# Patient Record
Sex: FEMALE | Race: WHITE | Employment: OTHER | URBAN - METROPOLITAN AREA
[De-identification: names, ages, dates, MRNs, and addresses within clinical notes are randomized per-mention and may not be internally consistent; named-entity substitution may affect disease eponyms.]

---

## 2017-01-03 ENCOUNTER — OFFICE VISIT (OUTPATIENT)
Dept: FAMILY MEDICINE | Facility: CLINIC | Age: 82
End: 2017-01-03
Payer: COMMERCIAL

## 2017-01-03 VITALS
HEART RATE: 65 BPM | WEIGHT: 122.25 LBS | SYSTOLIC BLOOD PRESSURE: 122 MMHG | OXYGEN SATURATION: 98 % | RESPIRATION RATE: 12 BRPM | DIASTOLIC BLOOD PRESSURE: 64 MMHG | HEIGHT: 62 IN | TEMPERATURE: 97.6 F | BODY MASS INDEX: 22.5 KG/M2

## 2017-01-03 DIAGNOSIS — N18.30 CKD (CHRONIC KIDNEY DISEASE) STAGE 3, GFR 30-59 ML/MIN (H): ICD-10-CM

## 2017-01-03 DIAGNOSIS — I10 HYPERTENSION GOAL BP (BLOOD PRESSURE) < 140/90: Primary | ICD-10-CM

## 2017-01-03 PROCEDURE — 80048 BASIC METABOLIC PNL TOTAL CA: CPT | Performed by: FAMILY MEDICINE

## 2017-01-03 PROCEDURE — 99214 OFFICE O/P EST MOD 30 MIN: CPT | Performed by: FAMILY MEDICINE

## 2017-01-03 PROCEDURE — 36415 COLL VENOUS BLD VENIPUNCTURE: CPT | Performed by: FAMILY MEDICINE

## 2017-01-03 RX ORDER — NIFEDIPINE 90 MG/1
90 TABLET, EXTENDED RELEASE ORAL DAILY
Qty: 90 TABLET | Refills: 1 | Status: SHIPPED
Start: 2017-01-03 | End: 2017-08-03

## 2017-01-03 RX ORDER — ATENOLOL 25 MG/1
75 TABLET ORAL DAILY
Qty: 270 TABLET | Refills: 1 | Status: SHIPPED
Start: 2017-01-03 | End: 2017-01-09

## 2017-01-03 NOTE — NURSING NOTE
"No chief complaint on file.      Initial /64 mmHg  Pulse 65  Temp(Src) 97.6  F (36.4  C) (Oral)  Resp 12  Ht 5' 1.5\" (1.562 m)  Wt 122 lb 4 oz (55.452 kg)  BMI 22.73 kg/m2  SpO2 98%  Breastfeeding? No Estimated body mass index is 22.73 kg/(m^2) as calculated from the following:    Height as of this encounter: 5' 1.5\" (1.562 m).    Weight as of this encounter: 122 lb 4 oz (55.452 kg).  BP completed using cuff size: regular  Alexis Abbott CMA   "

## 2017-01-03 NOTE — MR AVS SNAPSHOT
"              After Visit Summary   1/3/2017    Celestina Tejada    MRN: [id number]           Patient Information     Date Of Birth          4/2/1931        Visit Information        Provider Department      1/3/2017 1:20 PM Majo Alfaro MD [facility]        Today's Diagnoses     Hypertension goal BP (blood pressure) < 140/90    -  1     CKD (chronic kidney disease) stage 3, GFR 30-59 ml/min         Essential hypertension with goal blood pressure less than 140/90         Benign essential hypertension           Care Instructions    Schedule a follow up visit with me in three months        Follow-ups after your visit        Who to contact     If you have questions or need follow up information about today's clinic visit or your schedule please contact [facility] directly at [phone].  Normal or non-critical lab and imaging results will be communicated to you by MyChart, letter or phone within 4 business days after the clinic has received the results. If you do not hear from us within 7 days, please contact the clinic through MyChart or phone. If you have a critical or abnormal lab result, we will notify you by phone as soon as possible.  Submit refill requests through [employer] [employer] or call your pharmacy and they will forward the refill request to us. Please allow 3 business days for your refill to be completed.          Additional Information About Your Visit        MyChart Information     [employer] lets you send messages to your doctor, view your test results, renew your prescriptions, schedule appointments and more. To sign up, go to [email or web address] . Click on \"Log in\" on the left side of the screen, which will take you to the Welcome page. Then click on \"Sign up Now\" on the right side of the page.     You will be asked to enter the access code listed below, as well as some personal information. Please follow the directions to create your username and password.     Your access " "code is: [email or web address]  Expires: 3/20/2017  2:05 PM     Your access code will  in 90 days. If you need help or a new code, please call your [place] clinic or [phone].        Care EveryWhere ID     This is your Care EveryWhere ID. This could be used by other organizations to access your [place] medical records  [id number]        Your Vitals Were     Pulse Temperature Respirations Height BMI (Body Mass Index) Pulse Oximetry    65 97.6  F (36.4  C) (Oral) 12 5' 1.5\" (1.562 m) 22.73 kg/m2 98%    Breastfeeding?                   No            Blood Pressure from Last 3 Encounters:   17 122/64   16 168/70   16 162/71    Weight from Last 3 Encounters:   17 122 lb 4 oz (55.452 kg)   16 127 lb (57.607 kg)   16 113 lb (51.256 kg)              We Performed the Following     Basic metabolic panel  (Ca, Cl, CO2, Creat, Gluc, K, Na, BUN)          Where to get your medicines      These medications were sent to [facility]  [address], [facility] [address]     Phone:  [phone]    - atenolol 25 MG tablet  - NIFEdipine ER osmotic 90 MG 24 hr tablet       Primary Care Provider Office Phone # Fax #    Majo Alfaro [phone]       [facility] [address] [place], [place] [address]E [facility] [address]        Thank you!     Thank you for choosing [facility]  for your care. Our goal is always to provide you with excellent care. Hearing back from our patients is one way we can continue to improve our services. Please take a few minutes to complete the written survey that you may receive in the mail after your visit with us. Thank you!             Your Updated Medication List - Protect others around you: Learn how to safely use, store and throw away your medicines at www.disposemymeds.org.          This list is accurate as of: 1/3/17  1:45 PM.  Always use your most recent med list.                   Brand " Name Dispense Instructions for use    atenolol 25 MG tablet    TENORMIN    270 tablet    Take 3 tablets (75 mg) by mouth daily       atorvastatin 20 MG tablet    LIPITOR    90 tablet    Take 1 tablet (20 mg) by mouth daily       * HYDROcodone-acetaminophen 5-325 MG per tablet    NORCO    150 tablet    Take 1-2 tablets by mouth every 6 hours as needed for moderate to severe pain       * HYDROcodone-acetaminophen 5-325 MG per tablet    NORCO    150 tablet    Take 1-2 tablets by mouth every 6 hours as needed for moderate to severe pain       * HYDROcodone-acetaminophen 5-325 MG per tablet    NORCO    150 tablet    Take 1-2 tablets by mouth every 6 hours as needed for moderate to severe pain       hydroxychloroquine 200 MG tablet    PLAQUENIL     Take 200 mg by mouth daily       * lisinopril 10 MG tablet    PRINIVIL/ZESTRIL    90 tablet    Take 1 tablet (10 mg) by mouth daily       * lisinopril 20 MG tablet    PRINIVIL/ZESTRIL    90 tablet    Take 1 tablet (20 mg) by mouth daily       MULTIVITAMIN TABS   OR      1 tab daily       NIFEdipine ER osmotic 90 MG 24 hr tablet    PROCARDIA XL    90 tablet    Take 1 tablet (90 mg) by mouth daily Profile Rx: patient will contact pharmacy when needed       ondansetron 4 MG tablet    ZOFRAN    18 tablet    Take 1 tablet (4 mg) by mouth every 8 hours as needed for nausea       order for DME     1 each    Equipment being ordered: blood pressure cuff and monitor       tocilizumab 80 MG/4ML    ACTEMRA     Inject into the vein every 30 days       VITAMIN D3 PO      Take 2,000 Units by mouth daily       zoledronic Acid 5 MG/100ML Soln infusion    RECLAST    100 mL    Inject 5 mg into the vein Yearly       * Notice:  This list has 5 medication(s) that are the same as other medications prescribed for you. Read the directions carefully, and ask your doctor or other care provider to review them with you.

## 2017-01-03 NOTE — PROGRESS NOTES
"  SUBJECTIVE:                                                    Celestina Tejada is a 85 year old female who presents to clinic today for the following health issues:    Hypertension Follow-up      Outpatient blood pressures are not being checked.    Low Salt Diet: no added salt   She feels well on the increased lisinopril. Denies chest pain, SOB, lightheadedness. She continues to have lower energy levels but this is not new.    Chronic Kidney Disease Follow-up      Current NSAID use?  No      Amount of exercise or physical activity: shopping a couple times a week    Problems taking medications regularly: No    Medication side effects: none    Diet: regular (no restrictions)    Last visit plan: \"2. Hypertension goal BP (blood pressure) < 140/90  Uncontrolled. Will increase lisinopril, continue nifedipine and atenolol. F/u in 2 weeks. She had hyponatremia on hctz. BMP at her next visit  - lisinopril (PRINIVIL/ZESTRIL) 20 MG tablet; Take 1 tablet (20 mg) by mouth daily  Dispense: 30 tablet; Refill: 1\"    Problem list and histories reviewed & adjusted, as indicated.  Additional history: as documented    Patient Active Problem List   Diagnosis     Osteopenia     Hypertension goal BP (blood pressure) < 140/90     Rheumatoid arthritis (H)     CKD (chronic kidney disease) stage 3, GFR 30-59 ml/min     Hyperlipidemia with target LDL less than 100     Advanced directives, counseling/discussion     Osteoarthritis     Eczema     Peripheral edema     Hyponatremia with decreased serum osmolality     Acute hyponatremia     Chronic pain     Past Surgical History   Procedure Laterality Date     C appendectomy  1950     Surgical history of -        2 normal vaginal births       Social History   Substance Use Topics     Smoking status: Former Smoker -- 0.50 packs/day for 20 years     Types: Cigarettes     Quit date: 09/20/2000     Smokeless tobacco: Never Used     Alcohol Use: Yes      Comment: very occ,     Family History   Problem " Relation Age of Onset     HEART DISEASE Mother      CHF     Respiratory Mother      lung disease     C.A.D. Sister      heart by-pass     Arthritis Sister      Family History Negative Brother      Family History Negative Daughter      Family History Negative Daughter          Current Outpatient Prescriptions   Medication Sig Dispense Refill     HYDROcodone-acetaminophen (NORCO) 5-325 MG per tablet Take 1-2 tablets by mouth every 6 hours as needed for moderate to severe pain 150 tablet 0     HYDROcodone-acetaminophen (NORCO) 5-325 MG per tablet Take 1-2 tablets by mouth every 6 hours as needed for moderate to severe pain 150 tablet 0     HYDROcodone-acetaminophen (NORCO) 5-325 MG per tablet Take 1-2 tablets by mouth every 6 hours as needed for moderate to severe pain 150 tablet 0     lisinopril (PRINIVIL/ZESTRIL) 20 MG tablet Take 1 tablet (20 mg) by mouth daily 90 tablet 1     atorvastatin (LIPITOR) 20 MG tablet Take 1 tablet (20 mg) by mouth daily 90 tablet 1     lisinopril (PRINIVIL,ZESTRIL) 10 MG tablet Take 1 tablet (10 mg) by mouth daily 90 tablet 3     Cholecalciferol (VITAMIN D3 PO) Take 2,000 Units by mouth daily       ondansetron (ZOFRAN) 4 MG tablet Take 1 tablet (4 mg) by mouth every 8 hours as needed for nausea 18 tablet 0     hydroxychloroquine (PLAQUENIL) 200 MG tablet Take 200 mg by mouth daily   3     NIFEdipine ER osmotic (PROCARDIA XL) 90 MG 24 hr tablet Take 1 tablet (90 mg) by mouth daily Profile Rx: patient will contact pharmacy when needed 90 tablet 1     atenolol (TENORMIN) 25 MG tablet Take 3 tablets (75 mg) by mouth daily 270 tablet 1     zoledronic Acid (RECLAST) 5 MG/100ML SOLN Inject 5 mg into the vein Yearly 100 mL 0     order for DME Equipment being ordered: blood pressure cuff and monitor 1 each 0     tocilizumab (ACTEMRA) 80 MG/4ML Inject into the vein every 30 days        MULTIVITAMIN TABS   OR 1 tab daily  0     Allergies   Allergen Reactions     Hydrochlorothiazide       "hyponatremia     No Known Allergies      Recent Labs   Lab Test 11/17/16 09/27/16 08/29/16   1514  08/22/16   1327  08/18/16   1322   08/11/16   1520   07/14/16   1305   05/12/15   1028   LDL   --    --    --    --   49   --    --    --   81   --   78   HDL   --    --    --    --   68   --    --    --   64   --   60   TRIG   --    --    --    --   85   --    --    --   120   --   108   ALT  22  25   --    --    --    --   27   < >  21   < >   --    CR  1.130  1.2  1.37*  1.31*  1.66*   < >  1.75*   < >  1.41*   < >  1.27*   GFRESTIMATED  48.6  45.4  37*  39*  29*   < >  28*   < >  35*   < >  40*   GFRESTBLACK   --    --   44*  47*  35*   < >  33*   < >  43*   --   48*   POTASSIUM   --    --   4.3  4.3  4.7   < >  3.0*   < >  4.0   --   4.5   TSH   --    --    --    --    --    --   1.95   --    --    --    --     < > = values in this interval not displayed.      BP Readings from Last 3 Encounters:   01/03/17 122/64   12/20/16 168/70   09/19/16 162/71    Wt Readings from Last 3 Encounters:   01/03/17 55.452 kg (122 lb 4 oz)   12/20/16 57.607 kg (127 lb)   09/19/16 51.256 kg (113 lb)            ROS:  C: NEGATIVE for fever  R: NEGATIVE for SOB  CV: NEGATIVE for chest pain    This document serves as a record of the services and decisions personally performed and made by Majo Alfaro MD. It was created on her behalf by Tamela Almeida, a trained medical scribe. The creation of this document is based the provider's statements to the medical scribe.  Tamela Almeida January 3, 2017 1:41 PM    OBJECTIVE:                                                    /64 mmHg  Pulse 65  Temp(Src) 97.6  F (36.4  C) (Oral)  Resp 12  Ht 1.562 m (5' 1.5\")  Wt 55.452 kg (122 lb 4 oz)  BMI 22.73 kg/m2  SpO2 98%  Breastfeeding? No  Body mass index is 22.73 kg/(m^2).  GENERAL: healthy, alert and no distress  PSYCH: mentation appears normal, affect normal/bright    Diagnostic Test Results:  none      ASSESSMENT/PLAN:              "                                       1. Hypertension goal BP (blood pressure) < 140/90  Controlled. Last visit we increased lisinopril dose to 20 mg daily, which she tolerates well.  Blood pressure in clinic today is much better. Will continue lisinopril, nefedipine, and atenolol. BMP today. Follow up in 3 months.  - Basic metabolic panel  (Ca, Cl, CO2, Creat, Gluc, K, Na, BUN)    2. CKD (chronic kidney disease) stage 3, GFR 30-59 ml/min  Stable. Recheck BMP today given recent lisinopril increase.                Patient Instructions   Schedule a follow up visit with me in three months        Majo Alfaro MD  [facility]

## 2017-01-04 LAB
ANION GAP SERPL CALCULATED.3IONS-SCNC: 10 MMOL/L (ref 3–14)
BUN SERPL-MCNC: 23 MG/DL (ref 7–30)
CALCIUM SERPL-MCNC: 9.1 MG/DL (ref 8.5–10.1)
CHLORIDE SERPL-SCNC: 104 MMOL/L (ref 94–109)
CO2 SERPL-SCNC: 25 MMOL/L (ref 20–32)
CREAT SERPL-MCNC: 1.7 MG/DL (ref 0.52–1.04)
GFR SERPL CREATININE-BSD FRML MDRD: 29 ML/MIN/1.7M2
GLUCOSE SERPL-MCNC: 86 MG/DL (ref 70–99)
POTASSIUM SERPL-SCNC: 3.3 MMOL/L (ref 3.4–5.3)
SODIUM SERPL-SCNC: 139 MMOL/L (ref 133–144)

## 2017-01-05 ENCOUNTER — TELEPHONE (OUTPATIENT)
Dept: FAMILY MEDICINE | Facility: CLINIC | Age: 82
End: 2017-01-05

## 2017-01-05 DIAGNOSIS — R94.4 ABNORMAL RENAL FUNCTION TEST: ICD-10-CM

## 2017-01-05 DIAGNOSIS — E87.6 HYPOKALEMIA: ICD-10-CM

## 2017-01-05 DIAGNOSIS — E87.6 HYPOKALEMIA: Primary | ICD-10-CM

## 2017-01-05 PROCEDURE — 80048 BASIC METABOLIC PNL TOTAL CA: CPT | Performed by: FAMILY MEDICINE

## 2017-01-05 PROCEDURE — 36415 COLL VENOUS BLD VENIPUNCTURE: CPT | Performed by: FAMILY MEDICINE

## 2017-01-05 NOTE — TELEPHONE ENCOUNTER
Writer called patient and informed her of message per below from Dr. Alfaro.    Dr. Alfaro huddled with writer and stated potassium can also be affected by Reclast.    Patient informed.    Patient verbalized understanding and in agreement with plan.    Patient coming in to lab this morning to have labs rechecked.    DM Martino, ALANN, RN

## 2017-01-05 NOTE — TELEPHONE ENCOUNTER
Writer called patient and informed her of message per below from Dr. Alfaro.    Patient verbalized understanding and in agreement with plan.    Patient will have to call back to schedule lab only appt tomorrow as needs to arrange transportation.    Patient stated she received Reclast infusion last month and gets this infusion yearly.    Routing to PCP.    Dr. Alfaro-For your review.    Thank you!  ALAN McnallyN, RN

## 2017-01-05 NOTE — TELEPHONE ENCOUNTER
RN -- Please call Celestina to let her know that her potassium was a little bit low and her kidney function was lower than the last blood test. We had increased her lisinopril, which can affect the kidney function a bit (though ultimately meant to protect the kidneys), but that should not cause low potassium. Please also find out if she has had any other recent medication changes from her rheumatologist. I would like to recheck these tests today or tomorrow if possible, otherwise Monday. She should be well-hydrated at the time of the test and does not need to fast for the test. Majo Alfaro M.D.          Results for orders placed or performed in visit on 01/03/17   Basic metabolic panel  (Ca, Cl, CO2, Creat, Gluc, K, Na, BUN)   Result Value Ref Range    Sodium 139 133 - 144 mmol/L    Potassium 3.3 (L) 3.4 - 5.3 mmol/L    Chloride 104 94 - 109 mmol/L    Carbon Dioxide 25 20 - 32 mmol/L    Anion Gap 10 3 - 14 mmol/L    Glucose 86 70 - 99 mg/dL    Urea Nitrogen 23 7 - 30 mg/dL    Creatinine 1.70 (H) 0.52 - 1.04 mg/dL    GFR Estimate 29 (L) >60 mL/min/1.7m2    GFR Estimate If Black 35 (L) >60 mL/min/1.7m2    Calcium 9.1 8.5 - 10.1 mg/dL

## 2017-01-05 NOTE — TELEPHONE ENCOUNTER
Thanks! Renal insufficiency is a potential side effect of the reclast and renal dosing is recommended. This could be an effect of the reclast. Will plan on rechecking her renal panel as below and decide on management depending on results. Baseline creatinine estimate appears to be about 1.4. Majo Alfaro M.D.     Results for orders placed or performed in visit on 01/03/17   Basic metabolic panel  (Ca, Cl, CO2, Creat, Gluc, K, Na, BUN)   Result Value Ref Range    Sodium 139 133 - 144 mmol/L    Potassium 3.3 (L) 3.4 - 5.3 mmol/L    Chloride 104 94 - 109 mmol/L    Carbon Dioxide 25 20 - 32 mmol/L    Anion Gap 10 3 - 14 mmol/L    Glucose 86 70 - 99 mg/dL    Urea Nitrogen 23 7 - 30 mg/dL    Creatinine 1.70 (H) 0.52 - 1.04 mg/dL    GFR Estimate 29 (L) >60 mL/min/1.7m2    GFR Estimate If Black 35 (L) >60 mL/min/1.7m2    Calcium 9.1 8.5 - 10.1 mg/dL

## 2017-01-06 LAB
ANION GAP SERPL CALCULATED.3IONS-SCNC: 10 MMOL/L (ref 3–14)
BUN SERPL-MCNC: 24 MG/DL (ref 7–30)
CALCIUM SERPL-MCNC: 9.1 MG/DL (ref 8.5–10.1)
CHLORIDE SERPL-SCNC: 104 MMOL/L (ref 94–109)
CO2 SERPL-SCNC: 24 MMOL/L (ref 20–32)
CREAT SERPL-MCNC: 1.59 MG/DL (ref 0.52–1.04)
GFR SERPL CREATININE-BSD FRML MDRD: 31 ML/MIN/1.7M2
GLUCOSE SERPL-MCNC: 87 MG/DL (ref 70–99)
POTASSIUM SERPL-SCNC: 3.4 MMOL/L (ref 3.4–5.3)
SODIUM SERPL-SCNC: 138 MMOL/L (ref 133–144)

## 2017-01-09 ENCOUNTER — TELEPHONE (OUTPATIENT)
Dept: FAMILY MEDICINE | Facility: CLINIC | Age: 82
End: 2017-01-09

## 2017-01-09 DIAGNOSIS — I10 HYPERTENSION GOAL BP (BLOOD PRESSURE) < 140/90: Primary | ICD-10-CM

## 2017-01-09 NOTE — TELEPHONE ENCOUNTER
Requesting 90 day supply  atenolol      Last Written Prescription Date: 1/3/17  Last Fill Quantity: 270, # refills: 1    Last Office Visit with [facility], [facility] or [facility] prescribing provider:  1/3/17   Future Office Visit:        BP Readings from Last 3 Encounters:   01/03/17 122/64   12/20/16 168/70   09/19/16 162/71

## 2017-01-11 RX ORDER — ATENOLOL 25 MG/1
75 TABLET ORAL DAILY
Qty: 270 TABLET | Refills: 1 | Status: SHIPPED
Start: 2017-01-11 | End: 2017-07-04

## 2017-01-11 NOTE — TELEPHONE ENCOUNTER
New prescription was recently sent - [facility] requesting additional refills    Ivan Mcwilliams RN

## 2017-03-13 ENCOUNTER — TRANSFERRED RECORDS (OUTPATIENT)
Dept: HEALTH INFORMATION MANAGEMENT | Facility: CLINIC | Age: 82
End: 2017-03-13

## 2017-03-13 LAB — AST SERPL-CCNC: 22 U/L (ref 5–34)

## 2017-03-20 DIAGNOSIS — E78.5 HYPERLIPIDEMIA WITH TARGET LDL LESS THAN 100: ICD-10-CM

## 2017-03-20 NOTE — TELEPHONE ENCOUNTER
Medication Detail      Disp Refills Start End ALECIA   atorvastatin (LIPITOR) 20 MG tablet 90 tablet 1 9/19/2016  No   Sig: Take 1 tablet (20 mg) by mouth daily       Last Office Visit with [facility], [facility] or [facility] prescribing provider: 1/3/17  Next 5 appointments (look out 90 days)     Mar 21, 2017 10:20 AM CDT   SHORT with Majo Alfaro MD   [facility] ([facility])    [address] [place] [id number] [phone]                   Lab Results   Component Value Date    CHOL 134 08/18/2016     Lab Results   Component Value Date    HDL 68 08/18/2016     Lab Results   Component Value Date    LDL 49 08/18/2016     Lab Results   Component Value Date    TRIG 85 08/18/2016     Lab Results   Component Value Date    CHOLHDLRATIO 2.7 05/12/2015

## 2017-03-21 ENCOUNTER — OFFICE VISIT (OUTPATIENT)
Dept: FAMILY MEDICINE | Facility: CLINIC | Age: 82
End: 2017-03-21
Payer: COMMERCIAL

## 2017-03-21 VITALS
TEMPERATURE: 98.1 F | BODY MASS INDEX: 23.24 KG/M2 | HEART RATE: 68 BPM | RESPIRATION RATE: 12 BRPM | DIASTOLIC BLOOD PRESSURE: 70 MMHG | WEIGHT: 125 LBS | SYSTOLIC BLOOD PRESSURE: 138 MMHG | OXYGEN SATURATION: 96 %

## 2017-03-21 DIAGNOSIS — M85.80 OSTEOPENIA: ICD-10-CM

## 2017-03-21 DIAGNOSIS — M19.90 OSTEOARTHRITIS, UNSPECIFIED OSTEOARTHRITIS TYPE, UNSPECIFIED SITE: ICD-10-CM

## 2017-03-21 DIAGNOSIS — N18.30 CKD (CHRONIC KIDNEY DISEASE) STAGE 3, GFR 30-59 ML/MIN (H): ICD-10-CM

## 2017-03-21 DIAGNOSIS — I10 HYPERTENSION GOAL BP (BLOOD PRESSURE) < 140/90: ICD-10-CM

## 2017-03-21 DIAGNOSIS — G89.29 OTHER CHRONIC PAIN: Primary | ICD-10-CM

## 2017-03-21 DIAGNOSIS — M06.9 RHEUMATOID ARTHRITIS, INVOLVING UNSPECIFIED SITE, UNSPECIFIED RHEUMATOID FACTOR PRESENCE: ICD-10-CM

## 2017-03-21 PROCEDURE — 99214 OFFICE O/P EST MOD 30 MIN: CPT | Performed by: FAMILY MEDICINE

## 2017-03-21 RX ORDER — HYDROCODONE BITARTRATE AND ACETAMINOPHEN 5; 325 MG/1; MG/1
1-2 TABLET ORAL EVERY 6 HOURS PRN
Qty: 150 TABLET | Refills: 0 | Status: SHIPPED | OUTPATIENT
Start: 2017-03-21 | End: 2017-06-06

## 2017-03-21 RX ORDER — HYDROCODONE BITARTRATE AND ACETAMINOPHEN 5; 325 MG/1; MG/1
1-2 TABLET ORAL EVERY 6 HOURS PRN
Qty: 150 TABLET | Refills: 0 | Status: SHIPPED | OUTPATIENT
Start: 2017-05-19 | End: 2017-06-06

## 2017-03-21 RX ORDER — ATORVASTATIN CALCIUM 20 MG/1
TABLET, FILM COATED ORAL
Qty: 90 TABLET | Refills: 0 | Status: SHIPPED | OUTPATIENT
Start: 2017-03-21 | End: 2017-06-06

## 2017-03-21 RX ORDER — HYDROCODONE BITARTRATE AND ACETAMINOPHEN 5; 325 MG/1; MG/1
1-2 TABLET ORAL EVERY 6 HOURS PRN
Qty: 150 TABLET | Refills: 0 | Status: SHIPPED | OUTPATIENT
Start: 2017-04-21 | End: 2017-06-06

## 2017-03-21 NOTE — PROGRESS NOTES
"  SUBJECTIVE:                                                    Celestina Tejada is a 85 year old female who presents to clinic today for the following health issues:    Hypertension Follow-up      Outpatient blood pressures are being checked at home.      Low Salt Diet: not monitoring salt     Chronic Kidney Disease Follow-up    Current NSAID use?  No    Amount of exercise or physical activity: 6-7 days/week for an average of greater than 60 minutes    Problems taking medications regularly: No    Medication side effects: none    Diet: regular (no restrictions)    Clinic on 1/3/17:   \"1. Hypertension goal BP (blood pressure) < 140/90  Controlled. Last visit we increased lisinopril dose to 20 mg daily, which she tolerates well. Blood pressure in clinic today is much better. Will continue lisinopril, nefedipine, and atenolol. BMP today. Follow up in 3 months.  2. CKD (chronic kidney disease) stage 3, GFR 30-59 ml/min  Stable. Recheck BMP today given recent lisinopril increase.\"      She is feeling well.   Occasionally she feels stiff which prevents her from wanting to do things but otherwise things are good.  Still doing Reclast yearly- next is in December.      Problem list and histories reviewed & adjusted, as indicated.  Additional history: as documented    Patient Active Problem List   Diagnosis     Osteopenia     Hypertension goal BP (blood pressure) < 140/90     Rheumatoid arthritis (H)     CKD (chronic kidney disease) stage 3, GFR 30-59 ml/min     Hyperlipidemia with target LDL less than 100     Advanced directives, counseling/discussion     Osteoarthritis     Eczema     Peripheral edema     Hyponatremia with decreased serum osmolality     Acute hyponatremia     Chronic pain     Past Surgical History   Procedure Laterality Date     C appendectomy  1950     Surgical history of -        2 normal vaginal births       Social History   Substance Use Topics     Smoking status: Former Smoker     Packs/day: 0.50     " Years: 20.00     Types: Cigarettes     Quit date: 9/20/2000     Smokeless tobacco: Never Used     Alcohol use Yes      Comment: very occ,     Family History   Problem Relation Age of Onset     HEART DISEASE Mother      CHF     Respiratory Mother      lung disease     C.A.D. Sister      heart by-pass     Arthritis Sister      Family History Negative Brother      Family History Negative Daughter      Family History Negative Daughter          Current Outpatient Prescriptions   Medication Sig Dispense Refill     atorvastatin (LIPITOR) 20 MG tablet TAKE 1 TABLET DAILY 90 tablet 0     atenolol (TENORMIN) 25 MG tablet Take 3 tablets (75 mg) by mouth daily 270 tablet 1     NIFEdipine ER osmotic (PROCARDIA XL) 90 MG 24 hr tablet Take 1 tablet (90 mg) by mouth daily Profile Rx: patient will contact pharmacy when needed 90 tablet 1     HYDROcodone-acetaminophen (NORCO) 5-325 MG per tablet Take 1-2 tablets by mouth every 6 hours as needed for moderate to severe pain 150 tablet 0     HYDROcodone-acetaminophen (NORCO) 5-325 MG per tablet Take 1-2 tablets by mouth every 6 hours as needed for moderate to severe pain 150 tablet 0     HYDROcodone-acetaminophen (NORCO) 5-325 MG per tablet Take 1-2 tablets by mouth every 6 hours as needed for moderate to severe pain 150 tablet 0     lisinopril (PRINIVIL/ZESTRIL) 20 MG tablet Take 1 tablet (20 mg) by mouth daily 90 tablet 1     Cholecalciferol (VITAMIN D3 PO) Take 2,000 Units by mouth daily       hydroxychloroquine (PLAQUENIL) 200 MG tablet Take 200 mg by mouth daily   3     zoledronic Acid (RECLAST) 5 MG/100ML SOLN Inject 5 mg into the vein Yearly 100 mL 0     order for DME Equipment being ordered: blood pressure cuff and monitor 1 each 0     tocilizumab (ACTEMRA) 80 MG/4ML Inject into the vein every 30 days        MULTIVITAMIN TABS   OR 1 tab daily  0     Allergies   Allergen Reactions     Hydrochlorothiazide      hyponatremia     No Known Allergies      Recent Labs   Lab Test   01/05/17   1054  01/03/17   1351 11/17/16 09/27/16 08/18/16   1322   08/11/16   1520   07/14/16   1305   05/12/15   1028   LDL   --    --    --    --    --   49   --    --    --   81   --   78   HDL   --    --    --    --    --   68   --    --    --   64   --   60   TRIG   --    --    --    --    --   85   --    --    --   120   --   108   ALT   --    --   22  25   --    --    --   27   < >  21   < >   --    CR  1.59*  1.70*  1.130  1.2   < >  1.66*   < >  1.75*   < >  1.41*   < >  1.27*   GFRESTIMATED  31*  29*  48.6  45.4   < >  29*   < >  28*   < >  35*   < >  40*   GFRESTBLACK  37*  35*   --    --    < >  35*   < >  33*   < >  43*   --   48*   POTASSIUM  3.4  3.3*   --    --    < >  4.7   < >  3.0*   < >  4.0   --   4.5   TSH   --    --    --    --    --    --    --   1.95   --    --    --    --     < > = values in this interval not displayed.      BP Readings from Last 3 Encounters:   03/21/17 138/70   01/03/17 122/64   12/20/16 168/70    Wt Readings from Last 3 Encounters:   03/21/17 56.7 kg (125 lb)   01/03/17 55.5 kg (122 lb 4 oz)   12/20/16 57.6 kg (127 lb)         Reviewed and updated as needed this visit by clinical staff  Reviewed and updated as needed this visit by Provider    ROS:  See above.    This document serves as a record of the services and decisions personally performed and made by Majo Alfaro MD. It was created on his/her behalf by Sarah Priest, trained medical scribe. The creation of this document is based the provider's statements to the medical scribes.    Scribe Sarah Priest, March 21, 2017  OBJECTIVE:                                                    /70  Pulse 68  Temp 98.1  F (36.7  C) (Oral)  Resp 12  Wt 56.7 kg (125 lb)  SpO2 96%  BMI 23.24 kg/m2  Body mass index is 23.24 kg/(m^2).  GENERAL: healthy, alert and no distress    Diagnostic Test Results:  none      ASSESSMENT/PLAN:                                                    1. Other chronic pain  Well controlled  refills for norco given today and f/u planned in 3 months     2. CKD (chronic kidney disease) stage 3, GFR 30-59 ml/min  Improved from last visit. Will scan labs.     3. Rheumatoid arthritis, involving unspecified site, unspecified rheumatoid factor presence (H)  Stable, followed by Rheumatology . Labs were just done with her and will be sent here. I reviewed her copy of labs today and her kidney function has improved    4. Hypertension goal BP (blood pressure) < 140/90  Controlled. No changes today     5. Osteopenia  Reclast injections yearly with Rheum     6. Osteoarthritis, unspecified osteoarthritis type, unspecified site  Pain in well controlled. Continues on hydrocodone.  - HYDROcodone-acetaminophen (NORCO) 5-325 MG per tablet; Take 1-2 tablets by mouth every 6 hours as needed for moderate to severe pain  Dispense: 150 tablet; Refill: 0  - HYDROcodone-acetaminophen (NORCO) 5-325 MG per tablet; Take 1-2 tablets by mouth every 6 hours as needed for moderate to severe pain  Dispense: 150 tablet; Refill: 0  - HYDROcodone-acetaminophen (NORCO) 5-325 MG per tablet; Take 1-2 tablets by mouth every 6 hours as needed for moderate to severe pain  Dispense: 150 tablet; Refill: 0       The information in this document, created by the medical scribe for me, accurately reflects the services I personally performed and the decisions made by me. I have reviewed and approved this document for accuracy prior to leaving the patient care area. 03/21/17    Majo Alfaro MD  [facility]

## 2017-03-21 NOTE — MR AVS SNAPSHOT
After Visit Summary   3/21/2017    Celestina Tejada    MRN: [id number]           Patient Information     Date Of Birth          4/2/1931        Visit Information        Provider Department      3/21/2017 10:20 AM Majo Alfaro MD [facility]        Today's Diagnoses     Other chronic pain    -  1    CKD (chronic kidney disease) stage 3, GFR 30-59 ml/min        Rheumatoid arthritis, involving unspecified site, unspecified rheumatoid factor presence (H)        Hypertension goal BP (blood pressure) < 140/90        Osteopenia        Osteoarthritis, unspecified osteoarthritis type, unspecified site           Follow-ups after your visit        Your next 10 appointments already scheduled     Jun 06, 2017 11:20 AM CDT   Office Visit with Majo Alfaro MD   [facility]    [address] [place] [id number] [phone]           Bring a current list of meds and any records pertaining to this visit.  For Physicals, please bring immunization records and any forms needing to be filled out.  Please arrive 10 minutes early to complete paperwork.              Who to contact     If you have questions or need follow up information about today's clinic visit or your schedule please contact [facility] directly at [phone].  Normal or non-critical lab and imaging results will be communicated to you by MyChart, letter or phone within 4 business days after the clinic has received the results. If you do not hear from us within 7 days, please contact the clinic through MyChart or phone. If you have a critical or abnormal lab result, we will notify you by phone as soon as possible.  Submit refill requests through [employer] or call your pharmacy and they will forward the refill request to us. Please allow 3 business days for your refill to be completed.          Additional Information About Your Visit        MyChart Information      "[employer] lets you send messages to your doctor, view your test results, renew your prescriptions, schedule appointments and more. To sign up, go to [email or web address] . Click on \"Log in\" on the left side of the screen, which will take you to the Welcome page. Then click on \"Sign up Now\" on the right side of the page.     You will be asked to enter the access code listed below, as well as some personal information. Please follow the directions to create your username and password.     Your access code is: [email or web address]  Expires: 2017 11:27 AM     Your access code will  in 90 days. If you need help or a new code, please call your [place] clinic or [phone].        Care EveryWhere ID     This is your [facility] EveryWhere ID. This could be used by other organizations to access your [place] medical records  [id number]        Your Vitals Were     Pulse Temperature Respirations Pulse Oximetry BMI (Body Mass Index)       68 98.1  F (36.7  C) (Oral) 12 96% 23.24 kg/m2        Blood Pressure from Last 3 Encounters:   17 138/70   17 122/64   16 168/70    Weight from Last 3 Encounters:   17 125 lb (56.7 kg)   17 122 lb 4 oz (55.5 kg)   16 127 lb (57.6 kg)              Today, you had the following     No orders found for display         Where to get your medicines      Some of these will need a paper prescription and others can be bought over the counter.  Ask your nurse if you have questions.     Bring a paper prescription for each of these medications     HYDROcodone-acetaminophen 5-325 MG per tablet    HYDROcodone-acetaminophen 5-325 MG per tablet    HYDROcodone-acetaminophen 5-325 MG per tablet          Primary Care Provider Office Phone # Fax #    Majo Alfaro [phone]       [facility] [address]  [facility] [address]        Thank you!     Thank you for choosing [facility]  for your care. Our goal is always to provide you " with excellent care. Hearing back from our patients is one way we can continue to improve our services. Please take a few minutes to complete the written survey that you may receive in the mail after your visit with us. Thank you!             Your Updated Medication List - Protect others around you: Learn how to safely use, store and throw away your medicines at www.disposemymeds.org.          This list is accurate as of: 3/21/17 11:27 AM.  Always use your most recent med list.                   Brand Name Dispense Instructions for use    atenolol 25 MG tablet    TENORMIN    270 tablet    Take 3 tablets (75 mg) by mouth daily       atorvastatin 20 MG tablet    LIPITOR    90 tablet    TAKE 1 TABLET DAILY       * HYDROcodone-acetaminophen 5-325 MG per tablet    NORCO    150 tablet    Take 1-2 tablets by mouth every 6 hours as needed for moderate to severe pain       * HYDROcodone-acetaminophen 5-325 MG per tablet   Start taking on:  4/21/2017    NORCO    150 tablet    Take 1-2 tablets by mouth every 6 hours as needed for moderate to severe pain       * HYDROcodone-acetaminophen 5-325 MG per tablet   Start taking on:  5/19/2017    NORCO    150 tablet    Take 1-2 tablets by mouth every 6 hours as needed for moderate to severe pain       hydroxychloroquine 200 MG tablet    PLAQUENIL     Take 200 mg by mouth daily       lisinopril 20 MG tablet    PRINIVIL/ZESTRIL    90 tablet    Take 1 tablet (20 mg) by mouth daily       MULTIVITAMIN TABS   OR      1 tab daily       NIFEdipine ER osmotic 90 MG 24 hr tablet    PROCARDIA XL    90 tablet    Take 1 tablet (90 mg) by mouth daily Profile Rx: patient will contact pharmacy when needed       order for DME     1 each    Equipment being ordered: blood pressure cuff and monitor       tocilizumab 80 MG/4ML    ACTEMRA     Inject into the vein every 30 days       VITAMIN D3 PO      Take 2,000 Units by mouth daily       zoledronic Acid 5 MG/100ML Soln infusion    RECLAST    100 mL     Inject 5 mg into the vein Yearly       * Notice:  This list has 3 medication(s) that are the same as other medications prescribed for you. Read the directions carefully, and ask your doctor or other care provider to review them with you.

## 2017-03-21 NOTE — NURSING NOTE
"Chief Complaint   Patient presents with     Recheck Medication       Initial /70  Pulse 68  Temp 98.1  F (36.7  C) (Oral)  Resp 12  Wt 125 lb (56.7 kg)  SpO2 96%  BMI 23.24 kg/m2 Estimated body mass index is 23.24 kg/(m^2) as calculated from the following:    Height as of 1/3/17: 5' 1.5\" (1.562 m).    Weight as of this encounter: 125 lb (56.7 kg).  Medication Reconciliation: complete       Genny Kelly MA     "

## 2017-03-31 LAB
ALT SERPL-CCNC: 17 IU/L (ref 5–35)
CREAT SERPL-MCNC: 1.04 MG/DL (ref 0.5–1.3)
GFR SERPL CREATININE-BSD FRML MDRD: 53.5 ML/MIN/1.73M2

## 2017-05-29 DIAGNOSIS — I10 HYPERTENSION GOAL BP (BLOOD PRESSURE) < 140/90: ICD-10-CM

## 2017-05-30 NOTE — TELEPHONE ENCOUNTER
Lisinopril      Last Written Prescription Date: 12/20/16  Last Fill Quantity: 90, # refills: 1  Last Office Visit with G, P or [facility] prescribing provider: 3/21/17  betito alcazar    Next 5 appointments (look out 90 days)     Jun 06, 2017 11:20 AM CDT   Office Visit with Majo Alfaro MD   [facility] ([facility])    [facility] [address] [place] [address] [phone]                   Potassium   Date Value Ref Range Status   01/05/2017 3.4 3.4 - 5.3 mmol/L Final     Creatinine   Date Value Ref Range Status   03/31/2017 1.040 0.500 - 1.300 mg/dL Final     BP Readings from Last 3 Encounters:   03/21/17 138/70   01/03/17 122/64   12/20/16 168/70

## 2017-05-31 RX ORDER — LISINOPRIL 20 MG/1
TABLET ORAL
Qty: 90 TABLET | Refills: 2 | Status: SHIPPED | OUTPATIENT
Start: 2017-05-31 | End: 2017-11-20

## 2017-05-31 NOTE — TELEPHONE ENCOUNTER
HTN last addressed 3/21/2017    Prescription approved per [facility] Refill Protocol.    Signed Prescriptions:                        Disp   Refills    lisinopril (PRINIVIL/ZESTRIL) 20 MG tablet 90 tab*2        Sig: TAKE 1 TABLET DAILY (HYPERTENSION GOAL BLOOD PRESSURE           BELOW 140/90)  Authorizing Provider: PRAMOD WONG  Ordering User: TONY ACE      Closing encounter - no further actions needed at this time  Tony Ace RN

## 2017-06-06 ENCOUNTER — OFFICE VISIT (OUTPATIENT)
Dept: FAMILY MEDICINE | Facility: CLINIC | Age: 82
End: 2017-06-06
Payer: COMMERCIAL

## 2017-06-06 VITALS
OXYGEN SATURATION: 97 % | RESPIRATION RATE: 14 BRPM | TEMPERATURE: 98.1 F | BODY MASS INDEX: 23.61 KG/M2 | HEART RATE: 70 BPM | SYSTOLIC BLOOD PRESSURE: 137 MMHG | DIASTOLIC BLOOD PRESSURE: 65 MMHG | WEIGHT: 127 LBS

## 2017-06-06 DIAGNOSIS — M19.90 OSTEOARTHRITIS, UNSPECIFIED OSTEOARTHRITIS TYPE, UNSPECIFIED SITE: ICD-10-CM

## 2017-06-06 DIAGNOSIS — E78.5 HYPERLIPIDEMIA WITH TARGET LDL LESS THAN 100: ICD-10-CM

## 2017-06-06 DIAGNOSIS — N18.30 CKD (CHRONIC KIDNEY DISEASE) STAGE 3, GFR 30-59 ML/MIN (H): ICD-10-CM

## 2017-06-06 DIAGNOSIS — G89.29 OTHER CHRONIC PAIN: Primary | ICD-10-CM

## 2017-06-06 DIAGNOSIS — I10 HYPERTENSION GOAL BP (BLOOD PRESSURE) < 140/90: ICD-10-CM

## 2017-06-06 DIAGNOSIS — M06.9 RHEUMATOID ARTHRITIS, INVOLVING UNSPECIFIED SITE, UNSPECIFIED RHEUMATOID FACTOR PRESENCE: ICD-10-CM

## 2017-06-06 DIAGNOSIS — R60.0 PERIPHERAL EDEMA: ICD-10-CM

## 2017-06-06 LAB
ANION GAP SERPL CALCULATED.3IONS-SCNC: 9 MMOL/L (ref 3–14)
BUN SERPL-MCNC: 22 MG/DL (ref 7–30)
CALCIUM SERPL-MCNC: 9 MG/DL (ref 8.5–10.1)
CHLORIDE SERPL-SCNC: 106 MMOL/L (ref 94–109)
CO2 SERPL-SCNC: 24 MMOL/L (ref 20–32)
CREAT SERPL-MCNC: 1.13 MG/DL (ref 0.52–1.04)
CREAT UR-MCNC: 64 MG/DL
GFR SERPL CREATININE-BSD FRML MDRD: 46 ML/MIN/1.7M2
GLUCOSE SERPL-MCNC: 86 MG/DL (ref 70–99)
MICROALBUMIN UR-MCNC: 22 MG/L
MICROALBUMIN/CREAT UR: 34.53 MG/G CR (ref 0–25)
POTASSIUM SERPL-SCNC: 4.6 MMOL/L (ref 3.4–5.3)
SODIUM SERPL-SCNC: 139 MMOL/L (ref 133–144)

## 2017-06-06 PROCEDURE — 99000 SPECIMEN HANDLING OFFICE-LAB: CPT | Performed by: FAMILY MEDICINE

## 2017-06-06 PROCEDURE — 80048 BASIC METABOLIC PNL TOTAL CA: CPT | Performed by: FAMILY MEDICINE

## 2017-06-06 PROCEDURE — 80307 DRUG TEST PRSMV CHEM ANLYZR: CPT | Mod: 90 | Performed by: FAMILY MEDICINE

## 2017-06-06 PROCEDURE — 36415 COLL VENOUS BLD VENIPUNCTURE: CPT | Performed by: FAMILY MEDICINE

## 2017-06-06 PROCEDURE — 82043 UR ALBUMIN QUANTITATIVE: CPT | Performed by: FAMILY MEDICINE

## 2017-06-06 PROCEDURE — 99214 OFFICE O/P EST MOD 30 MIN: CPT | Performed by: FAMILY MEDICINE

## 2017-06-06 RX ORDER — HYDROCODONE BITARTRATE AND ACETAMINOPHEN 5; 325 MG/1; MG/1
1-2 TABLET ORAL EVERY 6 HOURS PRN
Qty: 150 TABLET | Refills: 0 | Status: SHIPPED | OUTPATIENT
Start: 2017-06-21 | End: 2017-09-12

## 2017-06-06 RX ORDER — HYDROCODONE BITARTRATE AND ACETAMINOPHEN 5; 325 MG/1; MG/1
1-2 TABLET ORAL EVERY 6 HOURS PRN
Qty: 150 TABLET | Refills: 0 | Status: SHIPPED | OUTPATIENT
Start: 2017-09-21 | End: 2017-06-06

## 2017-06-06 RX ORDER — HYDROCODONE BITARTRATE AND ACETAMINOPHEN 5; 325 MG/1; MG/1
1-2 TABLET ORAL EVERY 6 HOURS PRN
Qty: 150 TABLET | Refills: 0 | Status: SHIPPED | OUTPATIENT
Start: 2017-08-21 | End: 2017-09-12

## 2017-06-06 RX ORDER — ATORVASTATIN CALCIUM 20 MG/1
20 TABLET, FILM COATED ORAL DAILY
Qty: 90 TABLET | Refills: 3 | Status: SHIPPED | OUTPATIENT
Start: 2017-06-06 | End: 2017-06-18

## 2017-06-06 RX ORDER — HYDROCODONE BITARTRATE AND ACETAMINOPHEN 5; 325 MG/1; MG/1
1-2 TABLET ORAL EVERY 6 HOURS PRN
Qty: 150 TABLET | Refills: 0 | Status: SHIPPED | OUTPATIENT
Start: 2017-07-21 | End: 2017-06-06

## 2017-06-06 NOTE — MR AVS SNAPSHOT
After Visit Summary   6/6/2017    Celestina Tejada    MRN: [id number]           Patient Information     Date Of Birth          4/2/1931        Visit Information        Provider Department      6/6/2017 11:20 AM Majo Alfaro MD [facility]        Today's Diagnoses     Other chronic pain    -  1    Hypertension goal BP (blood pressure) < 140/90        CKD (chronic kidney disease) stage 3, GFR 30-59 ml/min        Rheumatoid arthritis, involving unspecified site, unspecified rheumatoid factor presence (H)        Osteoarthritis, unspecified osteoarthritis type, unspecified site        Hyperlipidemia with target LDL less than 100        Peripheral edema          Care Instructions    1. If you notice any new chest pain, shortness of breath, increased fatigue, are using more pillows to sleep on at night, waking at night feeling like you can't breath, feel restlessness at night, or notice abnormal/sudden weight gain, let me know and we will do an echocardiogram.  2. See if you notice more swelling after your injections.  3. Start checking your blood pressure at home for a couple days, make sure it's <140/90.  4. Follow up in three months.            Follow-ups after your visit        Who to contact     If you have questions or need follow up information about today's clinic visit or your schedule please contact [facility] directly at [phone].  Normal or non-critical lab and imaging results will be communicated to you by MyChart, letter or phone within 4 business days after the clinic has received the results. If you do not hear from us within 7 days, please contact the clinic through MyChart or phone. If you have a critical or abnormal lab result, we will notify you by phone as soon as possible.  Submit refill requests through [employer] or call your pharmacy and they will forward the refill request to us. Please allow 3 business days for your refill to be completed.           "Additional Information About Your Visit        MyChart Information     [employer] lets you send messages to your doctor, view your test results, renew your prescriptions, schedule appointments and more. To sign up, go to [email or web address] . Click on \"Log in\" on the left side of the screen, which will take you to the Welcome page. Then click on \"Sign up Now\" on the right side of the page.     You will be asked to enter the access code listed below, as well as some personal information. Please follow the directions to create your username and password.     Your access code is: [email or web address]  Expires: 2017 11:27 AM     Your access code will  in 90 days. If you need help or a new code, please call your [place] clinic or [phone].        Care EveryWhere ID     This is your Care EveryWhere ID. This could be used by other organizations to access your [place] medical records  [id number]        Your Vitals Were     Pulse Temperature Respirations Pulse Oximetry BMI (Body Mass Index)       70 98.1  F (36.7  C) (Oral) 14 97% 23.61 kg/m2        Blood Pressure from Last 3 Encounters:   17 137/65   17 138/70   17 122/64    Weight from Last 3 Encounters:   17 127 lb (57.6 kg)   17 125 lb (56.7 kg)   17 122 lb 4 oz (55.5 kg)              We Performed the Following     Albumin Random Urine Quantitative     Basic metabolic panel  (Ca, Cl, CO2, Creat, Gluc, K, Na, BUN)     Drug  Screen Comprehensive , Urine with Reported Meds (MedTox) (Pain Care Package)          Today's Medication Changes          These changes are accurate as of: 17  2:27 PM.  If you have any questions, ask your nurse or doctor.               These medicines have changed or have updated prescriptions.        Dose/Directions    atorvastatin 20 MG tablet   Commonly known as:  LIPITOR   This may have changed:  See the new instructions.   Used for:  Hyperlipidemia with target LDL less than 100   Changed by:  " Majo Alfaro MD        Dose:  20 mg   Take 1 tablet (20 mg) by mouth daily   Quantity:  90 tablet   Refills:  3            Where to get your medicines      These medications were sent to [id number] - [place], [place] - [address] AT [facility] [address], [facility] [address]    Hours:  24-hours Phone:  [phone]     atorvastatin 20 MG tablet         Some of these will need a paper prescription and others can be bought over the counter.  Ask your nurse if you have questions.     Bring a paper prescription for each of these medications     HYDROcodone-acetaminophen 5-325 MG per tablet    HYDROcodone-acetaminophen 5-325 MG per tablet                Primary Care Provider Office Phone # Fax #    Majo Alfaro [phone]       [facility] [address]  [facility] [address]        Thank you!     Thank you for choosing [facility]  for your care. Our goal is always to provide you with excellent care. Hearing back from our patients is one way we can continue to improve our services. Please take a few minutes to complete the written survey that you may receive in the mail after your visit with us. Thank you!             Your Updated Medication List - Protect others around you: Learn how to safely use, store and throw away your medicines at www.disposemymeds.org.          This list is accurate as of: 6/6/17  2:27 PM.  Always use your most recent med list.                   Brand Name Dispense Instructions for use    atenolol 25 MG tablet    TENORMIN    270 tablet    Take 3 tablets (75 mg) by mouth daily       atorvastatin 20 MG tablet    LIPITOR    90 tablet    Take 1 tablet (20 mg) by mouth daily       * HYDROcodone-acetaminophen 5-325 MG per tablet   Start taking on:  6/21/2017    NORCO    150 tablet    Take 1-2 tablets by mouth every 6 hours as needed for moderate to severe pain       * HYDROcodone-acetaminophen 5-325 MG per  tablet   Start taking on:  8/21/2017    NORCO    150 tablet    Take 1-2 tablets by mouth every 6 hours as needed for moderate to severe pain       hydroxychloroquine 200 MG tablet    PLAQUENIL     Take 200 mg by mouth daily       lisinopril 20 MG tablet    PRINIVIL/ZESTRIL    90 tablet    TAKE 1 TABLET DAILY (HYPERTENSION GOAL BLOOD PRESSURE BELOW 140/90)       MULTIVITAMIN TABS   OR      1 tab daily       NIFEdipine ER osmotic 90 MG 24 hr tablet    PROCARDIA XL    90 tablet    Take 1 tablet (90 mg) by mouth daily Profile Rx: patient will contact pharmacy when needed       order for DME     1 each    Equipment being ordered: blood pressure cuff and monitor       tocilizumab 80 MG/4ML    ACTEMRA     Inject into the vein every 30 days       VITAMIN D3 PO      Take 2,000 Units by mouth daily       zoledronic Acid 5 MG/100ML Soln infusion    RECLAST    100 mL    Inject 5 mg into the vein Yearly       * Notice:  This list has 2 medication(s) that are the same as other medications prescribed for you. Read the directions carefully, and ask your doctor or other care provider to review them with you.

## 2017-06-06 NOTE — PATIENT INSTRUCTIONS
1. If you notice any new chest pain, shortness of breath, increased fatigue, are using more pillows to sleep on at night, waking at night feeling like you can't breath, feel restlessness at night, or notice abnormal/sudden weight gain, let me know and we will do an echocardiogram.  2. See if you notice more swelling after your injections.  3. Start checking your blood pressure at home for a couple days, make sure it's <140/90.  4. Follow up in three months.

## 2017-06-06 NOTE — PROGRESS NOTES
"  SUBJECTIVE:                                                    Celestina Tejada is a 86 year old female who presents to clinic today for the following health issues:    Hypertension Follow-up      Outpatient blood pressures are not being checked.    Low Salt Diet: low salt     Chronic Kidney Disease Follow-up    Current NSAID use?  No      Amount of exercise or physical activity: Everyday household chores     Problems taking medications regularly: No    Medication side effects: none    Diet: regular (no restrictions)    Clinic on 3/21/17:   \"1. Other chronic pain  Well controlled refills for norco given today and f/u planned in 3 months   2. CKD (chronic kidney disease) stage 3, GFR 30-59 ml/min  Improved from last visit. Will scan labs.   3. Rheumatoid arthritis, involving unspecified site, unspecified rheumatoid factor presence (H)  Stable, followed by Rheumatology . Labs were just done with her and will be sent here. I reviewed her copy of labs today and her kidney function has improved  4. Hypertension goal BP (blood pressure) < 140/90  Controlled. No changes today   5. Osteopenia  Reclast injections yearly with Rheum   6. Osteoarthritis, unspecified osteoarthritis type, unspecified site  Pain in well controlled. Continues on hydrocodone.\"      Intermittent ankle swelling sometimes it's worse than others. Doesn't have trouble breathing at night, doesn't get up feeling SOB when she goes to the bathroom. She sleeps flat with two pillow under her head, no changes with increase leg swelling. She has been out and about walking. No symptoms of heart failure. No new cough. Tries to elevate her legs above her heart when sitting. She is trying to stay hydrated.    No change in aching, osteoarthritis is stable with pain medication. Rheumatoid arthritis is controlled with injections. Has not noticed more swelling after injections.  She has seen a gradual increase in feeling tired more often, but attributes it to " age.    Not checking her BP at home.    Wt Readings from Last 5 Encounters:   06/06/17 57.6 kg (127 lb)   03/21/17 56.7 kg (125 lb)   01/03/17 55.5 kg (122 lb 4 oz)   12/20/16 57.6 kg (127 lb)   09/19/16 51.3 kg (113 lb)     Problem list and histories reviewed & adjusted, as indicated.  Additional history: as documented  Patient Active Problem List   Diagnosis     Osteopenia     Hypertension goal BP (blood pressure) < 140/90     Rheumatoid arthritis (H)     CKD (chronic kidney disease) stage 3, GFR 30-59 ml/min     Hyperlipidemia with target LDL less than 100     Advanced directives, counseling/discussion     Osteoarthritis     Eczema     Peripheral edema     Hyponatremia with decreased serum osmolality     Acute hyponatremia     Chronic pain     Past Surgical History:   Procedure Laterality Date     C APPENDECTOMY  1950     SURGICAL HISTORY OF -       2 normal vaginal births       Social History   Substance Use Topics     Smoking status: Former Smoker     Packs/day: 0.50     Years: 20.00     Types: Cigarettes     Quit date: 9/20/2000     Smokeless tobacco: Never Used     Alcohol use Yes      Comment: very occ,     Family History   Problem Relation Age of Onset     HEART DISEASE Mother      CHF     Respiratory Mother      lung disease     C.A.D. Sister      heart by-pass     Arthritis Sister      Family History Negative Brother      Family History Negative Daughter      Family History Negative Daughter          Current Outpatient Prescriptions   Medication Sig Dispense Refill     [START ON 7/21/2017] HYDROcodone-acetaminophen (NORCO) 5-325 MG per tablet Take 1-2 tablets by mouth every 6 hours as needed for moderate to severe pain 150 tablet 0     [START ON 8/21/2017] HYDROcodone-acetaminophen (NORCO) 5-325 MG per tablet Take 1-2 tablets by mouth every 6 hours as needed for moderate to severe pain 150 tablet 0     [START ON 9/21/2017] HYDROcodone-acetaminophen (NORCO) 5-325 MG per tablet Take 1-2 tablets by mouth  every 6 hours as needed for moderate to severe pain 150 tablet 0     atorvastatin (LIPITOR) 20 MG tablet Take 1 tablet (20 mg) by mouth daily 90 tablet 3     lisinopril (PRINIVIL/ZESTRIL) 20 MG tablet TAKE 1 TABLET DAILY (HYPERTENSION GOAL BLOOD PRESSURE BELOW 140/90) 90 tablet 2     atenolol (TENORMIN) 25 MG tablet Take 3 tablets (75 mg) by mouth daily 270 tablet 1     NIFEdipine ER osmotic (PROCARDIA XL) 90 MG 24 hr tablet Take 1 tablet (90 mg) by mouth daily Profile Rx: patient will contact pharmacy when needed 90 tablet 1     Cholecalciferol (VITAMIN D3 PO) Take 2,000 Units by mouth daily       hydroxychloroquine (PLAQUENIL) 200 MG tablet Take 200 mg by mouth daily   3     zoledronic Acid (RECLAST) 5 MG/100ML SOLN Inject 5 mg into the vein Yearly 100 mL 0     order for DME Equipment being ordered: blood pressure cuff and monitor 1 each 0     tocilizumab (ACTEMRA) 80 MG/4ML Inject into the vein every 30 days        MULTIVITAMIN TABS   OR 1 tab daily  0     [DISCONTINUED] atorvastatin (LIPITOR) 20 MG tablet TAKE 1 TABLET DAILY 90 tablet 0     Allergies   Allergen Reactions     Hydrochlorothiazide      hyponatremia     No Known Allergies      Recent Labs   Lab Test 03/31/17 03/13/17 01/05/17   1054  01/03/17   1351 11/17/16 09/27/16 08/18/16   1322   08/11/16   1520   07/14/16   1305   05/12/15   1028   LDL   --    --    --    --    --    --    --   49   --    --    --   81   --   78   HDL   --    --    --    --    --    --    --   68   --    --    --   64   --   60   TRIG   --    --    --    --    --    --    --   85   --    --    --   120   --   108   ALT   --   17   --    --   22  25   --    --    --   27   < >  21   < >   --    CR  1.040   --   1.59*  1.70*  1.130  1.2   < >  1.66*   < >  1.75*   < >  1.41*   < >  1.27*   GFRESTIMATED  53.5   --   31*  29*  48.6  45.4   < >  29*   < >  28*   < >  35*   < >  40*   GFRESTBLACK   --    --   37*  35*   --    --    < >  35*   < >  33*   < >  43*   --   48*    POTASSIUM   --    --   3.4  3.3*   --    --    < >  4.7   < >  3.0*   < >  4.0   --   4.5   TSH   --    --    --    --    --    --    --    --    --   1.95   --    --    --    --     < > = values in this interval not displayed.      BP Readings from Last 3 Encounters:   06/06/17 152/66   03/21/17 138/70   01/03/17 122/64    Wt Readings from Last 3 Encounters:   06/06/17 57.6 kg (127 lb)   03/21/17 56.7 kg (125 lb)   01/03/17 55.5 kg (122 lb 4 oz)        Reviewed and updated as needed this visit by clinical staff  Reviewed and updated as needed this visit by Provider    ROS:  Denies dizziness, chest pain, or SOB.    This document serves as a record of the services and decisions personally performed and made by Majo Alfaro MD. It was created on his/her behalf by Sarah Priest, trained medical scribe. The creation of this document is based the provider's statements to the medical scribes.    Scribe Sarah Priest, June 6, 2017  OBJECTIVE:                                                    /66  Pulse 70  Temp 98.1  F (36.7  C) (Oral)  Resp 14  Wt 57.6 kg (127 lb)  SpO2 97%  BMI 23.61 kg/m2  Body mass index is 23.61 kg/(m^2).  GENERAL: healthy, alert and no distress  MS: BLE 3+ edema    Diagnostic Test Results:  No results found for this or any previous visit (from the past 24 hour(s)).      ASSESSMENT/PLAN:                                                    1. Other chronic pain  Well controlled. Refills given for three months of NORCO today. Will follow up in three months.   - Drug  Screen Comprehensive , Urine with Reported Meds (MedTox) (Pain Care Package)  - Albumin Random Urine Quantitative    2. Hypertension goal BP (blood pressure) < 140/90  Elevated. MA will recheck. If still high she will return to clinic in one week for an MA BP check. Continues on lisinopril and atenolol.   - Basic metabolic panel  (Ca, Cl, CO2, Creat, Gluc, K, Na, BUN)    3. CKD (chronic kidney disease) stage 3, GFR  30-59 ml/min  Stable.    4. Rheumatoid arthritis, involving unspecified site, unspecified rheumatoid factor presence (H)  Well controlled with Actemra injections monthly. Followed by rheum    5. Osteoarthritis, unspecified osteoarthritis type, unspecified site  stable. Continues on NORCO.  - Drug  Screen Comprehensive , Urine with Reported Meds (MedTox) (Pain Care Package)  - HYDROcodone-acetaminophen (NORCO) 5-325 MG per tablet; Take 1-2 tablets by mouth every 6 hours as needed for moderate to severe pain  Dispense: 150 tablet; Refill: 0  - HYDROcodone-acetaminophen (NORCO) 5-325 MG per tablet; Take 1-2 tablets by mouth every 6 hours as needed for moderate to severe pain  Dispense: 150 tablet; Refill: 0  - HYDROcodone-acetaminophen (NORCO) 5-325 MG per tablet; Take 1-2 tablets by mouth every 6 hours as needed for moderate to severe pain  Dispense: 150 tablet; Refill: 0  - Albumin Random Urine Quantitative    6. Hyperlipidemia with target LDL less than 100  Stable. Continue atorvastatin.   - atorvastatin (LIPITOR) 20 MG tablet; Take 1 tablet (20 mg) by mouth daily  Dispense: 90 tablet; Refill: 3    7. Peripheral edema  Intermittent for years, always some level of edema. No sx suggesting HF. She does not want an echocardiogram at this time. She will watch for sx of HF, reviewed with pt. Suspect this is multifactorial secondary to up on her feet more lately shopping, warmer weather, nifedipine side effect, arthritis may also contribute. Sx not suggestive of DVT, no calf pain and sx are bilateral, no erythema. She is not able to apply compression stockings due to her hand arthritis, which makes it too difficult. She had been on diuretic, but had significant hyponatremia with hctz. She will elevate her legs.  She is not aware if her swelling is worse after actemra injections and peripheral edema is an uncommon side effect of this medication. recent    Patient Instructions   1. If you notice any new chest pain, shortness  of breath, increased fatigue, are using more pillows to sleep on at night, waking at night feeling like you can't breath, feel restlessness at night, or notice abnormal/sudden weight gain, let me know and we will do an echocardiogram.  2. See if you notice more swelling after your injections.  3. Start checking your blood pressure at home for a couple days, make sure it's <140/90.  4. Follow up in three months.        The information in this document, created by the medical scribe for me, accurately reflects the services I personally performed and the decisions made by me. I have reviewed and approved this document for accuracy. 06/06/17    Majo Alfaro MD  [facility]

## 2017-06-06 NOTE — LETTER
[facility]   [address] [place]   [address]  [phone]    June 8, 2017      Celestina Tejada  [address]   [facility] [address]              Dear Ms. Tejada,    Your lab results are stable. Please let us know if you have any questions.     Results for orders placed or performed in visit on 06/06/17   Basic metabolic panel  (Ca, Cl, CO2, Creat, Gluc, K, Na, BUN)   Result Value Ref Range    Sodium 139 133 - 144 mmol/L    Potassium 4.6 3.4 - 5.3 mmol/L    Chloride 106 94 - 109 mmol/L    Carbon Dioxide 24 20 - 32 mmol/L    Anion Gap 9 3 - 14 mmol/L    Glucose 86 70 - 99 mg/dL    Urea Nitrogen 22 7 - 30 mg/dL    Creatinine 1.13 (H) 0.52 - 1.04 mg/dL    GFR Estimate 46 (L) >60 mL/min/1.7m2    GFR Estimate If Black 55 (L) >60 mL/min/1.7m2    Calcium 9.0 8.5 - 10.1 mg/dL   Albumin Random Urine Quantitative   Result Value Ref Range    Creatinine Urine 64 mg/dL    Albumin Urine mg/L 22 mg/L    Albumin Urine mg/g Cr 34.53 (H) 0 - 25 mg/g Cr           Sincerely,    Majo Alfaro MD/nr

## 2017-06-06 NOTE — NURSING NOTE
"Chief Complaint   Patient presents with     Hypertension       Initial /66  Pulse 70  Temp 98.1  F (36.7  C) (Oral)  Resp 14  Wt 127 lb (57.6 kg)  SpO2 97%  BMI 23.61 kg/m2 Estimated body mass index is 23.61 kg/(m^2) as calculated from the following:    Height as of 1/3/17: 5' 1.5\" (1.562 m).    Weight as of this encounter: 127 lb (57.6 kg).  Medication Reconciliation: complete     Genny Kelly MA     "

## 2017-06-06 NOTE — LETTER
"[facility]   [address] [place]   [address]  [phone]    June 11, 2017      Celestina Tejada  [address]   [facility] [address]              Dear Ms. Teajda,    Thanks for coming in to clinic.  Your lab results (below) look good.  Your kidney function (creatinine and eGFR) levels have improved which is great!  Urine albumin (also known as albumin creatinine ratio or \"ACR\") is a test for early kidney damage that detects microscopic levels of protein in the urine.  This shows that your kidneys are spilling a slight amount of protein, which is something that that should be monitored annually.  Your UTox results are as expected.      Results for orders placed or performed in visit on 06/06/17   Basic metabolic panel  (Ca, Cl, CO2, Creat, Gluc, K, Na, BUN)   Result Value Ref Range    Sodium 139 133 - 144 mmol/L    Potassium 4.6 3.4 - 5.3 mmol/L    Chloride 106 94 - 109 mmol/L    Carbon Dioxide 24 20 - 32 mmol/L    Anion Gap 9 3 - 14 mmol/L    Glucose 86 70 - 99 mg/dL    Urea Nitrogen 22 7 - 30 mg/dL    Creatinine 1.13 (H) 0.52 - 1.04 mg/dL    GFR Estimate 46 (L) >60 mL/min/1.7m2    GFR Estimate If Black 55 (L) >60 mL/min/1.7m2    Calcium 9.0 8.5 - 10.1 mg/dL   Drug  Screen Comprehensive , Urine with Reported Meds (MedTox) (Pain Care Package)   Result Value Ref Range    Pain Drug SCR UR W RPTD Meds       FINAL  (Note)  ====================================================================  [facility] COMP DRUG ANALYSIS,UR  ====================================================================  Test                             Result       Flag       Units  Drug Present and Declared for Prescription Verification   Hydrocodone                    2305         EXPECTED   ng/mg creat   Hydromorphone                  392          EXPECTED   ng/mg creat   Dihydrocodeine                 292          EXPECTED   ng/mg creat   Norhydrocodone                 3106         EXPECTED   " ng/mg creat    Sources of hydrocodone include scheduled prescription    medications. Hydromorphone, dihydrocodeine and norhydrocodone are    expected metabolites of hydrocodone. Hydromorphone and    dihydrocodeine are also available as scheduled prescription    medications.     Acetaminophen                  PRESENT      EXPECTED   Atenolol                       PRESENT      EXPECTED    Drug Present not Declared for Prescrip tion Verification   Salicylate                     PRESENT      UNEXPECTED  ====================================================================  Test                      Result    Flag   Units      Ref Range   Creatinine              65               mg/dL      >=20  ====================================================================  Declared Medications:  The flagging and interpretation on this report are based on the  following declared medications.  Unexpected results may arise from  inaccuracies in the declared medications.    **Note: The testing scope of this panel includes these medications:    Atenolol  Hydrocodone (Norco)    **Note: The testing scope of this panel does not include small to  moderate amounts of these reported medications:    Acetaminophen (Norco)    **Note: The testing scope of this panel does not include following  reported medications:    Atorvastatin (Lipitor)  Hydroxychloroquine (Plaquenil)  Lisinopril  Multivitamin  Nifedipine  Tocilizumab (Actemra)  Zo ledronic Acid (Reclast)  ====================================================================  For clinical consultation, please call [phone].  ====================================================================  Analysis performed by [employer] [place] [address]     Albumin Random Urine Quantitative   Result Value Ref Range    Creatinine Urine 64 mg/dL    Albumin Urine mg/L 22 mg/L    Albumin Urine mg/g Cr 34.53 (H) 0 - 25 mg/g Cr          Sincerely,    Majo De Souza MD for Dr. Kasper  Dominic/isidro

## 2017-06-10 LAB — PAIN DRUG SCR UR W RPTD MEDS: NORMAL

## 2017-06-18 DIAGNOSIS — E78.5 HYPERLIPIDEMIA WITH TARGET LDL LESS THAN 100: ICD-10-CM

## 2017-06-20 RX ORDER — ATORVASTATIN CALCIUM 20 MG/1
TABLET, FILM COATED ORAL
Qty: 90 TABLET | Refills: 0 | Status: SHIPPED | OUTPATIENT
Start: 2017-06-20 | End: 2017-09-12

## 2017-06-20 NOTE — TELEPHONE ENCOUNTER
Spoke to patient and message was given she know she needs to get in sometime in august for fasting lab work

## 2017-07-04 DIAGNOSIS — I10 HYPERTENSION GOAL BP (BLOOD PRESSURE) < 140/90: ICD-10-CM

## 2017-07-05 RX ORDER — ATENOLOL 25 MG/1
TABLET ORAL
Qty: 270 TABLET | Refills: 3 | Status: SHIPPED | OUTPATIENT
Start: 2017-07-05 | End: 2018-06-27

## 2017-07-05 NOTE — TELEPHONE ENCOUNTER
atenolol (TENORMIN) 25 MG tablet      Last Written Prescription Date: 1/11/2017  Last Fill Quantity: 270, # refills: 1    Last Office Visit with [facility], [facility] or [facility] prescribing provider:  6/6/2017   Future Office Visit:        BP Readings from Last 3 Encounters:   06/06/17 137/65   03/21/17 138/70   01/03/17 122/64     HTN last addressed 6/6/2017    Prescription approved per [facility] Refill Protocol.    Signed Prescriptions:                        Disp   Refills    atenolol (TENORMIN) 25 MG tablet           270 ta*3        Sig: TAKE 3 TABLETS DAILY  Authorizing Provider: PRAMOD WONG  Ordering User: TONY ACE      Closing encounter - no further actions needed at this time    Tony Ace RN

## 2017-08-02 ENCOUNTER — TRANSFERRED RECORDS (OUTPATIENT)
Dept: HEALTH INFORMATION MANAGEMENT | Facility: CLINIC | Age: 82
End: 2017-08-02

## 2017-08-02 LAB
ALT SERPL-CCNC: 21 IU/L (ref 5–35)
AST SERPL-CCNC: 30 U/L (ref 5–34)
CREAT SERPL-MCNC: 1.27 MG/DL (ref 0.5–1.3)
GFR SERPL CREATININE-BSD FRML MDRD: 42.4 ML/MIN/1.73M2

## 2017-08-03 DIAGNOSIS — I10 HYPERTENSION GOAL BP (BLOOD PRESSURE) < 140/90: Primary | ICD-10-CM

## 2017-08-07 RX ORDER — NIFEDIPINE 90 MG/1
TABLET, EXTENDED RELEASE ORAL
Qty: 90 TABLET | Refills: 0 | Status: SHIPPED | OUTPATIENT
Start: 2017-08-07 | End: 2017-09-12

## 2017-08-07 NOTE — TELEPHONE ENCOUNTER
NIFEdipine ER osmotic (PROCARDIA XL) 90 MG 24 hr tablet 90 tablet 1 1/3/2017  No   Sig: Take 1 tablet (90 mg) by mouth daily Profile Rx: patient will contact pharmacy when needed       Last Office Visit with [facility], [facility] or [facility] prescribing provider:  6/6/17   Future Office Visit:    Next 5 appointments (look out 90 days)     Sep 12, 2017 11:20 AM CDT   SHORT with Majo Alfaro MD   [facility] ([facility])    [address] [place] [id number] [phone]                    BP Readings from Last 3 Encounters:   06/06/17 137/65   03/21/17 138/70   01/03/17 122/64

## 2017-09-12 ENCOUNTER — HOSPITAL ENCOUNTER (OUTPATIENT)
Dept: ULTRASOUND IMAGING | Facility: CLINIC | Age: 82
Discharge: HOME | End: 2017-09-12
Attending: FAMILY MEDICINE | Admitting: FAMILY MEDICINE
Payer: COMMERCIAL

## 2017-09-12 ENCOUNTER — OFFICE VISIT (OUTPATIENT)
Dept: FAMILY MEDICINE | Facility: CLINIC | Age: 82
End: 2017-09-12
Payer: COMMERCIAL

## 2017-09-12 VITALS
OXYGEN SATURATION: 96 % | WEIGHT: 128.25 LBS | HEART RATE: 64 BPM | SYSTOLIC BLOOD PRESSURE: 112 MMHG | BODY MASS INDEX: 23.84 KG/M2 | DIASTOLIC BLOOD PRESSURE: 64 MMHG | TEMPERATURE: 98.5 F

## 2017-09-12 DIAGNOSIS — G89.29 OTHER CHRONIC PAIN: Primary | ICD-10-CM

## 2017-09-12 DIAGNOSIS — N18.30 CKD (CHRONIC KIDNEY DISEASE) STAGE 3, GFR 30-59 ML/MIN (H): ICD-10-CM

## 2017-09-12 DIAGNOSIS — I10 HYPERTENSION GOAL BP (BLOOD PRESSURE) < 140/90: ICD-10-CM

## 2017-09-12 DIAGNOSIS — R60.0 PERIPHERAL EDEMA: ICD-10-CM

## 2017-09-12 DIAGNOSIS — M06.9 RHEUMATOID ARTHRITIS, INVOLVING UNSPECIFIED SITE, UNSPECIFIED RHEUMATOID FACTOR PRESENCE: ICD-10-CM

## 2017-09-12 DIAGNOSIS — M19.90 OSTEOARTHRITIS, UNSPECIFIED OSTEOARTHRITIS TYPE, UNSPECIFIED SITE: ICD-10-CM

## 2017-09-12 DIAGNOSIS — R53.81 MALAISE: ICD-10-CM

## 2017-09-12 DIAGNOSIS — E78.5 HYPERLIPIDEMIA WITH TARGET LDL LESS THAN 100: ICD-10-CM

## 2017-09-12 LAB
ALBUMIN SERPL-MCNC: 4.2 G/DL (ref 3.4–5)
ALP SERPL-CCNC: 73 U/L (ref 40–150)
ALT SERPL W P-5'-P-CCNC: 31 U/L (ref 0–50)
ANION GAP SERPL CALCULATED.3IONS-SCNC: 10 MMOL/L (ref 3–14)
AST SERPL W P-5'-P-CCNC: 37 U/L (ref 0–45)
BASOPHILS # BLD AUTO: 0 10E9/L (ref 0–0.2)
BASOPHILS NFR BLD AUTO: 0.5 %
BILIRUB SERPL-MCNC: 0.6 MG/DL (ref 0.2–1.3)
BUN SERPL-MCNC: 26 MG/DL (ref 7–30)
CALCIUM SERPL-MCNC: 8.8 MG/DL (ref 8.5–10.1)
CHLORIDE SERPL-SCNC: 103 MMOL/L (ref 94–109)
CHOLEST SERPL-MCNC: 133 MG/DL
CO2 SERPL-SCNC: 24 MMOL/L (ref 20–32)
CREAT SERPL-MCNC: 1.6 MG/DL (ref 0.52–1.04)
DIFFERENTIAL METHOD BLD: ABNORMAL
EOSINOPHIL # BLD AUTO: 0.5 10E9/L (ref 0–0.7)
EOSINOPHIL NFR BLD AUTO: 7.4 %
ERYTHROCYTE [DISTWIDTH] IN BLOOD BY AUTOMATED COUNT: 13.4 % (ref 10–15)
GFR SERPL CREATININE-BSD FRML MDRD: 31 ML/MIN/1.7M2
GLUCOSE SERPL-MCNC: 87 MG/DL (ref 70–99)
HCT VFR BLD AUTO: 34.9 % (ref 35–47)
HDLC SERPL-MCNC: 65 MG/DL
HGB BLD-MCNC: 11.4 G/DL (ref 11.7–15.7)
LDLC SERPL CALC-MCNC: 50 MG/DL
LYMPHOCYTES # BLD AUTO: 1.3 10E9/L (ref 0.8–5.3)
LYMPHOCYTES NFR BLD AUTO: 21.2 %
MCH RBC QN AUTO: 31.4 PG (ref 26.5–33)
MCHC RBC AUTO-ENTMCNC: 32.7 G/DL (ref 31.5–36.5)
MCV RBC AUTO: 96 FL (ref 78–100)
MONOCYTES # BLD AUTO: 1.1 10E9/L (ref 0–1.3)
MONOCYTES NFR BLD AUTO: 17.8 %
NEUTROPHILS # BLD AUTO: 3.3 10E9/L (ref 1.6–8.3)
NEUTROPHILS NFR BLD AUTO: 53.1 %
NONHDLC SERPL-MCNC: 68 MG/DL
PLATELET # BLD AUTO: 150 10E9/L (ref 150–450)
POTASSIUM SERPL-SCNC: 4.2 MMOL/L (ref 3.4–5.3)
PROT SERPL-MCNC: 6.7 G/DL (ref 6.8–8.8)
RBC # BLD AUTO: 3.63 10E12/L (ref 3.8–5.2)
SODIUM SERPL-SCNC: 137 MMOL/L (ref 133–144)
TRIGL SERPL-MCNC: 89 MG/DL
WBC # BLD AUTO: 6.2 10E9/L (ref 4–11)

## 2017-09-12 PROCEDURE — 80061 LIPID PANEL: CPT | Performed by: FAMILY MEDICINE

## 2017-09-12 PROCEDURE — 99215 OFFICE O/P EST HI 40 MIN: CPT | Performed by: FAMILY MEDICINE

## 2017-09-12 PROCEDURE — 93970 EXTREMITY STUDY: CPT

## 2017-09-12 PROCEDURE — 36415 COLL VENOUS BLD VENIPUNCTURE: CPT | Performed by: FAMILY MEDICINE

## 2017-09-12 PROCEDURE — 85025 COMPLETE CBC W/AUTO DIFF WBC: CPT | Performed by: FAMILY MEDICINE

## 2017-09-12 PROCEDURE — 80053 COMPREHEN METABOLIC PANEL: CPT | Performed by: FAMILY MEDICINE

## 2017-09-12 RX ORDER — HYDROCODONE BITARTRATE AND ACETAMINOPHEN 5; 325 MG/1; MG/1
1-2 TABLET ORAL EVERY 6 HOURS PRN
Qty: 150 TABLET | Refills: 0 | Status: SHIPPED | OUTPATIENT
Start: 2017-10-20 | End: 2017-09-26

## 2017-09-12 RX ORDER — ATORVASTATIN CALCIUM 20 MG/1
20 TABLET, FILM COATED ORAL DAILY
Qty: 90 TABLET | Refills: 3 | Status: SHIPPED | OUTPATIENT
Start: 2017-09-12 | End: 2017-09-19

## 2017-09-12 RX ORDER — NIFEDIPINE 90 MG/1
90 TABLET, EXTENDED RELEASE ORAL DAILY
Qty: 90 TABLET | Refills: 3 | Status: SHIPPED | OUTPATIENT
Start: 2017-09-12 | End: 2017-11-05

## 2017-09-12 RX ORDER — HYDROCODONE BITARTRATE AND ACETAMINOPHEN 5; 325 MG/1; MG/1
1-2 TABLET ORAL EVERY 6 HOURS PRN
Qty: 150 TABLET | Refills: 0 | Status: SHIPPED | OUTPATIENT
Start: 2017-09-21 | End: 2017-09-26

## 2017-09-12 RX ORDER — HYDROCODONE BITARTRATE AND ACETAMINOPHEN 5; 325 MG/1; MG/1
1-2 TABLET ORAL EVERY 6 HOURS PRN
Qty: 150 TABLET | Refills: 0 | Status: SHIPPED | OUTPATIENT
Start: 2017-11-21 | End: 2017-11-20

## 2017-09-12 ASSESSMENT — ANXIETY QUESTIONNAIRES
6. BECOMING EASILY ANNOYED OR IRRITABLE: NOT AT ALL
IF YOU CHECKED OFF ANY PROBLEMS ON THIS QUESTIONNAIRE, HOW DIFFICULT HAVE THESE PROBLEMS MADE IT FOR YOU TO DO YOUR WORK, TAKE CARE OF THINGS AT HOME, OR GET ALONG WITH OTHER PEOPLE: NOT DIFFICULT AT ALL
GAD7 TOTAL SCORE: 2
2. NOT BEING ABLE TO STOP OR CONTROL WORRYING: NOT AT ALL
7. FEELING AFRAID AS IF SOMETHING AWFUL MIGHT HAPPEN: SEVERAL DAYS
1. FEELING NERVOUS, ANXIOUS, OR ON EDGE: NOT AT ALL
5. BEING SO RESTLESS THAT IT IS HARD TO SIT STILL: NOT AT ALL
3. WORRYING TOO MUCH ABOUT DIFFERENT THINGS: SEVERAL DAYS

## 2017-09-12 ASSESSMENT — PATIENT HEALTH QUESTIONNAIRE - PHQ9
5. POOR APPETITE OR OVEREATING: NOT AT ALL
SUM OF ALL RESPONSES TO PHQ QUESTIONS 1-9: 7

## 2017-09-12 NOTE — PROGRESS NOTES
"  SUBJECTIVE:   Celestina Tejada is a 86 year old female who presents to clinic today with her daughter for the following health issues:      Chronic Kidney Disease Follow-up    Current NSAID use?  No    Chronic Pain Follow-Up  Type / Location of Pain: Osteoarthritis   Analgesia/pain control:       Recent changes:  same      Overall control: Tolerable with discomfort  Activity level/function:      Daily activities:  Can do most things most days, with some rest  Adverse effects:  No  Risk Factors:    Sleep:  Good    Mood/anxiety:  controlled    Recent family or social stressors:  none noted    Other aggravating factors: none  PHQ-9 SCORE 9/19/2016 9/12/2017   Total Score 7 7     NUNO-7 SCORE 9/19/2016 9/12/2017   Total Score 0 2     Encounter-Level CSA:     There are no encounter-level csa.           Clinic on 6/6/2017:  \"1. Other chronic pain  Well controlled. Refills given for three months of NORCO today. Will follow up in three months.   - Drug  Screen Comprehensive , Urine with Reported Meds ([facility]) (Pain Care Package)  - Albumin Random Urine Quantitative  2. Hypertension goal BP (blood pressure) < 140/90  Elevated. MA will recheck. If still high she will return to clinic in one week for an MA BP check. Continues on lisinopril and atenolol.   - Basic metabolic panel  (Ca, Cl, CO2, Creat, Gluc, K, Na, BUN)  3. CKD (chronic kidney disease) stage 3, GFR 30-59 ml/min  Stable.  4. Rheumatoid arthritis, involving unspecified site, unspecified rheumatoid factor presence (H)  Well controlled with Actemra injections monthly. Followed by rheum   5. Osteoarthritis, unspecified osteoarthritis type, unspecified site  stable. Continues on NORCO.  6. Hyperlipidemia with target LDL less than 100  Stable. Continue atorvastatin.   - atorvastatin (LIPITOR) 20 MG tablet; Take 1 tablet (20 mg) by mouth daily  Dispense: 90 tablet; Refill: 3  7. Peripheral edema  Intermittent for years, always some level of edema. No sx suggesting HF. " "She does not want an echocardiogram at this time. She will watch for sx of HF, reviewed with pt. Suspect this is multifactorial secondary to up on her feet more lately shopping, warmer weather, nifedipine side effect, arthritis may also contribute. Sx not suggestive of DVT, no calf pain and sx are bilateral, no erythema. She is not able to apply compression stockings due to her hand arthritis, which makes it too difficult. She had been on diuretic, but had significant hyponatremia with hctz. She will elevate her legs.  She is not aware if her swelling is worse after actemra injections and peripheral edema is an uncommon side effect of this medication. Recent\"       Lower extremity edema - She has swelling in her lower extremities. The swelling improves the slightest bit if she is off her feet. Her daughter notes that the swelling has been constant. It is difficult for her to get her shoes on. Denies chest pain, shortness of breath, lightheadedness, PND, orthopnea, singificant weigh gain. Denies calf pain.       Her daughter notes the last week or two she has not felt like her normal self, declining to get out and do things. Patient does not feel sick. She denies other physical symptoms. She denies feeling depressed.     Bone health - Her last Reclast infusion was in December. She is getting Actemra injections monthly.     Bowel changes - She occasionally has diarrhea from overdoing it with her laxatives at times. She does not have a bowel movement every day. On days she overdoes it on the laxatives she will have multiple stools a day. Since starting Miralax her stools have been pretty soft. She was using Senna with Docusate and switched to Miralax.     Denies frequency, pain with urination, or blood in her urine.      Problem list and histories reviewed & adjusted, as indicated.  Additional history: as documented  Patient Active Problem List   Diagnosis     Osteopenia     Hypertension goal BP (blood pressure) < " 140/90     Rheumatoid arthritis (H)     CKD (chronic kidney disease) stage 3, GFR 30-59 ml/min     Hyperlipidemia with target LDL less than 100     Advanced directives, counseling/discussion     Osteoarthritis     Eczema     Peripheral edema     Hyponatremia with decreased serum osmolality     Acute hyponatremia     Chronic pain     Past Surgical History:   Procedure Laterality Date     C APPENDECTOMY  1950     SURGICAL HISTORY OF -       2 normal vaginal births       Social History   Substance Use Topics     Smoking status: Former Smoker     Packs/day: 0.50     Years: 20.00     Types: Cigarettes     Quit date: 9/20/2000     Smokeless tobacco: Never Used     Alcohol use Yes      Comment: very occ,     Family History   Problem Relation Age of Onset     HEART DISEASE Mother      CHF     Respiratory Mother      lung disease     C.A.D. Sister      heart by-pass     Arthritis Sister      Family History Negative Brother      Family History Negative Daughter      Family History Negative Daughter          Current Outpatient Prescriptions   Medication Sig Dispense Refill     [START ON 11/21/2017] HYDROcodone-acetaminophen (NORCO) 5-325 MG per tablet Take 1-2 tablets by mouth every 6 hours as needed for moderate to severe pain 150 tablet 0     [START ON 9/21/2017] HYDROcodone-acetaminophen (NORCO) 5-325 MG per tablet Take 1-2 tablets by mouth every 6 hours as needed for moderate to severe pain 150 tablet 0     [START ON 10/20/2017] HYDROcodone-acetaminophen (NORCO) 5-325 MG per tablet Take 1-2 tablets by mouth every 6 hours as needed for moderate to severe pain 150 tablet 0     NIFEdipine ER osmotic (PROCARDIA XL) 90 MG 24 hr tablet Take 1 tablet (90 mg) by mouth daily 90 tablet 3     atorvastatin (LIPITOR) 20 MG tablet Take 1 tablet (20 mg) by mouth daily 90 tablet 3     atenolol (TENORMIN) 25 MG tablet TAKE 3 TABLETS DAILY 270 tablet 3     lisinopril (PRINIVIL/ZESTRIL) 20 MG tablet TAKE 1 TABLET DAILY (HYPERTENSION GOAL  BLOOD PRESSURE BELOW 140/90) 90 tablet 2     Cholecalciferol (VITAMIN D3 PO) Take 2,000 Units by mouth daily       hydroxychloroquine (PLAQUENIL) 200 MG tablet Take 200 mg by mouth daily   3     zoledronic Acid (RECLAST) 5 MG/100ML SOLN Inject 5 mg into the vein Yearly 100 mL 0     order for DME Equipment being ordered: blood pressure cuff and monitor 1 each 0     tocilizumab (ACTEMRA) 80 MG/4ML Inject into the vein every 30 days        MULTIVITAMIN TABS   OR 1 tab daily  0     [DISCONTINUED] NIFEdipine ER osmotic (PROCARDIA XL) 90 MG 24 hr tablet TAKE 1 TABLET DAILY 90 tablet 0     [DISCONTINUED] atorvastatin (LIPITOR) 20 MG tablet TAKE 1 TABLET DAILY 90 tablet 0     Allergies   Allergen Reactions     Hydrochlorothiazide      hyponatremia     No Known Allergies      Recent Labs   Lab Test  06/06/17   1220 03/31/17 03/13/17 01/05/17   1054  11/17/16 09/27/16 08/18/16   1322   08/11/16   1520   07/14/16   1305   05/12/15   1028   LDL   --    --    --    --    --    --    --    --   49   --    --    --   81   --   78   HDL   --    --    --    --    --    --    --    --   68   --    --    --   64   --   60   TRIG   --    --    --    --    --    --    --    --   85   --    --    --   120   --   108   ALT   --    --   17   --    --   22  25   --    --    --   27   < >  21   < >   --    CR  1.13*  1.040   --   1.59*   < >  1.130  1.2   < >  1.66*   < >  1.75*   < >  1.41*   < >  1.27*   GFRESTIMATED  46*  53.5   --   31*   < >  48.6  45.4   < >  29*   < >  28*   < >  35*   < >  40*   GFRESTBLACK  55*   --    --   37*   < >   --    --    < >  35*   < >  33*   < >  43*   --   48*   POTASSIUM  4.6   --    --   3.4   < >   --    --    < >  4.7   < >  3.0*   < >  4.0   --   4.5   TSH   --    --    --    --    --    --    --    --    --    --   1.95   --    --    --    --     < > = values in this interval not displayed.      BP Readings from Last 3 Encounters:   09/12/17 112/64   06/06/17 137/65   03/21/17 138/70    Wt  Readings from Last 3 Encounters:   09/12/17 58.2 kg (128 lb 4 oz)   06/06/17 57.6 kg (127 lb)   03/21/17 56.7 kg (125 lb)        Reviewed and updated as needed this visit by clinical staffTobacco  Allergies  Meds  Med Hx  Surg Hx  Fam Hx  Soc Hx      Reviewed and updated as needed this visit by Provider         ROS:   ROS: 10 point ROS neg other than the symptoms noted above in the HPI.     This document serves as a record of the services and decisions personally performed and made by Majo Alfaro MD. It was created on his/her behalf by Sarah Priest, trained medical scribe. The creation of this document is based the provider's statements to the medical scribes.    Scribe Sarah Priest, September 12, 2017  OBJECTIVE:   /64 (Cuff Size: Adult Regular)  Pulse 64  Temp 98.5  F (36.9  C) (Oral)  Wt 58.2 kg (128 lb 4 oz)  SpO2 96%  BMI 23.84 kg/m2  Body mass index is 23.84 kg/(m^2).    Gen: NAD, appears stated age  Eyes: anicteric, moist conjunctivae without lid lag; PERRL  ENT: OP normal and good dentition  Lungs: clear to auscultation with normal respiratory effort  Heart: RRR, no MRGs.    Skin: Skin is warm and dry on palpation. No rash, lesions or ulcers are seen.   EXT: pronounced pitting edema to the knee bilaterally, nontender at the calves, no erythema  Psych: A and O x 3 with appropriate affect, mild difficulty with memory (needing repetition of plan, though this may also be secondary to hearing difficulty), mentation appears normal    Diagnostic Test Results:  No results found for this or any previous visit (from the past 24 hour(s)).   ASSESSMENT/PLAN:   1. Other chronic pain  Stable. Refills given for three months of NORCO today. F/u in three months.    2. Hypertension goal BP (blood pressure) < 140/90  Controlled. Continues on atenolol and lisinopril.   - NIFEdipine ER osmotic (PROCARDIA XL) 90 MG 24 hr tablet; Take 1 tablet (90 mg) by mouth daily  Dispense: 90 tablet; Refill: 3  -  CARDIOLOGY [facility] ADULT REFERRAL  - Echocardiogram Complete; Future  - Comprehensive metabolic panel    3. Hyperlipidemia with target LDL less than 100  Stable.  - atorvastatin (LIPITOR) 20 MG tablet; Take 1 tablet (20 mg) by mouth daily  Dispense: 90 tablet; Refill: 3  - Lipid panel reflex to direct LDL    4. Osteoarthritis, unspecified osteoarthritis type, unspecified site  Stable. Continues on NOCRO.  - HYDROcodone-acetaminophen (NORCO) 5-325 MG per tablet; Take 1-2 tablets by mouth every 6 hours as needed for moderate to severe pain  Dispense: 150 tablet; Refill: 0  - HYDROcodone-acetaminophen (NORCO) 5-325 MG per tablet; Take 1-2 tablets by mouth every 6 hours as needed for moderate to severe pain  Dispense: 150 tablet; Refill: 0  - HYDROcodone-acetaminophen (NORCO) 5-325 MG per tablet; Take 1-2 tablets by mouth every 6 hours as needed for moderate to severe pain  Dispense: 150 tablet; Refill: 0    5. CKD (chronic kidney disease) stage 3, GFR 30-59 ml/min  Stable. Will check hemoglobin.   - CBC with platelets differential    6. Rheumatoid arthritis, involving unspecified site, unspecified rheumatoid factor presence (H)  Well controlled with Actemra injections monthly.     7. Peripheral edema  Worsening edema in her bilateral lower extremities. More sedentary lately with some general malaise, not acting herself in terms of her energy level per her daughter. No other sx to suggest HF.  Will do a lower extremity US today to check for DVT, low suspicion. Check renal function, electrolytes today. If significant abnormalities discussed with Celestina and her daughter that I would recommend ED visit. Suspect edema is multifactorial. She will schedule with cardiology to discuss other BP med options and edema, follow up on echo. She had significant hyponatremia resulting in hospitalization with hctz. I am hesitant to start diuretic at this time. She will also schedule an echocardiogram.   From last visit 6/6/17:  "\"Intermittent for years, always some level of edema. No sx suggesting HF. She does not want an echocardiogram at this time. She will watch for sx of HF, reviewed with pt. Suspect this is multifactorial secondary to up on her feet more lately shopping, warmer weather, nifedipine side effect, arthritis may also contribute. Sx not suggestive of DVT, no calf pain and sx are bilateral, no erythema. She is not able to apply compression stockings due to her hand arthritis, which makes it too difficult. She had been on diuretic, but had significant hyponatremia with hctz. She will elevate her legs.  She is not aware if her swelling is worse after actemra injections and peripheral edema is an uncommon side effect of this medication.\"  - CARDIOLOGY [facility] ADULT REFERRAL  - US Lower Extremity Venous Duplex Bilateral; Future  - Comprehensive metabolic panel  - CBC with platelets differential    8. Malaise  Will check labs today.  - Comprehensive metabolic panel  - CBC with platelets differential        Patient Instructions   1. Schedule a lower extremity ultrasound today to check for blood clots in your legs.  2. Schedule with cardiology to discuss medications options for your blood pressure and for your leg swelling.  3. Schedule an echocardiogram.   4. It is okay to use miralax or senna daily for your stools if you are constipated, but you should reduce medication if you are having diarrhea.    5. If you have any new or worsening symptoms, please go to the ER.      The information in this document, created by the medical scribe for me, accurately reflects the services I personally performed and the decisions made by me. I have reviewed and approved this document for accuracy. 09/12/17    Majo Alfaro MD  [facility]     "

## 2017-09-12 NOTE — NURSING NOTE
"Chief Complaint   Patient presents with     Hypertension       Initial /64 (Cuff Size: Adult Regular)  Pulse 64  Temp 98.5  F (36.9  C) (Oral)  Wt 128 lb 4 oz (58.2 kg)  SpO2 96%  BMI 23.84 kg/m2 Estimated body mass index is 23.84 kg/(m^2) as calculated from the following:    Height as of 1/3/17: 5' 1.5\" (1.562 m).    Weight as of this encounter: 128 lb 4 oz (58.2 kg).  Medication Reconciliation: complete     Sofia Cespedes, MITZI      "

## 2017-09-12 NOTE — PROGRESS NOTES
SUBJECTIVE:   Celestina Tejada is a 86 year old female who presents to clinic today for the following health issues:      Hypertension Follow-up      Outpatient blood pressures are not being checked.    Low Salt Diet: little salt     Chronic Kidney Disease Follow-up      Current NSAID use?  No          Amount of exercise or physical activity: None    Problems taking medications regularly: No    Medication side effects: none  Diet: regular (no restrictions)                      Problem list and histories reviewed & adjusted, as indicated.  Additional history: as documented    Patient Active Problem List   Diagnosis     Osteopenia     Hypertension goal BP (blood pressure) < 140/90     Rheumatoid arthritis (H)     CKD (chronic kidney disease) stage 3, GFR 30-59 ml/min     Hyperlipidemia with target LDL less than 100     Advanced directives, counseling/discussion     Osteoarthritis     Eczema     Peripheral edema     Hyponatremia with decreased serum osmolality     Acute hyponatremia     Chronic pain     Past Surgical History:   Procedure Laterality Date     C APPENDECTOMY  1950     SURGICAL HISTORY OF -       2 normal vaginal births       Social History   Substance Use Topics     Smoking status: Former Smoker     Packs/day: 0.50     Years: 20.00     Types: Cigarettes     Quit date: 9/20/2000     Smokeless tobacco: Never Used     Alcohol use Yes      Comment: very occ,     Family History   Problem Relation Age of Onset     HEART DISEASE Mother      CHF     Respiratory Mother      lung disease     C.A.D. Sister      heart by-pass     Arthritis Sister      Family History Negative Brother      Family History Negative Daughter      Family History Negative Daughter          Current Outpatient Prescriptions   Medication Sig Dispense Refill     NIFEdipine ER osmotic (PROCARDIA XL) 90 MG 24 hr tablet TAKE 1 TABLET DAILY 90 tablet 0     atenolol (TENORMIN) 25 MG tablet TAKE 3 TABLETS DAILY 270 tablet 3     atorvastatin  (LIPITOR) 20 MG tablet TAKE 1 TABLET DAILY 90 tablet 0     HYDROcodone-acetaminophen (NORCO) 5-325 MG per tablet Take 1-2 tablets by mouth every 6 hours as needed for moderate to severe pain 150 tablet 0     HYDROcodone-acetaminophen (NORCO) 5-325 MG per tablet Take 1-2 tablets by mouth every 6 hours as needed for moderate to severe pain 150 tablet 0     lisinopril (PRINIVIL/ZESTRIL) 20 MG tablet TAKE 1 TABLET DAILY (HYPERTENSION GOAL BLOOD PRESSURE BELOW 140/90) 90 tablet 2     Cholecalciferol (VITAMIN D3 PO) Take 2,000 Units by mouth daily       hydroxychloroquine (PLAQUENIL) 200 MG tablet Take 200 mg by mouth daily   3     zoledronic Acid (RECLAST) 5 MG/100ML SOLN Inject 5 mg into the vein Yearly 100 mL 0     order for DME Equipment being ordered: blood pressure cuff and monitor 1 each 0     tocilizumab (ACTEMRA) 80 MG/4ML Inject into the vein every 30 days        MULTIVITAMIN TABS   OR 1 tab daily  0     Allergies   Allergen Reactions     Hydrochlorothiazide      hyponatremia     No Known Allergies      Recent Labs   Lab Test  06/06/17   1220 03/31/17 03/13/17 01/05/17   1054  11/17/16 09/27/16 08/18/16   1322   08/11/16   1520   07/14/16   1305   05/12/15   1028   LDL   --    --    --    --    --    --    --    --   49   --    --    --   81   --   78   HDL   --    --    --    --    --    --    --    --   68   --    --    --   64   --   60   TRIG   --    --    --    --    --    --    --    --   85   --    --    --   120   --   108   ALT   --    --   17   --    --   22  25   --    --    --   27   < >  21   < >   --    CR  1.13*  1.040   --   1.59*   < >  1.130  1.2   < >  1.66*   < >  1.75*   < >  1.41*   < >  1.27*   GFRESTIMATED  46*  53.5   --   31*   < >  48.6  45.4   < >  29*   < >  28*   < >  35*   < >  40*   GFRESTBLACK  55*   --    --   37*   < >   --    --    < >  35*   < >  33*   < >  43*   --   48*   POTASSIUM  4.6   --    --   3.4   < >   --    --    < >  4.7   < >  3.0*   < >  4.0   --   4.5   TSH  "  --    --    --    --    --    --    --    --    --    --   1.95   --    --    --    --     < > = values in this interval not displayed.      BP Readings from Last 3 Encounters:   06/06/17 137/65   03/21/17 138/70   01/03/17 122/64    Wt Readings from Last 3 Encounters:   06/06/17 57.6 kg (127 lb)   03/21/17 56.7 kg (125 lb)   01/03/17 55.5 kg (122 lb 4 oz)          Clinic on 6/6/2017:  \"1. Other chronic pain  Well controlled. Refills given for three months of NORCO today. Will follow up in three months.   - Drug  Screen Comprehensive , Urine with Reported Meds (MedTox) (Pain Care Package)  - Albumin Random Urine Quantitative  2. Hypertension goal BP (blood pressure) < 140/90  Elevated. MA will recheck. If still high she will return to clinic in one week for an MA BP check. Continues on lisinopril and atenolol.   - Basic metabolic panel  (Ca, Cl, CO2, Creat, Gluc, K, Na, BUN)  3. CKD (chronic kidney disease) stage 3, GFR 30-59 ml/min  Stable.  4. Rheumatoid arthritis, involving unspecified site, unspecified rheumatoid factor presence (H)  Well controlled with Actemra injections monthly. Followed by rheum   5. Osteoarthritis, unspecified osteoarthritis type, unspecified site  stable. Continues on NORCO.  6. Hyperlipidemia with target LDL less than 100  Stable. Continue atorvastatin.   - atorvastatin (LIPITOR) 20 MG tablet; Take 1 tablet (20 mg) by mouth daily  Dispense: 90 tablet; Refill: 3  7. Peripheral edema  Intermittent for years, always some level of edema. No sx suggesting HF. She does not want an echocardiogram at this time. She will watch for sx of HF, reviewed with pt. Suspect this is multifactorial secondary to up on her feet more lately shopping, warmer weather, nifedipine side effect, arthritis may also contribute. Sx not suggestive of DVT, no calf pain and sx are bilateral, no erythema. She is not able to apply compression stockings due to her hand arthritis, which makes it too difficult. She had been " "on diuretic, but had significant hyponatremia with hctz. She will elevate her legs.  She is not aware if her swelling is worse after actemra injections and peripheral edema is an uncommon side effect of this medication. Recent\"                      Reviewed and updated as needed this visit by clinical staff     Reviewed and updated as needed this visit by Provider         ROS:  No complaint of chest pain or SOB.    OBJECTIVE:     There were no vitals taken for this visit.  There is no height or weight on file to calculate BMI.  {Exam List:875747}    {Diagnostic Test Results:230003::\"Diagnostic Test Results:\",\"none \"}    ASSESSMENT/PLAN:       {Diag Picklist:431947}    {Follow-Up:308819}    Majo Alfaro MD, MD  [facility]    "

## 2017-09-12 NOTE — PATIENT INSTRUCTIONS
1. Schedule a lower extremity ultrasound today to check for blood clots in your legs.  2. Schedule with cardiology to discuss medications options for your blood pressure and for your leg swelling.  3. Schedule an echocardiogram.   4. It is okay to use miralax or senna daily for your stools if you are constipated, but you should reduce medication if you are having diarrhea.    5. If you have any new or worsening symptoms, please go to the ER.

## 2017-09-12 NOTE — MR AVS SNAPSHOT
After Visit Summary   9/12/2017    Celestina Tejada    MRN: [id number]           Patient Information     Date Of Birth          4/2/1931        Visit Information        Provider Department      9/12/2017 11:20 AM Majo Alfaro MD [facility]        Today's Diagnoses     Other chronic pain    -  1    Hypertension goal BP (blood pressure) < 140/90        Hyperlipidemia with target LDL less than 100        Osteoarthritis, unspecified osteoarthritis type, unspecified site        CKD (chronic kidney disease) stage 3, GFR 30-59 ml/min        Rheumatoid arthritis, involving unspecified site, unspecified rheumatoid factor presence (H)        Peripheral edema        Malaise          Care Instructions    1. Schedule a lower extremity ultrasound today to check for blood clots in your legs.  2. Schedule with cardiology to discuss medications options for your blood pressure and for your leg swelling.  3. Schedule an echocardiogram.   4. It is okay to use miralax or senna daily for your stools if you are constipated, but you should reduce medication if you are having diarrhea.    5. If you have any new or worsening symptoms, please go to the ER.          Follow-ups after your visit        Additional Services     CARDIOLOGY EVAL ADULT REFERRAL       Your provider has referred you to:  [facility]: [facility] [phone]   [email or web address]    Please be aware that coverage of these services is subject to the terms and limitations of your health insurance plan.  Call member services at your health plan with any benefit or coverage questions.      Type of Referral:  New Cardiology Consult    Timeframe requested:  Within 1 month    Please bring the following to your appointment:  >>   Any x-rays, CTs or MRIs which have been performed.  Contact the facility where they were done to arrange for  prior to your scheduled  appointment.    >>   List of current medications  >>   This referral request   >>   Any documents/labs given to you for this referral                  Your next 10 appointments already scheduled     Sep 12, 2017  3:30 PM CDT   US LOWER EXTREMITY VENOUS DUPLEX BILATERAL with SHUS2   [facility] Ultrasound ([facility])    [address] [address] [phone]           Please bring a list of your medicines (including vitamins, minerals and over-the-counter drugs). Also, tell your doctor about any allergies you may have. Wear comfortable clothes and leave your valuables at home.  You do not need to do anything special to prepare for your exam.  Please call the Imaging Department at your exam site with any questions.            Sep 21, 2017  3:00 PM CDT   Ech Complete with [id number]   [facility] CV Echocardiography (Cardiovascular Imaging at [facility])    [address]  [address]  [facility] [id number] [phone]           1. Please bring or wear a comfortable two-piece outfit. 2. You may eat, drink and take your normal medicines. 3. For any questions that cannot be answered, please contact the ordering physician            Sep 26, 2017 11:15 AM CDT   New Visit with Fili Isaacs MD   [facility] [place] ([facility])    [address]  [facility] [id number] [phone]              Future tests that were ordered for you today     Open Future Orders        Priority Expected Expires Ordered    US Lower Extremity Venous Duplex Bilateral Routine  9/12/2018 9/12/2017    Echocardiogram Complete Routine  9/12/2018 9/12/2017            Who to contact     If you have questions or need follow up information about today's clinic visit or your schedule please contact [facility] directly at [phone].  Normal or non-critical lab and imaging results will be  "communicated to you by [employer], letter or phone within 4 business days after the clinic has received the results. If you do not hear from us within 7 days, please contact the clinic through [employer] or phone. If you have a critical or abnormal lab result, we will notify you by phone as soon as possible.  Submit refill requests through [employer] or call your pharmacy and they will forward the refill request to us. Please allow 3 business days for your refill to be completed.          Additional Information About Your Visit        [employer] Information     [employer] lets you send messages to your doctor, view your test results, renew your prescriptions, schedule appointments and more. To sign up, go to [email or web address] . Click on \"Log in\" on the left side of the screen, which will take you to the Welcome page. Then click on \"Sign up Now\" on the right side of the page.     You will be asked to enter the access code listed below, as well as some personal information. Please follow the directions to create your username and password.     Your access code is: [email or web address]  Expires: 2017 12:17 PM     Your access code will  in 90 days. If you need help or a new code, please call your [place] clinic or [phone].        Care EveryWhere ID     This is your Care EveryWhere ID. This could be used by other organizations to access your [place] medical records  [id number]        Your Vitals Were     Pulse Temperature Pulse Oximetry BMI (Body Mass Index)          64 98.5  F (36.9  C) (Oral) 96% 23.84 kg/m2         Blood Pressure from Last 3 Encounters:   17 112/64   17 137/65   17 138/70    Weight from Last 3 Encounters:   17 128 lb 4 oz (58.2 kg)   17 127 lb (57.6 kg)   17 125 lb (56.7 kg)              We Performed the Following     CARDIOLOGY EVAL ADULT REFERRAL     CBC with platelets differential     Comprehensive metabolic panel     Lipid panel reflex to direct LDL        "   Today's Medication Changes          These changes are accurate as of: 9/12/17 12:32 PM.  If you have any questions, ask your nurse or doctor.               These medicines have changed or have updated prescriptions.        Dose/Directions    atorvastatin 20 MG tablet   Commonly known as:  LIPITOR   This may have changed:  See the new instructions.   Used for:  Hyperlipidemia with target LDL less than 100   Changed by:  Majo Alfaro MD        Dose:  20 mg   Take 1 tablet (20 mg) by mouth daily   Quantity:  90 tablet   Refills:  3       * HYDROcodone-acetaminophen 5-325 MG per tablet   Commonly known as:  NORCO   This may have changed:  Another medication with the same name was added. Make sure you understand how and when to take each.   Used for:  Osteoarthritis, unspecified osteoarthritis type, unspecified site   Changed by:  Majo Alfaro MD        Dose:  1-2 tablet   Start taking on:  9/21/2017   Take 1-2 tablets by mouth every 6 hours as needed for moderate to severe pain   Quantity:  150 tablet   Refills:  0       * HYDROcodone-acetaminophen 5-325 MG per tablet   Commonly known as:  NORCO   This may have changed:  You were already taking a medication with the same name, and this prescription was added. Make sure you understand how and when to take each.   Used for:  Osteoarthritis, unspecified osteoarthritis type, unspecified site   Changed by:  Majo Alfaro MD        Dose:  1-2 tablet   Start taking on:  10/20/2017   Take 1-2 tablets by mouth every 6 hours as needed for moderate to severe pain   Quantity:  150 tablet   Refills:  0       * HYDROcodone-acetaminophen 5-325 MG per tablet   Commonly known as:  NORCO   This may have changed:  Another medication with the same name was added. Make sure you understand how and when to take each.   Used for:  Osteoarthritis, unspecified osteoarthritis type, unspecified site   Changed by:  Majo Alfaro MD        Dose:  1-2 tablet   Start taking on:   11/21/2017   Take 1-2 tablets by mouth every 6 hours as needed for moderate to severe pain   Quantity:  150 tablet   Refills:  0       NIFEdipine ER osmotic 90 MG 24 hr tablet   Commonly known as:  PROCARDIA XL   This may have changed:  See the new instructions.   Used for:  Hypertension goal BP (blood pressure) < 140/90   Changed by:  Majo Alfaro MD        Dose:  90 mg   Take 1 tablet (90 mg) by mouth daily   Quantity:  90 tablet   Refills:  3       * Notice:  This list has 3 medication(s) that are the same as other medications prescribed for you. Read the directions carefully, and ask your doctor or other care provider to review them with you.         Where to get your medicines      These medications were sent to [facility] [id number] [place] [address] [address] AT [facility]  [address] [facility] [place] [address]    Hours:  24-hours Phone:  [phone]     atorvastatin 20 MG tablet    NIFEdipine ER osmotic 90 MG 24 hr tablet         Some of these will need a paper prescription and others can be bought over the counter.  Ask your nurse if you have questions.     Bring a paper prescription for each of these medications     HYDROcodone-acetaminophen 5-325 MG per tablet    HYDROcodone-acetaminophen 5-325 MG per tablet    HYDROcodone-acetaminophen 5-325 MG per tablet                Primary Care Provider Office Phone # Fax #    Majo Alfaro [phone] [address]  [facility] [address]        Equal Access to Services     HIEN URENA AH: Hadii melodie giordano hadasho Soana mariaali, waaxda luqadaha, qaybta kaalmada adeegyada, alona hampton. So [facility] [phone].    ATENCIÓN: Si habla español, tiene a gallardo disposición servicios gratuitos de asistencia lingüística. Llame al [phone].    We comply with applicable federal civil rights laws and [place] laws. We do not discriminate on the basis of race, color, national origin, age, disability  sex, sexual orientation or gender identity.            Thank you!     Thank you for choosing [facility]  for your care. Our goal is always to provide you with excellent care. Hearing back from our patients is one way we can continue to improve our services. Please take a few minutes to complete the written survey that you may receive in the mail after your visit with us. Thank you!             Your Updated Medication List - Protect others around you: Learn how to safely use, store and throw away your medicines at www.disposemymeds.org.          This list is accurate as of: 9/12/17 12:32 PM.  Always use your most recent med list.                   Brand Name Dispense Instructions for use Diagnosis    atenolol 25 MG tablet    TENORMIN    270 tablet    TAKE 3 TABLETS DAILY    Hypertension goal BP (blood pressure) < 140/90       atorvastatin 20 MG tablet    LIPITOR    90 tablet    Take 1 tablet (20 mg) by mouth daily    Hyperlipidemia with target LDL less than 100       * HYDROcodone-acetaminophen 5-325 MG per tablet   Start taking on:  9/21/2017    NORCO    150 tablet    Take 1-2 tablets by mouth every 6 hours as needed for moderate to severe pain    Osteoarthritis, unspecified osteoarthritis type, unspecified site       * HYDROcodone-acetaminophen 5-325 MG per tablet   Start taking on:  10/20/2017    NORCO    150 tablet    Take 1-2 tablets by mouth every 6 hours as needed for moderate to severe pain    Osteoarthritis, unspecified osteoarthritis type, unspecified site       * HYDROcodone-acetaminophen 5-325 MG per tablet   Start taking on:  11/21/2017    NORCO    150 tablet    Take 1-2 tablets by mouth every 6 hours as needed for moderate to severe pain    Osteoarthritis, unspecified osteoarthritis type, unspecified site       hydroxychloroquine 200 MG tablet    PLAQUENIL     Take 200 mg by mouth daily        lisinopril 20 MG tablet    PRINIVIL/ZESTRIL    90 tablet    TAKE 1 TABLET DAILY (HYPERTENSION  GOAL BLOOD PRESSURE BELOW 140/90)    Hypertension goal BP (blood pressure) < 140/90       MULTIVITAMIN TABS   OR      1 tab daily    Essential hypertension, benign, Mixed hyperlipidemia       NIFEdipine ER osmotic 90 MG 24 hr tablet    PROCARDIA XL    90 tablet    Take 1 tablet (90 mg) by mouth daily    Hypertension goal BP (blood pressure) < 140/90       order for DME     1 each    Equipment being ordered: blood pressure cuff and monitor    Hypertension goal BP (blood pressure) < 140/90       tocilizumab 80 MG/4ML    ACTEMRA     Inject into the vein every 30 days    RA (rheumatoid arthritis) (H)       VITAMIN D3 PO      Take 2,000 Units by mouth daily        zoledronic Acid 5 MG/100ML Soln infusion    RECLAST    100 mL    Inject 5 mg into the vein Yearly        * Notice:  This list has 3 medication(s) that are the same as other medications prescribed for you. Read the directions carefully, and ask your doctor or other care provider to review them with you.

## 2017-09-13 ASSESSMENT — ANXIETY QUESTIONNAIRES: GAD7 TOTAL SCORE: 2

## 2017-09-14 ENCOUNTER — TELEPHONE (OUTPATIENT)
Dept: FAMILY MEDICINE | Facility: CLINIC | Age: 82
End: 2017-09-14

## 2017-09-14 DIAGNOSIS — N18.30 CKD (CHRONIC KIDNEY DISEASE) STAGE 3, GFR 30-59 ML/MIN (H): Primary | ICD-10-CM

## 2017-09-14 NOTE — TELEPHONE ENCOUNTER
RN -- Please call to let Celestina know that her results have returned. Her blood tests showed mild anemia and some worsening in her kidney function. Her electrolytes, including her sodium level, were normal. Her ultrasound showed no blood clot in the legs. There was some fluid behind each knee, likely something called Baker's cyst, which can form when arthritis is present. This, in some cases, can also contribute to lower leg swelling. I recommend scheduling a visit with your Rheumatologist to discuss possible corticosteroid knee injection or drainage of the cysts if she would recommend this. I recommend that she also schedule a visit with nephrology (the kidney specialist) due to her decline in kidney function. They can also help with blood pressure management and deciding on diuretic use. Majo Alfaro M.D.          Results for orders placed or performed during the hospital encounter of 09/12/17   US Lower Extremity Venous Duplex Bilateral    Narrative    ULTRASOUND VENOUS LOWER EXTREMITY BILATERAL 9/12/2017 4:17 PM     HISTORY: Pitting edema to the knee bilaterally. Edema, unspecified.    COMPARISON: None.    TECHNIQUE: Ultrasound gray scale, Color Doppler flow, and spectral  Doppler waveform analysis performed.    FINDINGS: The bilateral common femoral, superficial femoral, popliteal  and posterior tibial veins are patent and fully compressible and  demonstrate normal venous Doppler flow. The visualized greater  saphenous veins are negative for thrombus. A fluid collection is noted  in each popliteal fossa medially, measuring up to 5.2 cm on the right  and 4.1 cm on the left, likely bilateral Baker's cysts.      Impression    IMPRESSION: No DVT demonstrated.    Preliminary results were called to Dr. Alfaro by the sonographer at  4:15 PM on 9/12/2017.    RODRICK DORMAN MD

## 2017-09-14 NOTE — TELEPHONE ENCOUNTER
RN -- Let's have her complete the echocardiogram. I do think I would recommend keeping the cardiology appointment for now. I do not think that the swelling is related only to the cysts behind the knee. I am guessing it is a combination of things, including medication side effect from nifedipine and being more sedentary lately, but do have concern about her declining energy level still and want to make sure this is not related to her heart. I will let her know if that recommendation changes based on echo results. I am also placing a repeat BMP order for early next week to recheck her kidney function. Please ask her to avoid NSAID medication (which can cause reduction in kidney function) and not to fast prior to the lab--just eat and drink normally. Majo Alfaro M.D.

## 2017-09-14 NOTE — TELEPHONE ENCOUNTER
Spoke to patient's daughter and gave message as below.  Given referral information  Daughter will call back to make the lab only appointment for next week  Yuliet Banda RN

## 2017-09-14 NOTE — TELEPHONE ENCOUNTER
--I called Celestina and carefully read last House msg to her.  --I have not been able to reach Tawnya yet.    --I tried Tawnya on home and cell number and left voice mail to call back and ask to speak with a triage nurse.  --Celestina wants all messages in this encounter to be read to Tawnya (daughter).  --Celestina will call back to schedule lab date when she knows about transportation.    Yana Robbins RN

## 2017-09-14 NOTE — TELEPHONE ENCOUNTER
--  --Celestina would like to know in light of this new plan if  still wants her to see CARDS.      --I called Celestina and read 's message to her slowly so she could write it down.   --She also wanted me to call her daughter and tell her and also send her a letter with this message from House   --I asked [employer] MAN to mail the letter.    --Celestina wanted me to call her daughter at 9 am so not quite yet.  --I called Tawnya Rosario (daughter) at 9 am and left voice mail asking her to call back and ask to speak with a triage nurse.

## 2017-09-14 NOTE — LETTER
[facility]   [address] [place] [address]  [phone]      September 14, 2017      Celestina Tejada  [address]   [facility] [place] [facility] [address]            Dear Celestina Tejada:     wants to let you know that your results have returned. Your blood tests showed mild anemia and some worsening in your kidney function. Your electrolytes, including your sodium level, were normal. Your ultrasound showed no blood clot in the legs. There was some fluid behind each knee, likely something called Baker's cyst, which can form when arthritis is present. This, in some cases, can also contribute to lower leg swelling.  recommends scheduling a visit with your Rheumatologist to discuss possible corticosteroid knee injection or drainage of the cysts if rheumatolgist would recommend this. Dr. Alfaro recommends that you also schedule a visit with nephrology (the kidney specialist) due to your decline in kidney function. The kidney doctor can also help with blood pressure management and deciding on diuretic use. Majo Alfaro M.D.                 Results for orders placed or performed during the hospital encounter of 09/12/17   US Lower Extremity Venous Duplex Bilateral     Narrative     ULTRASOUND VENOUS LOWER EXTREMITY BILATERAL 9/12/2017 4:17 PM      HISTORY: Pitting edema to the knee bilaterally. Edema, unspecified.     COMPARISON: None.     TECHNIQUE: Ultrasound gray scale, Color Doppler flow, and spectral  Doppler waveform analysis performed.     FINDINGS: The bilateral common femoral, superficial femoral, popliteal  and posterior tibial veins are patent and fully compressible and  demonstrate normal venous Doppler flow. The visualized greater  saphenous veins are negative for thrombus. A fluid collection is noted  in each popliteal fossa medially, measuring up to 5.2 cm on the right  and 4.1 cm on the left, likely bilateral Baker's cysts.     Impression     IMPRESSION: No DVT  demonstrated.     Preliminary results were called to Dr. Alfaro by the sonographer at  4:15 PM on 9/12/2017.     RODRICK DORMAN MD       NEPHROLOGY ADULT REFERRAL [[id number]]      Electronically signed by: Majo Alfaro MD on 09/14/17 0737 Status: Active     Ordering user: Majo Alfaro MD 09/14/17 0737                 Order History  Outpatient     Date/Time Action Taken User Additional Information     09/14/17 0737 Sign Majo Alfaro MD        Comments      Your provider has referred you to: [facility]: Kidney (Nephrology) Clinic [facility] [phone]   [email or web address]    Please be aware that coverage of these services is subject to the terms and limitations of your health insurance plan.  Call member services at your health plan with any benefit or coverage questions.      Reason for referral:  Unexplained decline in eGFR > 15 ml/min between two readings.   Please bring the following to your appointment:    >>   Any x-rays, CTs or MRIs which have been performed.  Contact the facility where they were done to arrange for  prior to your scheduled appointment.   >>   List of current medications   >>   This referral request   >>   Any documents/labs given to you for this referral       Associated Diagnoses      CKD (chronic kidney disease) stage 3, GFR 30-59 ml/min [N18.3]  - Primary           Sincerely,    Majo Alfaro MD / Yana Robbins RN

## 2017-09-17 DIAGNOSIS — E78.5 HYPERLIPIDEMIA WITH TARGET LDL LESS THAN 100: ICD-10-CM

## 2017-09-18 ENCOUNTER — TRANSFERRED RECORDS (OUTPATIENT)
Dept: HEALTH INFORMATION MANAGEMENT | Facility: CLINIC | Age: 82
End: 2017-09-18

## 2017-09-18 ENCOUNTER — PRE VISIT (OUTPATIENT)
Dept: CARDIOLOGY | Facility: CLINIC | Age: 82
End: 2017-09-18

## 2017-09-18 DIAGNOSIS — N18.30 CKD (CHRONIC KIDNEY DISEASE) STAGE 3, GFR 30-59 ML/MIN (H): ICD-10-CM

## 2017-09-18 LAB
ANION GAP SERPL CALCULATED.3IONS-SCNC: 10 MMOL/L (ref 3–14)
BUN SERPL-MCNC: 20 MG/DL (ref 7–30)
CALCIUM SERPL-MCNC: 9.4 MG/DL (ref 8.5–10.1)
CHLORIDE SERPL-SCNC: 103 MMOL/L (ref 94–109)
CO2 SERPL-SCNC: 24 MMOL/L (ref 20–32)
CREAT SERPL-MCNC: 1.38 MG/DL (ref 0.52–1.04)
GFR SERPL CREATININE-BSD FRML MDRD: 36 ML/MIN/1.7M2
GLUCOSE SERPL-MCNC: 85 MG/DL (ref 70–99)
POTASSIUM SERPL-SCNC: 4.7 MMOL/L (ref 3.4–5.3)
SODIUM SERPL-SCNC: 137 MMOL/L (ref 133–144)

## 2017-09-18 PROCEDURE — 80048 BASIC METABOLIC PNL TOTAL CA: CPT | Performed by: FAMILY MEDICINE

## 2017-09-18 PROCEDURE — 36415 COLL VENOUS BLD VENIPUNCTURE: CPT | Performed by: FAMILY MEDICINE

## 2017-09-18 NOTE — TELEPHONE ENCOUNTER
Medication Detail      Disp Refills Start End ALECIA   atorvastatin (LIPITOR) 20 MG tablet 90 tablet 3 9/12/2017  No   Sig: Take 1 tablet (20 mg) by mouth daily       Last Office Visit with [facility], [facility] or [facility] prescribing provider: 9/12/17       Lab Results   Component Value Date    CHOL 133 09/12/2017     Lab Results   Component Value Date    HDL 65 09/12/2017     Lab Results   Component Value Date    LDL 50 09/12/2017     Lab Results   Component Value Date    TRIG 89 09/12/2017     Lab Results   Component Value Date    CHOLHDLRATIO 2.7 05/12/2015

## 2017-09-19 ENCOUNTER — TELEPHONE (OUTPATIENT)
Dept: FAMILY MEDICINE | Facility: CLINIC | Age: 82
End: 2017-09-19

## 2017-09-19 RX ORDER — ATORVASTATIN CALCIUM 20 MG/1
TABLET, FILM COATED ORAL
Qty: 90 TABLET | Refills: 3 | Status: SHIPPED | OUTPATIENT
Start: 2017-09-19 | End: 2018-09-04

## 2017-09-19 NOTE — TELEPHONE ENCOUNTER
Actually, since she is seeing the nephrologist in November, that will be adequate to follow up on her kidney function. Thanks, Majo Alfaro M.D.

## 2017-09-19 NOTE — TELEPHONE ENCOUNTER
RN -- Please let Celestina know that her kidney function improved since last week, though not completely back to her baseline. I recommend follow up with me in three months, earlier as needed. Avoid NSAIDS and encourage good hydration. Majo Alfaro M.D.        Results for orders placed or performed in visit on 09/18/17   Basic metabolic panel   Result Value Ref Range    Sodium 137 133 - 144 mmol/L    Potassium 4.7 3.4 - 5.3 mmol/L    Chloride 103 94 - 109 mmol/L    Carbon Dioxide 24 20 - 32 mmol/L    Anion Gap 10 3 - 14 mmol/L    Glucose 85 70 - 99 mg/dL    Urea Nitrogen 20 7 - 30 mg/dL    Creatinine 1.38 (H) 0.52 - 1.04 mg/dL    GFR Estimate 36 (L) >60 mL/min/1.7m2    GFR Estimate If Black 44 (L) >60 mL/min/1.7m2    Calcium 9.4 8.5 - 10.1 mg/dL

## 2017-09-19 NOTE — TELEPHONE ENCOUNTER
Patient informed as below  Should she still follow-up with kidney specialist in November???  Please advise.  Yuliet Banda RN

## 2017-09-21 ENCOUNTER — HOSPITAL ENCOUNTER (OUTPATIENT)
Dept: CARDIOLOGY | Facility: CLINIC | Age: 82
Discharge: HOME | End: 2017-09-21
Attending: FAMILY MEDICINE | Admitting: FAMILY MEDICINE
Payer: COMMERCIAL

## 2017-09-21 DIAGNOSIS — I10 HYPERTENSION GOAL BP (BLOOD PRESSURE) < 140/90: ICD-10-CM

## 2017-09-21 PROCEDURE — 93306 TTE W/DOPPLER COMPLETE: CPT | Mod: 26 | Performed by: INTERNAL MEDICINE

## 2017-09-21 PROCEDURE — 93306 TTE W/DOPPLER COMPLETE: CPT

## 2017-09-26 ENCOUNTER — OFFICE VISIT (OUTPATIENT)
Dept: CARDIOLOGY | Facility: CLINIC | Age: 82
End: 2017-09-26
Attending: FAMILY MEDICINE
Payer: COMMERCIAL

## 2017-09-26 VITALS
HEART RATE: 72 BPM | HEIGHT: 64 IN | BODY MASS INDEX: 21.19 KG/M2 | WEIGHT: 124.1 LBS | SYSTOLIC BLOOD PRESSURE: 156 MMHG | DIASTOLIC BLOOD PRESSURE: 73 MMHG

## 2017-09-26 DIAGNOSIS — I10 HYPERTENSION GOAL BP (BLOOD PRESSURE) < 140/90: Primary | ICD-10-CM

## 2017-09-26 DIAGNOSIS — E78.5 HYPERLIPIDEMIA WITH TARGET LDL LESS THAN 100: ICD-10-CM

## 2017-09-26 PROCEDURE — 93000 ELECTROCARDIOGRAM COMPLETE: CPT | Performed by: INTERNAL MEDICINE

## 2017-09-26 PROCEDURE — 99204 OFFICE O/P NEW MOD 45 MIN: CPT | Mod: 25 | Performed by: INTERNAL MEDICINE

## 2017-09-26 NOTE — MR AVS SNAPSHOT
After Visit Summary   9/26/2017    Celestina Tejada    MRN: [id number]           Patient Information     Date Of Birth          4/2/1931        Visit Information        Provider Department      9/26/2017 11:15 AM Fili Isaacs MD [facility] [place]        Today's Diagnoses     Hypertension goal BP (blood pressure) < 140/90    -  1    Hyperlipidemia with target LDL less than 100           Follow-ups after your visit        Your next 10 appointments already scheduled     Nov 21, 2017  2:00 PM CST   Lab with  LAB   [facility] ([facility])    [address]  1st Floor  [facility] [address] [phone]            Nov 21, 2017  3:00 PM CST   (Arrive by 2:30 PM)   New Patient Visit with Octavia Elkins MD   [facility] ([facility])    [address]  3rd Floor  [facility] [address] [phone]              Who to contact     If you have questions or need follow up information about today's clinic visit or your schedule please contact [facility] directly at [phone].  Normal or non-critical lab and imaging results will be communicated to you by MyChart, letter or phone within 4 business days after the clinic has received the results. If you do not hear from us within 7 days, please contact the clinic through [employer] or phone. If you have a critical or abnormal lab result, we will notify you by phone as soon as possible.  Submit refill requests through [employer] or call your pharmacy and they will forward the refill request to us. Please allow 3 business days for your refill to be completed.          Additional Information About Your Visit        MyChart Information     [employer]Leaf lets you send messages to your doctor, view your test results, renew your prescriptions, schedule appointments and more. To sign up, go to [email or web address]  "Click on \"Log in\" on the left side of the screen, which will take you to the Welcome page. Then click on \"Sign up Now\" on the right side of the page.     You will be asked to enter the access code listed below, as well as some personal information. Please follow the directions to create your username and password.     Your access code is: [email or web address]  Expires: 2017 12:17 PM     Your access code will  in 90 days. If you need help or a new code, please call your [facility] clinic or [phone].        Care EveryWhere ID     This is your Care EveryWhere ID. This could be used by other organizations to access your [facility] medical records  [id number]        Your Vitals Were     Pulse Height BMI (Body Mass Index)             72 1.613 m (5' 3.5\") 21.64 kg/m2          Blood Pressure from Last 3 Encounters:   17 156/73   17 112/64   17 137/65    Weight from Last 3 Encounters:   17 56.3 kg (124 lb 1.6 oz)   17 58.2 kg (128 lb 4 oz)   17 57.6 kg (127 lb)              We Performed the Following     EKG 12-lead complete w/read - Clinics (performed today)        Primary Care Provider Office Phone # Fax #    Majo Alfaro [phone]       [address]  [facility] [address]        Equal Access to Services     HIEN URENA : Hadii aad ku hadasho Soomaali, waaxda luqadaha, qaybta kaalmada adeegyada, alona pillai . So [facility] [phone].    ATENCIÓN: Si habla español, tiene a gallardo disposición servicios gratuitos de asistencia lingüística. Llame al [phone].    We comply with applicable federal civil rights laws and [place] laws. We do not discriminate on the basis of race, color, national origin, age, disability sex, sexual orientation or gender identity.            Thank you!     Thank you for choosing [facility] [place]  for your care. Our goal is always to provide you with excellent care. " Hearing back from our patients is one way we can continue to improve our services. Please take a few minutes to complete the written survey that you may receive in the mail after your visit with us. Thank you!             Your Updated Medication List - Protect others around you: Learn how to safely use, store and throw away your medicines at www.disposemymeds.org.          This list is accurate as of: 9/26/17 12:51 PM.  Always use your most recent med list.                   Brand Name Dispense Instructions for use Diagnosis    atenolol 25 MG tablet    TENORMIN    270 tablet    TAKE 3 TABLETS DAILY    Hypertension goal BP (blood pressure) < 140/90       atorvastatin 20 MG tablet    LIPITOR    90 tablet    TAKE 1 TABLET DAILY    Hyperlipidemia with target LDL less than 100       HYDROcodone-acetaminophen 5-325 MG per tablet   Start taking on:  11/21/2017    NORCO    150 tablet    Take 1-2 tablets by mouth every 6 hours as needed for moderate to severe pain    Osteoarthritis, unspecified osteoarthritis type, unspecified site       hydroxychloroquine 200 MG tablet    PLAQUENIL     Take 200 mg by mouth daily        lisinopril 20 MG tablet    PRINIVIL/ZESTRIL    90 tablet    TAKE 1 TABLET DAILY (HYPERTENSION GOAL BLOOD PRESSURE BELOW 140/90)    Hypertension goal BP (blood pressure) < 140/90       MULTIVITAMIN TABS   OR      1 tab daily    Essential hypertension, benign, Mixed hyperlipidemia       NIFEdipine ER osmotic 90 MG 24 hr tablet    PROCARDIA XL    90 tablet    Take 1 tablet (90 mg) by mouth daily    Hypertension goal BP (blood pressure) < 140/90       order for DME     1 each    Equipment being ordered: blood pressure cuff and monitor    Hypertension goal BP (blood pressure) < 140/90       tocilizumab 80 MG/4ML    ACTEMRA     Inject into the vein every 30 days    RA (rheumatoid arthritis) (H)       VITAMIN D3 PO      Take 2,000 Units by mouth daily        zoledronic Acid 5 MG/100ML Soln infusion    RECLAST    100  mL    Inject 5 mg into the vein Yearly

## 2017-09-26 NOTE — LETTER
9/26/2017    Majo Alfaro MD  [address] [place] [address]    RE: Celestina Christopherbeck       Dear Colleague,    I had the pleasure of seeing Celestina Tejada in the [facility].    The patient is an 86-year-old elderly white female who is referred for cardiology consultation because of development of significant peripheral edema with difficult to control hypertension, mild renal insufficiency and rheumatoid arthritis.  The patient is not a very good historian, and most of the information is obtained from the chart.  The patient has had rheumatoid arthritis for many years as well as hypertension that has been somewhat problematic to control over many years.  She does not give a history of recent cigarette smoking or diabetes mellitus.  She has had a history of hyperlipidemia that has been treated with medication.  There is no history of premature coronary disease.  No history of documented myocardial infarction, congestive heart failure, rheumatic heart disease or congenital heart disease.  The patient has had increasing peripheral edema over the past several weeks if not months, felt to be multifactorial.      PRESENT MEDICATIONS:   1.  Atorvastatin 20 mg a day.   2.  Hydrocodone as needed for joint pain.   3.  Nifedipine extended-release 90 mg a day.   4.  Atenolol 75 mg a day.   5.  Lisinopril 20 mg a day.   6.  Vitamin D3 2000 units a day.   7.  Plaquenil 200 mg a day.   8.  Reclast 5 mg injection yearly.   9.  Actemra 80 mg every 30 days.   10.  Multivitamins 1 per day.      LABORATORY DATA:  Most recent laboratory data cholesterol was 133, HDL 65, LDL 50, triglycerides 89, sodium 137, potassium 4.7, BUN 20, creatinine 1.38.  Hemoglobin was 11.4, platelet count 150,000, ALT 31.  The patient's echocardiography that was performed within the last week showed normal-sized left ventricle with intact systolic function, normal wall thickness, ejection fraction estimated at 65%-70%.   There was no evidence of significant diastolic dysfunction.  Right ventricle was normal in size and function.  There was borderline dilatation of the left atrium, mild mitral insufficiency, mild tricuspid insufficiency and slight elevation of right ventricular systolic pressure of 35 mmHg plus right atrial pressure.  There was severe trileaflet aortic sclerosis and trace aortic regurgitation present with sinus rhythm noted.        The patient does not relate yumiko symptoms of chest pain, palpitations, nausea, vomiting, diaphoresis or syncope.  She has no history of PND, orthopnea, fevers, chills or sweats.      REVIEW OF SYSTEMS:  Unremarkable for severe headaches, seizure, stroke, syncope, active asthma, bronchitis, pneumonia, peptic ulcer disease, specific bowel or bladder dysfunction.  She has had the swelling in the lower extremities and generalized malaise and easy fatigability.  No other significant cardiac-related review of systems.      PHYSICAL EXAMINATION:   GENERAL:  The patient is an elderly white female in no acute distress.   VITAL SIGNS:  Blood pressure 156/73 with a heart rate of 72 and regular.  Weight was 124 pounds.   HEENT:  Pupils equal, round, react to light and accommodate.   NECK:  Without jugular venous distention, carotid bruit or palpable thyroid.   CHEST:  Essentially clear to percussion and auscultation with decreased breath sounds at the bases, slightly prolonged expiratory phase.   CARDIAC:  Regular rhythm, soft S1, physiologically split S2.  There is a soft S4 gallop.  There is a 1/6 systolic murmur at the left sternal border with slight radiation to the subclavicular area and towards the apex.  No diastolic murmur, rub or S3.   ABDOMEN:  Soft, nontender, without palpable liver, spleen or masses.  Bowel sounds are present.   EXTREMITIES:  Showed 3+ edema extending up to the knee as well as 1 to 2+ edema in the thigh.  Pulses were difficult to feel below the femorals, 1 to 2+ above.    NEUROLOGIC:  Appeared to be grossly intact.  The patient was able to move all extremities and respond to sensory stimuli.      Electrocardiogram demonstrated sinus rhythm with poor R-wave progression in the right precordial leads, but no acute ST abnormality present.      CLINICAL IMPRESSION:   1.  Significant to severe peripheral edema -- most likely multifactorial.   2.  Hypertension with elevated systolic blood pressure.   3.  History of rheumatoid arthritis.   4.  Mild renal insufficiency.   5.  Anemia.   6.  History of hyperlipidemia.      DISCUSSION:  The patient's peripheral edema most likely is multifactorial and most likely related to the combination of use of nifedipine with lisinopril as well as the presence of rheumatoid arthritis as well as the presence of venous insufficiency that she does not appear to have any history of congestive heart failure, right or left-sided.  Serum albumin is not reduced, with only slight reduction in serum protein.  Renal insufficiency has been labile, and this may also contribute to her peripheral edema.  It appears unlikely that the patient has had nonsteroidal anti-inflammatory therapy recently.  She will need an aggressive management of her edema through possible substitution of medication for her nifedipine as well as careful diuretic usage since she has what appears to be quite sensitive kidneys.  She is due for a Nephrology consultation with regards to her renal insufficiency.  She probably would benefit from more aggressive mechanical external reduction of the edema with Lymphedema Clinic and wrapping as well as more aggressive stockings such as Jobst and elevation of the legs as much as possible.  If these more conservative measures are not successful, venous insufficiency evaluation by Doppler with possible vein ablation in the lower extremities may be an alternative for more aggressive management of the peripheral edema.  The patient will have first evaluation  in Nephrology and set up for more aggressive mechanical approach to her peripheral edema, with suggestions of diuresis and further blood pressure control by Nephrology.  She may be a candidate for perhaps use of a peripheral or central alpha blocker that may not exacerbate her fluid retention.  The patient and daughter are comfortable with this approach to her peripheral edema.  She does not appear to require further cardiac assessment at this time.      RECOMMENDATIONS:   1.  Continue present medications, considering intervening with cautious diuretic therapy and substitution for nifedipine for blood pressure control possibly with an alpha blocker.   2.  Aggressive management of edema with Lymphedema Clinic, i.e., wrapping and more aggressive stockings and leg elevation.   3.  Consider venous Doppler evaluation for insufficiency and possible vein ablation intervention.   4.  Followup of anemia.   5.  Renal followup for renal insufficiency.   6.  Routine medical followup.   7.  Cardiology followup as needed.     Again, thank you for allowing me to participate in the care of your patient.      Sincerely,    Fili Isaacs MD     [facility]    cc:   Guillermo Amaya MD    [facility]    [address] [place] [address], [address]    [place]  [address]

## 2017-09-27 NOTE — PROGRESS NOTES
HISTORY OF PRESENT ILLNESS:  The patient is an 86-year-old elderly white female who is referred for cardiology consultation because of development of significant peripheral edema with difficult to control hypertension, mild renal insufficiency and rheumatoid arthritis.  The patient is not a very good historian, and most of the information is obtained from the chart.  The patient has had rheumatoid arthritis for many years as well as hypertension that has been somewhat problematic to control over many years.  She does not give a history of recent cigarette smoking or diabetes mellitus.  She has had a history of hyperlipidemia that has been treated with medication.  There is no history of premature coronary disease.  No history of documented myocardial infarction, congestive heart failure, rheumatic heart disease or congenital heart disease.  The patient has had increasing peripheral edema over the past several weeks if not months, felt to be multifactorial.      PRESENT MEDICATIONS:   1.  Atorvastatin 20 mg a day.   2.  Hydrocodone as needed for joint pain.   3.  Nifedipine extended-release 90 mg a day.   4.  Atenolol 75 mg a day.   5.  Lisinopril 20 mg a day.   6.  Vitamin D3 2000 units a day.   7.  Plaquenil 200 mg a day.   8.  Reclast 5 mg injection yearly.   9.  Actemra 80 mg every 30 days.   10.  Multivitamins 1 per day.      LABORATORY DATA:  Most recent laboratory data cholesterol was 133, HDL 65, LDL 50, triglycerides 89, sodium 137, potassium 4.7, BUN 20, creatinine 1.38.  Hemoglobin was 11.4, platelet count 150,000, ALT 31.  The patient's echocardiography that was performed within the last week showed normal-sized left ventricle with intact systolic function, normal wall thickness, ejection fraction estimated at 65%-70%.  There was no evidence of significant diastolic dysfunction.  Right ventricle was normal in size and function.  There was borderline dilatation of the left atrium, mild mitral  insufficiency, mild tricuspid insufficiency and slight elevation of right ventricular systolic pressure of 35 mmHg plus right atrial pressure.  There was severe trileaflet aortic sclerosis and trace aortic regurgitation present with sinus rhythm noted.        The patient does not relate yumiko symptoms of chest pain, palpitations, nausea, vomiting, diaphoresis or syncope.  She has no history of PND, orthopnea, fevers, chills or sweats.      REVIEW OF SYSTEMS:  Unremarkable for severe headaches, seizure, stroke, syncope, active asthma, bronchitis, pneumonia, peptic ulcer disease, specific bowel or bladder dysfunction.  She has had the swelling in the lower extremities and generalized malaise and easy fatigability.  No other significant cardiac-related review of systems.      PHYSICAL EXAMINATION:   GENERAL:  The patient is an elderly white female in no acute distress.   VITAL SIGNS:  Blood pressure 156/73 with a heart rate of 72 and regular.  Weight was 124 pounds.   HEENT:  Pupils equal, round, react to light and accommodate.   NECK:  Without jugular venous distention, carotid bruit or palpable thyroid.   CHEST:  Essentially clear to percussion and auscultation with decreased breath sounds at the bases, slightly prolonged expiratory phase.   CARDIAC:  Regular rhythm, soft S1, physiologically split S2.  There is a soft S4 gallop.  There is a 1/6 systolic murmur at the left sternal border with slight radiation to the subclavicular area and towards the apex.  No diastolic murmur, rub or S3.   ABDOMEN:  Soft, nontender, without palpable liver, spleen or masses.  Bowel sounds are present.   EXTREMITIES:  Showed 3+ edema extending up to the knee as well as 1 to 2+ edema in the thigh.  Pulses were difficult to feel below the femorals, 1 to 2+ above.   NEUROLOGIC:  Appeared to be grossly intact.  The patient was able to move all extremities and respond to sensory stimuli.      Electrocardiogram demonstrated sinus rhythm with  poor R-wave progression in the right precordial leads, but no acute ST abnormality present.      CLINICAL IMPRESSION:   1.  Significant to severe peripheral edema -- most likely multifactorial.   2.  Hypertension with elevated systolic blood pressure.   3.  History of rheumatoid arthritis.   4.  Mild renal insufficiency.   5.  Anemia.   6.  History of hyperlipidemia.      DISCUSSION:  The patient's peripheral edema most likely is multifactorial and most likely related to the combination of use of nifedipine with lisinopril as well as the presence of rheumatoid arthritis as well as the presence of venous insufficiency that she does not appear to have any history of congestive heart failure, right or left-sided.  Serum albumin is not reduced, with only slight reduction in serum protein.  Renal insufficiency has been labile, and this may also contribute to her peripheral edema.  It appears unlikely that the patient has had nonsteroidal anti-inflammatory therapy recently.  She will need an aggressive management of her edema through possible substitution of medication for her nifedipine as well as careful diuretic usage since she has what appears to be quite sensitive kidneys.  She is due for a Nephrology consultation with regards to her renal insufficiency.  She probably would benefit from more aggressive mechanical external reduction of the edema with Lymphedema Clinic and wrapping as well as more aggressive stockings such as Jobst and elevation of the legs as much as possible.  If these more conservative measures are not successful, venous insufficiency evaluation by Doppler with possible vein ablation in the lower extremities may be an alternative for more aggressive management of the peripheral edema.  The patient will have first evaluation in Nephrology and set up for more aggressive mechanical approach to her peripheral edema, with suggestions of diuresis and further blood pressure control by Nephrology.  She may  be a candidate for perhaps use of a peripheral or central alpha blocker that may not exacerbate her fluid retention.  The patient and daughter are comfortable with this approach to her peripheral edema.  She does not appear to require further cardiac assessment at this time.      RECOMMENDATIONS:   1.  Continue present medications, considering intervening with cautious diuretic therapy and substitution for nifedipine for blood pressure control possibly with an alpha blocker.   2.  Aggressive management of edema with Lymphedema Clinic, i.e., wrapping and more aggressive stockings and leg elevation.   3.  Consider venous Doppler evaluation for insufficiency and possible vein ablation intervention.   4.  Followup of anemia.   5.  Renal followup for renal insufficiency.   6.  Routine medical followup.   7.  Cardiology followup as needed.      cc:   Majo Alfaro MD    [facility]    [address]  [address]       Guillermo Amaya MD    [facility]    [address] [place] [address], [address]    [place]  [address]         RUEL RIVAS MD, [facility]             D: 2017 18:32   T: 2017 02:10   MT: MARISOL      Name:     HARDY CORRIGAN   MRN:      [id number]        Account:      [id number]   :      1931           Service Date: 2017      Document: [id number]

## 2017-10-02 ENCOUNTER — TELEPHONE (OUTPATIENT)
Dept: FAMILY MEDICINE | Facility: CLINIC | Age: 82
End: 2017-10-02

## 2017-10-02 NOTE — TELEPHONE ENCOUNTER
"Spoke to Daughter   They are seeing nephrology in [place] next week  She will continue to monitor and wants to see what nephrolgy says  If change is dramatic or accelerates, will go to the ER.  Otherwise daughter would like to follow-up with Dr Alfaro after seeing nephrology.  \"I just wanted to make sure we weren't missing something glaring.\"  Yuliet Banda RN  "

## 2017-10-02 NOTE — TELEPHONE ENCOUNTER
Left message on machine to call back.  Ask to speak to an RN, let them know it's a return call.  Leave a number and time that you can be reached.   Yuliet Banda RN

## 2017-10-02 NOTE — TELEPHONE ENCOUNTER
We have not been able to find a clear cause for her sense of malaise as well as her edema. She did report a sense of fatigue and malaise at her last visit. She denied feeling depressed at all. If her daughter Tawnya feels this is quickly worsening, an ER visit would be reasonable. Otherwise, could follow up with me for further discussion and evaluation. Majo Alfaro M.D.

## 2017-10-02 NOTE — TELEPHONE ENCOUNTER
"  Daughter is calling about patient     Consent to communicate is on file    Patient has been treating for edema in her legs    This has been going on for some time    Daughter and patient are becoming more concerned because now patient is suffering from Malaise (daughter's word).     Daughter states that her mother doesn't want to get off the couch or do anything    The malaise seems to be progressing and daughter is getting scared    States they saw cardiology last week and cards told them that her edema does not seem to be from the heart, \"Her heart is in good shape for her age.\"    They do have an upcoming appointment with nephrologist    Daughter thinks the malaise is separate from the edema, though might be somewhat related.    The edema is old and the malaise is new.    Patient is not feeling well at all, but can't put her finger on what is causing it  Please advise.  Yuliet Banda RN      "

## 2017-10-11 ENCOUNTER — TRANSFERRED RECORDS (OUTPATIENT)
Dept: HEALTH INFORMATION MANAGEMENT | Facility: CLINIC | Age: 82
End: 2017-10-11

## 2017-10-12 DIAGNOSIS — R82.90 NONSPECIFIC FINDING ON EXAMINATION OF URINE: ICD-10-CM

## 2017-10-12 DIAGNOSIS — N18.30 CHRONIC KIDNEY DISEASE, STAGE III (MODERATE) (H): Primary | ICD-10-CM

## 2017-10-12 DIAGNOSIS — I10 ESSENTIAL HYPERTENSION, MALIGNANT: ICD-10-CM

## 2017-10-12 LAB
ALBUMIN UR-MCNC: ABNORMAL MG/DL
APPEARANCE UR: CLEAR
BACTERIA #/AREA URNS HPF: ABNORMAL /HPF
BILIRUB UR QL STRIP: NEGATIVE
COLOR UR AUTO: YELLOW
GLUCOSE UR STRIP-MCNC: NEGATIVE MG/DL
HGB UR QL STRIP: NEGATIVE
KETONES UR STRIP-MCNC: ABNORMAL MG/DL
LEUKOCYTE ESTERASE UR QL STRIP: ABNORMAL
NITRATE UR QL: NEGATIVE
NON-SQ EPI CELLS #/AREA URNS LPF: ABNORMAL /LPF
PH UR STRIP: 5 PH (ref 5–7)
RBC #/AREA URNS AUTO: ABNORMAL /HPF
SOURCE: ABNORMAL
SP GR UR STRIP: 1.02 (ref 1–1.03)
UROBILINOGEN UR STRIP-ACNC: 0.2 EU/DL (ref 0.2–1)
WBC #/AREA URNS AUTO: ABNORMAL /HPF
YEAST #/AREA URNS HPF: ABNORMAL /HPF

## 2017-10-12 PROCEDURE — 87088 URINE BACTERIA CULTURE: CPT | Performed by: FAMILY MEDICINE

## 2017-10-12 PROCEDURE — 87086 URINE CULTURE/COLONY COUNT: CPT | Performed by: FAMILY MEDICINE

## 2017-10-12 PROCEDURE — 87186 SC STD MICRODIL/AGAR DIL: CPT | Performed by: FAMILY MEDICINE

## 2017-10-12 PROCEDURE — 82043 UR ALBUMIN QUANTITATIVE: CPT | Performed by: FAMILY MEDICINE

## 2017-10-12 PROCEDURE — 84166 PROTEIN E-PHORESIS/URINE/CSF: CPT | Performed by: FAMILY MEDICINE

## 2017-10-12 PROCEDURE — 81001 URINALYSIS AUTO W/SCOPE: CPT | Performed by: FAMILY MEDICINE

## 2017-10-13 LAB
CREAT UR-MCNC: 127 MG/DL
MICROALBUMIN UR-MCNC: 32 MG/L
MICROALBUMIN/CREAT UR: 24.8 MG/G CR (ref 0–25)
PROT PATTERN UR ELPH-IMP: NORMAL

## 2017-10-17 ENCOUNTER — DOCUMENTATION ONLY (OUTPATIENT)
Dept: CARDIOLOGY | Facility: CLINIC | Age: 82
End: 2017-10-17

## 2017-10-20 LAB
BACTERIA SPEC CULT: ABNORMAL
BACTERIA SPEC CULT: ABNORMAL
SPECIMEN SOURCE: ABNORMAL

## 2017-11-01 ENCOUNTER — TRANSFERRED RECORDS (OUTPATIENT)
Dept: HEALTH INFORMATION MANAGEMENT | Facility: CLINIC | Age: 82
End: 2017-11-01

## 2017-11-01 LAB
ALT SERPL-CCNC: 19 IU/L (ref 5–35)
AST SERPL-CCNC: 27 U/L (ref 5–34)
CREAT SERPL-MCNC: 1.14 MG/DL (ref 0.5–1.3)
GFR SERPL CREATININE-BSD FRML MDRD: 48 ML/MIN/1.73M2

## 2017-11-05 DIAGNOSIS — I10 HYPERTENSION GOAL BP (BLOOD PRESSURE) < 140/90: ICD-10-CM

## 2017-11-08 RX ORDER — NIFEDIPINE 90 MG/1
TABLET, EXTENDED RELEASE ORAL
Qty: 90 TABLET | Refills: 2 | Status: SHIPPED | OUTPATIENT
Start: 2017-11-08 | End: 2018-03-27

## 2017-11-13 DIAGNOSIS — N18.30 CKD (CHRONIC KIDNEY DISEASE) STAGE 3, GFR 30-59 ML/MIN (H): Primary | ICD-10-CM

## 2017-11-20 ENCOUNTER — OFFICE VISIT (OUTPATIENT)
Dept: FAMILY MEDICINE | Facility: CLINIC | Age: 82
End: 2017-11-20
Payer: COMMERCIAL

## 2017-11-20 VITALS
DIASTOLIC BLOOD PRESSURE: 52 MMHG | OXYGEN SATURATION: 98 % | WEIGHT: 119.5 LBS | TEMPERATURE: 97.4 F | RESPIRATION RATE: 14 BRPM | HEART RATE: 76 BPM | SYSTOLIC BLOOD PRESSURE: 134 MMHG | BODY MASS INDEX: 20.84 KG/M2

## 2017-11-20 DIAGNOSIS — N18.30 CKD (CHRONIC KIDNEY DISEASE) STAGE 3, GFR 30-59 ML/MIN (H): Primary | ICD-10-CM

## 2017-11-20 DIAGNOSIS — R60.0 PERIPHERAL EDEMA: ICD-10-CM

## 2017-11-20 DIAGNOSIS — E78.5 HYPERLIPIDEMIA WITH TARGET LDL LESS THAN 100: ICD-10-CM

## 2017-11-20 DIAGNOSIS — M19.90 OSTEOARTHRITIS, UNSPECIFIED OSTEOARTHRITIS TYPE, UNSPECIFIED SITE: ICD-10-CM

## 2017-11-20 DIAGNOSIS — M06.9 RHEUMATOID ARTHRITIS, INVOLVING UNSPECIFIED SITE, UNSPECIFIED RHEUMATOID FACTOR PRESENCE: ICD-10-CM

## 2017-11-20 DIAGNOSIS — I10 HYPERTENSION GOAL BP (BLOOD PRESSURE) < 140/90: ICD-10-CM

## 2017-11-20 PROBLEM — G89.29 OTHER CHRONIC PAIN: Status: ACTIVE | Noted: 2017-11-20

## 2017-11-20 PROCEDURE — 99214 OFFICE O/P EST MOD 30 MIN: CPT | Performed by: FAMILY MEDICINE

## 2017-11-20 RX ORDER — HYDROCODONE BITARTRATE AND ACETAMINOPHEN 5; 325 MG/1; MG/1
1-2 TABLET ORAL EVERY 6 HOURS PRN
Qty: 150 TABLET | Refills: 0 | Status: SHIPPED | OUTPATIENT
Start: 2018-01-19 | End: 2018-02-13

## 2017-11-20 RX ORDER — HYDROCODONE BITARTRATE AND ACETAMINOPHEN 5; 325 MG/1; MG/1
1-2 TABLET ORAL EVERY 6 HOURS PRN
Qty: 150 TABLET | Refills: 0 | Status: SHIPPED | OUTPATIENT
Start: 2017-12-21 | End: 2018-02-13

## 2017-11-20 RX ORDER — LISINOPRIL 20 MG/1
20 TABLET ORAL DAILY
Qty: 90 TABLET | Refills: 3 | Status: SHIPPED | OUTPATIENT
Start: 2017-11-20 | End: 2018-03-05

## 2017-11-20 RX ORDER — HYDROCODONE BITARTRATE AND ACETAMINOPHEN 5; 325 MG/1; MG/1
1-2 TABLET ORAL EVERY 6 HOURS PRN
Qty: 150 TABLET | Refills: 0 | Status: SHIPPED | OUTPATIENT
Start: 2018-02-21 | End: 2018-02-13

## 2017-11-20 NOTE — PATIENT INSTRUCTIONS
1) Follow up with me in three months  2) Schedule with lymphedema clinic to help with your leg swelling.   3) Continue your current medications

## 2017-11-20 NOTE — MR AVS SNAPSHOT
"              After Visit Summary   11/20/2017    Celestina Tejada    MRN: [id number]           Patient Information     Date Of Birth          4/2/1931        Visit Information        Provider Department      11/20/2017 11:40 AM Majo Alfaro MD [facility]        Today's Diagnoses     CKD (chronic kidney disease) stage 3, GFR 30-59 ml/min    -  1    Hypertension goal BP (blood pressure) < 140/90        Hyperlipidemia with target LDL less than 100        Rheumatoid arthritis, involving unspecified site, unspecified rheumatoid factor presence (H)        Osteoarthritis, unspecified osteoarthritis type, unspecified site        Peripheral edema          Care Instructions    1) Follow up with me in three months  2) Schedule with lymphedema clinic to help with your leg swelling.   3) Continue your current medications          Follow-ups after your visit        Additional Services     LYMPHEDEMA THERAPY REFERRAL       *This therapy referral will be filtered to a centralized scheduling office at [facility] and the patient will receive a call to schedule an appointment at a [place] location most convenient for them. *   If you have not heard from the scheduling office within 2 business days, please call [phone] for all locations, with the exception of [place], please call [phone].     Treatment: PT or OT Evaluation & Treatment  Special Instructions:    PT/OT Treatment Diagnosis: Edema    Please be aware that coverage of these services is subject to the terms and limitations of your health insurance plan.  Call member services at your health plan with any benefit or coverage questions.      **Note to Provider:  If you are referring outside of [place] for the therapy appointment, please list the name of the location in the \"special instructions\" above, print the referral and give to the patient to schedule the appointment.                  Who to contact     If you have questions " "or need follow up information about today's clinic visit or your schedule please contact [facility] directly at [phone].  Normal or non-critical lab and imaging results will be communicated to you by MyChart, letter or phone within 4 business days after the clinic has received the results. If you do not hear from us within 7 days, please contact the clinic through [employer] or phone. If you have a critical or abnormal lab result, we will notify you by phone as soon as possible.  Submit refill requests through [employer] or call your pharmacy and they will forward the refill request to us. Please allow 3 business days for your refill to be completed.          Additional Information About Your Visit        [employer] Information     [employer] lets you send messages to your doctor, view your test results, renew your prescriptions, schedule appointments and more. To sign up, go to [email or web address] . Click on \"Log in\" on the left side of the screen, which will take you to the Welcome page. Then click on \"Sign up Now\" on the right side of the page.     You will be asked to enter the access code listed below, as well as some personal information. Please follow the directions to create your username and password.     Your access code is: [email or web address]  Expires: 2017 11:17 AM     Your access code will  in 90 days. If you need help or a new code, please call your [facility] clinic or [phone].        Care EveryWhere ID     This is your Care EveryWhere ID. This could be used by other organizations to access your [facility] medical records  [id number]        Your Vitals Were     Pulse Temperature Respirations Pulse Oximetry BMI (Body Mass Index)       76 97.4  F (36.3  C) (Tympanic) 14 98% 20.84 kg/m2        Blood Pressure from Last 3 Encounters:   17 134/52   17 156/73   17 112/64    Weight from Last 3 Encounters:   17 119 lb 8 oz (54.2 kg)   17 124 lb 1.6 oz (56.3 " kg)   09/12/17 128 lb 4 oz (58.2 kg)              We Performed the Following     LYMPHEDEMA THERAPY REFERRAL          Today's Medication Changes          These changes are accurate as of: 11/20/17 12:16 PM.  If you have any questions, ask your nurse or doctor.               These medicines have changed or have updated prescriptions.        Dose/Directions    * HYDROcodone-acetaminophen 5-325 MG per tablet   Commonly known as:  NORCO   This may have changed:  You were already taking a medication with the same name, and this prescription was added. Make sure you understand how and when to take each.   Used for:  Osteoarthritis, unspecified osteoarthritis type, unspecified site   Changed by:  Majo Alfaro MD        Dose:  1-2 tablet   Start taking on:  12/21/2017   Take 1-2 tablets by mouth every 6 hours as needed for moderate to severe pain   Quantity:  150 tablet   Refills:  0       * HYDROcodone-acetaminophen 5-325 MG per tablet   Commonly known as:  NORCO   This may have changed:  You were already taking a medication with the same name, and this prescription was added. Make sure you understand how and when to take each.   Used for:  Osteoarthritis, unspecified osteoarthritis type, unspecified site   Changed by:  Majo Alfaro MD        Dose:  1-2 tablet   Start taking on:  1/19/2018   Take 1-2 tablets by mouth every 6 hours as needed for moderate to severe pain   Quantity:  150 tablet   Refills:  0       * HYDROcodone-acetaminophen 5-325 MG per tablet   Commonly known as:  NORCO   This may have changed:  Another medication with the same name was added. Make sure you understand how and when to take each.   Used for:  Osteoarthritis, unspecified osteoarthritis type, unspecified site   Changed by:  Majo Alfaro MD        Dose:  1-2 tablet   Start taking on:  2/21/2018   Take 1-2 tablets by mouth every 6 hours as needed for moderate to severe pain   Quantity:  150 tablet   Refills:  0       lisinopril 20 MG  tablet   Commonly known as:  PRINIVIL/ZESTRIL   This may have changed:  See the new instructions.   Used for:  Hypertension goal BP (blood pressure) < 140/90   Changed by:  Majo Alfaro MD        Dose:  20 mg   Take 1 tablet (20 mg) by mouth daily   Quantity:  90 tablet   Refills:  3       * Notice:  This list has 3 medication(s) that are the same as other medications prescribed for you. Read the directions carefully, and ask your doctor or other care provider to review them with you.         Where to get your medicines      These medications were sent to [facility] [id number] [place] [address] [address] AT [facility] [address]  [address], [facility] [address]    Hours:  24-hours Phone:  [phone]     lisinopril 20 MG tablet         Some of these will need a paper prescription and others can be bought over the counter.  Ask your nurse if you have questions.     Bring a paper prescription for each of these medications     HYDROcodone-acetaminophen 5-325 MG per tablet    HYDROcodone-acetaminophen 5-325 MG per tablet    HYDROcodone-acetaminophen 5-325 MG per tablet                Primary Care Provider Office Phone # Fax #    Majo Alfaro [phone] [address]  [facility] [address]        Equal Access to Services     HIEN URENA AH: Hadii melodie ku hadasho Soomaali, waaxda luqadaha, qaybta kaalmada adeegyada, waxagus azevedoin haygisele hampton. So [facility] [phone].    ATENCIÓN: Si habla español, tiene a gallardo disposición servicios gratuitos de asistencia lingüística. [facility] [phone].    We comply with applicable federal civil rights laws and [place] laws. We do not discriminate on the basis of race, color, national origin, age, disability, sex, sexual orientation, or gender identity.            Thank you!     Thank you for choosing [facility]  for your care. Our goal is always to provide you with excellent care. Hearing back from  our patients is one way we can continue to improve our services. Please take a few minutes to complete the written survey that you may receive in the mail after your visit with us. Thank you!             Your Updated Medication List - Protect others around you: Learn how to safely use, store and throw away your medicines at www.disposemymeds.org.          This list is accurate as of: 11/20/17 12:16 PM.  Always use your most recent med list.                   Brand Name Dispense Instructions for use Diagnosis    atenolol 25 MG tablet    TENORMIN    270 tablet    TAKE 3 TABLETS DAILY    Hypertension goal BP (blood pressure) < 140/90       atorvastatin 20 MG tablet    LIPITOR    90 tablet    TAKE 1 TABLET DAILY    Hyperlipidemia with target LDL less than 100       * HYDROcodone-acetaminophen 5-325 MG per tablet   Start taking on:  12/21/2017    NORCO    150 tablet    Take 1-2 tablets by mouth every 6 hours as needed for moderate to severe pain    Osteoarthritis, unspecified osteoarthritis type, unspecified site       * HYDROcodone-acetaminophen 5-325 MG per tablet   Start taking on:  1/19/2018    NORCO    150 tablet    Take 1-2 tablets by mouth every 6 hours as needed for moderate to severe pain    Osteoarthritis, unspecified osteoarthritis type, unspecified site       * HYDROcodone-acetaminophen 5-325 MG per tablet   Start taking on:  2/21/2018    NORCO    150 tablet    Take 1-2 tablets by mouth every 6 hours as needed for moderate to severe pain    Osteoarthritis, unspecified osteoarthritis type, unspecified site       hydroxychloroquine 200 MG tablet    PLAQUENIL     Take 200 mg by mouth daily        lisinopril 20 MG tablet    PRINIVIL/ZESTRIL    90 tablet    Take 1 tablet (20 mg) by mouth daily    Hypertension goal BP (blood pressure) < 140/90       MULTIVITAMIN TABS   OR      1 tab daily    Essential hypertension, benign, Mixed hyperlipidemia       NIFEdipine ER osmotic 90 MG 24 hr tablet    PROCARDIA XL    90  tablet    TAKE 1 TABLET DAILY    Hypertension goal BP (blood pressure) < 140/90       order for DME     1 each    Equipment being ordered: blood pressure cuff and monitor    Hypertension goal BP (blood pressure) < 140/90       tocilizumab 80 MG/4ML    ACTEMRA     Inject into the vein every 30 days    RA (rheumatoid arthritis) (H)       VITAMIN D3 PO      Take 2,000 Units by mouth daily        zoledronic Acid 5 MG/100ML Soln infusion    RECLAST    100 mL    Inject 5 mg into the vein Yearly        * Notice:  This list has 3 medication(s) that are the same as other medications prescribed for you. Read the directions carefully, and ask your doctor or other care provider to review them with you.

## 2017-11-20 NOTE — PROGRESS NOTES
"  SUBJECTIVE:   Celestina Tejada is a 86 year old female who presents to clinic today for the following health issues:      Hypertension Follow-up      Outpatient blood pressures are not being checked.    Low Salt Diet: no added salt    Chronic Kidney Disease Follow-up      Current NSAID use?  No    Chronic Pain Follow-Up       Type / Location of Pain: all over   Analgesia/pain control:       Recent changes:  same      Overall control: Tolerable with discomfort  Activity level/function:      Daily activities:  Able to do light housework, cooking  Adverse effects:  No  Adherance    Taking medication as directed?  Yes    Risk Factors:    Sleep:  Fair    Mood/anxiety:  controlled    Recent family or social stressors:  none noted    Other aggravating factors: repetitive activities - .  PHQ-9 SCORE 9/19/2016 9/12/2017   Total Score 7 7     NUNO-7 SCORE 9/19/2016 9/12/2017   Total Score 0 2     Encounter-Level CSA:     There are no encounter-level csa.          From 9/12/17 clinic visit:   \"1. Other chronic pain  Stable. Refills given for three months of NORCO today. F/u in three months.  2. Hypertension goal BP (blood pressure) < 140/90  Controlled. Continues on atenolol and lisinopril.   3. Hyperlipidemia with target LDL less than 100  Stable.  4. Osteoarthritis, unspecified osteoarthritis type, unspecified site  Stable. Continues on NOCRO.  5. CKD (chronic kidney disease) stage 3, GFR 30-59 ml/min  Stable. Will check hemoglobin.   6. Rheumatoid arthritis, involving unspecified site, unspecified rheumatoid factor presence (H)  Well controlled with Actemra injections monthly.    7. Peripheral edema  Worsening edema in her bilateral lower extremities. More sedentary lately with some general malaise, not acting herself in terms of her energy level per her daughter. No other sx to suggest HF.  Will do a lower extremity US today to check for DVT, low suspicion. Check renal function, electrolytes today. If significant " "abnormalities discussed with Celestina and her daughter that I would recommend ED visit. Suspect edema is multifactorial. She will schedule with cardiology to discuss other BP med options and edema, follow up on echo. She had significant hyponatremia resulting in hospitalization with hctz. I am hesitant to start diuretic at this time. She will also schedule an echocardiogram.   From last visit 6/6/17: \"Intermittent for years, always some level of edema. No sx suggesting HF. She does not want an echocardiogram at this time. She will watch for sx of HF, reviewed with pt. Suspect this is multifactorial secondary to up on her feet more lately shopping, warmer weather, nifedipine side effect, arthritis may also contribute. Sx not suggestive of DVT, no calf pain and sx are bilateral, no erythema. She is not able to apply compression stockings due to her hand arthritis, which makes it too difficult. She had been on diuretic, but had significant hyponatremia with hctz. She will elevate her legs.  She is not aware if her swelling is worse after actemra injections and peripheral edema is an uncommon side effect of this medication.\"  8. Malaise  Will check labs today.\"      She was taken off of  Nifedipine, but she has not been able to get the last two infusions due to her blood pressure being so high. Her daughter does with her to all of her appointments, and her suggested that she go back on Nifedipine in order to control her blood pressure which she did.    She continues to have swollen legs and she struggles to get her feet into shoes. She is interested in lymphedema therapy.       Problem list and histories reviewed & adjusted, as indicated.  Additional history: as documented    Patient Active Problem List   Diagnosis     Osteopenia     Hypertension goal BP (blood pressure) < 140/90     Rheumatoid arthritis (H)     CKD (chronic kidney disease) stage 3, GFR 30-59 ml/min     Hyperlipidemia with target LDL less than 100     " Advanced directives, counseling/discussion     Osteoarthritis     Eczema     Peripheral edema     Hyponatremia with decreased serum osmolality     Acute hyponatremia     Chronic pain     Other chronic pain     Past Surgical History:   Procedure Laterality Date     C APPENDECTOMY  1950     SURGICAL HISTORY OF -       2 normal vaginal births       Social History   Substance Use Topics     Smoking status: Former Smoker     Packs/day: 0.50     Years: 20.00     Types: Cigarettes     Quit date: 9/20/2000     Smokeless tobacco: Never Used     Alcohol use Yes      Comment: very occ,     Family History   Problem Relation Age of Onset     HEART DISEASE Mother      CHF     Respiratory Mother      lung disease     C.A.D. Sister      heart by-pass     Arthritis Sister      Family History Negative Brother      Family History Negative Daughter      Family History Negative Daughter          Current Outpatient Prescriptions   Medication Sig Dispense Refill     NIFEdipine ER osmotic (PROCARDIA XL) 90 MG 24 hr tablet TAKE 1 TABLET DAILY 90 tablet 2     atorvastatin (LIPITOR) 20 MG tablet TAKE 1 TABLET DAILY 90 tablet 3     [START ON 11/21/2017] HYDROcodone-acetaminophen (NORCO) 5-325 MG per tablet Take 1-2 tablets by mouth every 6 hours as needed for moderate to severe pain 150 tablet 0     atenolol (TENORMIN) 25 MG tablet TAKE 3 TABLETS DAILY 270 tablet 3     lisinopril (PRINIVIL/ZESTRIL) 20 MG tablet TAKE 1 TABLET DAILY (HYPERTENSION GOAL BLOOD PRESSURE BELOW 140/90) 90 tablet 2     Cholecalciferol (VITAMIN D3 PO) Take 2,000 Units by mouth daily       hydroxychloroquine (PLAQUENIL) 200 MG tablet Take 200 mg by mouth daily   3     zoledronic Acid (RECLAST) 5 MG/100ML SOLN Inject 5 mg into the vein Yearly 100 mL 0     order for DME Equipment being ordered: blood pressure cuff and monitor 1 each 0     tocilizumab (ACTEMRA) 80 MG/4ML Inject into the vein every 30 days        MULTIVITAMIN TABS   OR 1 tab daily  0     Allergies   Allergen  Reactions     Hydrochlorothiazide      hyponatremia     No Known Allergies      Recent Labs   Lab Test 11/01/17 09/18/17   1254  09/12/17   1236 08/02/17 08/18/16   1322   08/11/16   1520   07/14/16   1305   LDL   --    --   50   --    --   49   --    --    --   81   HDL   --    --   65   --    --   68   --    --    --   64   TRIG   --    --   89   --    --   85   --    --    --   120   ALT  19   --   31  21   < >   --    --   27   < >  21   CR  1.140  1.38*  1.60*  1.270   < >  1.66*   < >  1.75*   < >  1.41*   GFRESTIMATED  48.0  36*  31*  42.4   < >  29*   < >  28*   < >  35*   GFRESTBLACK   --   44*  37*   --    < >  35*   < >  33*   < >  43*   POTASSIUM   --   4.7  4.2   --    < >  4.7   < >  3.0*   < >  4.0   TSH   --    --    --    --    --    --    --   1.95   --    --     < > = values in this interval not displayed.      BP Readings from Last 3 Encounters:   11/20/17 134/52   09/26/17 156/73   09/12/17 112/64    Wt Readings from Last 3 Encounters:   11/20/17 54.2 kg (119 lb 8 oz)   09/26/17 56.3 kg (124 lb 1.6 oz)   09/12/17 58.2 kg (128 lb 4 oz)      Reviewed and updated as needed this visit by clinical staffTobacco  Allergies  Meds  Med Hx  Surg Hx  Fam Hx  Soc Hx      Reviewed and updated as needed this visit by Provider       ROS:  See above.    This document serves as a record of the services and decisions personally performed and made by Majo Alfaro MD. It was created on his/her behalf by Teja Garay, trained medical scribe. The creation of this document is based the provider's statements to the medical scribes.    Scribthomas Garay, November 20, 2017    OBJECTIVE:     /52  Pulse 76  Temp 97.4  F (36.3  C) (Tympanic)  Resp 14  Wt 54.2 kg (119 lb 8 oz)  SpO2 98%  BMI 20.84 kg/m2  Body mass index is 20.84 kg/(m^2).  GENERAL: elderly female, alert and no distress    Diagnostic Test Results:  none     ASSESSMENT/PLAN:   1. CKD (chronic kidney disease) stage 3, GFR  30-59 ml/min  Stable. Did visit with nephrology (see scanned notes).      2. Hypertension goal BP (blood pressure) < 140/90  Controlled. Will continue lisinopril 20 mg daily.  - lisinopril (PRINIVIL/ZESTRIL) 20 MG tablet; Take 1 tablet (20 mg) by mouth daily  Dispense: 90 tablet; Refill: 3    3. Hyperlipidemia with target LDL less than 100  Stable. Continues atorvastatin 20 mg daily.    4. Rheumatoid arthritis, involving unspecified site, unspecified rheumatoid factor presence (H)  Controlled with Plaquenil and with Actemra injections monthly. Followed by rheum    5. Osteoarthritis, unspecified osteoarthritis type, unspecified site  Controlled with Norco 5-325 Q6H PRN.  - HYDROcodone-acetaminophen (NORCO) 5-325 MG per tablet; Take 1-2 tablets by mouth every 6 hours as needed for moderate to severe pain  Dispense: 150 tablet; Refill: 0  - HYDROcodone-acetaminophen (NORCO) 5-325 MG per tablet; Take 1-2 tablets by mouth every 6 hours as needed for moderate to severe pain  Dispense: 150 tablet; Refill: 0  - HYDROcodone-acetaminophen (NORCO) 5-325 MG per tablet; Take 1-2 tablets by mouth every 6 hours as needed for moderate to severe pain  Dispense: 150 tablet; Refill: 0    6. Peripheral edema  Leg swelling persistent. Likely multifactorial. Is on nifedipine, which likely contributes, but unable to get off this due to high blood pressure. Unable to tolerate diuretic due to electrolyte disturbance. Will schedule with lymphedema clinic.  - LYMPHEDEMA THERAPY REFERRAL    Patient Instructions   1) Follow up with me in three months  2) Schedule with lymphedema clinic to help with your leg swelling.   3) Continue your current medications      The information in this document, created by the medical scribe for me, accurately reflects the services I personally performed and the decisions made by me. I have reviewed and approved this document for accuracy. 11/20/17    Majo Alfaro MD  [facility]

## 2017-11-20 NOTE — NURSING NOTE
"Chief Complaint   Patient presents with     Hypertension       Initial /52  Pulse 76  Temp 97.4  F (36.3  C) (Tympanic)  Resp 14  Wt 119 lb 8 oz (54.2 kg)  SpO2 98%  BMI 20.84 kg/m2 Estimated body mass index is 20.84 kg/(m^2) as calculated from the following:    Height as of 9/26/17: 5' 3.5\" (1.613 m).    Weight as of this encounter: 119 lb 8 oz (54.2 kg).  Medication Reconciliation: complete     Genny Kelly MA     "

## 2018-01-08 ENCOUNTER — TRANSFERRED RECORDS (OUTPATIENT)
Dept: HEALTH INFORMATION MANAGEMENT | Facility: CLINIC | Age: 83
End: 2018-01-08

## 2018-01-08 LAB
ALT SERPL-CCNC: 28 IU/L (ref 5–35)
CREAT SERPL-MCNC: 1.45 MG/DL (ref 0.5–1.3)
GFR SERPL CREATININE-BSD FRML MDRD: 36.4 ML/MIN/1.73M2

## 2018-01-28 LAB — AST SERPL-CCNC: 35 U/L (ref 5–35)

## 2018-02-13 ENCOUNTER — OFFICE VISIT (OUTPATIENT)
Dept: FAMILY MEDICINE | Facility: CLINIC | Age: 83
End: 2018-02-13
Payer: COMMERCIAL

## 2018-02-13 VITALS
TEMPERATURE: 97.7 F | DIASTOLIC BLOOD PRESSURE: 65 MMHG | WEIGHT: 112.5 LBS | BODY MASS INDEX: 21.24 KG/M2 | RESPIRATION RATE: 14 BRPM | HEIGHT: 61 IN | OXYGEN SATURATION: 97 % | HEART RATE: 72 BPM | SYSTOLIC BLOOD PRESSURE: 146 MMHG

## 2018-02-13 DIAGNOSIS — M19.90 OSTEOARTHRITIS, UNSPECIFIED OSTEOARTHRITIS TYPE, UNSPECIFIED SITE: ICD-10-CM

## 2018-02-13 DIAGNOSIS — M06.9 RHEUMATOID ARTHRITIS, INVOLVING UNSPECIFIED SITE, UNSPECIFIED RHEUMATOID FACTOR PRESENCE: ICD-10-CM

## 2018-02-13 DIAGNOSIS — N18.30 CKD (CHRONIC KIDNEY DISEASE) STAGE 3, GFR 30-59 ML/MIN (H): ICD-10-CM

## 2018-02-13 DIAGNOSIS — R60.0 PERIPHERAL EDEMA: ICD-10-CM

## 2018-02-13 DIAGNOSIS — I10 HYPERTENSION GOAL BP (BLOOD PRESSURE) < 140/90: Primary | ICD-10-CM

## 2018-02-13 DIAGNOSIS — E78.5 HYPERLIPIDEMIA WITH TARGET LDL LESS THAN 100: ICD-10-CM

## 2018-02-13 PROCEDURE — 99214 OFFICE O/P EST MOD 30 MIN: CPT | Performed by: FAMILY MEDICINE

## 2018-02-13 RX ORDER — HYDROCODONE BITARTRATE AND ACETAMINOPHEN 5; 325 MG/1; MG/1
1-2 TABLET ORAL EVERY 6 HOURS PRN
Qty: 150 TABLET | Refills: 0 | Status: SHIPPED | OUTPATIENT
Start: 2018-05-21 | End: 2018-05-21

## 2018-02-13 RX ORDER — HYDROCODONE BITARTRATE AND ACETAMINOPHEN 5; 325 MG/1; MG/1
1-2 TABLET ORAL EVERY 6 HOURS PRN
Qty: 150 TABLET | Refills: 0 | Status: SHIPPED | OUTPATIENT
Start: 2018-04-20 | End: 2018-05-21

## 2018-02-13 RX ORDER — HYDROCODONE BITARTRATE AND ACETAMINOPHEN 5; 325 MG/1; MG/1
1-2 TABLET ORAL EVERY 6 HOURS PRN
Qty: 150 TABLET | Refills: 0 | Status: SHIPPED | OUTPATIENT
Start: 2018-03-21 | End: 2018-05-21

## 2018-02-13 NOTE — MR AVS SNAPSHOT
After Visit Summary   2/13/2018    Celestina Tejada    MRN: [id number]           Patient Information     Date Of Birth          4/2/1931        Visit Information        Provider Department      2/13/2018 11:40 AM Majo Alfaro MD [facility]        Today's Diagnoses     Hypertension goal BP (blood pressure) < 140/90    -  1    Hyperlipidemia with target LDL less than 100        CKD (chronic kidney disease) stage 3, GFR 30-59 ml/min        Osteoarthritis, unspecified osteoarthritis type, unspecified site        Rheumatoid arthritis, involving unspecified site, unspecified rheumatoid factor presence (H)        Peripheral edema          Care Instructions    1. Schedule with lymphedema therapy to help work the fluid out of your legs.  2. Let your Rheumatologist know that your arthritis flares with the colder weather and see if she has other suggestions.  3. Follow up with me in three months.          Follow-ups after your visit        Who to contact     If you have questions or need follow up information about today's clinic visit or your schedule please contact [facility] directly at [phone].  Normal or non-critical lab and imaging results will be communicated to you by Aqua Access[employer], letter or phone within 4 business days after the clinic has received the results. If you do not hear from us within 7 days, please contact the clinic through Inductlyt or phone. If you have a critical or abnormal lab result, we will notify you by phone as soon as possible.  Submit refill requests through [facility] or call your pharmacy and they will forward the refill request to us. Please allow 3 business days for your refill to be completed.          Additional Information About Your Visit        MyChart Information     [facility] lets you send messages to your doctor, view your test results, renew your prescriptions, schedule appointments and more. To sign up, go to [email or web address] . Click  "on \"Log in\" on the left side of the screen, which will take you to the Welcome page. Then click on \"Sign up Now\" on the right side of the page.     You will be asked to enter the access code listed below, as well as some personal information. Please follow the directions to create your username and password.     Your access code is: [email or web address]  Expires: 2018 12:00 PM     Your access code will  in 90 days. If you need help or a new code, please call your [facility] or [phone].        Care EveryWhere ID     This is your Care EveryWhere ID. This could be used by other organizations to access your [place] medical records  [id number]        Your Vitals Were     Pulse Temperature Respirations Height Pulse Oximetry BMI (Body Mass Index)    72 97.7  F (36.5  C) (Oral) 14 5' 1\" (1.549 m) 97% 21.26 kg/m2       Blood Pressure from Last 3 Encounters:   18 146/65   17 134/52   17 156/73    Weight from Last 3 Encounters:   18 112 lb 8 oz (51 kg)   17 119 lb 8 oz (54.2 kg)   17 124 lb 1.6 oz (56.3 kg)              Today, you had the following     No orders found for display         Where to get your medicines      Some of these will need a paper prescription and others can be bought over the counter.  Ask your nurse if you have questions.     Bring a paper prescription for each of these medications     HYDROcodone-acetaminophen 5-325 MG per tablet    HYDROcodone-acetaminophen 5-325 MG per tablet    HYDROcodone-acetaminophen 5-325 MG per tablet          Primary Care Provider Office Phone # Fax #    Majo Alfaro [phone] [address] [place] [id number]        Equal Access to Services     BENJAMIN URENA : Seth Parada, bony dcokery, alona jimenez. [facility] [phone].    ATENCIÓN: Si habla español, tiene a gallardo disposición servicios gratuitos de asistencia lingüística. " Olga Lidia monahan [phone].    We comply with applicable federal civil rights laws and [place] laws. We do not discriminate on the basis of race, color, national origin, age, disability, sex, sexual orientation, or gender identity.            Thank you!     Thank you for choosing [facility]  for your care. Our goal is always to provide you with excellent care. Hearing back from our patients is one way we can continue to improve our services. Please take a few minutes to complete the written survey that you may receive in the mail after your visit with us. Thank you!             Your Updated Medication List - Protect others around you: Learn how to safely use, store and throw away your medicines at www.disposemymeds.org.          This list is accurate as of 2/13/18 12:00 PM.  Always use your most recent med list.                   Brand Name Dispense Instructions for use Diagnosis    atenolol 25 MG tablet    TENORMIN    270 tablet    TAKE 3 TABLETS DAILY    Hypertension goal BP (blood pressure) < 140/90       atorvastatin 20 MG tablet    LIPITOR    90 tablet    TAKE 1 TABLET DAILY    Hyperlipidemia with target LDL less than 100       * HYDROcodone-acetaminophen 5-325 MG per tablet   Start taking on:  3/21/2018    NORCO    150 tablet    Take 1-2 tablets by mouth every 6 hours as needed for moderate to severe pain    Osteoarthritis, unspecified osteoarthritis type, unspecified site       * HYDROcodone-acetaminophen 5-325 MG per tablet   Start taking on:  4/20/2018    NORCO    150 tablet    Take 1-2 tablets by mouth every 6 hours as needed for moderate to severe pain    Osteoarthritis, unspecified osteoarthritis type, unspecified site       * HYDROcodone-acetaminophen 5-325 MG per tablet   Start taking on:  5/21/2018    NORCO    150 tablet    Take 1-2 tablets by mouth every 6 hours as needed for moderate to severe pain    Osteoarthritis, unspecified osteoarthritis type, unspecified site        hydroxychloroquine 200 MG tablet    PLAQUENIL     Take 200 mg by mouth daily        lisinopril 20 MG tablet    PRINIVIL/ZESTRIL    90 tablet    Take 1 tablet (20 mg) by mouth daily    Hypertension goal BP (blood pressure) < 140/90       MULTIVITAMIN TABS   OR      1 tab daily    Essential hypertension, benign, Mixed hyperlipidemia       NIFEdipine ER osmotic 90 MG 24 hr tablet    PROCARDIA XL    90 tablet    TAKE 1 TABLET DAILY    Hypertension goal BP (blood pressure) < 140/90       order for DME     1 each    Equipment being ordered: blood pressure cuff and monitor    Hypertension goal BP (blood pressure) < 140/90       tocilizumab 80 MG/4ML    ACTEMRA     Inject into the vein every 30 days    RA (rheumatoid arthritis) (H)       VITAMIN D3 PO      Take 2,000 Units by mouth daily        zoledronic Acid 5 MG/100ML Soln infusion    RECLAST    100 mL    Inject 5 mg into the vein Yearly        * Notice:  This list has 3 medication(s) that are the same as other medications prescribed for you. Read the directions carefully, and ask your doctor or other care provider to review them with you.

## 2018-02-13 NOTE — PATIENT INSTRUCTIONS
1. Schedule with lymphedema therapy to help work the fluid out of your legs.  2. Let your Rheumatologist know that your arthritis flares with the colder weather and see if she has other suggestions.  3. Follow up with me in three months.

## 2018-02-13 NOTE — PROGRESS NOTES
"  SUBJECTIVE:   Celestina Tejada is a 86 year old female who presents to clinic today for the following health issues:    Chronic Kidney Disease Follow-up    Current NSAID use?  No    Chronic Pain Follow-Up  Type / Location of Pain: back and neck   Analgesia/pain control:       Recent changes:  same      Overall control: Tolerable with discomfort  Activity level/function:      Daily activities:  Able to do light housework, cooking    Work:  Unable to work  Adverse effects:  No  Adherance    Taking medication as directed?  Yes    Participating in other treatments: no -   Risk Factors:    Sleep:  Good    Mood/anxiety:  controlled    Recent family or social stressors:  none noted    Other aggravating factors: Weather   PHQ-9 SCORE 9/19/2016 9/12/2017   Total Score 7 7     NUNO-7 SCORE 9/19/2016 9/12/2017   Total Score 0 2     Encounter-Level CSA:     There are no encounter-level csa.        Clinic on 9/12/17:  \"1. Other chronic pain  Stable. Refills given for three months of NORCO today. F/u in three months.  2. Hypertension goal BP (blood pressure) < 140/90  Controlled. Continues on atenolol and lisinopril.   3. Hyperlipidemia with target LDL less than 100  Stable.  4. Osteoarthritis, unspecified osteoarthritis type, unspecified site  Stable. Continues on NOCRO.  5. CKD (chronic kidney disease) stage 3, GFR 30-59 ml/min  Stable. Will check hemoglobin.   6. Rheumatoid arthritis, involving unspecified site, unspecified rheumatoid factor presence (H)  Well controlled with Actemra injections monthly.   7. Peripheral edema  Worsening edema in her bilateral lower extremities. More sedentary lately with some general malaise, not acting herself in terms of her energy level per her daughter. No other sx to suggest HF.  Will do a lower extremity US today to check for DVT, low suspicion. Check renal function, electrolytes today. If significant abnormalities discussed with Celestina and her daughter that I would recommend ED visit. " "Suspect edema is multifactorial. She will schedule with cardiology to discuss other BP med options and edema, follow up on echo. She had significant hyponatremia resulting in hospitalization with hctz. I am hesitant to start diuretic at this time. She will also schedule an echocardiogram.   From last visit 6/6/17: \"Intermittent for years, always some level of edema. No sx suggesting HF. She does not want an echocardiogram at this time. She will watch for sx of HF, reviewed with pt. Suspect this is multifactorial secondary to up on her feet more lately shopping, warmer weather, nifedipine side effect, arthritis may also contribute. Sx not suggestive of DVT, no calf pain and sx are bilateral, no erythema. She is not able to apply compression stockings due to her hand arthritis, which makes it too difficult. She had been on diuretic, but had significant hyponatremia with hctz. She will elevate her legs.  She is not aware if her swelling is worse after actemra injections and peripheral edema is an uncommon side effect of this medication.\"  8. Malaise  Will check labs today.\"      She continues to struggle with her lower extremity edema. It is very bothersome and she does not feel well because of it. She cannot get out and do a lot of the activities she would like to. Her Rheumatologist looked at the edema and said it is common for people who have arthritis, that it is never going to go away. She has not gone to lymphedema therapy yet. She is wearing compression stockings. Patient does not want to sit at home with her feet elevated all day. Her legs are not painful per say but feel uncomfortable.     Her arthritis is flaring with this cold weather. She has yet to discuss this with her Rheumatologist.       Problem list and histories reviewed & adjusted, as indicated.  Additional history: as documented  Patient Active Problem List   Diagnosis     Osteopenia     Hypertension goal BP (blood pressure) < 140/90     Rheumatoid " arthritis (H)     CKD (chronic kidney disease) stage 3, GFR 30-59 ml/min     Hyperlipidemia with target LDL less than 100     Advanced directives, counseling/discussion     Osteoarthritis     Eczema     Peripheral edema     Hyponatremia with decreased serum osmolality     Acute hyponatremia     Chronic pain     Other chronic pain     Past Surgical History:   Procedure Laterality Date     C APPENDECTOMY  1950     SURGICAL HISTORY OF -       2 normal vaginal births       Social History   Substance Use Topics     Smoking status: Former Smoker     Packs/day: 0.50     Years: 20.00     Types: Cigarettes     Quit date: 9/20/2000     Smokeless tobacco: Never Used     Alcohol use Yes      Comment: very occ,     Family History   Problem Relation Age of Onset     HEART DISEASE Mother      CHF     Respiratory Mother      lung disease     C.A.D. Sister      heart by-pass     Arthritis Sister      Family History Negative Brother      Family History Negative Daughter      Family History Negative Daughter          Current Outpatient Prescriptions   Medication Sig Dispense Refill     [START ON 5/21/2018] HYDROcodone-acetaminophen (NORCO) 5-325 MG per tablet Take 1-2 tablets by mouth every 6 hours as needed for moderate to severe pain 150 tablet 0     [START ON 4/20/2018] HYDROcodone-acetaminophen (NORCO) 5-325 MG per tablet Take 1-2 tablets by mouth every 6 hours as needed for moderate to severe pain 150 tablet 0     [START ON 3/21/2018] HYDROcodone-acetaminophen (NORCO) 5-325 MG per tablet Take 1-2 tablets by mouth every 6 hours as needed for moderate to severe pain 150 tablet 0     lisinopril (PRINIVIL/ZESTRIL) 20 MG tablet Take 1 tablet (20 mg) by mouth daily 90 tablet 3     NIFEdipine ER osmotic (PROCARDIA XL) 90 MG 24 hr tablet TAKE 1 TABLET DAILY 90 tablet 2     atorvastatin (LIPITOR) 20 MG tablet TAKE 1 TABLET DAILY 90 tablet 3     atenolol (TENORMIN) 25 MG tablet TAKE 3 TABLETS DAILY 270 tablet 3     Cholecalciferol (VITAMIN  D3 PO) Take 2,000 Units by mouth daily       hydroxychloroquine (PLAQUENIL) 200 MG tablet Take 200 mg by mouth daily   3     zoledronic Acid (RECLAST) 5 MG/100ML SOLN Inject 5 mg into the vein Yearly 100 mL 0     order for DME Equipment being ordered: blood pressure cuff and monitor 1 each 0     tocilizumab (ACTEMRA) 80 MG/4ML Inject into the vein every 30 days        MULTIVITAMIN TABS   OR 1 tab daily  0     Allergies   Allergen Reactions     Hydrochlorothiazide      hyponatremia     No Known Allergies      Recent Labs   Lab Test 01/08/18 11/01/17 09/18/17   1254  09/12/17   1236   08/18/16   1322   08/11/16   1520   07/14/16   1305   LDL   --    --    --   50   --   49   --    --    --   81   HDL   --    --    --   65   --   68   --    --    --   64   TRIG   --    --    --   89   --   85   --    --    --   120   ALT  28  19   --   31   < >   --    --   27   < >  21   CR  1.450*  1.140  1.38*  1.60*   < >  1.66*   < >  1.75*   < >  1.41*   GFRESTIMATED  36.4  48.0  36*  31*   < >  29*   < >  28*   < >  35*   GFRESTBLACK   --    --   44*  37*   < >  35*   < >  33*   < >  43*   POTASSIUM   --    --   4.7  4.2   < >  4.7   < >  3.0*   < >  4.0   TSH   --    --    --    --    --    --    --   1.95   --    --     < > = values in this interval not displayed.      BP Readings from Last 3 Encounters:   02/13/18 146/65   11/20/17 134/52   09/26/17 156/73    Wt Readings from Last 3 Encounters:   02/13/18 51 kg (112 lb 8 oz)   11/20/17 54.2 kg (119 lb 8 oz)   09/26/17 56.3 kg (124 lb 1.6 oz)        Reviewed and updated as needed this visit by clinical staff  Tobacco  Allergies       Reviewed and updated as needed this visit by Provider         ROS:  See above.     This document serves as a record of the services and decisions personally performed and made by Majo Alfaro MD. It was created on his/her behalf by Sarah Priest, trained medical scribe. The creation of this document is based the provider's statements to  "the medical scribes.    Scribe Sarah Priest, February 13, 2018  OBJECTIVE:     /65  Pulse 72  Temp 97.7  F (36.5  C) (Oral)  Resp 14  Ht 1.549 m (5' 1\")  Wt 51 kg (112 lb 8 oz)  SpO2 97%  BMI 21.26 kg/m2  Body mass index is 21.26 kg/(m^2).  GENERAL: appears healthy, alert and no distress  EXT: bilateral significant LE edema present  PSYCH: mentation appears normal, affect normal/bright    Diagnostic Test Results:  Results for orders placed or performed in visit on 01/08/18   ALT   Result Value Ref Range    ALT 28 5 - 35 IU/L    Narrative    LAB RESULT [facility]   AST   Result Value Ref Range    AST 35 5 - 35 U/L    Narrative    LAB RESULT [facility]   Creatinine   Result Value Ref Range    Creatinine 1.450 (H) 0.500 - 1.300 mg/dL    GFR Estimate 36.4 ml/min/1.73m2    Narrative    LAB RESULT [facility]     ASSESSMENT/PLAN:   1. Hypertension goal BP (blood pressure) < 140/90  Controlled. Continues on lisinopril 20 mg and atenolol 75 mg daily.    2. Hyperlipidemia with target LDL less than 100  Stable. Continues on atorvastatin 20 mg daily.    3. CKD (chronic kidney disease) stage 3, GFR 30-59 ml/min  stable    4. Osteoarthritis, unspecified osteoarthritis type, unspecified site  Stable. Three months of refills provided.   - HYDROcodone-acetaminophen (NORCO) 5-325 MG per tablet; Take 1-2 tablets by mouth every 6 hours as needed for moderate to severe pain  Dispense: 150 tablet; Refill: 0  - HYDROcodone-acetaminophen (NORCO) 5-325 MG per tablet; Take 1-2 tablets by mouth every 6 hours as needed for moderate to severe pain  Dispense: 150 tablet; Refill: 0  - HYDROcodone-acetaminophen (NORCO) 5-325 MG per tablet; Take 1-2 tablets by mouth every 6 hours as needed for moderate to severe pain  Dispense: 150 tablet; Refill: 0    5. Rheumatoid arthritis, involving unspecified site, unspecified rheumatoid factor presence " (H)  Stable. Followed by Rheum. She has seen an increase in her pain with the cold weather. Recommended she discuss this with her Rheumatologist and see if she has other suggestions.    6. Peripheral edema  multifactorial. she continues to have significant lower extremity edema that is causing her to be more sedentary and affecting her quality of life. Lower extremity US on 9/12/17 was negative for DVT. No other sx to suggest HF. Echo on 9/21/17 was without abnormality. Has seen cardiology and nephrology.  Discussed lymphedema therapy in the fall, but pt never scheduled. She is ready to schedule with lymphedema clinic at this point. She continues to wear compression stockings and elevate her feet when possible. Could also consider venous doppler eval for insufficiency and possible vein ablation intervention             Patient Instructions   1. Schedule with lymphedema therapy to help work the fluid out of your legs.  2. Let your Rheumatologist know that your arthritis flares with the colder weather and see if she has other suggestions.  3. Follow up with me in three months.      The information in this document, created by the medical scribe for me, accurately reflects the services I personally performed and the decisions made by me. I have reviewed and approved this document for accuracy. 02/13/18    Majo Alfaro MD  [facility]

## 2018-02-14 ASSESSMENT — PATIENT HEALTH QUESTIONNAIRE - PHQ9: SUM OF ALL RESPONSES TO PHQ QUESTIONS 1-9: 6

## 2018-03-05 DIAGNOSIS — I10 HYPERTENSION GOAL BP (BLOOD PRESSURE) < 140/90: ICD-10-CM

## 2018-03-07 NOTE — TELEPHONE ENCOUNTER
"Requested Prescriptions   Pending Prescriptions Disp Refills     lisinopril (PRINIVIL/ZESTRIL) 20 MG tablet [Pharmacy Med Name: LISINOPRIL TABS 20MG]  Last Written Prescription Date:  11/20/17  Last Fill Quantity: 90 TABLET,  # refills: 3   Last office visit: 2/13/2018 with prescribing provider:  HOUSE   Future Office Visit:     90 tablet 2     Sig: TAKE 1 TABLET DAILY (HYPERTENSION GOAL BLOOD PRESSURE BELOW 140/90)    ACE Inhibitors (Including Combos) Protocol Failed    3/5/2018 11:43 PM       Failed - Blood pressure under 140/90 in past 12 months    BP Readings from Last 3 Encounters:   02/13/18 146/65   11/20/17 134/52   09/26/17 156/73                Failed - Normal serum creatinine on file in past 12 months    Recent Labs   Lab Test 01/08/18   CR  1.450*            Passed - Recent (12 mo) or future (30 days) visit within the authorizing provider's specialty    Patient had office visit in the last year or has a visit in the next 30 days with authorizing provider.  See \"Patient Info\" tab in inbasket, or \"Choose Columns\" in Meds & Orders section of the refill encounter.            Passed - Patient is age 18 or older       Passed - No active pregnancy on record       Passed - Normal serum potassium on file in past 12 months    Recent Labs   Lab Test  09/18/17   1254   POTASSIUM  4.7            Passed - No positive pregnancy test in past 12 months          "

## 2018-03-08 RX ORDER — LISINOPRIL 20 MG/1
TABLET ORAL
Qty: 90 TABLET | Refills: 0 | Status: ON HOLD | OUTPATIENT
Start: 2018-03-08 | End: 2018-05-25

## 2018-03-27 ENCOUNTER — TELEPHONE (OUTPATIENT)
Dept: FAMILY MEDICINE | Facility: CLINIC | Age: 83
End: 2018-03-27

## 2018-03-27 DIAGNOSIS — I10 HYPERTENSION GOAL BP (BLOOD PRESSURE) < 140/90: ICD-10-CM

## 2018-03-27 RX ORDER — NIFEDIPINE 90 MG/1
90 TABLET, EXTENDED RELEASE ORAL DAILY
Qty: 90 TABLET | Refills: 1 | Status: SHIPPED | OUTPATIENT
Start: 2018-03-27 | End: 2018-09-04

## 2018-03-27 NOTE — TELEPHONE ENCOUNTER
The patients daughter has not been successful in reaching the mail order pharmacy.  Per report of patients daughter, the patient has not taken her medication for a week.  Pharmacy loaded.

## 2018-04-04 ENCOUNTER — TRANSFERRED RECORDS (OUTPATIENT)
Dept: HEALTH INFORMATION MANAGEMENT | Facility: CLINIC | Age: 83
End: 2018-04-04

## 2018-04-04 LAB
ALT SERPL-CCNC: 23 IU/L (ref 5–35)
AST SERPL-CCNC: 37 U/L (ref 5–34)
CREAT SERPL-MCNC: 1.08 MG/DL (ref 0.5–1.3)
GFR SERPL CREATININE-BSD FRML MDRD: 51 ML/MIN/1.73M2

## 2018-04-27 ENCOUNTER — OFFICE VISIT (OUTPATIENT)
Dept: URGENT CARE | Facility: URGENT CARE | Age: 83
End: 2018-04-27
Payer: COMMERCIAL

## 2018-04-27 ENCOUNTER — RADIANT APPOINTMENT (OUTPATIENT)
Dept: GENERAL RADIOLOGY | Facility: CLINIC | Age: 83
End: 2018-04-27
Attending: FAMILY MEDICINE
Payer: COMMERCIAL

## 2018-04-27 VITALS
HEART RATE: 89 BPM | OXYGEN SATURATION: 97 % | SYSTOLIC BLOOD PRESSURE: 140 MMHG | TEMPERATURE: 97.6 F | DIASTOLIC BLOOD PRESSURE: 70 MMHG | RESPIRATION RATE: 14 BRPM

## 2018-04-27 DIAGNOSIS — M89.8X5 PAIN IN RIGHT FEMUR: ICD-10-CM

## 2018-04-27 DIAGNOSIS — T14.8XXA BRUISE: ICD-10-CM

## 2018-04-27 DIAGNOSIS — M89.8X5 PAIN IN RIGHT FEMUR: Primary | ICD-10-CM

## 2018-04-27 PROCEDURE — 73552 X-RAY EXAM OF FEMUR 2/>: CPT | Mod: RT

## 2018-04-27 PROCEDURE — 99214 OFFICE O/P EST MOD 30 MIN: CPT | Performed by: FAMILY MEDICINE

## 2018-04-27 NOTE — MR AVS SNAPSHOT
After Visit Summary   4/27/2018    Celestina Tejada    MRN: [id number]           Patient Information     Date Of Birth          4/2/1931        Visit Information        Provider Department      4/27/2018 10:55 AM Freeman Jorge,  [facility]        Today's Diagnoses     Pain in right femur    -  1       Follow-ups after your visit        Additional Services     ORTHOPEDICS ADULT REFERRAL       Your provider has referred you to: [facility]: [facility] [place] -  [facility]  [phone]   [email or web address]  Karrie Howell -  [facility]  [phone]   [email or web address]/  [facility]: [facility] [phone]   [email or web address]    [facility] [place] [phone]   [email or web address]    [place] [phone]   [email or web address]  [place] [phone]   [email or web address]    Please be aware that coverage of these services is subject to the terms and limitations of your health insurance plan.  Call member services at your health plan with any benefit or coverage questions.      Please bring the following to your appointment:    >>   Any x-rays, CTs or MRIs which have been performed.  Contact the facility where they were done to arrange for  prior to your scheduled appointment.    >>   List of current medications   >>   This referral request   >>   Any documents/labs given to you for this referral                  Your next 10 appointments already scheduled     May 01, 2018 10:40 AM CDT   SHORT with Majo Alfaro MD   [facility] [place]    [address] [place] [id number] [phone]  "             Who to contact     If you have questions or need follow up information about today's clinic visit or your schedule please contact [facility] directly at [phone].  Normal or non-critical lab and imaging results will be communicated to you by MyChart, letter or phone within 4 business days after the clinic has received the results. If you do not hear from us within 7 days, please contact the clinic through MyChart or phone. If you have a critical or abnormal lab result, we will notify you by phone as soon as possible.  Submit refill requests through [employer] or call your pharmacy and they will forward the refill request to us. Please allow 3 business days for your refill to be completed.          Additional Information About Your Visit        [facility] Information     [employer] lets you send messages to your doctor, view your test results, renew your prescriptions, schedule appointments and more. To sign up, go to [email or web address] . Click on \"Log in\" on the left side of the screen, which will take you to the Welcome page. Then click on \"Sign up Now\" on the right side of the page.     You will be asked to enter the access code listed below, as well as some personal information. Please follow the directions to create your username and password.     Your access code is: [email or web address]  Expires: 2018  1:00 PM     Your access code will  in 90 days. If you need help or a new code, please call your [place] clinic or [phone].        Care EveryWhere ID     This is your Care EveryWhere ID. This could be used by other organizations to access your [place] medical records  [id number]        Your Vitals Were     Pulse Temperature Respirations Pulse Oximetry          89 97.6  F (36.4  C) (Oral) 14 97%         Blood Pressure from Last 3 Encounters:   18 140/70   18 146/65   17 134/52    Weight from Last 3 Encounters:   18 112 lb 8 oz (51 kg) "   11/20/17 119 lb 8 oz (54.2 kg)   09/26/17 124 lb 1.6 oz (56.3 kg)              We Performed the Following     ORTHOPEDICS ADULT REFERRAL        Primary Care Provider Office Phone # Fax #    Majo Alfaro [phone] [address]  [facility] [address]        Equal Access to Services     [facility]: Hadii aad ku hadasho Soomaali, waaxda luqadaha, qaybta kaalmada adeegyada, waxay idiin hayaan adeeg kharash la'aan . So [facility] [phone].    ATENCIÓN: Si habla español, tiene a gallardo disposición servicios gratuitos de asistencia lingüística. Koriname al [phone].    We comply with applicable federal civil rights laws and [place] laws. We do not discriminate on the basis of race, color, national origin, age, disability, sex, sexual orientation, or gender identity.            Thank you!     Thank you for choosing [facility]  for your care. Our goal is always to provide you with excellent care. Hearing back from our patients is one way we can continue to improve our services. Please take a few minutes to complete the written survey that you may receive in the mail after your visit with us. Thank you!             Your Updated Medication List - Protect others around you: Learn how to safely use, store and throw away your medicines at www.disposemymeds.org.          This list is accurate as of 4/27/18 12:51 PM.  Always use your most recent med list.                   Brand Name Dispense Instructions for use Diagnosis    atenolol 25 MG tablet    TENORMIN    270 tablet    TAKE 3 TABLETS DAILY    Hypertension goal BP (blood pressure) < 140/90       atorvastatin 20 MG tablet    LIPITOR    90 tablet    TAKE 1 TABLET DAILY    Hyperlipidemia with target LDL less than 100       * HYDROcodone-acetaminophen 5-325 MG per tablet    NORCO    150 tablet    Take 1-2 tablets by mouth every 6 hours as needed for moderate to severe pain    Osteoarthritis, unspecified osteoarthritis  type, unspecified site       * HYDROcodone-acetaminophen 5-325 MG per tablet    NORCO    150 tablet    Take 1-2 tablets by mouth every 6 hours as needed for moderate to severe pain    Osteoarthritis, unspecified osteoarthritis type, unspecified site       * HYDROcodone-acetaminophen 5-325 MG per tablet   Start taking on:  5/21/2018    NORCO    150 tablet    Take 1-2 tablets by mouth every 6 hours as needed for moderate to severe pain    Osteoarthritis, unspecified osteoarthritis type, unspecified site       hydroxychloroquine 200 MG tablet    PLAQUENIL     Take 200 mg by mouth daily        lisinopril 20 MG tablet    PRINIVIL/ZESTRIL    90 tablet    TAKE 1 TABLET DAILY (HYPERTENSION GOAL BLOOD PRESSURE BELOW 140/90)    Hypertension goal BP (blood pressure) < 140/90       MULTIVITAMIN TABS   OR      1 tab daily    Essential hypertension, benign, Mixed hyperlipidemia       NIFEdipine ER osmotic 90 MG 24 hr tablet    PROCARDIA XL    90 tablet    Take 1 tablet (90 mg) by mouth daily    Hypertension goal BP (blood pressure) < 140/90       order for DME     1 each    Equipment being ordered: blood pressure cuff and monitor    Hypertension goal BP (blood pressure) < 140/90       tocilizumab 80 MG/4ML    ACTEMRA     Inject into the vein every 30 days    RA (rheumatoid arthritis) (H)       VITAMIN D3 PO      Take 2,000 Units by mouth daily        zoledronic Acid 5 MG/100ML Soln infusion    RECLAST    100 mL    Inject 5 mg into the vein Yearly        * Notice:  This list has 3 medication(s) that are the same as other medications prescribed for you. Read the directions carefully, and ask your doctor or other care provider to review them with you.

## 2018-04-27 NOTE — PROGRESS NOTES
SUBJECTIVE:  Chief Complaint   Patient presents with     Urgent Care     Pt states right leg pain 1x weeks    .maia who presents with a chief complaint of  right femur pain.  Symptoms began day(s) ago , are moderate andstill present.  Context:Injury: none known  Pain exacerbated by weight-bearing   Relieved by nothing She treated it initially with heat rest.   This is the first time this type of injury has occurred to this patient.     Past Medical History:   Diagnosis Date     CKD (chronic kidney disease) stage 3, GFR 30-59 ml/min      Hyperlipidemia LDL goal < 100      Hypertension goal BP (blood pressure) < 140/90      Osteoarthritis 2/9/2012     Osteopenia      Rheumatoid arthritis(714.0)        Past Surgical History:   Procedure Laterality Date     C APPENDECTOMY  1950     SURGICAL HISTORY OF -       2 normal vaginal births       Family History   Problem Relation Age of Onset     HEART DISEASE Mother      CHF     Respiratory Mother      lung disease     C.A.D. Sister      heart by-pass     Arthritis Sister      Family History Negative Brother      Family History Negative Daughter      Family History Negative Daughter        Social History   Substance Use Topics     Smoking status: Former Smoker     Packs/day: 0.50     Years: 20.00     Types: Cigarettes     Quit date: 9/20/2000     Smokeless tobacco: Never Used     Alcohol use Yes      Comment: very occ,         ROS:Review of systems negative except as stated below    EXAM: /70  Pulse 89  Temp 97.6  F (36.4  C) (Oral)  Resp 14  SpO2 97%  Exam: rt femur  tenderness to palpation and pain with wt bearing  GENERAL APPEARANCE: healthy, alert and no distress  EXTREMITIES: peripheral pulses normal  SKIN: no suspicious lesions or rashes  NEURO: Normal strength and tone, sensory exam grossly normal, mentation intact and speech normal  No posterior calf/hamstring pain    Xray without acute findings, read by Freeman Jorge D.O.    ASSESSMENT:     ICD-10-CM    1.  Pain in right femur M89.8X5 ORTHOPEDICS ADULT REFERRAL     CANCELED: XR Femur Right 2 Views   2. Bruise T14.8XXA

## 2018-04-30 ENCOUNTER — HOSPITAL ENCOUNTER (EMERGENCY)
Facility: CLINIC | Age: 83
Discharge: HOME | End: 2018-04-30
Attending: EMERGENCY MEDICINE | Admitting: EMERGENCY MEDICINE
Payer: COMMERCIAL

## 2018-04-30 ENCOUNTER — APPOINTMENT (OUTPATIENT)
Dept: GENERAL RADIOLOGY | Facility: CLINIC | Age: 83
End: 2018-04-30
Attending: EMERGENCY MEDICINE
Payer: COMMERCIAL

## 2018-04-30 ENCOUNTER — APPOINTMENT (OUTPATIENT)
Dept: CT IMAGING | Facility: CLINIC | Age: 83
End: 2018-04-30
Attending: EMERGENCY MEDICINE
Payer: COMMERCIAL

## 2018-04-30 ENCOUNTER — APPOINTMENT (OUTPATIENT)
Dept: ULTRASOUND IMAGING | Facility: CLINIC | Age: 83
End: 2018-04-30
Attending: EMERGENCY MEDICINE
Payer: COMMERCIAL

## 2018-04-30 VITALS
BODY MASS INDEX: 19.32 KG/M2 | OXYGEN SATURATION: 95 % | TEMPERATURE: 96.8 F | RESPIRATION RATE: 18 BRPM | WEIGHT: 105 LBS | SYSTOLIC BLOOD PRESSURE: 114 MMHG | HEIGHT: 62 IN | DIASTOLIC BLOOD PRESSURE: 50 MMHG | HEART RATE: 66 BPM

## 2018-04-30 DIAGNOSIS — M79.651 PAIN OF RIGHT THIGH: ICD-10-CM

## 2018-04-30 LAB
ALBUMIN SERPL-MCNC: 4.4 G/DL (ref 3.4–5)
ALP SERPL-CCNC: 106 U/L (ref 40–150)
ALT SERPL W P-5'-P-CCNC: 28 U/L (ref 0–50)
ANION GAP SERPL CALCULATED.3IONS-SCNC: 7 MMOL/L (ref 3–14)
AST SERPL W P-5'-P-CCNC: 44 U/L (ref 0–45)
BASOPHILS # BLD AUTO: 0 10E9/L (ref 0–0.2)
BASOPHILS NFR BLD AUTO: 0.2 %
BILIRUB SERPL-MCNC: 1.1 MG/DL (ref 0.2–1.3)
BUN SERPL-MCNC: 30 MG/DL (ref 7–30)
CALCIUM SERPL-MCNC: 9.2 MG/DL (ref 8.5–10.1)
CHLORIDE SERPL-SCNC: 96 MMOL/L (ref 94–109)
CO2 SERPL-SCNC: 26 MMOL/L (ref 20–32)
CREAT SERPL-MCNC: 0.89 MG/DL (ref 0.52–1.04)
DIFFERENTIAL METHOD BLD: ABNORMAL
EOSINOPHIL # BLD AUTO: 0.1 10E9/L (ref 0–0.7)
EOSINOPHIL NFR BLD AUTO: 1.5 %
ERYTHROCYTE [DISTWIDTH] IN BLOOD BY AUTOMATED COUNT: 13.5 % (ref 10–15)
GFR SERPL CREATININE-BSD FRML MDRD: 60 ML/MIN/1.7M2
GLUCOSE SERPL-MCNC: 98 MG/DL (ref 70–99)
HCT VFR BLD AUTO: 34.1 % (ref 35–47)
HGB BLD-MCNC: 11.9 G/DL (ref 11.7–15.7)
IMM GRANULOCYTES # BLD: 0 10E9/L (ref 0–0.4)
IMM GRANULOCYTES NFR BLD: 0.3 %
INR PPP: 0.97 (ref 0.86–1.14)
LYMPHOCYTES # BLD AUTO: 0.9 10E9/L (ref 0.8–5.3)
LYMPHOCYTES NFR BLD AUTO: 9.9 %
MCH RBC QN AUTO: 31.1 PG (ref 26.5–33)
MCHC RBC AUTO-ENTMCNC: 34.9 G/DL (ref 31.5–36.5)
MCV RBC AUTO: 89 FL (ref 78–100)
MONOCYTES # BLD AUTO: 0.7 10E9/L (ref 0–1.3)
MONOCYTES NFR BLD AUTO: 8 %
NEUTROPHILS # BLD AUTO: 7.4 10E9/L (ref 1.6–8.3)
NEUTROPHILS NFR BLD AUTO: 80.1 %
NRBC # BLD AUTO: 0 10*3/UL
NRBC BLD AUTO-RTO: 0 /100
PLATELET # BLD AUTO: 157 10E9/L (ref 150–450)
POTASSIUM SERPL-SCNC: 4 MMOL/L (ref 3.4–5.3)
PROT SERPL-MCNC: 6.9 G/DL (ref 6.8–8.8)
RBC # BLD AUTO: 3.83 10E12/L (ref 3.8–5.2)
SODIUM SERPL-SCNC: 129 MMOL/L (ref 133–144)
WBC # BLD AUTO: 9.2 10E9/L (ref 4–11)

## 2018-04-30 PROCEDURE — 73700 CT LOWER EXTREMITY W/O DYE: CPT | Mod: XS,RT

## 2018-04-30 PROCEDURE — 96360 HYDRATION IV INFUSION INIT: CPT

## 2018-04-30 PROCEDURE — 73700 CT LOWER EXTREMITY W/O DYE: CPT | Mod: RT

## 2018-04-30 PROCEDURE — 85610 PROTHROMBIN TIME: CPT | Performed by: EMERGENCY MEDICINE

## 2018-04-30 PROCEDURE — 25000128 H RX IP 250 OP 636: Performed by: EMERGENCY MEDICINE

## 2018-04-30 PROCEDURE — 93971 EXTREMITY STUDY: CPT | Mod: RT

## 2018-04-30 PROCEDURE — 96361 HYDRATE IV INFUSION ADD-ON: CPT

## 2018-04-30 PROCEDURE — 85025 COMPLETE CBC W/AUTO DIFF WBC: CPT | Performed by: EMERGENCY MEDICINE

## 2018-04-30 PROCEDURE — 99285 EMERGENCY DEPT VISIT HI MDM: CPT | Mod: 25

## 2018-04-30 PROCEDURE — 72170 X-RAY EXAM OF PELVIS: CPT

## 2018-04-30 PROCEDURE — 25000132 ZZH RX MED GY IP 250 OP 250 PS 637: Performed by: EMERGENCY MEDICINE

## 2018-04-30 PROCEDURE — 73552 X-RAY EXAM OF FEMUR 2/>: CPT | Mod: RT

## 2018-04-30 PROCEDURE — 80053 COMPREHEN METABOLIC PANEL: CPT | Performed by: EMERGENCY MEDICINE

## 2018-04-30 RX ORDER — HYDROCODONE BITARTRATE AND ACETAMINOPHEN 5; 325 MG/1; MG/1
2 TABLET ORAL ONCE
Status: COMPLETED | OUTPATIENT
Start: 2018-04-30 | End: 2018-04-30

## 2018-04-30 RX ADMIN — SODIUM CHLORIDE 500 ML: 9 INJECTION, SOLUTION INTRAVENOUS at 14:44

## 2018-04-30 RX ADMIN — HYDROCODONE BITARTRATE AND ACETAMINOPHEN 2 TABLET: 5; 325 TABLET ORAL at 18:04

## 2018-04-30 ASSESSMENT — ENCOUNTER SYMPTOMS
SHORTNESS OF BREATH: 0
FEVER: 0
NAUSEA: 0
ABDOMINAL PAIN: 0
DIARRHEA: 0
APPETITE CHANGE: 1
VOMITING: 0
MYALGIAS: 1

## 2018-04-30 NOTE — ED AVS SNAPSHOT
Emergency Department    [address] [place] [address]    Phone:  [phone]    Fax:  [phone]                                       Celestina Tejada   MRN: [id number]    Department:   Emergency Department   Date of Visit:  4/30/2018           After Visit Summary Signature Page     I have received my discharge instructions, and my questions have been answered. I have discussed any challenges I see with this plan with the nurse or doctor.    ..........................................................................................................................................  Patient/Patient Representative Signature      ..........................................................................................................................................  Patient Representative Print Name and Relationship to Patient    ..................................................               ................................................  Date                                            Time    ..........................................................................................................................................  Reviewed by Signature/Title    ...................................................              ..............................................  Date                                                            Time

## 2018-04-30 NOTE — ED AVS SNAPSHOT
Emergency Department    [address] [facility] [address]    Phone:  [phone]    Fax:  [phone]                                       Celestina Tejada   MRN: [id number]    Department:   Emergency Department   Date of Visit:  4/30/2018           Patient Information     Date Of Birth          4/2/1931        Your diagnoses for this visit were:     Pain of right thigh        You were seen by Hina Hererra MD and Trierweiler, Chad A, MD.      Follow-up Information     Follow up with Orthopaedics, [facility]. Schedule an appointment as soon as possible for a visit in 3 days.    Contact information:    [address] [address]  [phone]          Follow up with  Emergency Department.    Specialty:  EMERGENCY MEDICINE    Why:  As needed    Contact information:    [address] [facility] [address] [phone]        Discharge Instructions         Discharge Instructions  Extremity Injury    You were seen today for an injury to an extremity (arm, hand, leg, or foot). You may have a bruise, strain, or fracture (broken bone).    Return to the Emergency Department or see your regular doctor if your injured area is not back to normal within 5-7 days.    Return to the Emergency Department right away if:    Your pain seems to change or get worse or there is pain in a new area.    Your extremity becomes pale, cool, blue, or numb or tingling past the injury.    You have more drainage, redness or pain in the area of the cut or abrasion.    You have pain that you can t control with the medicine recommended or prescribed here, or you have pain that seems too much for your injury.    Your child will not stop crying or is much more fussy than normal.    You have new symptoms or anything that worries you.    What to Expect:    Your swelling and pain may be worse the day after your injury, but should not be severe and should start getting better after that. You should  not have new symptoms and your pain should not get worse.    You may start to get a bruise over the injured area or below the injured area.    Your movement and strength should get better with time.    Some injuries may not show up until after you have left the Emergency Department so it is important to follow-up with your regular doctor.    Your injury may prevent you from working.  Follow-up with your regular doctor to get a work release note.    Pain medications or your injury may make it unsafe to drive or operate machinery.    Home Care:    Apply ice your injured area for 15 minutes at a time, at least 3 times a day. Use a cloth between the ice bag and your skin to prevent frostbite.     Do not sleep with an ice pack or heating pad on, since this can cause burns or skin injury.    Rest your injured area for at least 1-2 days. After that you may start using your extremity again as long as there is not too much pain.     Raise the injured area above the level of your heart as much as possible in the first 1-2 days.    Use Tylenol  (acetaminophen), Motrin (ibuprofen), or Advil  (ibuprofen) for your pain unless you have an allergy or are told not to use these medications by your doctor.  Take the medications as instructed on the package. Tylenol  (acetaminophen) is in many prescription medicines and non-prescription medicines--check all of your medicines to be sure you aren t taking more than 3000 mg per day.    You may use an elastic bandage (Ace  Wrap) if it makes you more comfortable. Wrap it just tight enough to provide light compression, like a new pair of socks feels. Loosen the bandage if you have swelling past the bandage.      Please follow any other instructions that were discussed with you by your doctor.    MORE INFORMATION:    X-rays:  X-rays done today were read by your doctor but will also be read by a radiologist.  We will contact you if the radiologist sees anything different on the x-ray.  Your  regular doctor may also want to review your x-rays on follow-up.    You could have a fracture (break), even if we told you your x-rays were normal. X-rays are not always certain, and some fractures are hard to see and may not show up right away.  Also, your x-ray may look like you have a fracture, even though you do not.  It is important to follow-up with your regular doctor.     Stretching:  If your injury was to your arm or shoulder and your doctor put you in a sling or an immobilizer, it is important that you take off your immobilizer within 3 days and stretch/move your shoulder, unless your doctor specifically tells you to not move your shoulder.  This is to prevent further injury such as a  frozen shoulder .     If you were given a prescription for medicine here today, be sure to read all of the information (including the package insert) that comes with your prescription.  This will include important information about the medicine, its side effects, and any warnings that you need to know about.  The pharmacist who fills the prescription can provide more information and answer questions you may have about the medicine.  If you have questions or concerns that the pharmacist cannot address, please call or return to the Emergency Department.     Opioid Medication Information    Pain medications are among the most commonly prescribed medicines, so we are including this information for all our patients. If you did not receive pain medication or get a prescription for pain medicine, you can ignore it.     You may have been given a prescription for an opioid (narcotic) pain medicine and/or have received a pain medicine while here in the Emergency Department. These medicines can make you drowsy or impaired. You must not drive, operate dangerous equipment, or engage in any other dangerous activities while taking these medications. If you drive while taking these medications, you could be arrested for DUI, or driving  under the influence. Do not drink any alcohol while you are taking these medications.     Opioid pain medications can cause addiction. If you have a history of chemical dependency of any type, you are at a higher risk of becoming addicted to pain medications.  Only take these prescribed medications to treat your pain when all other options have been tried. Take it for as short a time and as few doses as possible. Store your pain pills in a secure place, as they are frequently stolen and provide a dangerous opportunity for children or visitors in your house to start abusing these powerful medications. We will not replace any lost or stolen medicine.  As soon as your pain is better, you should flush all your remaining medication.     Many prescription pain medications contain Tylenol  (acetaminophen), including Vicodin , Tylenol #3 , Norco , Lortab , and Percocet .  You should not take any extra pills of Tylenol  if you are using these prescription medications or you can get very sick.  Do not ever take more than 3000 mg of acetaminophen in any 24 hour period.    All opioids tend to cause constipation. Drink plenty of water and eat foods that have a lot of fiber, such as fruits, vegetables, prune juice, apple juice and high fiber cereal.  Take a laxative if you don t move your bowels at least every other day. Miralax , Milk of Magnesia, Colace , or Senna  can be used to keep you regular.      Remember that you can always come back to the Emergency Department if you are not able to see your regular doctor in the amount of time listed above, if you get any new symptoms, or if there is anything that worries you.        24 Hour Appointment Hotline       To make an appointment at any [facility] (formerly [facility]), call [phone] ([phone]). If you don't have a family doctor or clinic, we will help you find one. [place] clinics are conveniently located to serve the needs of you and your family.             Review of your  medicines      Our records show that you are taking the medicines listed below. If these are incorrect, please call your family doctor or clinic.        Dose / Directions Last dose taken    atenolol 25 MG tablet   Commonly known as:  TENORMIN   Quantity:  270 tablet        TAKE 3 TABLETS DAILY   Refills:  3        atorvastatin 20 MG tablet   Commonly known as:  LIPITOR   Quantity:  90 tablet        TAKE 1 TABLET DAILY   Refills:  3        * HYDROcodone-acetaminophen 5-325 MG per tablet   Commonly known as:  NORCO   Dose:  1-2 tablet   Quantity:  150 tablet        Take 1-2 tablets by mouth every 6 hours as needed for moderate to severe pain   Refills:  0        * HYDROcodone-acetaminophen 5-325 MG per tablet   Commonly known as:  NORCO   Dose:  1-2 tablet   Quantity:  150 tablet        Take 1-2 tablets by mouth every 6 hours as needed for moderate to severe pain   Refills:  0        * HYDROcodone-acetaminophen 5-325 MG per tablet   Commonly known as:  NORCO   Dose:  1-2 tablet   Quantity:  150 tablet   Start taking on:  5/21/2018        Take 1-2 tablets by mouth every 6 hours as needed for moderate to severe pain   Refills:  0        hydroxychloroquine 200 MG tablet   Commonly known as:  PLAQUENIL   Dose:  200 mg        Take 200 mg by mouth daily   Refills:  3        lisinopril 20 MG tablet   Commonly known as:  PRINIVIL/ZESTRIL   Quantity:  90 tablet        TAKE 1 TABLET DAILY (HYPERTENSION GOAL BLOOD PRESSURE BELOW 140/90)   Refills:  0        MULTIVITAMIN TABS   OR        1 tab daily   Refills:  0        NIFEdipine ER osmotic 90 MG 24 hr tablet   Commonly known as:  PROCARDIA XL   Dose:  90 mg   Quantity:  90 tablet        Take 1 tablet (90 mg) by mouth daily   Refills:  1        order for DME   Quantity:  1 each        Equipment being ordered: blood pressure cuff and monitor   Refills:  0        tocilizumab 80 MG/4ML   Commonly known as:  ACTEMRA   Indication:  Moderate to Severe Rheumatoid Arthritis         Inject into the vein every 30 days   Refills:  0        VITAMIN D3 PO   Dose:  2000 Units        Take 2,000 Units by mouth daily   Refills:  0        zoledronic Acid 5 MG/100ML Soln infusion   Commonly known as:  RECLAST   Dose:  5 mg   Quantity:  100 mL        Inject 5 mg into the vein Yearly   Refills:  0        * Notice:  This list has 3 medication(s) that are the same as other medications prescribed for you. Read the directions carefully, and ask your doctor or other care provider to review them with you.            Procedures and tests performed during your visit     CBC with platelets differential    CT Femur Right w/o Contrast    CT Hip Right w/o Contrast    Comprehensive metabolic panel    INR    US Lower Extremity Venous Duplex Right    XR Femur Right 2 Views    XR Pelvis 1/2 Views      Orders Needing Specimen Collection     None      Pending Results     No orders found from 4/28/2018 to 5/1/2018.            Pending Culture Results     No orders found from 4/28/2018 to 5/1/2018.            Pending Results Instructions     If you had any lab results that were not finalized at the time of your Discharge, you can call the ED Lab Result RN at [phone]. You will be contacted by this team for any positive Lab results or changes in treatment. The nurses are available 7 days a week from 10A to 6:30P.  You can leave a message 24 hours per day and they will return your call.        Test Results From Your Hospital Stay        4/30/2018  2:50 PM      Component Results     Component Value Ref Range & Units Status    WBC 9.2 4.0 - 11.0 10e9/L Final    RBC Count 3.83 3.8 - 5.2 10e12/L Final    Hemoglobin 11.9 11.7 - 15.7 g/dL Final    Hematocrit 34.1 (L) 35.0 - 47.0 % Final    MCV 89 78 - 100 fl Final    MCH 31.1 26.5 - 33.0 pg Final    MCHC 34.9 31.5 - 36.5 g/dL Final    RDW 13.5 10.0 - 15.0 % Final    Platelet Count 157 150 - 450 10e9/L Final    Diff Method Automated Method  Final    % Neutrophils 80.1 % Final    %  Lymphocytes 9.9 % Final    % Monocytes 8.0 % Final    % Eosinophils 1.5 % Final    % Basophils 0.2 % Final    % Immature Granulocytes 0.3 % Final    Nucleated RBCs 0 0 /100 Final    Absolute Neutrophil 7.4 1.6 - 8.3 10e9/L Final    Absolute Lymphocytes 0.9 0.8 - 5.3 10e9/L Final    Absolute Monocytes 0.7 0.0 - 1.3 10e9/L Final    Absolute Eosinophils 0.1 0.0 - 0.7 10e9/L Final    Absolute Basophils 0.0 0.0 - 0.2 10e9/L Final    Abs Immature Granulocytes 0.0 0 - 0.4 10e9/L Final    Absolute Nucleated RBC 0.0  Final         4/30/2018  3:05 PM      Component Results     Component Value Ref Range & Units Status    Sodium 129 (L) 133 - 144 mmol/L Final    Potassium 4.0 3.4 - 5.3 mmol/L Final    Chloride 96 94 - 109 mmol/L Final    Carbon Dioxide 26 20 - 32 mmol/L Final    Anion Gap 7 3 - 14 mmol/L Final    Glucose 98 70 - 99 mg/dL Final    Urea Nitrogen 30 7 - 30 mg/dL Final    Creatinine 0.89 0.52 - 1.04 mg/dL Final    GFR Estimate 60 (L) >60 mL/min/1.7m2 Final    Non  GFR Calc    GFR Estimate If Black 73 >60 mL/min/1.7m2 Final    African American GFR Calc    Calcium 9.2 8.5 - 10.1 mg/dL Final    Bilirubin Total 1.1 0.2 - 1.3 mg/dL Final    Albumin 4.4 3.4 - 5.0 g/dL Final    Protein Total 6.9 6.8 - 8.8 g/dL Final    Alkaline Phosphatase 106 40 - 150 U/L Final    ALT 28 0 - 50 U/L Final    AST 44 0 - 45 U/L Final         4/30/2018  3:00 PM      Component Results     Component Value Ref Range & Units Status    INR 0.97 0.86 - 1.14 Final         4/30/2018  3:04 PM      Narrative     XR FEMUR RT 2 VW 4/30/2018 3:00 PM    COMPARISON: 4/27/2018    HISTORY: Pain, cannot stand.        Impression     IMPRESSION: No fracture seen in the right femur. However, there is  cortical irregularity involving the superior pubic ramus on the right  that may represent a mildly displaced fracture. This could be further  evaluated with dedicated pelvic radiographs. Mild degenerative changes  in the hip and knee.    KIMBERLY  MD JOE         4/30/2018  4:08 PM      Narrative     ULTRASOUND VENOUS RIGHT LOWER EXTREMITY  4/30/2018 3:41 PM     HISTORY:  Pain.     COMPARISON: September 12, 2017    TECHNIQUE: Ultrasound gray scale, Color Doppler flow, and spectral  Doppler waveform analysis performed.    FINDINGS:  The right common femoral, superficial femoral, popliteal  and posterior tibial veins are patent and fully compressible and  demonstrate normal venous Doppler flow. The visualized greater  saphenous vein is negative for thrombus. For comparison the left  common femoral vein was evaluated and was unremarkable.        Impression     IMPRESSION: No DVT demonstrated.    TI GONSALES MD         4/30/2018  4:21 PM      Narrative     XR PELVIS 1/2 VW 4/30/2018 4:14 PM    COMPARISON: Femur radiographs earlier on the same day.    HISTORY: Pain, irregularity on x-ray.        Impression     IMPRESSION: Cortical irregularity in the inferior pubic ramus appears  to be chronic and is symmetric to the contralateral side. No acute  fractures are suspected.    KIMBERLY DIAZ MD         4/30/2018  5:47 PM      Narrative     CT RIGHT HIP AND CT RIGHT FEMUR WITHOUT CONTRAST   4/30/2018 5:33 PM    HISTORY: Pain. Evaluate for fracture.    COMPARISON: Radiographs earlier today.    TECHNIQUE: Routine CT imaging is performed to the pelvis and hips as  well as the right femur without contrast. Multiplanar reformatting was  performed. Radiation dose for this scan was reduced using automated  exposure control, adjustment of the mA and/or kV according to patient  size, or iterative reconstruction technique.     FINDINGS: No fracture or osseous lesion is seen. There are  degenerative changes in both hips, mild on the right and somewhat more  prominent on the left. There are also degenerative changes in the  visualized lower lumbar spine. There is calcification in the vascular  structures. No other soft tissue abnormality is seen.        Impression      IMPRESSION: Degenerative changes with no fracture or other evidence of  acute injury.     RAFA MCCARTNEY MD         4/30/2018  5:47 PM      Narrative     CT RIGHT HIP AND CT RIGHT FEMUR WITHOUT CONTRAST   4/30/2018 5:33 PM    HISTORY: Pain. Evaluate for fracture.    COMPARISON: Radiographs earlier today.    TECHNIQUE: Routine CT imaging is performed to the pelvis and hips as  well as the right femur without contrast. Multiplanar reformatting was  performed. Radiation dose for this scan was reduced using automated  exposure control, adjustment of the mA and/or kV according to patient  size, or iterative reconstruction technique.     FINDINGS: No fracture or osseous lesion is seen. There are  degenerative changes in both hips, mild on the right and somewhat more  prominent on the left. There are also degenerative changes in the  visualized lower lumbar spine. There is calcification in the vascular  structures. No other soft tissue abnormality is seen.        Impression     IMPRESSION: Degenerative changes with no fracture or other evidence of  acute injury.     RAFA MCCARTNEY MD                Clinical Quality Measure: Blood Pressure Screening     Your blood pressure was checked while you were in the emergency department today. The last reading we obtained was  BP: 114/50 . Please read the guidelines below about what these numbers mean and what you should do about them.  If your systolic blood pressure (the top number) is less than 120 and your diastolic blood pressure (the bottom number) is less than 80, then your blood pressure is normal. There is nothing more that you need to do about it.  If your systolic blood pressure (the top number) is 120-139 or your diastolic blood pressure (the bottom number) is 80-89, your blood pressure may be higher than it should be. You should have your blood pressure rechecked within a year by a primary care provider.  If your systolic blood pressure (the top number) is 140  "or greater or your diastolic blood pressure (the bottom number) is 90 or greater, you may have high blood pressure. High blood pressure is treatable, but if left untreated over time it can put you at risk for heart attack, stroke, or kidney failure. You should have your blood pressure rechecked by a primary care provider within the next 4 weeks.  If your provider in the emergency department today gave you specific instructions to follow-up with your doctor or provider even sooner than that, you should follow that instruction and not wait for up to 4 weeks for your follow-up visit.        Thank you for choosing [facility]       Thank you for choosing [facility] for your care. Our goal is always to provide you with excellent care. Hearing back from our patients is one way we can continue to improve our services. Please take a few minutes to complete the written survey that you may receive in the mail after you visit with us. Thank you!        [employer] Information     [employer] lets you send messages to your doctor, view your test results, renew your prescriptions, schedule appointments and more. To sign up, go to [email or web address] . Click on \"Log in\" on the left side of the screen, which will take you to the Welcome page. Then click on \"Sign up Now\" on the right side of the page.     You will be asked to enter the access code listed below, as well as some personal information. Please follow the directions to create your username and password.     Your access code is: [email or web address]  Expires: 2018  1:00 PM     Your access code will  in 90 days. If you need help or a new code, please call your [facility] clinic or [phone].        Care EveryWhere ID     This is your Care EveryWhere ID. This could be used by other organizations to access your [facility] medical records  [id number]        Equal Access to Services     HIEN URENA AH: bony Lassiter qaybta kaalmada adeegyada, waxay " candice pillai ah. So [facility] [phone].    ATENCIÓN: Si habla español, tiene a gallardo disposición servicios gratuitos de asistencia lingüística. Llame al [phone].    We comply with applicable federal civil rights laws and [place] laws. We do not discriminate on the basis of race, color, national origin, age, disability, sex, sexual orientation, or gender identity.            After Visit Summary       This is your record. Keep this with you and show to your community pharmacist(s) and doctor(s) at your next visit.

## 2018-04-30 NOTE — ED PROVIDER NOTES
History     Chief Complaint:  Leg Pain     HPI   Celestina Tejada is a 87 year old female with a history of rheumatoid arthritis for which she receives monthly infusion who presents accompanied by her daughter for evaluation of leg pain. Approximately two weeks ago the patient started to develop worsening right leg pain, which she believes started after twisting her leg while at the grocery store. Since then, the patient has had significant difficulty getting up and walking, and she has started using a walker due to her pain with ambulation. She has also had a reduced appetite lately. On 4/27/2018, the patient was seen in an urgent care regarding this where she had negative X-rays and was discharged to home. No ultrasound was performed at that time. She has been taking Norco with limited improvement of her pain. Due to her persistent pain today with difficulty walking, the patient's daughter brought her into the ED for evaluation. Otherwise, the patient has not had any fever, chest pain, shortness of breath, nausea, vomiting, or diarrhea recently.     XR Femur Port 4/27/2018:  IMPRESSION: No evidence of acute fracture or malalignment.    Allergies:  Hydrochlorothiazide      Medications:    atenolol (TENORMIN) 25 MG tablet  atorvastatin (LIPITOR) 20 MG tablet  Cholecalciferol (VITAMIN D3 PO)  HYDROcodone-acetaminophen (NORCO) 5-325 MG per tablet  hydroxychloroquine (PLAQUENIL) 200 MG tablet  lisinopril (PRINIVIL/ZESTRIL) 20 MG tablet  MULTIVITAMIN TABS   OR  NIFEdipine ER osmotic (PROCARDIA XL) 90 MG 24 hr tablet  tocilizumab (ACTEMRA) 80 MG/4ML  zoledronic Acid (RECLAST) 5 MG/100ML SOLN    Past Medical History:    Chronic kidney disease, stage 3   Hypertension  Hyperlipidemia  Osteoarthritis  Osteopenia  Rheumatoid arthritis   Chronic pain     Past Surgical History:    Appendectomy     Family History:    CHF - Mother  Lung disease - Mother  CAD - Sister  Arthritis - Sister     Social History:  Tobacco use:  "   Former smoker - 0.50 packs / day for 20 years, quit 2000  Alcohol use:    Positive, occasionally   Marital status:       Accompanied to ED by:  Daughter      Review of Systems   Constitutional: Positive for appetite change. Negative for fever.   Respiratory: Negative for shortness of breath.    Cardiovascular: Negative for chest pain.   Gastrointestinal: Negative for abdominal pain, diarrhea, nausea and vomiting.   Musculoskeletal: Positive for myalgias (right leg).   10 point review of systems performed and is negative except as above and in HPI.     Physical Exam   First Vitals:  BP: 114/50  Pulse: 66  Temp: 96.8  F (36  C)  Resp: 18  Height: 157.5 cm (5' 2\")  Weight: 47.6 kg (105 lb)  SpO2: 95 %      Physical Exam  General: Back resting on the gurney, appears uncomfortable  Head:  The scalp, face, and head appear normal  Mouth/Throat: Mucus membranes are slightly dry.   CV:  Regular rate    Normal S1 and S2  No pathological murmur   Resp:  Breath sounds clear and equal bilaterally    Non-labored, no retractions or accessory muscle use    No coarseness    No wheezing   GI:  Abdomen is soft, no rigidity    No tenderness to palpation  MS:  Normal motor assessment of all extremities.    Good capillary refill noted.    Bruising on the right anterior thigh most consistent with grab marks in the patient's own hand pattern as demonstrated when showing me her bruises.     Tenderness to palpation of the anterior thigh and right hip.     No deformity.     No lower extremity swelling, redness, or warmth.   Skin:  No rash or lesions noted.  Neuro:   Speech is normal and fluent. No apparent deficit.  Psych: Awake. Alert.  Normal affect.      Appropriate interactions.    Emergency Department Course     Imaging:  Radiographic findings were communicated with the patient and family who voiced understanding of the findings.    US Lower Extremity Venous Duplex, Right:  IMPRESSION: No DVT demonstrated.  Preliminary report " per radiology.     XR Femur, Right:  IMPRESSION: No fracture seen in the right femur. However, there is cortical irregularity involving the superior pubic ramus on the right that may represent a mildly displaced fracture. This could be further  evaluated with dedicated pelvic radiographs. Mild degenerative changes in the hip and knee.  Per radiology.     XR Pelvis:  IMPRESSION: Cortical irregularity in the inferior pubic ramus appears to be chronic and is symmetric to the contralateral side. No acute fractures are suspected.  Per radiology.      CT Hip, Right w/o Contrast:  Pending    CT Femur, Right w/o Contrast:  Pending     Laboratory:  CBC: WNL (WBC 9.2, HGB 11.9, )  CMP:  low, GFR Estimate 60 low, o/w WNL (Creatinine 0.89)  INR: 0.97      Interventions:  1444  mL IV     Emergency Department Course:  Nursing notes and vitals reviewed.  1407: I performed an exam of the patient as documented above.     1630: I updated and reassessed the patient.     The patient will be signed out to Dr. Trierweiler pending CT results.     Impression & Plan      Medical Decision Making:  Celestina Tejada is a 87 year old female who presents to the emergency department for evaluation of right thigh pain.  Patient and her family report that she has been unable to ambulate and has started using a walker secondary to pain.  On my examination she has bruising to the anterior thigh with multiple areas consistent with fingerprint marks in the pattern of her grasp, likely secondary to frequent rubbing of the area of pain.  X-rays were obtained and are unremarkable other than old pubic ramus fracture.  Laboratory evaluation was unremarkable as well other than minimally low sodium.  The patient and her family were quite concerned that there is an occult fracture, as such CT imaging was obtained to be followed by my partner Dr. Trierweiler.  If this is negative she will be discharged to follow-up with orthopedics. If  positive she will likely need admission given her difficulty with ambulation.  She is a chronic pain patient and takes chronic narcotics, and I would not recommend increasing narcotics in the absence of acute fracture.    Diagnosis:    ICD-10-CM   1. Pain of right thigh M79.651       Disposition:  Sign out to Dr. Trierweiler.       I, Jw King, am serving as a scribe at 2:07 PM on 4/30/2018 to document services personally performed by Dr. Herrera, based on my observations and the provider's statements to me.     EMERGENCY DEPARTMENT       Hina Herrera MD  05/01/18 0789

## 2018-05-01 NOTE — ED PROVIDER NOTES
This patient was signed out by Dr. Herrera pending CT of the right hip/pelvis.  This study shows significant arthritis, but no evidence of fracture.  The patient is ambulatory with a walker.  We discussed that without trauma along with the nature of the pain that this most likely represents significant arthritis.  The patient was given information for close follow up with [facility] orth[facility]ics for possible intra-articular injection and further consultation.  The family felt very comfortable taking the patient home.  However, I was exceedingly clear that there should be no hesitation to return to us if the patient is unable to care for herself at home, has worsening pain, or there are any other emergent concerns.     Trierweiler, Chad A, MD  04/30/18 6209

## 2018-05-03 ENCOUNTER — TRANSFERRED RECORDS (OUTPATIENT)
Dept: HEALTH INFORMATION MANAGEMENT | Facility: CLINIC | Age: 83
End: 2018-05-03

## 2018-05-08 ENCOUNTER — TRANSFERRED RECORDS (OUTPATIENT)
Dept: HEALTH INFORMATION MANAGEMENT | Facility: CLINIC | Age: 83
End: 2018-05-08

## 2018-05-19 DIAGNOSIS — I10 HYPERTENSION GOAL BP (BLOOD PRESSURE) < 140/90: ICD-10-CM

## 2018-05-21 ENCOUNTER — RADIANT APPOINTMENT (OUTPATIENT)
Dept: GENERAL RADIOLOGY | Facility: CLINIC | Age: 83
End: 2018-05-21
Attending: FAMILY MEDICINE
Payer: COMMERCIAL

## 2018-05-21 ENCOUNTER — OFFICE VISIT (OUTPATIENT)
Dept: FAMILY MEDICINE | Facility: CLINIC | Age: 83
End: 2018-05-21
Payer: COMMERCIAL

## 2018-05-21 VITALS
DIASTOLIC BLOOD PRESSURE: 49 MMHG | OXYGEN SATURATION: 99 % | SYSTOLIC BLOOD PRESSURE: 110 MMHG | WEIGHT: 101.25 LBS | BODY MASS INDEX: 18.52 KG/M2 | HEART RATE: 69 BPM | RESPIRATION RATE: 19 BRPM | TEMPERATURE: 97.8 F

## 2018-05-21 DIAGNOSIS — M06.9 RHEUMATOID ARTHRITIS, INVOLVING UNSPECIFIED SITE, UNSPECIFIED RHEUMATOID FACTOR PRESENCE: ICD-10-CM

## 2018-05-21 DIAGNOSIS — M19.90 OSTEOARTHRITIS, UNSPECIFIED OSTEOARTHRITIS TYPE, UNSPECIFIED SITE: ICD-10-CM

## 2018-05-21 DIAGNOSIS — G89.29 OTHER CHRONIC PAIN: ICD-10-CM

## 2018-05-21 DIAGNOSIS — E46 PROTEIN-CALORIE MALNUTRITION, UNSPECIFIED SEVERITY (H): ICD-10-CM

## 2018-05-21 DIAGNOSIS — M79.604 PAIN OF RIGHT LOWER EXTREMITY: ICD-10-CM

## 2018-05-21 DIAGNOSIS — R63.4 UNINTENTIONAL WEIGHT LOSS: ICD-10-CM

## 2018-05-21 DIAGNOSIS — E87.1 HYPONATREMIA: ICD-10-CM

## 2018-05-21 DIAGNOSIS — R63.4 UNINTENTIONAL WEIGHT LOSS: Primary | ICD-10-CM

## 2018-05-21 LAB
CRP SERPL-MCNC: 82 MG/L (ref 0–8)
ERYTHROCYTE [SEDIMENTATION RATE] IN BLOOD BY WESTERGREN METHOD: 32 MM/H (ref 0–30)

## 2018-05-21 PROCEDURE — 71046 X-RAY EXAM CHEST 2 VIEWS: CPT

## 2018-05-21 PROCEDURE — 85652 RBC SED RATE AUTOMATED: CPT | Performed by: FAMILY MEDICINE

## 2018-05-21 PROCEDURE — 84443 ASSAY THYROID STIM HORMONE: CPT | Performed by: FAMILY MEDICINE

## 2018-05-21 PROCEDURE — 99214 OFFICE O/P EST MOD 30 MIN: CPT | Performed by: FAMILY MEDICINE

## 2018-05-21 PROCEDURE — 80048 BASIC METABOLIC PNL TOTAL CA: CPT | Performed by: FAMILY MEDICINE

## 2018-05-21 PROCEDURE — 36415 COLL VENOUS BLD VENIPUNCTURE: CPT | Performed by: FAMILY MEDICINE

## 2018-05-21 PROCEDURE — 86140 C-REACTIVE PROTEIN: CPT | Performed by: FAMILY MEDICINE

## 2018-05-21 RX ORDER — HYDROCODONE BITARTRATE AND ACETAMINOPHEN 5; 325 MG/1; MG/1
1-2 TABLET ORAL EVERY 6 HOURS PRN
Qty: 150 TABLET | Refills: 0 | Status: ON HOLD | OUTPATIENT
Start: 2018-06-21 | End: 2018-05-25

## 2018-05-21 RX ORDER — HYDROCODONE BITARTRATE AND ACETAMINOPHEN 5; 325 MG/1; MG/1
1-2 TABLET ORAL EVERY 6 HOURS PRN
Qty: 150 TABLET | Refills: 0 | Status: ON HOLD | OUTPATIENT
Start: 2018-07-20 | End: 2018-05-25

## 2018-05-21 RX ORDER — HYDROCODONE BITARTRATE AND ACETAMINOPHEN 5; 325 MG/1; MG/1
1-2 TABLET ORAL EVERY 6 HOURS PRN
Qty: 150 TABLET | Refills: 0 | Status: ON HOLD | OUTPATIENT
Start: 2018-08-21 | End: 2018-05-25

## 2018-05-21 NOTE — MR AVS SNAPSHOT
After Visit Summary   5/21/2018    Celestina Tejada    MRN: [id number]           Patient Information     Date Of Birth          4/2/1931        Visit Information        Provider Department      5/21/2018 3:00 PM Majo Alfaro MD [facility]        Today's Diagnoses     Unintentional weight loss    -  1    Protein-calorie malnutrition, unspecified severity (H)        Osteoarthritis, unspecified osteoarthritis type, unspecified site        Other chronic pain        Rheumatoid arthritis, involving unspecified site, unspecified rheumatoid factor presence (H)          Care Instructions    1. Call your orthopedist to let that person know that your injection did not help to find out what they would recommend next.  2. Schedule with a pain clinic  3. Schedule with a nutritionist.  4. We will do labs today.  5. I will let you know if your labs indicate you need to go into the ER. Other reasons for going to the ER are if you are unable to manage the pain on your own at home, if you are unable to care for yourself at home, if you are unable to eat or get adequate hydration at home. Sometimes people are admitted to the hospital for a few days in order to arrange for longer-term care in a rehab/nursing home facility.           Follow-ups after your visit        Additional Services     NUTRITION REFERRAL       Your provider has referred you to: [facility]: [facility] (on call location)  - [place] [phone]   [email or web address]    Please be aware that coverage of these services is subject to the terms and limitations of your health insurance plan.  Call member services at your health plan with any benefit or coverage questions.      Please bring the following with you to your appointment:    (1) This referral request  (2) Any documents given to you regarding this referral  (3) Any specific questions you have about diet and/or food choices            PAIN  MANAGEMENT REFERRAL       Your provider has referred you to: FM: [facility] -    Reason for Referral: Evaluation for comprehensive services- patients will be evaluated if appropriate for comprehensive service including medication changes, procedures, pain psychology, and pain physical therapy.  While involved with comprehensive services, pain providers will work with referring provider/PCP to stabilize appropriate medication management, with long-term plan of transition of prescribing back to referring provider/PCP upon completion of comprehensive services.      Please complete the following questions:    Do you have any specific questions for the pain specialist? No    Are there any red flags that may impact the assessment or management of the patient? None      What is your diagnosis for the patient's pain? Chronic back pain, rheumatoid arthritis      For any questions, contact the [facility] at [phone].   [facility]: [facility] - [place] [phone] [email or web address]      Please call [phone] to make an appointment. Clinic is located: [facility] [address] [place] [address] [id number] [place] [address]      Please complete the following questions:    Procedure/Referral: Referral Only -  Comprehensive Evaluation and Management    What is your diagnosis for the patient's pain? Chronic back pain, rheumatoid arthritis    What are your specific questions for the pain specialist? Needing improved pain management, recent back injection not helpful    Are there any red flags that may impact the assessment or management of the patient? None         Please note the Pre-Op Pain Consult must be scheduled 2-3 weeks prior to the patient's surgery.  Patient's trying to schedule within 2 weeks of surgery may not be accommodated.     Pre-Op Pain Consults are  only good for 30 days.  N: [place] [facility] - [place] [phone]   [email or web address]  [place] [phone]   [email or web address]  [place] [phone]   [email or web address]       Please call clinic directly to schedule appointment.  N: [facility] - [place] [phone]   [email or web address]  [place] [phone]   [email or web address]  [place] [phone]   [email or web address]       Please call clinic directly to schedule appointment.    **ANY DIAGNOSTIC TESTS THAT ARE NOT IN EPIC SHOULD BE SENT TO THE PAIN CENTER**    REGARDING OPIOID MEDICATIONS:  The discussion of opioids management, appropriateness of therapy, and dosing will be discussed in patients being seen for evaluation.  The pain management clinics are not long-term prescribing clinics, with transition of prescribing of medications ultimately going back to the referring provider/PCP.  If prescribing is taken over at the pain clinic, it is in actively involved patients whom are appropriate for opioids, urine drug screening is completed, and long-term prescribing plan has been determined.  Therefore, we will not be automatically taking over prescribing at the patient's first visit.  Is this agreeable to you? agrees.     Please be aware that coverage of these services is subject to the terms and limitations of your health insurance plan.  Call member services at your health plan with any benefit or coverage questions.      Please bring the following with you to your appointment:    (1) Any X-Rays, CTs or MRIs which have been performed.  Contact the facility where they were done to arrange for  prior to your scheduled appointment.    (2) List of current medications   (3) This referral request   (4) Any documents/labs given to you for this referral                  Future tests that were ordered for you today     Open  "Future Orders        Priority Expected Expires Ordered    XR Chest 2 Views Routine 2018            Who to contact     If you have questions or need follow up information about today's clinic visit or your schedule please contact [facility] directly at [phone].  Normal or non-critical lab and imaging results will be communicated to you by MyChart, letter or phone within 4 business days after the clinic has received the results. If you do not hear from us within 7 days, please contact the clinic through [employer] or phone. If you have a critical or abnormal lab result, we will notify you by phone as soon as possible.  Submit refill requests through [employer] or call your pharmacy and they will forward the refill request to us. Please allow 3 business days for your refill to be completed.          Additional Information About Your Visit        [employer] Information     S[employer]box lets you send messages to your doctor, view your test results, renew your prescriptions, schedule appointments and more. To sign up, go to [email or web address] . Click on \"Log in\" on the left side of the screen, which will take you to the Welcome page. Then click on \"Sign up Now\" on the right side of the page.     You will be asked to enter the access code listed below, as well as some personal information. Please follow the directions to create your username and password.     Your access code is: [email or web address]  Expires: 2018  4:00 PM     Your access code will  in 90 days. If you need help or a new code, please call your [place] clinic or [phone].        Care EveryWhere ID     This is your Care EveryWhere ID. This could be used by other organizations to access your [place] medical records  [id number]        Your Vitals Were     Pulse Temperature Respirations Pulse Oximetry BMI (Body Mass Index)       69 97.8  F (36.6  C) (Oral) 19 99% 18.52 kg/m2        Blood Pressure from Last 3 " Encounters:   05/21/18 110/49   04/30/18 114/50   04/27/18 140/70    Weight from Last 3 Encounters:   05/21/18 101 lb 4 oz (45.9 kg)   04/30/18 105 lb (47.6 kg)   02/13/18 112 lb 8 oz (51 kg)              We Performed the Following     Basic metabolic panel     CRP, inflammation     ESR: Erythrocyte sedimentation rate     NUTRITION REFERRAL     PAIN MANAGEMENT REFERRAL     TSH with free T4 reflex          Where to get your medicines      Some of these will need a paper prescription and others can be bought over the counter.  Ask your nurse if you have questions.     Bring a paper prescription for each of these medications     HYDROcodone-acetaminophen 5-325 MG per tablet    HYDROcodone-acetaminophen 5-325 MG per tablet    HYDROcodone-acetaminophen 5-325 MG per tablet         Information about OPIOIDS     PRESCRIPTION OPIOIDS: WHAT YOU NEED TO KNOW   You have a prescription for an opioid (narcotic) pain medicine. Opioids can cause addiction. If you have a history of chemical dependency of any type, you are at a higher risk of becoming addicted to opioids. Only take this medicine after all other options have been tried. Take it for as short a time and as few doses as possible.     Do not:    Drive. If you drive while taking these medicines, you could be arrested for driving under the influence (DUI).    Operate heavy machinery    Do any other dangerous activities while taking these medicines.     Drink any alcohol while taking these medicines.      Take with any other medicines that contain acetaminophen. Read all labels carefully. Look for the word  acetaminophen  or  Tylenol.  Ask your pharmacist if you have questions or are unsure.    Store your pills in a secure place, locked if possible. We will not replace any lost or stolen medicine. If you don t finish your medicine, please throw away (dispose) as directed by your pharmacist. The [place] [employer]ution [employer] has more information about safe disposal:  https://www.pca.[facility].mn.us/living-green/managing-unwanted-medications    All opioids tend to cause constipation. Drink plenty of water and eat foods that have a lot of fiber, such as fruits, vegetables, prune juice, apple juice and high-fiber cereal. Take a laxative (Miralax, milk of magnesia, Colace, Senna) if you don t move your bowels at least every other day.          Primary Care Provider Office Phone # Fax #    Majo Alfaro [phone] [address]  [facility] [address]        Equal Access to Services     [facility] Hospital[facility] : Hadii melodie giordano hadasho Soomaali, waaxda luqadaha, qaybta kaalmada adelornayarosalinda, alona pillai . So [facility] [phone].    ATENCIÓN: Si habla español, tiene a gallardo disposición servicios gratuitos de asistencia lingüística. Llame al [phone].    We comply with applicable federal civil rights laws and [place] laws. We do not discriminate on the basis of race, color, national origin, age, disability, sex, sexual orientation, or gender identity.            Thank you!     Thank you for choosing [facility]  for your care. Our goal is always to provide you with excellent care. Hearing back from our patients is one way we can continue to improve our services. Please take a few minutes to complete the written survey that you may receive in the mail after your visit with us. Thank you!             Your Updated Medication List - Protect others around you: Learn how to safely use, store and throw away your medicines at www.disposemymeds.org.          This list is accurate as of 5/21/18  4:00 PM.  Always use your most recent med list.                   Brand Name Dispense Instructions for use Diagnosis    atenolol 25 MG tablet    TENORMIN    270 tablet    TAKE 3 TABLETS DAILY    Hypertension goal BP (blood pressure) < 140/90       atorvastatin 20 MG tablet    LIPITOR    90 tablet    TAKE 1 TABLET DAILY    Hyperlipidemia with target LDL  less than 100       * HYDROcodone-acetaminophen 5-325 MG per tablet   Start taking on:  6/21/2018    NORCO    150 tablet    Take 1-2 tablets by mouth every 6 hours as needed for moderate to severe pain    Osteoarthritis, unspecified osteoarthritis type, unspecified site       * HYDROcodone-acetaminophen 5-325 MG per tablet   Start taking on:  7/20/2018    NORCO    150 tablet    Take 1-2 tablets by mouth every 6 hours as needed for moderate to severe pain    Osteoarthritis, unspecified osteoarthritis type, unspecified site       * HYDROcodone-acetaminophen 5-325 MG per tablet   Start taking on:  8/21/2018    NORCO    150 tablet    Take 1-2 tablets by mouth every 6 hours as needed for moderate to severe pain    Osteoarthritis, unspecified osteoarthritis type, unspecified site       hydroxychloroquine 200 MG tablet    PLAQUENIL     Take 200 mg by mouth daily        lisinopril 20 MG tablet    PRINIVIL/ZESTRIL    90 tablet    TAKE 1 TABLET DAILY (HYPERTENSION GOAL BLOOD PRESSURE BELOW 140/90)    Hypertension goal BP (blood pressure) < 140/90       MULTIVITAMIN TABS   OR      1 tab daily    Essential hypertension, benign, Mixed hyperlipidemia       NIFEdipine ER osmotic 90 MG 24 hr tablet    PROCARDIA XL    90 tablet    Take 1 tablet (90 mg) by mouth daily    Hypertension goal BP (blood pressure) < 140/90       order for DME     1 each    Equipment being ordered: blood pressure cuff and monitor    Hypertension goal BP (blood pressure) < 140/90       tocilizumab 80 MG/4ML    ACTEMRA     Inject into the vein every 30 days    RA (rheumatoid arthritis) (H)       VITAMIN D3 PO      Take 2,000 Units by mouth daily        zoledronic Acid 5 MG/100ML Soln infusion    RECLAST    100 mL    Inject 5 mg into the vein Yearly        * Notice:  This list has 3 medication(s) that are the same as other medications prescribed for you. Read the directions carefully, and ask your doctor or other care provider to review them with you.

## 2018-05-21 NOTE — TELEPHONE ENCOUNTER
"Requested Prescriptions   Pending Prescriptions Disp Refills     lisinopril (PRINIVIL/ZESTRIL) 20 MG tablet [Pharmacy Med Name: LISINOPRIL TABS 20MG]  Last Written Prescription Date:  3/8/2018  Last Fill Quantity: 90 tablet,  # refills: 0   Last Office Visit: 2/13/2018   Future Office Visit:    Next 5 appointments (look out 90 days)     May 21, 2018  3:00 PM CDT   Office Visit with Majo Alfaro MD   [facility] ([facility])    [address] [place] [id number] [phone]                90 tablet 0     Sig: TAKE 1 TABLET DAILY (HYPERTENSION GOAL BLOOD PRESSURE BELOW 140/90)    ACE Inhibitors (Including Combos) Protocol Passed    5/19/2018 12:27 AM       Passed - Blood pressure under 140/90 in past 12 months    BP Readings from Last 3 Encounters:   04/30/18 114/50   04/27/18 140/70   02/13/18 146/65          Passed - Recent (12 mo) or future (30 days) visit within the authorizing provider's specialty    Patient had office visit in the last 12 months or has a visit in the next 30 days with authorizing provider or within the authorizing provider's specialty.  See \"Patient Info\" tab in inbasket, or \"Choose Columns\" in Meds & Orders section of the refill encounter.           Passed - Patient is age 18 or older       Passed - No active pregnancy on record       Passed - Normal serum creatinine on file in past 12 months    Recent Labs   Lab Test  04/30/18   1439   CR  0.89          Passed - Normal serum potassium on file in past 12 months    Recent Labs   Lab Test  04/30/18   1439   POTASSIUM  4.0          Passed - No positive pregnancy test in past 12 months          "

## 2018-05-21 NOTE — PROGRESS NOTES
SUBJECTIVE:   Celestina Tejada is a 87 year old female who presents to clinic today for the following health issues:    Unintentional weight loss:  Loss of appetite.   Wt Readings from Last 5 Encounters:   05/23/18 102 lb 15.3 oz (46.7 kg)   05/21/18 101 lb 4 oz (45.9 kg)   04/30/18 105 lb (47.6 kg)   02/13/18 112 lb 8 oz (51 kg)   11/20/17 119 lb 8 oz (54.2 kg)      Back Pain Follow Up  Description:    Osteoarthritis          Location of pain:  All over, right tight and back pain    Character of pain: sharp and dull ache; throbbing pain.    Pain radiation: Does not radiate and radiates below the knee     Since last visit, pain is:  unchanged    New numbness or weakness in legs, not attributed to pain:  YES- unable to walk    Intensity: Currently 9/10    History:   Pain interferes with job: Not applicable,    Therapies tried without relief: orthopedic, acetaminophen (Tylenol), cold and heat  Therapies tried with relief: nothing.     Accompanying Signs & Symptoms:  Risk of Fracture:  None  Risk of Cauda Equina:  None  Risk of Infection:  None  Risk of Cancer:  None      Patient reports to the clinic with her daughter and sister. Her daughter notes that her back pain had radiated to her right leg and with all the tests done they were told the problem was with her spine. She had a cortisone injection, which offered slight relief, but not much. Her daughter notes that she is just always on the couch day and night, she is not eating much, and can get up and get to the bathroom and back with her walker, but that is it. This pain and wasting aways is also affecting her short term memory. Her sister notes that she had been over using her pain medication and so they ran out of her prescription quickly. They have been supplementing her medication with ibuprofen.     She states that her appetite is gone and her sister and daughter are scared and don't know what to do. She reports slight abdominal pain that is relieved by  eating, but she is just not hungry. She states that she has not had a BM in a few days and when she does have a BM, it is normal. Her daughter notes that she is usually constipated on her medications    She denies any ENT symptoms, a cough, SOB, chest pain, nausea, vomiting, blood in stool, melena, dysuria, bowel problems, increase in leg swelling, or easy bruising or bleeding.    Patient states that she does not want to go into hospitalization and just go home. Her daughter and sister are at their wits end with taking care of her and getting time off work to take care of her. Watching her and trying to care for her is draining for them. Her daughter is curious about home health or home nurse care if they decide to go down that road.       Problem list and histories reviewed & adjusted, as indicated.  Additional history: as documented    Patient Active Problem List   Diagnosis     Osteopenia     Hypertension goal BP (blood pressure) < 140/90     Rheumatoid arthritis (H)     CKD (chronic kidney disease) stage 3, GFR 30-59 ml/min     Hyperlipidemia with target LDL less than 100     Advanced directives, counseling/discussion     Osteoarthritis     Eczema     Peripheral edema     Hyponatremia with decreased serum osmolality     Acute hyponatremia     Chronic pain     Other chronic pain     Protein-calorie malnutrition (H)     Hyponatremia     Past Surgical History:   Procedure Laterality Date     C APPENDECTOMY  1950     SURGICAL HISTORY OF -       2 normal vaginal births       Social History   Substance Use Topics     Smoking status: Former Smoker     Packs/day: 0.50     Years: 20.00     Types: Cigarettes     Quit date: 9/20/2000     Smokeless tobacco: Never Used     Alcohol use Yes      Comment: very occ,     Family History   Problem Relation Age of Onset     HEART DISEASE Mother      CHF     Respiratory Mother      lung disease     C.A.D. Sister      heart by-pass     Arthritis Sister      Family History Negative  Brother      Family History Negative Daughter      Family History Negative Daughter          No current outpatient prescriptions on file.     Allergies   Allergen Reactions     Hydrochlorothiazide      hyponatremia     Recent Labs   Lab Test  05/23/18   0535  05/22/18   1728  05/21/18   1618  04/30/18   1439 04/04/18 01/08/18 09/12/17   1236   08/18/16   1322   08/11/16   1520   07/14/16   1305   LDL   --    --    --    --    --    --    --   50   --   49   --    --    --   81   HDL   --    --    --    --    --    --    --   65   --   68   --    --    --   64   TRIG   --    --    --    --    --    --    --   89   --   85   --    --    --   120   ALT   --    --    --   28  23  28   < >  31   < >   --    --   27   < >  21   CR  0.76  0.72  0.82  0.89  1.080  1.450*   < >  1.60*   < >  1.66*   < >  1.75*   < >  1.41*   GFRESTIMATED  73  76  66  60*  51.0  36.4   < >  31*   < >  29*   < >  28*   < >  35*   GFRESTBLACK  88  >90  80  73   --    --    < >  37*   < >  35*   < >  33*   < >  43*   POTASSIUM  4.3  4.2  4.6  4.0   --    --    < >  4.2   < >  4.7   < >  3.0*   < >  4.0   TSH   --    --   1.56   --    --    --    --    --    --    --    --   1.95   --    --     < > = values in this interval not displayed.      BP Readings from Last 3 Encounters:   05/23/18 124/57   05/21/18 110/49   04/30/18 114/50    Wt Readings from Last 3 Encounters:   05/23/18 102 lb 15.3 oz (46.7 kg)   05/21/18 101 lb 4 oz (45.9 kg)   04/30/18 105 lb (47.6 kg)        Reviewed and updated as needed this visit by clinical staff  Tobacco  Allergies  Meds  Med Hx  Surg Hx  Fam Hx  Soc Hx      Reviewed and updated as needed this visit by Provider       ROS:   ROS: 10 point ROS neg other than the symptoms noted above in the HPI.     This document serves as a record of the services and decisions personally performed and made by Majo Alfaro MD. It was created on his/her behalf by Jacqui Sylvester trained medical scribe. The creation of  this document is based the provider's statements to the medical scribes.    Berta Sylvester, May 21, 2018  OBJECTIVE:     /49 (BP Location: Left arm, Patient Position: Chair, Cuff Size: Adult Regular)  Pulse 69  Temp 97.8  F (36.6  C) (Oral)  Resp 19  Wt 101 lb 4 oz (45.9 kg)  SpO2 99%  BMI 18.52 kg/m2  Body mass index is 18.52 kg/(m^2).     GENERAL: chronically ill appearing female, tjhin and frail, sitting in wheelchair, alert, but forgetful today and history is supplemented by her daughter and sister, she is in no acute distress  Eyes: anicteric, normal lids and conjunctiva; PERRL  ENT: OP normal  NECK: no masses, no thyromegaly appreciated  RESP: lungs clear to auscultation - no rales, rhonchi or wheezes  CV: regular rate and rhythm, normal S1 S2. She does have persistent bilateral peripheral edema L >R.   ABD: soft, nontender, nondistended      Diagnostic Test Results:  Results for orders placed or performed in visit on 05/21/18   TSH with free T4 reflex   Result Value Ref Range    TSH 1.56 0.40 - 4.00 mU/L   ESR: Erythrocyte sedimentation rate   Result Value Ref Range    Sed Rate 32 (H) 0 - 30 mm/h   CRP, inflammation   Result Value Ref Range    CRP Inflammation 82.0 (H) 0.0 - 8.0 mg/L   Basic metabolic panel   Result Value Ref Range    Sodium 121 (L) 133 - 144 mmol/L    Potassium 4.6 3.4 - 5.3 mmol/L    Chloride 89 (L) 94 - 109 mmol/L    Carbon Dioxide 23 20 - 32 mmol/L    Anion Gap 9 3 - 14 mmol/L    Glucose 83 70 - 99 mg/dL    Urea Nitrogen 19 7 - 30 mg/dL    Creatinine 0.82 0.52 - 1.04 mg/dL    GFR Estimate 66 >60 mL/min/1.7m2    GFR Estimate If Black 80 >60 mL/min/1.7m2    Calcium 9.1 8.5 - 10.1 mg/dL        ASSESSMENT/PLAN:     1. Unintentional weight loss  unclear etiology with uncertain prognosis, requires further workup    Multiple possible causes. I suspect pain and disability due to her pain are the primary factors, however she has had poor appetite for awhile and poor appetite and  weight loss are both listed side effects of the plaquenil and reclast that she has been on. Will begin evaluation for other potential causes. See below. And will refer to nutrition  - TSH with free T4 reflex  - ESR: Erythrocyte sedimentation rate  - CRP, inflammation  - XR Chest 2 Views; Future  - NUTRITION REFERRAL  - Basic metabolic panel    - CARE COORDINATION REFERRAL    2. Protein-calorie malnutrition, unspecified severity (H)    - TSH with free T4 reflex  - ESR: Erythrocyte sedimentation rate  - CRP, inflammation  - XR Chest 2 Views; Future  - NUTRITION REFERRAL  - Basic metabolic panel     - CARE COORDINATION REFERRAL    3. Hyponatremia  Mildly low in ED during her visit for right thigh pain.   She has been forgetful and at times confused per her family recently, though this is not clearly an acute change I do think we should recheck BMP today and I did discuss with pt and family today that if worsening hyponatremia she will need go to the ER.      4. Osteoarthritis, unspecified osteoarthritis type, unspecified site    - HYDROcodone-acetaminophen (NORCO) 5-325 MG per tablet; Take 1-2 tablets by mouth every 6 hours as needed for moderate to severe pain  Dispense: 150 tablet; Refill: 0  - HYDROcodone-acetaminophen (NORCO) 5-325 MG per tablet; Take 1-2 tablets by mouth every 6 hours as needed for moderate to severe pain  Dispense: 150 tablet; Refill: 0  - HYDROcodone-acetaminophen (NORCO) 5-325 MG per tablet; Take 1-2 tablets by mouth every 6 hours as needed for moderate to severe pain  Dispense: 150 tablet; Refill: 0     - CARE COORDINATION REFERRAL    5. Other chronic pain       - HYDROcodone-acetaminophen (NORCO) 5-325 MG per tablet; Take 1-2 tablets by mouth every 6 hours as needed for moderate to severe pain  Dispense: 150 tablet; Refill: 0  - HYDROcodone-acetaminophen (NORCO) 5-325 MG per tablet; Take 1-2 tablets by mouth every 6 hours as needed for moderate to severe pain  Dispense: 150 tablet; Refill:  0  - HYDROcodone-acetaminophen (NORCO) 5-325 MG per tablet; Take 1-2 tablets by mouth every 6 hours as needed for moderate to severe pain  Dispense: 150 tablet; Refill: 0     - CARE COORDINATION REFERRAL    6. Rheumatoid arthritis, involving unspecified site, unspecified rheumatoid factor presence (H)    Follows with rheumatology and continues on plaquenil and actemra    - CARE COORDINATION REFERRAL    7. Right leg pain  Was seen by ortho and given spinal injection. She is now 10 days post injection and has not seen improvement. She will contact ortho and I have also placed a referral to pain clinic.     1. Call your orthopedist to let that person know that your injection did not help to find out what they would recommend next.  2. Schedule with a pain clinic  3. Schedule with a nutritionist.  4. We will do labs today.  5. I will let you know if your labs indicate you need to go into the ER. Other reasons for going to the ER are if you are unable to manage the pain on your own at home, if you are unable to care for yourself at home, if you are unable to eat or get adequate hydration at home. Sometimes people are admitted to the hospital for a few days in order to arrange for longer-term care in a rehab/nursing home facility.     The information in this document, created by the medical scribe for me, accurately reflects the services I personally performed and the decisions made by me. I have reviewed and approved this document for accuracy. 05/21/18    Majo Alfaro MD  [facility] [place]

## 2018-05-21 NOTE — PATIENT INSTRUCTIONS
1. Call your orthopedist to let that person know that your injection did not help to find out what they would recommend next.  2. Schedule with a pain clinic  3. Schedule with a nutritionist.  4. We will do labs today.  5. I will let you know if your labs indicate you need to go into the ER. Other reasons for going to the ER are if you are unable to manage the pain on your own at home, if you are unable to care for yourself at home, if you are unable to eat or get adequate hydration at home. Sometimes people are admitted to the hospital for a few days in order to arrange for longer-term care in a rehab/nursing home facility.

## 2018-05-22 ENCOUNTER — HOSPITAL ENCOUNTER (INPATIENT)
Facility: CLINIC | Age: 83
LOS: 3 days | Discharge: NURSING, REHAB OR LONG-TERM CARE | DRG: 643 | End: 2018-05-25
Attending: EMERGENCY MEDICINE | Admitting: INTERNAL MEDICINE
Payer: COMMERCIAL

## 2018-05-22 ENCOUNTER — TELEPHONE (OUTPATIENT)
Dept: FAMILY MEDICINE | Facility: CLINIC | Age: 83
End: 2018-05-22

## 2018-05-22 DIAGNOSIS — E87.1 HYPONATREMIA: ICD-10-CM

## 2018-05-22 DIAGNOSIS — E46 PROTEIN-CALORIE MALNUTRITION, UNSPECIFIED SEVERITY (H): ICD-10-CM

## 2018-05-22 DIAGNOSIS — R63.4 UNINTENTIONAL WEIGHT LOSS: ICD-10-CM

## 2018-05-22 DIAGNOSIS — R41.0 CONFUSION: ICD-10-CM

## 2018-05-22 DIAGNOSIS — G89.29 OTHER CHRONIC PAIN: ICD-10-CM

## 2018-05-22 DIAGNOSIS — M19.90 OSTEOARTHRITIS, UNSPECIFIED OSTEOARTHRITIS TYPE, UNSPECIFIED SITE: ICD-10-CM

## 2018-05-22 DIAGNOSIS — M06.9 RHEUMATOID ARTHRITIS, INVOLVING UNSPECIFIED SITE, UNSPECIFIED RHEUMATOID FACTOR PRESENCE: ICD-10-CM

## 2018-05-22 LAB
ALBUMIN UR-MCNC: 10 MG/DL
ANION GAP SERPL CALCULATED.3IONS-SCNC: 14 MMOL/L (ref 3–14)
ANION GAP SERPL CALCULATED.3IONS-SCNC: 9 MMOL/L (ref 3–14)
APPEARANCE UR: CLEAR
BASOPHILS # BLD AUTO: 0 10E9/L (ref 0–0.2)
BASOPHILS NFR BLD AUTO: 0.1 %
BILIRUB UR QL STRIP: NEGATIVE
BUN SERPL-MCNC: 18 MG/DL (ref 7–30)
BUN SERPL-MCNC: 19 MG/DL (ref 7–30)
CALCIUM SERPL-MCNC: 9.1 MG/DL (ref 8.5–10.1)
CALCIUM SERPL-MCNC: 9.1 MG/DL (ref 8.5–10.1)
CHLORIDE SERPL-SCNC: 86 MMOL/L (ref 94–109)
CHLORIDE SERPL-SCNC: 89 MMOL/L (ref 94–109)
CO2 SERPL-SCNC: 23 MMOL/L (ref 20–32)
CO2 SERPL-SCNC: 23 MMOL/L (ref 20–32)
COLOR UR AUTO: YELLOW
CREAT SERPL-MCNC: 0.72 MG/DL (ref 0.52–1.04)
CREAT SERPL-MCNC: 0.82 MG/DL (ref 0.52–1.04)
DIFFERENTIAL METHOD BLD: ABNORMAL
EOSINOPHIL # BLD AUTO: 0.1 10E9/L (ref 0–0.7)
EOSINOPHIL NFR BLD AUTO: 0.4 %
ERYTHROCYTE [DISTWIDTH] IN BLOOD BY AUTOMATED COUNT: 13.3 % (ref 10–15)
GFR SERPL CREATININE-BSD FRML MDRD: 66 ML/MIN/1.7M2
GFR SERPL CREATININE-BSD FRML MDRD: 76 ML/MIN/1.7M2
GLUCOSE SERPL-MCNC: 114 MG/DL (ref 70–99)
GLUCOSE SERPL-MCNC: 83 MG/DL (ref 70–99)
GLUCOSE UR STRIP-MCNC: NEGATIVE MG/DL
HCT VFR BLD AUTO: 30.7 % (ref 35–47)
HGB BLD-MCNC: 11 G/DL (ref 11.7–15.7)
HGB UR QL STRIP: NEGATIVE
IMM GRANULOCYTES # BLD: 0.2 10E9/L (ref 0–0.4)
IMM GRANULOCYTES NFR BLD: 1 %
KETONES UR STRIP-MCNC: NEGATIVE MG/DL
LEUKOCYTE ESTERASE UR QL STRIP: NEGATIVE
LYMPHOCYTES # BLD AUTO: 0.8 10E9/L (ref 0.8–5.3)
LYMPHOCYTES NFR BLD AUTO: 4.7 %
MCH RBC QN AUTO: 32 PG (ref 26.5–33)
MCHC RBC AUTO-ENTMCNC: 35.8 G/DL (ref 31.5–36.5)
MCV RBC AUTO: 89 FL (ref 78–100)
MONOCYTES # BLD AUTO: 1.1 10E9/L (ref 0–1.3)
MONOCYTES NFR BLD AUTO: 6.4 %
MUCOUS THREADS #/AREA URNS LPF: PRESENT /LPF
NEUTROPHILS # BLD AUTO: 14.4 10E9/L (ref 1.6–8.3)
NEUTROPHILS NFR BLD AUTO: 87.4 %
NITRATE UR QL: NEGATIVE
NRBC # BLD AUTO: 0 10*3/UL
NRBC BLD AUTO-RTO: 0 /100
OSMOLALITY SERPL: 256 MMOL/KG (ref 280–301)
OSMOLALITY UR: 385 MMOL/KG (ref 100–1200)
PH UR STRIP: 6 PH (ref 5–7)
PLATELET # BLD AUTO: 267 10E9/L (ref 150–450)
POTASSIUM SERPL-SCNC: 4.2 MMOL/L (ref 3.4–5.3)
POTASSIUM SERPL-SCNC: 4.6 MMOL/L (ref 3.4–5.3)
RBC # BLD AUTO: 3.44 10E12/L (ref 3.8–5.2)
RBC #/AREA URNS AUTO: <1 /HPF (ref 0–2)
SODIUM SERPL-SCNC: 121 MMOL/L (ref 133–144)
SODIUM SERPL-SCNC: 123 MMOL/L (ref 133–144)
SODIUM UR-SCNC: 25 MMOL/L
SOURCE: ABNORMAL
SP GR UR STRIP: 1.01 (ref 1–1.03)
SQUAMOUS #/AREA URNS AUTO: 4 /HPF (ref 0–1)
TSH SERPL DL<=0.005 MIU/L-ACNC: 1.56 MU/L (ref 0.4–4)
UROBILINOGEN UR STRIP-MCNC: NORMAL MG/DL (ref 0–2)
WBC # BLD AUTO: 16.5 10E9/L (ref 4–11)
WBC #/AREA URNS AUTO: 2 /HPF (ref 0–5)

## 2018-05-22 PROCEDURE — 25000132 ZZH RX MED GY IP 250 OP 250 PS 637: Performed by: INTERNAL MEDICINE

## 2018-05-22 PROCEDURE — 25000132 ZZH RX MED GY IP 250 OP 250 PS 637: Performed by: EMERGENCY MEDICINE

## 2018-05-22 PROCEDURE — 96360 HYDRATION IV INFUSION INIT: CPT

## 2018-05-22 PROCEDURE — 84300 ASSAY OF URINE SODIUM: CPT | Performed by: EMERGENCY MEDICINE

## 2018-05-22 PROCEDURE — 25000128 H RX IP 250 OP 636: Performed by: EMERGENCY MEDICINE

## 2018-05-22 PROCEDURE — 83930 ASSAY OF BLOOD OSMOLALITY: CPT | Performed by: EMERGENCY MEDICINE

## 2018-05-22 PROCEDURE — 83935 ASSAY OF URINE OSMOLALITY: CPT | Performed by: EMERGENCY MEDICINE

## 2018-05-22 PROCEDURE — 80048 BASIC METABOLIC PNL TOTAL CA: CPT | Performed by: EMERGENCY MEDICINE

## 2018-05-22 PROCEDURE — 12000000 ZZH R&B MED SURG/OB

## 2018-05-22 PROCEDURE — 81001 URINALYSIS AUTO W/SCOPE: CPT | Performed by: EMERGENCY MEDICINE

## 2018-05-22 PROCEDURE — 99223 1ST HOSP IP/OBS HIGH 75: CPT | Mod: AI | Performed by: INTERNAL MEDICINE

## 2018-05-22 PROCEDURE — 85025 COMPLETE CBC W/AUTO DIFF WBC: CPT | Performed by: EMERGENCY MEDICINE

## 2018-05-22 PROCEDURE — 99285 EMERGENCY DEPT VISIT HI MDM: CPT | Mod: 25

## 2018-05-22 RX ORDER — AMOXICILLIN 250 MG
2 CAPSULE ORAL 2 TIMES DAILY PRN
Status: DISCONTINUED | OUTPATIENT
Start: 2018-05-22 | End: 2018-05-25 | Stop reason: HOSPADM

## 2018-05-22 RX ORDER — ACETAMINOPHEN 325 MG/1
650 TABLET ORAL ONCE
Status: COMPLETED | OUTPATIENT
Start: 2018-05-22 | End: 2018-05-22

## 2018-05-22 RX ORDER — BISACODYL 10 MG
10 SUPPOSITORY, RECTAL RECTAL DAILY PRN
Status: DISCONTINUED | OUTPATIENT
Start: 2018-05-22 | End: 2018-05-25 | Stop reason: HOSPADM

## 2018-05-22 RX ORDER — HYDROCODONE BITARTRATE AND ACETAMINOPHEN 5; 325 MG/1; MG/1
1-2 TABLET ORAL EVERY 4 HOURS PRN
Status: DISCONTINUED | OUTPATIENT
Start: 2018-05-22 | End: 2018-05-25 | Stop reason: HOSPADM

## 2018-05-22 RX ORDER — HYDROXYCHLOROQUINE SULFATE 200 MG/1
200 TABLET, FILM COATED ORAL DAILY
Status: DISCONTINUED | OUTPATIENT
Start: 2018-05-23 | End: 2018-05-25 | Stop reason: HOSPADM

## 2018-05-22 RX ORDER — ACETAMINOPHEN 325 MG/1
650 TABLET ORAL EVERY 4 HOURS PRN
Status: DISCONTINUED | OUTPATIENT
Start: 2018-05-22 | End: 2018-05-25 | Stop reason: HOSPADM

## 2018-05-22 RX ORDER — ONDANSETRON 4 MG/1
4 TABLET, ORALLY DISINTEGRATING ORAL EVERY 6 HOURS PRN
Status: DISCONTINUED | OUTPATIENT
Start: 2018-05-22 | End: 2018-05-25 | Stop reason: HOSPADM

## 2018-05-22 RX ORDER — NALOXONE HYDROCHLORIDE 0.4 MG/ML
.1-.4 INJECTION, SOLUTION INTRAMUSCULAR; INTRAVENOUS; SUBCUTANEOUS
Status: DISCONTINUED | OUTPATIENT
Start: 2018-05-22 | End: 2018-05-25 | Stop reason: HOSPADM

## 2018-05-22 RX ORDER — ACETAMINOPHEN 650 MG/1
650 SUPPOSITORY RECTAL EVERY 4 HOURS PRN
Status: DISCONTINUED | OUTPATIENT
Start: 2018-05-22 | End: 2018-05-25 | Stop reason: HOSPADM

## 2018-05-22 RX ORDER — AMOXICILLIN 250 MG
1 CAPSULE ORAL 2 TIMES DAILY PRN
Status: DISCONTINUED | OUTPATIENT
Start: 2018-05-22 | End: 2018-05-25 | Stop reason: HOSPADM

## 2018-05-22 RX ORDER — ONDANSETRON 2 MG/ML
4 INJECTION INTRAMUSCULAR; INTRAVENOUS EVERY 6 HOURS PRN
Status: DISCONTINUED | OUTPATIENT
Start: 2018-05-22 | End: 2018-05-25 | Stop reason: HOSPADM

## 2018-05-22 RX ORDER — POLYETHYLENE GLYCOL 3350 17 G/17G
17 POWDER, FOR SOLUTION ORAL DAILY PRN
Status: DISCONTINUED | OUTPATIENT
Start: 2018-05-22 | End: 2018-05-25 | Stop reason: HOSPADM

## 2018-05-22 RX ORDER — PROCHLORPERAZINE 25 MG
12.5 SUPPOSITORY, RECTAL RECTAL EVERY 12 HOURS PRN
Status: DISCONTINUED | OUTPATIENT
Start: 2018-05-22 | End: 2018-05-25 | Stop reason: HOSPADM

## 2018-05-22 RX ORDER — PROCHLORPERAZINE MALEATE 5 MG
5 TABLET ORAL EVERY 6 HOURS PRN
Status: DISCONTINUED | OUTPATIENT
Start: 2018-05-22 | End: 2018-05-25 | Stop reason: HOSPADM

## 2018-05-22 RX ORDER — NIFEDIPINE 90 MG/1
90 TABLET, EXTENDED RELEASE ORAL DAILY
Status: DISCONTINUED | OUTPATIENT
Start: 2018-05-23 | End: 2018-05-25 | Stop reason: HOSPADM

## 2018-05-22 RX ADMIN — HYDROCODONE BITARTRATE AND ACETAMINOPHEN 1 TABLET: 5; 325 TABLET ORAL at 21:16

## 2018-05-22 RX ADMIN — SODIUM CHLORIDE 500 ML: 9 INJECTION, SOLUTION INTRAVENOUS at 18:32

## 2018-05-22 RX ADMIN — ACETAMINOPHEN 650 MG: 325 TABLET, FILM COATED ORAL at 18:56

## 2018-05-22 RX ADMIN — HYDROCODONE BITARTRATE AND ACETAMINOPHEN 1 TABLET: 5; 325 TABLET ORAL at 20:33

## 2018-05-22 ASSESSMENT — ENCOUNTER SYMPTOMS
APPETITE CHANGE: 1
ACTIVITY CHANGE: 1
NUMBNESS: 0
WEAKNESS: 0
BACK PAIN: 1
FATIGUE: 1
SHORTNESS OF BREATH: 0

## 2018-05-22 NOTE — ED NOTES
"[facility]  ED Nurse Handoff Report    ED Chief complaint: Abnormal Labs (Na 121 at PMDs office. )      ED Diagnosis:   Final diagnoses:   None       Code Status: DNR / DNI    Allergies:   Allergies   Allergen Reactions     Hydrochlorothiazide      hyponatremia       Activity level - Baseline/Home:  Stand with Assist    Activity Level - Current:   Stand with Assist of 2     Needed?: No    Isolation: No  Infection: Not Applicable    Bariatric?: No    Vital Signs:   Vitals:    05/22/18 1624 05/22/18 1811   BP: 132/46 115/56   Resp: 18    Temp: 98.7  F (37.1  C)    TempSrc: Temporal    SpO2: 98% 96%   Weight: 45.8 kg (101 lb)    Height: 1.575 m (5' 2\")        Cardiac Rhythm: ,        Pain level: 0-10 Pain Scale: 10    Is this patient confused?: Yes   Suicidality: Screened negative    Patient Report: Initial Complaint: C/O increased weakness  Focused Assessment: A & O but forgetful.  VSS.  Up with assistance of 2.  C/O increased weakness and decreased activity; went to the clinic today and was found to have low NA.  Denies pain.  Tests Performed: labs  Abnormal Results: labs  Treatments provided: 1L NS    Family Comments: daughter @ bedside    OBS brochure/video discussed/provided to patient: N/A    ED Medications: Medications - No data to display    Drips infusing?:  No    For the majority of the shift this patient was Green.   Interventions performed were NA.    Severe Sepsis OR Septic Shock Diagnosis Present: No      ED NURSE PHONE NUMBER: *[phone]       "

## 2018-05-22 NOTE — TELEPHONE ENCOUNTER
Gave this message to Tawnya.  She will take her mother to the ER.  She appreciated the call.  DONTE Charles

## 2018-05-22 NOTE — TELEPHONE ENCOUNTER
RN - Please call pt's daughter Tawnya (pt's preferred contact). Pt's sodium is very low. Let her know that she needs to go to the ER. Thanks! Majo Alfaro M.D.      Exam Information   Exam Date Exam Time Accession # Results    5/21/18  4:18 PM [id number]    Component Results   Component Value Flag Ref Range Units Status Collected Lab   Sodium 121 (L) 133 - 144 mmol/L Final 05/21/2018  4:18 PM 65   Potassium 4.6  3.4 - 5.3 mmol/L Final 05/21/2018  4:18 PM 65   Chloride 89 (L) 94 - 109 mmol/L Final 05/21/2018  4:18 PM 65   Carbon Dioxide 23  20 - 32 mmol/L Final 05/21/2018  4:18 PM 65   Anion Gap 9  3 - 14 mmol/L Final 05/21/2018  4:18 PM 65   Glucose 83  70 - 99 mg/dL Final 05/21/2018  4:18 PM 65   Comment:   Non Fasting   Urea Nitrogen 19  7 - 30 mg/dL Final 05/21/2018  4:18 PM 65   Creatinine 0.82  0.52 - 1.04 mg/dL Final 05/21/2018  4:18 PM 65   GFR Estimate 66  >60 mL/min/1.7m2 Final 05/21/2018  4:18 PM 65   Comment:   Non  GFR Calc   GFR Estimate If Black 80  >60 mL/min/1.7m2 Final 05/21/2018  4:18 PM 65   Comment:   African American GFR Calc   Calcium 9.1  8.5 - 10.1 mg/dL Final 05/21/2018  4:18

## 2018-05-22 NOTE — IP AVS SNAPSHOT
"[place] [address] [facility]: [phone]                                              INTERAGENCY TRANSFER FORM - PHYSICIAN ORDERS   2018                    Hospital Admission Date: 2018  HARDY CORRIGAN   : 1931  Sex: Female        Attending Provider: Joe Vora MD     Allergies:  Hydrochlorothiazide    Infection:  None   Service:  HOSPITALIST    Ht:  1.575 m (5' 2\")   Wt:  46.7 kg (102 lb 15.3 oz)   Admission Wt:  45.8 kg (101 lb)    BMI:  18.83 kg/m 2   BSA:  1.43 m 2            Patient PCP Information     Provider PCP Type    Majo Alfaro MD, MD General      ED Clinical Impression     Diagnosis Description Comment Added By Time Added    Hyponatremia [E87.1] Hyponatremia [E87.1]  Jacob Ambriz DO 2018  6:52 PM    Confusion [R41.0] Confusion [R41.0]  Jacob Ambriz DO 2018  6:52 PM      Hospital Problems as of 2018              Priority Class Noted POA    Hyponatremia Medium  2018 Yes      Non-Hospital Problems as of 2018              Priority Class Noted    Osteopenia Medium  5/15/2009    Hypertension goal BP (blood pressure) < 140/90 Medium  Unknown    Rheumatoid arthritis (H) Medium  Unknown    CKD (chronic kidney disease) stage 3, GFR 30-59 ml/min Medium  Unknown    Hyperlipidemia with target LDL less than 100 Medium  Unknown    Advanced directives, counseling/discussion Medium  2011    Osteoarthritis Medium  2012    Eczema Medium  2013    Peripheral edema Medium  3/8/2016    Hyponatremia with decreased serum osmolality Medium  2016    Acute hyponatremia Medium  2016    Chronic pain Medium  2016    Other chronic pain Medium  2017    Protein-calorie malnutrition (H) Medium  2018      Code Status History     Date Active Date Inactive Code Status Order ID Comments User Context    2018 12:47 PM  DNR/DNI [id number]  Jose E Clement MD Outpatient    2018  7:44 PM " 5/25/2018 12:47 PM DNR/DNI [id number]  Joe Vora MD Inpatient    8/13/2016  8:15 AM 5/22/2018  7:44 PM DNR/DNI [id number]  Abdelrahman Delatorre MD Outpatient    8/11/2016  5:37 PM 8/13/2016  8:15 AM DNR/DNI [id number]  Gerald Brooke MD Inpatient    7/29/2011  9:22 AM 8/11/2016  5:37 PM Full Code [id number]  Spatz, Lisa Outpatient    10/11/2004  2:44 PM 10/11/2004  2:44 PM  None  Augustina Soriano Demographics         Medication Review      START taking        Dose / Directions Comments    acetaminophen 325 MG tablet   Commonly known as:  TYLENOL   Used for:  Rheumatoid arthritis, involving unspecified site, unspecified rheumatoid factor presence (H)        Dose:  650 mg   Take 2 tablets (650 mg) by mouth 3 times daily   Quantity:  100 tablet   Refills:  0        gabapentin 100 MG capsule   Commonly known as:  NEURONTIN   Used for:  Osteoarthritis, unspecified osteoarthritis type, unspecified site        Dose:  100 mg   Take 1 capsule (100 mg) by mouth At Bedtime   Quantity:  30 capsule   Refills:  0          CONTINUE these medications which may have CHANGED, or have new prescriptions. If we are uncertain of the size of tablets/capsules you have at home, strength may be listed as something that might have changed.        Dose / Directions Comments    HYDROcodone-acetaminophen 5-325 MG per tablet   Commonly known as:  NORCO   This may have changed:    - how much to take  - These instructions start on 6/21/2018. If you are unsure what to do until then, ask your doctor or other care provider.  - Another medication with the same name was removed. Continue taking this medication, and follow the directions you see here.   Used for:  Osteoarthritis, unspecified osteoarthritis type, unspecified site, Unintentional weight loss, Protein-calorie malnutrition, unspecified severity (H), Other chronic pain, Rheumatoid arthritis, involving unspecified site, unspecified rheumatoid factor presence (H)        Dose:  1 tablet   Start  taking on:  6/21/2018   Take 1 tablet by mouth every 6 hours as needed for moderate to severe pain   Quantity:  15 tablet   Refills:  0          CONTINUE these medications which have NOT CHANGED        Dose / Directions Comments    atenolol 25 MG tablet   Commonly known as:  TENORMIN   Used for:  Hypertension goal BP (blood pressure) < 140/90        TAKE 3 TABLETS DAILY   Quantity:  270 tablet   Refills:  3        atorvastatin 20 MG tablet   Commonly known as:  LIPITOR   Used for:  Hyperlipidemia with target LDL less than 100        TAKE 1 TABLET DAILY   Quantity:  90 tablet   Refills:  3        hydroxychloroquine 200 MG tablet   Commonly known as:  PLAQUENIL        Dose:  200 mg   Take 200 mg by mouth daily   Refills:  3        NIFEdipine ER osmotic 90 MG 24 hr tablet   Commonly known as:  PROCARDIA XL   Used for:  Hypertension goal BP (blood pressure) < 140/90        Dose:  90 mg   Take 1 tablet (90 mg) by mouth daily   Quantity:  90 tablet   Refills:  1    Sent to local pharmacy.       order for DME   Used for:  Hypertension goal BP (blood pressure) < 140/90        Equipment being ordered: blood pressure cuff and monitor   Quantity:  1 each   Refills:  0        tocilizumab 80 MG/4ML   Commonly known as:  ACTEMRA   Indication:  Moderate to Severe Rheumatoid Arthritis   Used for:  RA (rheumatoid arthritis) (H)        Inject into the vein every 30 days   Refills:  0        zoledronic Acid 5 MG/100ML Soln infusion   Commonly known as:  RECLAST        Dose:  5 mg   Inject 5 mg into the vein Yearly   Quantity:  100 mL   Refills:  0          STOP taking     IBUPROFEN PO           lisinopril 20 MG tablet   Commonly known as:  PRINIVIL/ZESTRIL                   Summary of Visit     Reason for your hospital stay       Severe hyponatremia             After Care     Activity - Up with nursing assistance           Additional Discharge Instructions       Please closely monitor adequacy of PO fluid intake       Advance Diet as  Tolerated       Follow this diet upon discharge: Orders Placed This Encounter      Fluid restriction 1200 ML FLUID      Snacks/Supplements Adult: Boost Shake; Between Meals      Room Service      Combination Diet Regular Diet Adult         Fall precautions           General info for SNF       Length of Stay Estimate: Short Term Care: Estimated # of Days <30  Condition at Discharge: Improving  Level of care:skilled   Rehabilitation Potential: Good  Admission H&P remains valid and up-to-date: Yes  Recent Chemotherapy: N/A  Use Nursing Home Standing Orders: N/A       Intake and output       Every shift       Mantoux instructions       Give two-step Mantoux (PPD) Per Facility Policy Yes             Referrals     Occupational Therapy Adult Consult       Evaluate and treat as clinically indicated.    Reason:       Physical Therapy Adult Consult       Evaluate and treat as clinically indicated.    Reason:             Follow-Up Appointment Instructions     Future Labs/Procedures    Follow Up and recommended labs and tests     Comments:    Follow up with [facility] physician.  The following labs/tests are recommended: CBC and BMP on 5/29/18      Follow-Up Appointment Instructions     Follow Up and recommended labs and tests       Follow up with [facility] physician.  The following labs/tests are recommended: CBC and BMP on 5/29/18             Statement of Approval     Ordered          05/25/18 1248  I have reviewed and agree with all the recommendations and orders detailed in this document.  EFFECTIVE NOW     Approved and electronically signed by:  Jose E Clement MD

## 2018-05-22 NOTE — IP AVS SNAPSHOT
` ` Patient Information     Patient Name Sex     Celestina Tejada ([id number]) Female 1931       Room Bed    [facility] 6633-02      Patient Demographics     Address Phone    [address]   [facility] [address] [phone] (Home)  none (Work)  [phone] (Mobile)      Patient Ethnicity & Race     Ethnic Group Patient Race    American White      Emergency Contact(s)     Name Relation Home Work Mobile    AMBER YOUSIF Daughter [phone]    Mimi Latham Sister [phone]      FIORELLA MCADAMS Relative   [phone]      Documents on File        Status Date Received Description       Documents for the Patient    Insurance Card  ()      Privacy Notice - [place] Received 03     Face Sheet Received () 05/15/09     Insurance Card  () 04     Insurance Card  () 05/15/09     Face Sheet Received () 07/13/10     External Medication Information Consent Accepted () 07/13/10     Insurance Card  () 07/13/10     Patient ID Received () 14 [place]    Consent for Services - Hospital/Clinic  ()      Consent for Services - Hospital/Clinic Received () 11     External Medication Information Consent Accepted () 11     Insurance Card Received () 11     Insurance Card Received () 12     Consent for Services - Hospital/Clinic Received () 12     External Medication Information Consent Accepted () 12     Insurance Card Received () 01/15/13     Consent for EHR Access  13 Copied from existing Consent for services - C/HOD collected on 2012    [facility] Specified Other       Consent for Services - Hospital/Clinic Received () 13     External Medication Information Consent Accepted 13     Insurance Card Received () 14 [facility]    Consent for Services - Hospital/Clinic Received () 14      Consent for Services/Privacy Notice - Hospital/Clinic Received () 16     HIM KATHY Authorization  16 [facility]  2016    Consent to Communicate  16 AUTHORIZATION TO DISCUSS PROTECTED HEALTH INFORMATION    HIM KATHY Authorization  10/28/16 [employer]    Insurance Card Received 17 [facility]    HIM KATHY Authorization  17 [facility]/     Consent for Services/Privacy Notice - Hospital/Clinic Received () 17     Patient ID Received 17 ID exp 2021    Consent for Services - [facility]       HIM KATHY Authorization  () 10/19/17 Authorization for batch [employer]  cms  10-    Care Everywhere Prospective Auth Received 17     Consent for Services - Hospital and Clinic Received 18     HIE Auth Received 18     Insurance Card Received (Deleted) 05/12/15 [facility]    Insurance Card Received (Deleted) 16 [employer]       Documents for the Encounter    CMS IM for Patient Signature Received 18       Admission Information     Attending Provider Admitting Provider Admission Type Admission Date/Time    Joe Vora MD Melander, Ian M, MD Emergency 18  1715    Discharge Date Hospital Service Auth/Cert Status Service Area     Hospitalist [facility] SERVICES    Unit Room/Bed Admission Status       [address] UNIT 6633/6633-02 Admission (Confirmed)       Admission     Complaint    Hyponatremia      Hospital Account     Name Acct ID Class Status Primary Coverage    Celestina Tejada [id number] Inpatient Open COMMERCIAL - [employer] MEDICARE ADVANTAGE            Guarantor Account (for Hospital Account #[id number])     Name Relation to Pt Service Area Active? Acct Type    Celestina Tejada  FCS Yes Personal/Family    Address Phone          [address] [place] [address] [phone](H)  none(O)              Coverage Information (for Hospital Account #[id number])     F/O Payor/Plan Precert #     COMMERCIAL/[employer] MEDICARE ADVANTAGE     Subscriber Subscriber #    Celestina Tejada [id number]    Address Phone    [address] [address]  [place] [address]

## 2018-05-22 NOTE — IP AVS SNAPSHOT
"` `           [place] [address] [facility]: [phone]                                              INTERAGENCY TRANSFER FORM - NURSING   2018                    Hospital Admission Date: 2018  HARDY CORRIGAN   : 1931  Sex: Female        Attending Provider: Joe Vora MD     Allergies:  Hydrochlorothiazide    Infection:  None   Service:  HOSPITALIST    Ht:  1.575 m (5' 2\")   Wt:  46.7 kg (102 lb 15.3 oz)   Admission Wt:  45.8 kg (101 lb)    BMI:  18.83 kg/m 2   BSA:  1.43 m 2            Patient PCP Information     Provider PCP Type    Majo Alfaro MD, MD General      Current Code Status     Date Active Code Status Order ID Comments User Context       Prior      Code Status History     Date Active Date Inactive Code Status Order ID Comments User Context    2018 12:47 PM  DNR/DNI [id number]  Jose E Clement MD Outpatient    2018  7:44 PM 2018 12:47 PM DNR/DNI [id number]  Joe Vora MD Inpatient    2016  8:15 AM 2018  7:44 PM DNR/DNI [id number]  Abdelrahman Delatorre MD Outpatient    2016  5:37 PM 2016  8:15 AM DNR/DNI [id number]  Gerald Brooke MD Inpatient    2011  9:22 AM 2016  5:37 PM Full Code [id number]  Spatz, Lisa Outpatient    10/11/2004  2:44 PM 10/11/2004  2:44 PM  None  Augustina Soriano Demographics      Advance Directives        Scanned docmt in ACP Activity?           No scanned doc        Hospital Problems as of 2018              Priority Class Noted POA    Hyponatremia Medium  2018 Yes      Non-Hospital Problems as of 2018              Priority Class Noted    Osteopenia Medium  5/15/2009    Hypertension goal BP (blood pressure) < 140/90 Medium  Unknown    Rheumatoid arthritis (H) Medium  Unknown    CKD (chronic kidney disease) stage 3, GFR 30-59 ml/min Medium  Unknown    Hyperlipidemia with target LDL less than 100 Medium  Unknown    Advanced directives, counseling/discussion Medium  2011    " Osteoarthritis Medium  2/9/2012    Eczema Medium  5/14/2013    Peripheral edema Medium  3/8/2016    Hyponatremia with decreased serum osmolality Medium  8/11/2016    Acute hyponatremia Medium  8/11/2016    Chronic pain Medium  9/14/2016    Other chronic pain Medium  11/20/2017    Protein-calorie malnutrition (H) Medium  5/21/2018      Immunizations     Name Date      Influenza (High Dose) 3 valent vaccine 10/18/17     Influenza (High Dose) 3 valent vaccine 10/03/16     Influenza (High Dose) 3 valent vaccine 10/16/15     Influenza (High Dose) 3 valent vaccine 09/24/13     Influenza (High Dose) 3 valent vaccine 10/25/12     Influenza (High Dose) 3 valent vaccine 10/28/11     Influenza (IIV3) PF 09/10/14     Influenza (IIV3) PF 09/22/09     Influenza (IIV3) PF 10/23/08     Influenza (IIV3) PF 11/07/06     Influenza (IIV3) PF 10/12/04     Influenza (IIV3) PF 12/07/98     Pneumo Conj 13-V (2010&after) 12/05/14     Pneumococcal 23 valent 07/31/12     Pneumococcal 23 valent 05/01/06     TD (ADULT, 7+) 05/01/07     TDAP Vaccine (Boostrix) 01/15/13          END      ASSESSMENT     Discharge Profile Flowsheet     EXPECTED DISCHARGE     COMMUNICATION ASSESSMENT      Expected Discharge Date  05/25/18 (TCU) 05/24/18 1558   Patient's communication style  spoken language (English or Bilingual) 05/22/18 1622    DISCHARGE NEEDS ASSESSMENT     SKIN      Concerns To Be Addressed  no discharge needs identified 08/13/16 1232   Inspection of bony prominences  Full 05/25/18 0815    Equipment Currently Used at Home  none 05/23/18 1033   Inspection under devices  Full 05/25/18 0815    Transportation Available  car (Pt drives at baseline. ) 05/23/18 1033   Skin WDL  ex 05/25/18 0815    # of Referrals Placed by CTS  Post Acute Facilities 05/24/18 1236   Skin Temperature  warm 05/25/18 0815    Key Recommendations  home 08/13/16 1232   Skin Moisture  dry 05/25/18 0815    Primary Care MD Name  Majo Alfaro 08/13/16 1121   Skin Elasticity  quick  "return to original state 05/25/18 0815    GASTROINTESTINAL (ADULT,PEDIATRIC,OB)     Skin Integrity  bruise(s);wound(s) 05/25/18 0815    GI WDL  ex 05/25/18 0815   SAFETY      Last Bowel Movement  05/21/18 05/23/18 1142   Safety WDL  WDL 05/25/18 0815    GI Signs/Symptoms  constipation 05/25/18 0815   Safety Factors  ID band on;call light in reach;wheels locked;bed in low position 05/25/18 0815    Passing flatus  yes 05/25/18 0815   All Alarms  alarm(s) activated and audible 05/25/18 0815                 Assessment WDL (Within Defined Limits) Definitions           Safety WDL     Effective: 09/28/15    Row Information: <b>WDL Definition:</b> Bed in low position, wheels locked; call light in reach; upper side rails up x 2; ID band on<br> <font color=\"gray\"><i>Item=AS safety wdl>>List=AS safety wdl>>Version=F14</i></font>      Skin WDL     Effective: 09/28/15    Row Information: <b>WDL Definition:</b> Warm; dry; intact; elastic; without discoloration; pressure points without redness<br> <font color=\"gray\"><i>Item=AS skin wdl>>List=AS skin wdl>>Version=F14</i></font>      Vitals     Vital Signs Flowsheet     VITAL SIGNS     Side Effects Monitoring: Respiratory Depth  N 05/24/18 1716    Temp  98.2  F (36.8  C) 05/25/18 0844   Side Effects Monitoring: Sedation Level  1 05/24/18 1716    Temp src  Oral 05/25/18 0844   HEIGHT AND WEIGHT      Resp  16 05/25/18 0844   Height  1.575 m (5' 2\") 05/22/18 1625    Pulse  58 05/23/18 2322   Height Method  Stated 05/22/18 1625    Heart Rate  65 05/25/18 0844   Weight  46.7 kg (102 lb 15.3 oz) 05/25/18 0646    Pulse/Heart Rate Source  Monitor 05/25/18 0844   Weight Method  Bed scale 05/25/18 0646    BP  111/53 05/25/18 0844   BSA (Calculated - sq m)  1.42 05/22/18 1625    BP Location  Right arm 05/25/18 0844   BMI (Calculated)  18.51 05/22/18 1625    OXYGEN THERAPY     BUNNY COMA SCALE      SpO2  95 % 05/25/18 0844   Best Eye Response  4-->(E4) spontaneous 05/25/18 0815    O2 Device  " None (Room air) 05/25/18 0844   Best Motor Response  6-->(M6) obeys commands 05/25/18 0815    PAIN/COMFORT     Best Verbal Response  5-->(V5) oriented 05/25/18 0815    Patient Currently in Pain  yes 05/25/18 0121   Russell Coma Scale Score  15 05/25/18 0815    Preferred Pain Scale  number (Numeric Rating Pain Scale) 05/24/18 1716   POSITIONING      Patient's Stated Pain Goal  3 05/24/18 1011   Body Position  supine, head elevated 05/25/18 1038    0-10 Pain Scale  6 05/25/18 0854   Head of Bed (HOB)  HOB at 20-30 degrees 05/25/18 1039    Pain Location  Head 05/24/18 1716   Positioning/Transfer Devices  pillows;in use 05/25/18 1039    Pain Orientation  Right 05/24/18 1716   Chair  Upright in chair 05/23/18 1148    Pain Descriptors  Headache 05/24/18 1716   DAILY CARE      Pain Management Interventions  analgesia administered 05/24/18 1716   Activity Management  up to commode 05/25/18 1038    Pain Intervention(s)  Declines 05/25/18 0121   Activity Assistance Provided  assistance, 1 person 05/25/18 1038    Response to Interventions  Absence of nonverbal indicators of pain 05/24/18 1439   Assistive Device Utilized  gait belt;walker 05/25/18 1038    ANALGESIA SIDE EFFECTS MONITORING     Additional Documentation  Activity Device Assistance (Row) 05/24/18 1014    Side Effects Monitoring: Respiratory Quality  R 05/24/18 1716                 Patient Lines/Drains/Airways Status    Active LINES/DRAINS/AIRWAYS     Name: Placement date: Placement time: Site: Days: Last dressing change:    Peripheral IV 05/22/18 Right 05/22/18   1728      2     Pressure Injury 05/22/18 Other (Comment);Left Buttocks 05/22/18   2106    2             Patient Lines/Drains/Airways Status    Active PICC/CVC     None            Intake/Output Detail Report     Date Intake   Output Net    Shift P.O. IV Piggyback Total Urine Total       Noc 05/23/18 2300 - 05/24/18 0659 -- -- -- 825 825 -825    Day 05/24/18 0700 - 05/24/18 1459 840 -- 840 -- -- 840    Romy  05/24/18 1500 - 05/24/18 2259 -- -- -- 250 250 -250    Noc 05/24/18 2300 - 05/25/18 0659 -- -- -- 600 600 -600    Day 05/25/18 0700 - 05/25/18 1459 -- -- -- 25 25 -25      Last Void/BM       Most Recent Value    Urine Occurrence 1 at 05/25/2018 0953    Stool Occurrence 1 at 05/25/2018 0953      Case Management/Discharge Planning     Case Management/Discharge Planning Flowsheet     REFERRAL INFORMATION     Major Change/Loss/Stressor  none 05/22/18 1959    Admission Type  inpatient 05/23/18 1619   Patient Personal Strengths  expressive of emotions;expressive of needs;humor;motivated;positive attitude;strong support system 08/13/16 1232    Arrived From  home or self-care 08/13/16 1232   EXPECTED DISCHARGE      Referral Source  case finding 05/23/18 1619   Expected Discharge Date  05/25/18 (TCU) 05/24/18 1558    # of Referrals Placed by CTS  Post Acute Facilities 05/24/18 1236   ASSESSMENT/CONCERNS TO BE ADDRESSED      Post Acute Facilities  TCU 05/24/18 1236   Concerns To Be Addressed  no discharge needs identified 08/13/16 1232    Reason For Consult  discharge planning 05/23/18 1619   DISCHARGE PLANNING      Record Reviewed  clinical discipline documentation;history and physical;medical record;patient profile 05/23/18 1619   Transportation Available  car (Pt drives at baseline. ) 05/23/18 1033    CTS Assigned to Case  Kourtney [place] 05/24/18 1236   Key Recommendations  home 08/13/16 1232    Primary Care MD Name  Majo Alfaro 08/13/16 1121   FINAL RESOURCES      LIVING ENVIRONMENT     Equipment Currently Used at Home  none 05/23/18 1033    Lives With  alone 05/23/18 1619   ABUSE RISK SCREEN      Living Arrangements  apartment 05/23/18 1619   QUESTION TO PATIENT:  Has a member of your family or a partner(now or in the past) intimidated, hurt, manipulated, or controlled you in any way?  patient declined to answer or is unable to answer 05/22/18 1624    ASSESSMENT OF FAMILY/SOCIAL SUPPORT     QUESTION TO PATIENT: Do you  feel safe going back to the place where you are living?  patient declined to answer or is unable to answer 05/22/18 1624    Marital Status   05/23/18 1619   OBSERVATION: Is there reason to believe there has been maltreatment of a vulnerable adult (ie. Physical/Sexual/Emotional abuse, self neglect, lack of adequate food, shelter, medical care, or financial exploitation)?  no 05/22/18 1624    Who is your support system?  Children 05/23/18 1619   OTHER      Description of Support System  Involved;Supportive 05/23/18 1619   Are you depressed or being treated for depression?  No 05/22/18 1959    Support Assessment  Adequate family and caregiver support 05/23/18 1619   HOMICIDE RISK      COPING/STRESS     Feels Like Hurting Others  no 05/22/18 1622

## 2018-05-22 NOTE — IP AVS SNAPSHOT
[place] [address] [facility]    [address] [place] [address]    Phone:  [phone]                                       After Visit Summary   5/22/2018    Celestina Tejada    MRN: [id number]           After Visit Summary Signature Page     I have received my discharge instructions, and my questions have been answered. I have discussed any challenges I see with this plan with the nurse or doctor.    ..........................................................................................................................................  Patient/Patient Representative Signature      ..........................................................................................................................................  Patient Representative Print Name and Relationship to Patient    ..................................................               ................................................  Date                                            Time    ..........................................................................................................................................  Reviewed by Signature/Title    ...................................................              ..............................................  Date                                                            Time

## 2018-05-22 NOTE — IP AVS SNAPSHOT
"          [place] [address] [facility]: [phone]                                              INTERAGENCY TRANSFER FORM - LAB / IMAGING / EKG / EMG RESULTS   2018                    Hospital Admission Date: 2018  HARDY CORRIGAN   : 1931  Sex: Female        Attending Provider: Joe Vora MD     Allergies:  Hydrochlorothiazide    Infection:  None   Service:  HOSPITALIST    Ht:  1.575 m (5' 2\")   Wt:  46.7 kg (102 lb 15.3 oz)   Admission Wt:  45.8 kg (101 lb)    BMI:  18.83 kg/m 2   BSA:  1.43 m 2            Patient PCP Information     Provider PCP Type    Majo Alfaro MD, MD General         Lab Results - 3 Days      Magnesium [[id number]] (Abnormal)  Resulted: 18 1019, Result status: Final result    Ordering provider: Yao Sun,   18 0800 Resulting lab: [facility]    Specimen Information    Type Source Collected On     18 0800          Components       Value Reference Range Flag Lab   Magnesium 1.5 1.6 - 2.3 mg/dL L FrStHsLb            Basic metabolic panel [[id number]] (Abnormal)  Resulted: 18 0832, Result status: Final result    Ordering provider: Yao Sun DO  18 0000 Resulting lab: [facility]    Specimen Information    Type Source Collected On   Blood  18 0800          Components       Value Reference Range Flag Lab   Sodium 128 133 - 144 mmol/L L FrStHsLb   Potassium 4.2 3.4 - 5.3 mmol/L  FrStHsLb   Chloride 97 94 - 109 mmol/L  FrStHsLb   Carbon Dioxide 23 20 - 32 mmol/L  FrStHsLb   Anion Gap 8 3 - 14 mmol/L  FrStHsLb   Glucose 91 70 - 99 mg/dL  FrStHsLb   Urea Nitrogen 15 7 - 30 mg/dL  FrStHsLb   Creatinine 0.60 0.52 - 1.04 mg/dL  FrStHsLb   GFR Estimate >90 >60 mL/min/1.7m2  FrStHsLb   Comment:  Non  GFR Calc   GFR Estimate If Black >90 >60 mL/min/1.7m2  FrStHsLb   Comment:  African American GFR Calc   Calcium 8.3 8.5 - 10.1 mg/dL L FrStHsLb            CBC " with platelets [[id number]] (Abnormal)  Resulted: 05/25/18 0818, Result status: Final result    Ordering provider: Yao Sun,   05/25/18 0000 Resulting lab: [facility]    Specimen Information    Type Source Collected On   Blood  05/25/18 0800          Components       Value Reference Range Flag Lab   WBC 14.2 4.0 - 11.0 10e9/L H FrStHsLb   RBC Count 3.09 3.8 - 5.2 10e12/L L FrStHsLb   Hemoglobin 9.8 11.7 - 15.7 g/dL L FrStHsLb   Hematocrit 27.5 35.0 - 47.0 % L FrStHsLb   MCV 89 78 - 100 fl  FrStHsLb   MCH 31.7 26.5 - 33.0 pg  FrStHsLb   MCHC 35.6 31.5 - 36.5 g/dL  FrStHsLb   RDW 13.3 10.0 - 15.0 %  FrStHsLb   Platelet Count 220 150 - 450 10e9/L  FrStHsLb            Sodium [[id number]] (Abnormal)  Resulted: 05/24/18 1837, Result status: Final result    Ordering provider: Yao Sun,   05/24/18 1206 Resulting lab: [facility]    Specimen Information    Type Source Collected On   Blood  05/24/18 1810          Components       Value Reference Range Flag Lab   Sodium 127 133 - 144 mmol/L L FrStHsLb            Osmolality urine [[id number]]  Resulted: 05/24/18 1605, Result status: Final result    Ordering provider: Yao Sun,   05/24/18 1224 Resulting lab: [facility]    Specimen Information    Type Source Collected On   Urine Random Urine 05/24/18 1532          Components       Value Reference Range Flag Lab   Urine Osmolality 303 100 - 1200 mmol/kg  FrStHsLb            Sodium random urine [[id number]]  Resulted: 05/24/18 1601, Result status: Final result    Ordering provider: Yao Sun,   05/24/18 1224 Resulting lab: [facility]    Specimen Information    Type Source Collected On   Urine Random Urine 05/24/18 1532          Components       Value Reference Range Flag Lab   Sodium Urine mmol/L 36 mmol/L  FrStHsLb            Basic metabolic panel [[id number]] (Abnormal)  Resulted: 05/24/18 0853, Result status:  Final result    Ordering provider: Yao Sun, DO  05/24/18 0000 Resulting lab: [facility]    Specimen Information    Type Source Collected On   Blood  05/24/18 0820          Components       Value Reference Range Flag Lab   Sodium 128 133 - 144 mmol/L L FrStHsLb   Potassium 4.3 3.4 - 5.3 mmol/L  FrStHsLb   Chloride 96 94 - 109 mmol/L  FrStHsLb   Carbon Dioxide 23 20 - 32 mmol/L  FrStHsLb   Anion Gap 9 3 - 14 mmol/L  FrStHsLb   Glucose 100 70 - 99 mg/dL H FrStHsLb   Urea Nitrogen 13 7 - 30 mg/dL  FrStHsLb   Creatinine 0.66 0.52 - 1.04 mg/dL  FrStHsLb   GFR Estimate 84 >60 mL/min/1.7m2  FrStHsLb   Comment:  Non  GFR Calc   GFR Estimate If Black >90 >60 mL/min/1.7m2  FrStHsLb   Comment:  African American GFR Calc   Calcium 8.5 8.5 - 10.1 mg/dL  FrStHsLb            CBC with platelets [[id number]] (Abnormal)  Resulted: 05/24/18 0836, Result status: Final result    Ordering provider: Yao Sun, DO  05/24/18 0000 Resulting lab: [facility]    Specimen Information    Type Source Collected On   Blood  05/24/18 0820          Components       Value Reference Range Flag Lab   WBC 18.1 4.0 - 11.0 10e9/L H FrStHsLb   RBC Count 3.06 3.8 - 5.2 10e12/L L FrStHsLb   Hemoglobin 9.7 11.7 - 15.7 g/dL L FrStHsLb   Hematocrit 27.3 35.0 - 47.0 % L FrStHsLb   MCV 89 78 - 100 fl  FrStHsLb   MCH 31.7 26.5 - 33.0 pg  FrStHsLb   MCHC 35.5 31.5 - 36.5 g/dL  FrStHsLb   RDW 13.3 10.0 - 15.0 %  FrStHsLb   Platelet Count 227 150 - 450 10e9/L  FrStHsLb            Sodium (Every 6 Hrs) [[id number]] (Abnormal)  Resulted: 05/24/18 0119, Result status: Final result    Ordering provider: Freeman Cheng MD  05/23/18 2775 Resulting lab: [facility]    Specimen Information    Type Source Collected On   Blood  05/24/18 0050          Components       Value Reference Range Flag Lab   Sodium 128 133 - 144 mmol/L L FrStHsLb            Sodium (Every 6 Hrs) [[id number]]  (Abnormal)  Resulted: 05/23/18 2255, Result status: Final result    Ordering provider: Conner Ballard MD  05/23/18 2122 Resulting lab: [facility]    Specimen Information    Type Source Collected On   Blood  05/23/18 2230          Components       Value Reference Range Flag Lab   Sodium 126 133 - 144 mmol/L L FrStHsLb            Sodium [[id number]] (Abnormal)  Resulted: 05/23/18 1300, Result status: Final result    Ordering provider: Joe Vora MD  05/23/18 0600 Resulting lab: [facility]    Specimen Information    Type Source Collected On   Blood  05/23/18 1245          Components       Value Reference Range Flag Lab   Sodium 127 133 - 144 mmol/L L FrStHsLb            Cortisol [[id number]] (Abnormal)  Resulted: 05/23/18 1038, Result status: Final result    Ordering provider: Joe Vora MD  05/23/18 0001 Resulting lab: [facility]    Specimen Information    Type Source Collected On   Blood  05/23/18 0535          Components       Value Reference Range Flag Lab   Cortisol Serum 23.4 4 - 22 ug/dL H 51   Comment:         8 AM Cortisol Reference Range = 4-22 ug/dL  4 PM Cortisol Reference Range = 3-17 ug/dL              Basic metabolic panel [[id number]] (Abnormal)  Resulted: 05/23/18 0603, Result status: Final result    Ordering provider: Joe Vora MD  05/23/18 0001 Resulting lab: [facility]    Specimen Information    Type Source Collected On   Blood  05/23/18 0535          Components       Value Reference Range Flag Lab   Sodium 126 133 - 144 mmol/L L FrStHsLb   Potassium 4.3 3.4 - 5.3 mmol/L  FrStHsLb   Chloride 92 94 - 109 mmol/L L FrStHsLb   Carbon Dioxide 23 20 - 32 mmol/L  FrStHsLb   Anion Gap 11 3 - 14 mmol/L  FrStHsLb   Glucose 84 70 - 99 mg/dL  FrStHsLb   Urea Nitrogen 16 7 - 30 mg/dL  FrStHsLb   Creatinine 0.76 0.52 - 1.04 mg/dL  FrStHsLb   GFR Estimate 73 >60 mL/min/1.7m2  FrStHsLb   Comment:  Non   GFR Calc   GFR Estimate If Black 88 >60 mL/min/1.7m2  FrStHsLb   Comment:  African American GFR Calc   Calcium 8.1 8.5 - 10.1 mg/dL L FrStHsLb            CBC with platelets [[id number]] (Abnormal)  Resulted: 05/23/18 0551, Result status: Final result    Ordering provider: Joe Vora MD  05/23/18 0001 Resulting lab: [facility]    Specimen Information    Type Source Collected On   Blood  05/23/18 0535          Components       Value Reference Range Flag Lab   WBC 13.5 4.0 - 11.0 10e9/L H FrStHsLb   RBC Count 3.01 3.8 - 5.2 10e12/L L FrStHsLb   Hemoglobin 9.4 11.7 - 15.7 g/dL L FrStHsLb   Hematocrit 26.7 35.0 - 47.0 % L FrStHsLb   MCV 89 78 - 100 fl  FrStHsLb   MCH 31.2 26.5 - 33.0 pg  FrStHsLb   MCHC 35.2 31.5 - 36.5 g/dL  FrStHsLb   RDW 13.3 10.0 - 15.0 %  FrStHsLb   Platelet Count 216 150 - 450 10e9/L  FrStHsLb            Sodium [[id number]] (Abnormal)  Resulted: 05/23/18 0039, Result status: Final result    Ordering provider: Joe Vora MD  05/22/18 1944 Resulting lab: [facility]    Specimen Information    Type Source Collected On   Blood  05/23/18 0005          Components       Value Reference Range Flag Lab   Sodium 126 133 - 144 mmol/L L FrStHsLb            Sodium random urine [[id number]]  Resulted: 05/22/18 1931, Result status: Final result    Ordering provider: Jacob Ambriz DO  05/22/18 1852 Resulting lab: [facility]    Specimen Information    Type Source Collected On   Urine Random Urine 05/22/18 1806          Components       Value Reference Range Flag Lab   Sodium Urine mmol/L 25 mmol/L  FrStHsLb            Osmolality urine [[id number]]  Resulted: 05/22/18 1928, Result status: Final result    Ordering provider: Jacob Ambriz DO  05/22/18 1851 Resulting lab: [facility]    Specimen Information    Type Source Collected On   Urine Random Urine 05/22/18 3309          Components       Value  Reference Range Flag Lab   Urine Osmolality 385 100 - 1200 mmol/kg  FrStHsLb            Osmolality [[id number]] (Abnormal)  Resulted: 05/22/18 1923, Result status: Final result    Ordering provider: aJcob Ambriz,   05/22/18 1728 Resulting lab: [facility]    Specimen Information    Type Source Collected On     05/22/18 1728          Components       Value Reference Range Flag Lab   Osmolality 256 280 - 301 mmol/kg L FrStHsLb            UA with Microscopic [[id number]] (Abnormal)  Resulted: 05/22/18 1840, Result status: Final result    Ordering provider: Jacob Ambriz,   05/22/18 1743 Resulting lab: [facility]    Specimen Information    Type Source Collected On   Midstream Urine Urine clean catch 05/22/18 1806          Components       Value Reference Range Flag Lab   Color Urine Yellow   FrStHsLb   Appearance Urine Clear   FrStHsLb   Glucose Urine Negative NEG^Negative mg/dL  FrStHsLb   Bilirubin Urine Negative NEG^Negative  FrStHsLb   Ketones Urine Negative NEG^Negative mg/dL  FrStHsLb   Specific [place] Urine 1.012 1.003 - 1.035  FrStHsLb   Blood Urine Negative NEG^Negative  FrStHsLb   pH Urine 6.0 5.0 - 7.0 pH  FrStHsLb   Protein Albumin Urine 10 NEG^Negative mg/dL A FrStHsLb   Urobilinogen mg/dL Normal 0.0 - 2.0 mg/dL  FrStHsLb   Nitrite Urine Negative NEG^Negative  FrStHsLb   Leukocyte Esterase Urine Negative NEG^Negative  FrStHsLb   Source Midstream Urine   FrStHsLb   WBC Urine 2 0 - 5 /HPF  FrStHsLb   RBC Urine <1 0 - 2 /HPF  FrStHsLb   Squamous Epithelial /HPF Urine 4 0 - 1 /HPF H FrStHsLb   Mucous Urine Present NEG^Negative /LPF A FrStHsLb            Basic metabolic panel [[id number]] (Abnormal)  Resulted: 05/22/18 1806, Result status: Final result    Ordering provider: Jacob Ambriz,   05/22/18 1743 Resulting lab: [facility]    Specimen Information    Type Source Collected On   Blood  05/22/18 1728           Components       Value Reference Range Flag Lab   Sodium 123 133 - 144 mmol/L L FrStHsLb   Potassium 4.2 3.4 - 5.3 mmol/L  FrStHsLb   Chloride 86 94 - 109 mmol/L L FrStHsLb   Carbon Dioxide 23 20 - 32 mmol/L  FrStHsLb   Anion Gap 14 3 - 14 mmol/L  FrStHsLb   Glucose 114 70 - 99 mg/dL H FrStHsLb   Urea Nitrogen 18 7 - 30 mg/dL  FrStHsLb   Creatinine 0.72 0.52 - 1.04 mg/dL  FrStHsLb   GFR Estimate 76 >60 mL/min/1.7m2  FrStHsLb   Comment:  Non  GFR Calc   GFR Estimate If Black >90 >60 mL/min/1.7m2  FrStHsLb   Comment:  African American GFR Calc   Calcium 9.1 8.5 - 10.1 mg/dL  FrStHsLb            CBC with platelets + differential [[id number]] (Abnormal)  Resulted: 05/22/18 1753, Result status: Final result    Ordering provider: Jacob Ambriz DO  05/22/18 1743 Resulting lab: [facility]    Specimen Information    Type Source Collected On   Blood  05/22/18 1728          Components       Value Reference Range Flag Lab   WBC 16.5 4.0 - 11.0 10e9/L H FrStHsLb   RBC Count 3.44 3.8 - 5.2 10e12/L L FrStHsLb   Hemoglobin 11.0 11.7 - 15.7 g/dL L FrStHsLb   Hematocrit 30.7 35.0 - 47.0 % L FrStHsLb   MCV 89 78 - 100 fl  FrStHsLb   MCH 32.0 26.5 - 33.0 pg  FrStHsLb   MCHC 35.8 31.5 - 36.5 g/dL  FrStHsLb   RDW 13.3 10.0 - 15.0 %  FrStHsLb   Platelet Count 267 150 - 450 10e9/L  FrStHsLb   Diff Method Automated Method   FrStHsLb   % Neutrophils 87.4 %  FrStHsLb   % Lymphocytes 4.7 %  FrStHsLb   % Monocytes 6.4 %  FrStHsLb   % Eosinophils 0.4 %  FrStHsLb   % Basophils 0.1 %  FrStHsLb   % Immature Granulocytes 1.0 %  FrStHsLb   Nucleated RBCs 0 0 /100  FrStHsLb   Absolute Neutrophil 14.4 1.6 - 8.3 10e9/L H FrStHsLb   Absolute Lymphocytes 0.8 0.8 - 5.3 10e9/L  FrStHsLb   Absolute Monocytes 1.1 0.0 - 1.3 10e9/L  FrStHsLb   Absolute Eosinophils 0.1 0.0 - 0.7 10e9/L  FrStHsLb   Absolute Basophils 0.0 0.0 - 0.2 10e9/L  FrStHsLb   Abs Immature Granulocytes 0.2 0 - 0.4 10e9/L  FrStHsLb   Absolute  "Nucleated RBC 0.0   [facility]            TSH with free T4 reflex [[id number]]  Resulted: 05/22/18 0759, Result status: Final result    Resulting lab: [facility]     Specimen Information    Type Source Collected On   Blood  05/21/18 1618          Components       Value Reference Range Flag Lab   TSH 1.56 0.40 - 4.00 mU/L  65            Basic metabolic panel [[id number]] (Abnormal)  Resulted: 05/22/18 0748, Result status: Final result    Resulting lab: [facility]     Specimen Information    Type Source Collected On   Blood  05/21/18 1618          Components       Value Reference Range Flag Lab   Sodium 121 133 - 144 mmol/L L 65   Potassium 4.6 3.4 - 5.3 mmol/L  65   Chloride 89 94 - 109 mmol/L L 65   Carbon Dioxide 23 20 - 32 mmol/L  65   Anion Gap 9 3 - 14 mmol/L  65   Glucose 83 70 - 99 mg/dL  65   Comment:  Non Fasting   Urea Nitrogen 19 7 - 30 mg/dL  65   Creatinine 0.82 0.52 - 1.04 mg/dL  65   GFR Estimate 66 >60 mL/min/1.7m2  65   Comment:  Non  GFR Calc   GFR Estimate If Black 80 >60 mL/min/1.7m2  65   Comment:  African American GFR Calc   Calcium 9.1 8.5 - 10.1 mg/dL  65            Testing Performed By     Lab - Abbreviation Name Director Address Valid Date Range    14 - FrStHsLb [facility] Unknown [address] [place] [address] 05/08/15 1057 - Present    51 - Unknown [facility] Unknown [address] [facility] [address] 12/31/14 1010 - Present    65 - Unknown [facility] Unknown [address] [place] [address] 01/15/15 1011 - Present            Unresulted Labs (24h ago through future)    Start       Ordered    05/24/18 0000  Sodium (Every 6 Hrs)  AM DRAW,   Routine      05/23/18 2325    Unscheduled  Magnesium  (Magnesium Replacement -  Adult - \"Standard\" - Replacement for all levels less than 1.6 mg/dL )  CONDITIONAL (SPECIFY),   Routine     Comments:  Obtain " Magnesium Level for these conditions:  *IF no magnesium result within 24 hrs before initiation of order set, draw magnesium level with next lab collect.    *2 HOURS AFTER last magnesium replacement dose when magnesium replacement given for level less than 1.6   *Next morning after magnesium dose.     Repeat Magnesium Replacement if necessary.    05/25/18 1207      Encounter-Level Documents:     There are no encounter-level documents.      Order-Level Documents:     There are no order-level documents.

## 2018-05-22 NOTE — IP AVS SNAPSHOT
` `     [place] [address] [facility]: [phone]            Medication Administration Report for Celestina Tejada as of 18 1311   Legend:    Given Hold Not Given Due Canceled Entry Other Actions    Time Time (Time) Time  Time-Action       Inactive    Active    Linked        Medications 18    0.9% sodium chloride BOLUS  Dose: 500 mL  Freq: ONCE Route: IV  Last Dose: Stopped (18)  Start: 18   Admin. Amount: 500 mL  Dispense Loc: [facility] Floor Stock  Infused Over: 1 Hours  Administrations Remainin  Volume: 500 mL   Current Line: Peripheral IV 18 Right       -ED Infusing on Admission/transfer       -Stopped                     acetaminophen (TYLENOL) Suppository 650 mg  Dose: 650 mg  Freq: EVERY 4 HOURS PRN Route: RE  PRN Reason: mild pain  Start: 18   Admin Instructions: Alternate ibuprofen (if ordered) with acetaminophen.  Maximum acetaminophen dose from all sources = 75 mg/kg/day not to exceed 4 grams/day.    Admin. Amount: 1 suppository (1 × 650 mg suppository)  Dispense Loc:  [facility]               acetaminophen (TYLENOL) tablet 650 mg  Dose: 650 mg  Freq: 3 TIMES DAILY Route: PO  Start: 18 1600   Admin Instructions: Maximum acetaminophen dose from all sources = 75 mg/kg/day not to exceed 4 grams/day.    Admin. Amount: 2 tablet (2 × 325 mg tablet)  Last Admin: 18 0853  Dispense Loc:  [facility]         1640 (650 mg)-Given       2159 (650 mg)-Given        0901 (650 mg)-Given       1706 (650 mg)-Given       2134 (650 mg)-Given        0853 (650 mg)-Given       [ ] 1600       [ ] 2200           acetaminophen (TYLENOL) tablet 650 mg  Dose: 650 mg  Freq: EVERY 4 HOURS PRN Route: PO  PRN Reason: mild pain  Start: 18   Admin Instructions: Alternate ibuprofen (if ordered) with acetaminophen.  Maximum acetaminophen dose from all sources = 75 mg/kg/day not to exceed 4  grams/day.    Admin. Amount: 2 tablet (2 × 325 mg tablet)  Last Admin: 05/23/18 0509  Dispense Loc: [facility] 66         0509 (650 mg)-Given             atenolol (TENORMIN) tablet 75 mg  Dose: 75 mg  Freq: DAILY Route: PO  Start: 05/23/18 0900   Admin. Amount: 1 tablet (1 × 25 mg tablet) + 1 tablet (1 × 50 mg tablet)  Last Admin: 05/25/18 0855  Dispense Loc: [facility] 66   Mixture Administration Information:   Medication Type Amount   atenolol 25 MG TABS Medications 25 mg   atenolol 50 MG TABS Medications 50 mg                 0835 (75 mg)-Given        0901 (75 mg)-Given        0855 (75 mg)-Given           bisacodyl (DULCOLAX) Suppository 10 mg  Dose: 10 mg  Freq: DAILY PRN Route: RE  PRN Reason: constipation  Start: 05/22/18 1944   Admin Instructions: Hold for loose stools.  This is the third step of a three step constipation treatment.    Admin. Amount: 1 suppository (1 × 10 mg suppository)  Last Admin: 05/25/18 0904  Dispense Loc: [address]           0904 (10 mg)-Given           gabapentin (NEURONTIN) capsule 100 mg  Dose: 100 mg  Freq: AT BEDTIME Route: PO  Start: 05/23/18 2200   Admin. Amount: 1 capsule (1 × 100 mg capsule)  Last Admin: 05/24/18 2134  Dispense Loc: [address]         2200 (100 mg)-Given        2134 (100 mg)-Given        [ ] 2200           HYDROcodone-acetaminophen (NORCO) 5-325 MG per tablet 1-2 tablet  Dose: 1-2 tablet  Freq: EVERY 4 HOURS PRN Route: PO  PRN Reason: other  PRN Comment: pain control or improvement in physical function. Hold dose for analgesic side effects.  Start: 05/22/18 1944   Admin Instructions: Start with the lowest dose. May adjust dose by 1 tablet every 4 hours as needed. Hold dose for analgesic side effects.  Notify provider to assess for uncontrolled pain or analgesic side effects. Hold while on PCA or with regular IV opioid dosing.  Maximum acetaminophen dose from all sources= 75 mg/kg/day not to exceed 4 grams    Admin. Amount: 1-2 tablet  Last Admin: 05/24/18  1349  Dispense Loc: [address]        2033 (1 tablet)-Given       2116 (1 tablet)-Given        0138 (2 tablet)-Given       0838 (2 tablet)-Given       2043 (2 tablet)-Given        0916 (2 tablet)-Given       1349 (2 tablet)-Given            hydroxychloroquine (PLAQUENIL) tablet 200 mg  Dose: 200 mg  Freq: DAILY Route: PO  Indications Comment: RA  Start: 05/23/18 0900   Admin. Amount: 1 tablet (1 × 200 mg tablet)  Last Admin: 05/25/18 0855  Dispense Loc: [address]         0835 (200 mg)-Given        0901 (200 mg)-Given        0855 (200 mg)-Given           magnesium sulfate 2 g in NS intermittent infusion ([facility] or [facility])  Dose: 2 g  Freq: EVERY 4 HOURS PRN Route: IV  PRN Reason: magnesium supplementation  Start: 05/25/18 1207   Admin Instructions: For serum Mg++ less than 1.6 mg/dL<br>Give 2 g and recheck magnesium level 2 hours after dose, and next AM.    Admin. Amount: 2 g = 50 mL Conc: 2 g/50 mL  Dispense Loc: Contact Rx for dose  Infused Over: 120 Minutes  Volume: 50 mL               melatonin tablet 1 mg  Dose: 1 mg  Freq: AT BEDTIME PRN Route: PO  PRN Reason: sleep  Start: 05/22/18 1944   Admin Instructions: Do not give unless at least 6 hours of uninterrupted sleep is expected.    Admin. Amount: 1 tablet (1 × 1 mg tablet)  Last Admin: 05/23/18 2204  Dispense Loc: [address]         2204 (1 mg)-Given             naloxone (NARCAN) injection 0.1-0.4 mg  Dose: 0.1-0.4 mg  Freq: EVERY 2 MIN PRN Route: IV  PRN Reason: opioid reversal  Start: 05/22/18 1944   Admin Instructions: For respiratory rate LESS than or EQUAL to 8.  Partial reversal dose:  0.1 mg titrated q 2 minutes for Analgesia Side Effects Monitoring Sedation Level of 3 (frequently drowsy, arousable, drifts to sleep during conversation).Full reversal dose:  0.4 mg bolus for Analgesia Side Effects Monitoring Sedation Level of 4 (somnolent, minimal or no response to stimulation).  For ordered doses up to 2mg give IVP. Give each 0.4mg over 15 seconds  in emergency situations. For non-emergent situations further dilute in 9mL of NS to facilitate titration of response.    Admin. Amount: 0.1-0.4 mg = 0.25-1 mL Conc: 0.4 mg/mL  Dispense Loc: [facility]C  Volume: 1 mL               NIFEdipine ER osmotic (PROCARDIA XL) 24 hr tablet 90 mg  Dose: 90 mg  Freq: DAILY Route: PO  Start: 05/23/18 0900   Admin Instructions: DO NOT CRUSH.    Admin. Amount: 1 tablet (1 × 90 mg tablet)  Last Admin: 05/25/18 0855  Dispense Loc: [facility] 66C         0835 (90 mg)-Given        0901 (90 mg)-Given        0855 (90 mg)-Given           ondansetron (ZOFRAN-ODT) ODT tab 4 mg  Dose: 4 mg  Freq: EVERY 6 HOURS PRN Route: PO  PRN Reasons: nausea,vomiting  Start: 05/22/18 1944   Admin Instructions: This is Step 1 of nausea and vomiting management.  If nausea not resolved in 15 minutes, go to Step 2 prochlorperazine (COMPAZINE). Do not push through foil backing. Peel back foil and gently remove. Place on tongue immediately. Administration with liquid unnecessary  With dry hands, peel back foil backing and gently remove tablet; do not push oral disintegrating tablet through foil backing; administer immediately on tongue and oral disintegrating tablet dissolves in seconds; then swallow with saliva; liquid not required.    Admin. Amount: 1 tablet (1 × 4 mg tablet)  Dispense Loc:  [facility]              Or  ondansetron (ZOFRAN) injection 4 mg  Dose: 4 mg  Freq: EVERY 6 HOURS PRN Route: IV  PRN Reasons: nausea,vomiting  Start: 05/22/18 1944   Admin Instructions: This is Step 1 of nausea and vomiting management.  If nausea not resolved in 15 minutes, go to Step 2 prochlorperazine (COMPAZINE).  Irritant. For ordered doses up to 4 mg, give IV Push undiluted over 2-5 minutes.    Admin. Amount: 4 mg = 2 mL Conc: 4 mg/2 mL  Dispense Loc: [facility] 66C  Infused Over: 2-5 Minutes  Volume: 2 mL               polyethylene glycol (MIRALAX/GLYCOLAX) Packet 17 g  Dose: 17 g  Freq: DAILY PRN Route: PO  PRN Reason:  constipation  Start: 05/22/18 1944   Admin Instructions: Give in 8oz of  water, juice, or soda. Hold for loose stools.  This is the second step of a three step constipation treatment.  1 Packet = 17 grams. Mixed prescribed dose in 8 ounces of water. Follow with 8 oz. of water.    Admin. Amount: 17 g  Dispense Loc: [facility] 66C               prochlorperazine (COMPAZINE) injection 5 mg  Dose: 5 mg  Freq: EVERY 6 HOURS PRN Route: IV  PRN Reasons: nausea,vomiting  Start: 05/22/18 1944   Admin Instructions: This is Step 2 of nausea and vomiting management. Give if nausea not resolved 15 minutes after giving ondansetron (ZOFRAN). If nausea not resolved in 15 minutes, go to Step 3 metoclopramide (REGLAN), if ordered.  For ordered doses up to 10 mg, give IV Push undiluted. Each 5mg over 1 minute.    Admin. Amount: 5 mg = 1 mL Conc: 5 mg/mL  Dispense Loc: [facility] 66  Infused Over: 1-2 Minutes  Volume: 1 mL              Or  prochlorperazine (COMPAZINE) tablet 5 mg  Dose: 5 mg  Freq: EVERY 6 HOURS PRN Route: PO  PRN Reason: vomiting  Start: 05/22/18 1944   Admin Instructions: This is Step 2 of nausea and vomiting management. Give if nausea not resolved 15 minutes after giving ondansetron (ZOFRAN). If nausea not resolved in 15 minutes, go to Step 3 metoclopramide (REGLAN), if ordered.    Admin. Amount: 1 tablet (1 × 5 mg tablet)  Dispense Loc: [facility] 66C              Or  prochlorperazine (COMPAZINE) Suppository 12.5 mg  Dose: 12.5 mg  Freq: EVERY 12 HOURS PRN Route: RE  PRN Reasons: nausea,vomiting  Start: 05/22/18 1944   Admin Instructions: This is Step 2 of nausea and vomiting management. Give if nausea not resolved 15 minutes after giving ondansetron (ZOFRAN). If nausea not resolved in 15 minutes, go to Step 3 metoclopramide (REGLAN), if ordered.    Admin. Amount: 0.5 suppository (0.5 × 25 mg suppository)  Dispense Loc:  Main Pharmacy               senna-docusate (SENOKOT-S;PERICOLACE) 8.6-50 MG per tablet 1 tablet  Dose: 1  tablet  Freq: 2 TIMES DAILY PRN Route: PO  PRN Reason: constipation  Start: 18   Admin Instructions: If no bowel movement in 24 hours, increase to 2 tablets PO.  Hold for loose stools.  This is the first step of a three step constipation treatment.    Admin. Amount: 1 tablet  Last Admin: 18  Dispense Loc: [facility] 66C           (1 tablet)-Given           Or  senna-docusate (SENOKOT-S;PERICOLACE) 8.6-50 MG per tablet 2 tablet  Dose: 2 tablet  Freq: 2 TIMES DAILY PRN Route: PO  PRN Reason: constipation  Start: 18   Admin Instructions: Hold for loose stools.  This is the first step of a three step constipation treatment.    Admin. Amount: 2 tablet  Dispense Loc: [facility] 66C                     Completed Medications  Medications 18         Dose: 1,000 mL  Freq: ONCE Route: IV  Last Dose: Stopped (18)  Start: 18   End: 18   Admin. Amount: 1,000 mL  Last Admin: 18  Dispense Loc: [facility] Floor Stock  Infused Over: 1 Hours  Administrations Remainin  Volume: 1,000 mL   Current Line: Peripheral IV 18 Right        (500 mL)-New Bag       -Stopped                Dose: 650 mg  Freq: ONCE Route: PO  Start: 18   End: 18   Admin Instructions: Maximum acetaminophen dose from all sources = 75 mg/kg/day not to exceed 4 grams/day.    Admin. Amount: 2 tablet (2 × 325 mg tablet)  Last Admin: 18  Dispense Loc: [facility]  Administrations Remainin         (650 mg)-Given                Dose: 800 mg  Freq: ONCE Route: PO  Start: 18 1300   End: 18 1309   Admin. Amount: 2 tablet (2 × 400 mg tablet)  Last Admin: 18 1309  Dispense Loc: [facility] 66C  Administrations Remainin           1309 (800 mg)-Given

## 2018-05-22 NOTE — ED PROVIDER NOTES
"  History     Chief Complaint:  Abnormal Labs    The history is provided by the patient and a relative.      Celestina Tejada is a 87 year old female with a history of rheumatoid arthritis who presents with abnormal labs. The patient's daughter reports that the patient was seen at [facility] 11 days ago due to a sharp recent decrease in activity, thought to be caused by her arthritis - daughter notes \"bone on bone\" L2 and L3 with no disc present, and that the patient has chronic pain from this. At [facility] the patient was administered a \"spine injection\" which should have taken full effect in 10 days (yesterday). She was evaluated yesterday by Dr. Majo Alfaro who ordered blood tests which showed a sodium level of 121, prompting the patient's visit to the emergency department today. Chest x-ray was also conducted, impression below.    Patient's daughter reports noticing increased confusion, short-term memory loss, and decrease in appetite in the patient in recent weeks. Daughter states that the patient hasn't gotten up off the couch in the last 3 weeks. The patient knows the year, month, day, and president, however is generally unsure if she has taken her morning medication or eaten breakfast, pert daughter. Daughter notes that she is normally sharp, and that this behavior is a definite change from baseline. The patient sees Dr. Armstrong and receives ACTEMRA infusions for her rheumatoid arthritis. Patient denies shortness of breath, chest pain, urinary problems, recent steroid use, numbness or weakness, new medications, recent fall or head injury, or other complaint.     XR Chest, 2 views (5/21/2018):  IMPRESSION: There is minimal interstitial changes in the lung bases particularly in the right posterior lung base which appears chronic and is probably due to fibrotic change. Lungs are hyperinflated. Heart size and pulmonary vasculature are normal. No pleural effusions.     Allergies:  Hydrochlorothiazide  " "    Medications:    Atenolol  Lipitor  Vitamin D3  Plaquenil  Prinivil  Multivitamin  Procardia  ACTEMRA  Reclast    Past Medical History:    Protein-calorie malnutrition  Chronic pain  Hyponatremia w/ decreased serum osmolality  Acute hyponatremia  Peripheral edema  Eczema  Osteoarthritis  ADC  Hypertension  Rheumatoid arthritis  CKD  Hyperlipidemia  Osteopenia    Past Surgical History:    Appendectomy    Family History:    CHF  Lung disease  Heart bypass  Arthritis    Social History:  Presents with sister and daughter   Tobacco use: former smoker (0.5 ppd for 20 years, quit 9/20/2000)  Alcohol use: occasionally  PCP: Majo Alfaro MD    Marital Status:       Review of Systems   Constitutional: Positive for activity change, appetite change and fatigue.   Respiratory: Negative for shortness of breath.    Cardiovascular: Negative for chest pain.   Genitourinary: Negative.    Musculoskeletal: Positive for back pain.   Neurological: Negative for weakness and numbness.   All other systems reviewed and are negative.    Physical Exam     Patient Vitals for the past 24 hrs:   BP Temp Temp src Heart Rate Resp SpO2 Height Weight   05/22/18 1948 105/45 97.8  F (36.6  C) Oral 61 18 97 % - -   05/22/18 1931 104/50 - - 68 - 96 % - -   05/22/18 1900 - - - - - 94 % - -   05/22/18 1845 - - - - - 94 % - -   05/22/18 1833 - - - - - 95 % - -   05/22/18 1832 - - - - - 96 % - -   05/22/18 1831 105/57 - - - - 95 % - -   05/22/18 1830 - - - - - 96 % - -   05/22/18 1815 - - - - - 95 % - -   05/22/18 1811 115/56 - - - - 96 % - -   05/22/18 1624 132/46 98.7  F (37.1  C) Temporal 75 18 98 % 1.575 m (5' 2\") 45.8 kg (101 lb)        Physical Exam   General: Alert and cooperative with exam. Patient in mild distress. Family reports mild   confusion and short term memory issues.  Head:  Scalp is NC/AT  Eyes:  No scleral icterus, PERRL  ENT:  The external nose and ears are normal. The oropharynx is normal and without erythema; mucus " membranes are moist. Uvula midline, no evidence of deep space infection.  Neck:  Normal range of motion without rigidity.  CV:  Regular rate and rhythm    No pathologic murmur   Resp:  Breath sounds are clear bilaterally    Non-labored, no retractions or accessory muscle use  GI:  Abdomen is soft, no distension, no tenderness. No peritoneal signs  MS:  No lower extremity edema     Evidence of RA to her hands.  Skin:  Warm and dry, No rash or lesions noted.  Neuro: Oriented x 3. No gross motor deficits.    Strength and sensation grossly intact in all 4 extremities.      Cranial nerves 2-12 intact.    GCS: 15      Emergency Department Course     Laboratory:  CBC: WBC 16.5 (H), HB 11.0 (L), o/w WNL ()  BMP: Na 123 (L),  (H), Cl 86 (L) o/w WNL (Creatinine 0.72)    Sodium random urine:25  UA with micro: Albumin 10, SE 4 (H), Mucous present o/w negative  Osmolality: 256 (L)    Interventions:  1850: NS 0.5L IV Bolus   1851: Tylenol 650 mg PO      Emergency Department Course:  Past medical records, nursing notes, and vitals reviewed.  1717: I performed an exam of the patient and obtained history, as documented above.    Above interventions provided.  Blood drawn. This was sent to the lab for further testing, results above.  The patient provided a urine sample here in the emergency department. This was sent for laboratory testing, findings above.    Findings and plan explained to the Patient and daughter and sister who consents to admission.     1853: Discussed the patient with Dr. Vora, who will admit the patient to a observation bed for further monitoring, evaluation, and treatment.      Impression & Plan      Medical Decision Making:  Patient is a 87-year-old female who presents with recent labs demonstrating significant hyponatremia (121) as well as family report of mild increased confusion.  Patient's medical history and records were reviewed.  Labs were obtained and demonstrated persistent  hyponatremia (123).  UA obtained and demonstrates no evidence of infection (UA obtained prior to IV fluid administration).  Patient was provided 500 cc NS in the ED.  Labs also notable for elevated white count (16.5).  Patient has no history of fever or evidence of rash or other infectious source at this time.  She did have a chest x-ray performed yesterday which showed no significant evidence of pneumonia and she has had no change in respiratory symptoms (did report chronic cough).  She was provided Tylenol for mild headache.  Neurologic exam was nonfocal and patient remained hemodynamically stable throughout ED encounter.  Admitted to the hospital service for further evaluation and care.  No emergent indication for advanced head imaging at this time.  Presentation consistent with hyponatremia; etiology undetermined at this time.    Diagnosis:    ICD-10-CM    1. Hyponatremia E87.1 Osmolality urine     Sodium random urine     Osmolality     Osmolality     CANCELED: Osmolality   2. Confusion R41.0        Disposition:  Admitted to hospitalist care.      Doug KEARNEY, am serving as a scribe at 5:31 PM on 5/22/2018 to document services personally performed by Jacob Ambriz DO based on my observations and the provider's statements to me.  5/22/2018    EMERGENCY DEPARTMENT     Jacob Ambriz DO  05/23/18 0020

## 2018-05-22 NOTE — IP AVS SNAPSHOT
MRN:[id number]                      After Visit Summary   5/22/2018    Celestina Tejada    MRN: [id number]           Thank you!     Thank you for choosing [facility] for your care. Our goal is always to provide you with excellent care. Hearing back from our patients is one way we can continue to improve our services. Please take a few minutes to complete the written survey that you may receive in the mail after you visit with us. Thank you!        Patient Information     Date Of Birth          4/2/1931        Designated Caregiver       Most Recent Value    Caregiver    Will someone help with your care after discharge? yes    Name of designated caregiver Paz    Phone number of caregiver Shalonda [phone] Mimi [phone]    Caregiver address [address] [place], [place]      About your hospital stay     You were admitted on:  May 22, 2018 You last received care in the:  [facility] [address] [facility]    You were discharged on:  May 25, 2018        Reason for your hospital stay       Severe hyponatremia                  Who to Call     For medical emergencies, please call 911.  For non-urgent questions about your medical care, please call your primary care provider or clinic, [phone]          Attending Provider     Provider Specialty    Jacob Ambriz DO Emergency Medicine    Joe Vora MD Internal Medicine       Primary Care Provider Office Phone # Fax #    Majo Alfaro [phone]      After Care Instructions     Activity - Up with nursing assistance           Additional Discharge Instructions       Please closely monitor adequacy of PO fluid intake            Advance Diet as Tolerated       Follow this diet upon discharge: Orders Placed This Encounter      Fluid restriction 1200 ML FLUID      Snacks/Supplements Adult: Boost Shake; Between Meals      Room Service      Combination Diet Regular Diet Adult              Fall  "precautions           General info for SNF       Length of Stay Estimate: Short Term Care: Estimated # of Days <30  Condition at Discharge: Improving  Level of care:skilled   Rehabilitation Potential: Good  Admission H&P remains valid and up-to-date: Yes  Recent Chemotherapy: N/A  Use Nursing Home Standing Orders: N/A            Intake and output       Every shift            Mantoux instructions       Give two-step Mantoux (PPD) Per Facility Policy Yes                  Follow-up Appointments     Follow Up and recommended labs and tests       Follow up with halfway physician.  The following labs/tests are recommended: CBC and BMP on 5/29/18                  Additional Services     Occupational Therapy Adult Consult       Evaluate and treat as clinically indicated.    Reason:            Physical Therapy Adult Consult       Evaluate and treat as clinically indicated.    Reason:                  Pending Results     No orders found from 5/20/2018 to 5/23/2018.            Statement of Approval     Ordered          05/25/18 1248  I have reviewed and agree with all the recommendations and orders detailed in this document.  EFFECTIVE NOW     Approved and electronically signed by:  Jose E Clement MD             Admission Information     Date & Time Provider Department Dept. Phone    5/22/2018 Joe Vora MD [facility] [address] [facility] [phone]      Your Vitals Were     Blood Pressure Pulse Temperature Respirations Height Weight    111/53 (BP Location: Right arm) 58 98.2  F (36.8  C) (Oral) 16 1.575 m (5' 2\") 46.7 kg (102 lb 15.3 oz)    Pulse Oximetry BMI (Body Mass Index)                95% 18.83 kg/m2          MyChart Information     [employer] lets you send messages to your doctor, view your test results, renew your prescriptions, schedule appointments and more. To sign up, go to [email or web address] . Click on \"Log in\" on the left side of the screen, which will take you to the Welcome " "page. Then click on \"Sign up Now\" on the right side of the page.     You will be asked to enter the access code listed below, as well as some personal information. Please follow the directions to create your username and password.     Your access code is: [email or web address]  Expires: 2018  4:00 PM     Your access code will  in 90 days. If you need help or a new code, please call your [facility] clinic or [phone].        Care EveryWhere ID     This is your Care EveryWhere ID. This could be used by other organizations to access your [facility] medical records  [id number]        Equal Access to Services     [facility] : Seth Parada, bony dockery, jose ruiz, alona pillai . So [facility] [phone].    ATENCIÓN: Si habla español, tiene a gallardo disposición servicios gratuitos de asistencia lingüística. Llame al [phone].    We comply with applicable federal civil rights laws and [place] laws. We do not discriminate on the basis of race, color, national origin, age, disability, sex, sexual orientation, or gender identity.               Review of your medicines      START taking        Dose / Directions    acetaminophen 325 MG tablet   Commonly known as:  TYLENOL   Used for:  Rheumatoid arthritis, involving unspecified site, unspecified rheumatoid factor presence (H)        Dose:  650 mg   Take 2 tablets (650 mg) by mouth 3 times daily   Quantity:  100 tablet   Refills:  0       gabapentin 100 MG capsule   Commonly known as:  NEURONTIN   Used for:  Osteoarthritis, unspecified osteoarthritis type, unspecified site        Dose:  100 mg   Take 1 capsule (100 mg) by mouth At Bedtime   Quantity:  30 capsule   Refills:  0         CONTINUE these medicines which may have CHANGED, or have new prescriptions. If we are uncertain of the size of tablets/capsules you have at home, strength may be listed as something that might have changed.        Dose / Directions "    HYDROcodone-acetaminophen 5-325 MG per tablet   Commonly known as:  NORCO   This may have changed:    - how much to take  - These instructions start on 6/21/2018. If you are unsure what to do until then, ask your doctor or other care provider.  - Another medication with the same name was removed. Continue taking this medication, and follow the directions you see here.   Used for:  Osteoarthritis, unspecified osteoarthritis type, unspecified site, Unintentional weight loss, Protein-calorie malnutrition, unspecified severity (H), Other chronic pain, Rheumatoid arthritis, involving unspecified site, unspecified rheumatoid factor presence (H)        Dose:  1 tablet   Start taking on:  6/21/2018   Take 1 tablet by mouth every 6 hours as needed for moderate to severe pain   Quantity:  15 tablet   Refills:  0         CONTINUE these medicines which have NOT CHANGED        Dose / Directions    atenolol 25 MG tablet   Commonly known as:  TENORMIN   Used for:  Hypertension goal BP (blood pressure) < 140/90        TAKE 3 TABLETS DAILY   Quantity:  270 tablet   Refills:  3       atorvastatin 20 MG tablet   Commonly known as:  LIPITOR   Used for:  Hyperlipidemia with target LDL less than 100        TAKE 1 TABLET DAILY   Quantity:  90 tablet   Refills:  3       hydroxychloroquine 200 MG tablet   Commonly known as:  PLAQUENIL        Dose:  200 mg   Take 200 mg by mouth daily   Refills:  3       NIFEdipine ER osmotic 90 MG 24 hr tablet   Commonly known as:  PROCARDIA XL   Used for:  Hypertension goal BP (blood pressure) < 140/90        Dose:  90 mg   Take 1 tablet (90 mg) by mouth daily   Quantity:  90 tablet   Refills:  1       order for DME   Used for:  Hypertension goal BP (blood pressure) < 140/90        Equipment being ordered: blood pressure cuff and monitor   Quantity:  1 each   Refills:  0       tocilizumab 80 MG/4ML   Commonly known as:  ACTEMRA   Indication:  Moderate to Severe Rheumatoid Arthritis   Used for:  RA  (rheumatoid arthritis) (H)        Inject into the vein every 30 days   Refills:  0       zoledronic Acid 5 MG/100ML Soln infusion   Commonly known as:  RECLAST        Dose:  5 mg   Inject 5 mg into the vein Yearly   Quantity:  100 mL   Refills:  0         STOP taking     IBUPROFEN PO           lisinopril 20 MG tablet   Commonly known as:  PRINIVIL/ZESTRIL                Where to get your medicines      Some of these will need a paper prescription and others can be bought over the counter. Ask your nurse if you have questions.     Bring a paper prescription for each of these medications     HYDROcodone-acetaminophen 5-325 MG per tablet       You don't need a prescription for these medications     acetaminophen 325 MG tablet    gabapentin 100 MG capsule                Protect others around you: Learn how to safely use, store and throw away your medicines at www.disposemymeds.org.        Information about OPIOIDS     PRESCRIPTION OPIOIDS: WHAT YOU NEED TO KNOW   You have a prescription for an opioid (narcotic) pain medicine. Opioids can cause addiction. If you have a history of chemical dependency of any type, you are at a higher risk of becoming addicted to opioids. Only take this medicine after all other options have been tried. Take it for as short a time and as few doses as possible.     Do not:    Drive. If you drive while taking these medicines, you could be arrested for driving under the influence (DUI).    Operate heavy machinery    Do any other dangerous activities while taking these medicines.     Drink any alcohol while taking these medicines.      Take with any other medicines that contain acetaminophen. Read all labels carefully. Look for the word  acetaminophen  or  Tylenol.  Ask your pharmacist if you have questions or are unsure.    Store your pills in a secure place, locked if possible. We will not replace any lost or stolen medicine. If you don t finish your medicine, please throw away (dispose) as  directed by your pharmacist. The [place] Pollution Control Agency has more information about safe disposal: https://www.pca.[facility].[place]/living-green/managing-unwanted-medications    All opioids tend to cause constipation. Drink plenty of water and eat foods that have a lot of fiber, such as fruits, vegetables, prune juice, apple juice and high-fiber cereal. Take a laxative (Miralax, milk of magnesia, Colace, Senna) if you don t move your bowels at least every other day.              Medication List: This is a list of all your medications and when to take them. Check marks below indicate your daily home schedule. Keep this list as a reference.      Medications           Morning Afternoon Evening Bedtime As Needed    acetaminophen 325 MG tablet   Commonly known as:  TYLENOL   Take 2 tablets (650 mg) by mouth 3 times daily   Last time this was given:  650 mg on 5/25/2018  8:53 AM                                         atenolol 25 MG tablet   Commonly known as:  TENORMIN   TAKE 3 TABLETS DAILY   Last time this was given:  75 mg on 5/25/2018  8:55 AM                                   atorvastatin 20 MG tablet   Commonly known as:  LIPITOR   TAKE 1 TABLET DAILY                                   gabapentin 100 MG capsule   Commonly known as:  NEURONTIN   Take 1 capsule (100 mg) by mouth At Bedtime   Last time this was given:  100 mg on 5/24/2018  9:34 PM                                   HYDROcodone-acetaminophen 5-325 MG per tablet   Commonly known as:  NORCO   Take 1 tablet by mouth every 6 hours as needed for moderate to severe pain   Start taking on:  6/21/2018   Last time this was given:  2 tablets on 5/25/2018  2:02 PM                                hydroxychloroquine 200 MG tablet   Commonly known as:  PLAQUENIL   Take 200 mg by mouth daily   Last time this was given:  200 mg on 5/25/2018  8:55 AM                                   NIFEdipine ER osmotic 90 MG 24 hr tablet   Commonly known as:  PROCARDIA XL   Take  1 tablet (90 mg) by mouth daily   Last time this was given:  90 mg on 5/25/2018  8:55 AM                                   order for DME   Equipment being ordered: blood pressure cuff and monitor                                tocilizumab 80 MG/4ML   Commonly known as:  ACTEMRA   Inject into the vein every 30 days                                zoledronic Acid 5 MG/100ML Soln infusion   Commonly known as:  RECLAST   Inject 5 mg into the vein Yearly

## 2018-05-23 ENCOUNTER — APPOINTMENT (OUTPATIENT)
Dept: PHYSICAL THERAPY | Facility: CLINIC | Age: 83
DRG: 643 | End: 2018-05-23
Attending: INTERNAL MEDICINE
Payer: COMMERCIAL

## 2018-05-23 LAB
ANION GAP SERPL CALCULATED.3IONS-SCNC: 11 MMOL/L (ref 3–14)
BUN SERPL-MCNC: 16 MG/DL (ref 7–30)
CALCIUM SERPL-MCNC: 8.1 MG/DL (ref 8.5–10.1)
CHLORIDE SERPL-SCNC: 92 MMOL/L (ref 94–109)
CO2 SERPL-SCNC: 23 MMOL/L (ref 20–32)
CORTIS SERPL-MCNC: 23.4 UG/DL (ref 4–22)
CREAT SERPL-MCNC: 0.76 MG/DL (ref 0.52–1.04)
ERYTHROCYTE [DISTWIDTH] IN BLOOD BY AUTOMATED COUNT: 13.3 % (ref 10–15)
GFR SERPL CREATININE-BSD FRML MDRD: 73 ML/MIN/1.7M2
GLUCOSE SERPL-MCNC: 84 MG/DL (ref 70–99)
HCT VFR BLD AUTO: 26.7 % (ref 35–47)
HGB BLD-MCNC: 9.4 G/DL (ref 11.7–15.7)
MCH RBC QN AUTO: 31.2 PG (ref 26.5–33)
MCHC RBC AUTO-ENTMCNC: 35.2 G/DL (ref 31.5–36.5)
MCV RBC AUTO: 89 FL (ref 78–100)
PLATELET # BLD AUTO: 216 10E9/L (ref 150–450)
POTASSIUM SERPL-SCNC: 4.3 MMOL/L (ref 3.4–5.3)
RBC # BLD AUTO: 3.01 10E12/L (ref 3.8–5.2)
SODIUM SERPL-SCNC: 126 MMOL/L (ref 133–144)
SODIUM SERPL-SCNC: 127 MMOL/L (ref 133–144)
WBC # BLD AUTO: 13.5 10E9/L (ref 4–11)

## 2018-05-23 PROCEDURE — 99232 SBSQ HOSP IP/OBS MODERATE 35: CPT | Performed by: INTERNAL MEDICINE

## 2018-05-23 PROCEDURE — 36415 COLL VENOUS BLD VENIPUNCTURE: CPT | Performed by: INTERNAL MEDICINE

## 2018-05-23 PROCEDURE — 40000193 ZZH STATISTIC PT WARD VISIT: Performed by: PHYSICAL THERAPIST

## 2018-05-23 PROCEDURE — 82533 TOTAL CORTISOL: CPT | Performed by: INTERNAL MEDICINE

## 2018-05-23 PROCEDURE — 84295 ASSAY OF SERUM SODIUM: CPT | Performed by: INTERNAL MEDICINE

## 2018-05-23 PROCEDURE — G0463 HOSPITAL OUTPT CLINIC VISIT: HCPCS

## 2018-05-23 PROCEDURE — 85027 COMPLETE CBC AUTOMATED: CPT | Performed by: INTERNAL MEDICINE

## 2018-05-23 PROCEDURE — 97116 GAIT TRAINING THERAPY: CPT | Mod: GP | Performed by: PHYSICAL THERAPIST

## 2018-05-23 PROCEDURE — 12000000 ZZH R&B MED SURG/OB

## 2018-05-23 PROCEDURE — 80048 BASIC METABOLIC PNL TOTAL CA: CPT | Performed by: INTERNAL MEDICINE

## 2018-05-23 PROCEDURE — 25000132 ZZH RX MED GY IP 250 OP 250 PS 637: Performed by: INTERNAL MEDICINE

## 2018-05-23 PROCEDURE — 97161 PT EVAL LOW COMPLEX 20 MIN: CPT | Mod: GP | Performed by: PHYSICAL THERAPIST

## 2018-05-23 RX ORDER — ACETAMINOPHEN 325 MG/1
650 TABLET ORAL 3 TIMES DAILY
Status: DISCONTINUED | OUTPATIENT
Start: 2018-05-23 | End: 2018-05-25 | Stop reason: HOSPADM

## 2018-05-23 RX ORDER — GABAPENTIN 100 MG/1
100 CAPSULE ORAL AT BEDTIME
Status: DISCONTINUED | OUTPATIENT
Start: 2018-05-23 | End: 2018-05-25 | Stop reason: HOSPADM

## 2018-05-23 RX ADMIN — GABAPENTIN 100 MG: 100 CAPSULE ORAL at 22:00

## 2018-05-23 RX ADMIN — ATENOLOL 75 MG: 50 TABLET ORAL at 08:35

## 2018-05-23 RX ADMIN — NIFEDIPINE 90 MG: 90 TABLET, FILM COATED, EXTENDED RELEASE ORAL at 08:35

## 2018-05-23 RX ADMIN — ACETAMINOPHEN 650 MG: 325 TABLET ORAL at 21:59

## 2018-05-23 RX ADMIN — ACETAMINOPHEN 650 MG: 325 TABLET, FILM COATED ORAL at 05:09

## 2018-05-23 RX ADMIN — Medication 1 MG: at 22:04

## 2018-05-23 RX ADMIN — HYDROXYCHLOROQUINE SULFATE 200 MG: 200 TABLET, FILM COATED ENTERAL at 08:35

## 2018-05-23 RX ADMIN — HYDROCODONE BITARTRATE AND ACETAMINOPHEN 2 TABLET: 5; 325 TABLET ORAL at 01:38

## 2018-05-23 RX ADMIN — HYDROCODONE BITARTRATE AND ACETAMINOPHEN 2 TABLET: 5; 325 TABLET ORAL at 08:38

## 2018-05-23 RX ADMIN — HYDROCODONE BITARTRATE AND ACETAMINOPHEN 2 TABLET: 5; 325 TABLET ORAL at 20:43

## 2018-05-23 RX ADMIN — ACETAMINOPHEN 650 MG: 325 TABLET ORAL at 16:40

## 2018-05-23 NOTE — PLAN OF CARE
Problem: Patient Care Overview  Goal: Plan of Care/Patient Progress Review  Outcome: No Change  Care Plan Summary Note: vss, alert x4, moderate chronic pain, Norco 2tabs given with improvement. +BS, lungs clear, on RA. Ax1 with GB/WK. Up in chair for meals. PT eval done, recommending TCU stay prior to d/c to home. Ambulated in unit with staff several times during shift. Generalized weakness. Fair appetite. Compliant with 1200ml FR. IND position in bed, encouraged to turn Q 2 hours. Seen by wound care for existing coccyx wound, recommendation made. PIV patent and SL. INC of bladder at times, uses bathroom. Na check Q 6 hours, 127 at noon. Monitor. D/c pending improvement of Na level.

## 2018-05-23 NOTE — PROGRESS NOTES
Admission    Patient arrives to room 633-1 via cart from ED  Care plan note: yes.    Inpatient nursing criteria listed below were met:    PCD's Documented: yes  Skin issues/needs documented :yes. Wound to coccyx identified. WOC to consult  Isolation education started/completed na  Patient allergies verified with patient: yes  Fall Prevention: Care plan updated, Education given and documented: yes  Care Plan initiated: yes  Home medications documented in belongings flowsheet: yes  Patient belongings documented in belongings flowsheet: yes  Reminder note (belongings/ medications) placed in discharge instructions:yes  Admission profile/ required documentation complete:continuing

## 2018-05-23 NOTE — PLAN OF CARE
Problem: Patient Care Overview  Goal: Plan of Care/Patient Progress Review  PT: Orders received, evaluation completed, and treatment initiated. Patient is a 86 y/o female admitted with increased confusion and short term memory loss found to have hyponatremia. Patient lives alone in an apartment with elevator access. Pt reports completing functional mobility independently without use of an AD at baseline.     Discharge Planner PT   Patient plan for discharge: None stated.   Current status: Pt performed supine-sit, SBA. Sit <> stand and ambulation of 45 feet with FWW and CGA, pt required frequent cues for upright posture. Pt agreeable to sit up in chair at end of session.   Barriers to return to prior living situation: Lives alone, Level of assist, Decreased activity tolerance  Recommendations for discharge: TCU  Rationale for recommendations: Pt would benefit from TCU stay prior to returning home alone in order to increase independence and safety with functional mobility. Pt would be able to return home if 24 hour assist available and home PT.        Entered by: Liv Brown 05/23/2018 12:54 PM

## 2018-05-23 NOTE — PROGRESS NOTES
[facility]    Hospitalist Progress Note      Assessment & Plan   Celestina Tejada is a 87 year old female with a past medical history of Htn, CKD, RA who was admitted on 5/22/2018 with hyponatremia.    Hyponatremia  Clinical history suggestive of hypovolemia, as her family reports that she has not been eating or drinking as well over the past 3 weeks due to pain.  However, she does not appear markedly dry on exam, her function is at baseline and she is not tachycardic which would argue against a significant hypovolemia.  She has been in a substantial amount of pain for the past 3 weeks and SIADH from uncontrolled pain is a consideration. She is using NSAIDs as well which may contribute. Urine studies are more suggestive of SIADH with urine sodium of 25 and urine osm of 385.  She appears minimally symptomatic, with a headache at present and mild cognitive difficulties noted by family, although she is alert and conversant.  Cortisol just mildly above normal.   - continue 1200 cc fluid restriction  - Sodium q6H  - Manage pain as below   - Temporarily hold lisinopril      Hypertension  - Continue prior to admission atenolol, nifedipine  - Temporarily hold lisinopril as may contribute to hyponatremia - consider restarting tomorrow     Rheumatoid arthritis  - Continue prior to admission hydroxychloroquine      Hyperlipidemia  - Can temporarily hold statin while hospitalized     Subacute low back pain with reported severe degenerative disc disease, with sciatica   Recently evaluated by [facility], and had epidural injection.   - Hold NSAIDs  - Continue PTA hydrocodone-acetaminophen PRN  - prn tylenol  - physical therapy ordered    Pain Assessment:  Current Pain Score 5/23/2018   Patient currently in pain? sleeping: patient not able to self report   Pain score (0-10) -   Pain location -   Pain descriptors -   - Celestina is experiencing pain due to her back. Pain management was discussed and the plan was created in a  collaborative fashion.  Celestina's response to the current recommendations: engaged  - continue PTA regimen as above        DVT Prophylaxis: Pneumatic Compression Devices  Code Status: DNR/DNI    Disposition: Expected discharge tomorrow once sodium normal.    Addendum: discussed with daughter who is concerned about nerve type pain.  Notes pt is typically very independent.  We also discussed that physical therapy's current recommendation is for TCU.  Daughter Tawnya would like to be involved in the decision making process for discharge.  For now will trial low dose gabapentin 100 mg at bedtime.      [facility]    Interval History   Feels ok this morning.  Has back pain which she has been dealing with for a while but no other pain at this point.  Seems to be tolerating the fluid restriction ok so far.  No shortness of breath.  Hoping to dc to home tomorrow.      -Data reviewed today: I reviewed all new labs and imaging results over the last 24 hours. I personally reviewed no images or EKG's today.    Physical Exam   Temp: 97.3  F (36.3  C) Temp src: Oral BP: 124/57   Heart Rate: 64 Resp: 18 SpO2: 95 % O2 Device: None (Room air)    Vitals:    05/22/18 1624 05/23/18 0606   Weight: 45.8 kg (101 lb) 46.7 kg (102 lb 15.3 oz)     Vital Signs with Ranges  Temp:  [97.3  F (36.3  C)-98.7  F (37.1  C)] 97.3  F (36.3  C)  Heart Rate:  [61-75] 64  Resp:  [18] 18  BP: (104-132)/(45-57) 124/57  SpO2:  [94 %-98 %] 95 %  I/O last 3 completed shifts:  In: 150 [P.O.:150]  Out: -     Constitutional: awake, alert, cooperative    Respiratory: Clear to auscultation bilaterally, no crackles or wheezing noted.  Good air exchange.     Cardiovascular: Regular rate and rhythm.  No murmur, rub or gallop noted.     GI: Positive bowel sounds, soft, non-distended, non-tender.  No masses or organomegaly noted.     Musculoskeletal: Full range of motion.  Tone is normal.  No acute abnormalities.     Neurologic: Awake, alert and oriented to name,  place and time.  Cranial nerves II-XII are grossly intact.      Lymphatic: trace edema of BLE    Skin: no rash    Medications       sodium chloride 0.9%  500 mL Intravenous Once     atenolol  75 mg Oral Daily     hydroxychloroquine  200 mg Oral Daily     NIFEdipine ER osmotic  90 mg Oral Daily       Data     Recent Labs  Lab 05/23/18  0535 05/23/18  0005 05/22/18  1728 05/21/18  1618   WBC 13.5*  --  16.5*  --    HGB 9.4*  --  11.0*  --    MCV 89  --  89  --      --  267  --    * 126* 123* 121*   POTASSIUM 4.3  --  4.2 4.6   CHLORIDE 92*  --  86* 89*   CO2 23  --  23 23   BUN 16  --  18 19   CR 0.76  --  0.72 0.82   ANIONGAP 11  --  14 9   JOSÉ 8.1*  --  9.1 9.1   GLC 84  --  114* 83       No results found for this or any previous visit (from the past 24 hour(s)).

## 2018-05-23 NOTE — PLAN OF CARE
Problem: Patient Care Overview  Goal: Plan of Care/Patient Progress Review  Outcome: No Change  A&O x 2. Disoriented to time and place. Forgetful. IV SL. Sodium 126. Up assist of one to bathroom. Voided with some urgency. Mepilex to coccyx CDI. Was given Narco 2 tabs x 1 dose for right leg pain and Tylenol x 1 dose for c/o headache. Slept well throughout the night. Continue on fluid restriction of 1,200 ml. Will continue to monitor.

## 2018-05-23 NOTE — PROGRESS NOTES
"   05/23/18 1028   Quick Adds   Type of Visit Initial PT Evaluation   Living Environment   Lives With alone  (Sister lives on 3rd floor)   Living Arrangements apartment   Number of Stairs to Enter Home 0  (Elevator Access)   Number of Stairs Within Home 0   Transportation Available car  (Pt drives at baseline. )   Living Environment Comment Pt reports her daughter assists as needed around the house when she is in town.    Self-Care   Usual Activity Tolerance good   Current Activity Tolerance fair   Regular Exercise no   Equipment Currently Used at Home none   Activity/Exercise/Self-Care Comment Pt reports she has a FWW and SEC.    Functional Level Prior   Ambulation 0-->independent   Transferring 0-->independent   Toileting 0-->independent   Bathing 0-->independent   Dressing 0-->independent   Fall history within last six months no   General Information   Onset of Illness/Injury or Date of Surgery - Date 05/22/18   Referring Physician Yao Sun, DO   Patient/Family Goals Statement None stated.    Pertinent History of Current Problem (include personal factors and/or comorbidities that impact the POC) 86 y/o female admitted with noted increased confusion, short term memory loss, found to have hyponatremia. PMH including RA, HTN, hyperlipidemia.    Precautions/Limitations fall precautions   General Observations Pt in supine upon arrival of therapist.    General Info Comments Up with assist.    Cognitive Status Examination   Orientation orientation to person, place and time  (\"May 21st, 2018\")   Level of Consciousness confused   Follows Commands and Answers Questions 100% of the time   Personal Safety and Judgment intact   Pain Assessment   Patient Currently in Pain No   Integumentary/Edema   Integumentary/Edema Comments Noted redness/bruising on R thigh, pt reports from hot pack prior to current hospitalization.    Posture    Posture Comments Noted forward head and shoulder posture upon sitting EOB and " "standing at FWW.    Range of Motion (ROM)   ROM Comment B LEs WFL.    Strength   Strength Comments Not formally assessed, pt demonstrated at least 3/5 grossly with mobility.    Bed Mobility   Bed Mobility Comments Supine-sit, SBA.    Transfer Skills   Transfer Comments Sit <> stand with FWW and CGA.    Gait   Gait Comments Pt amb 10' with FWW and CGA.    Balance   Balance Comments Noted good sitting and standing balance at FWW.    Sensory Examination   Sensory Perception Comments Pt reports varying numbness/tingling in B feet.    General Therapy Interventions   Planned Therapy Interventions bed mobility training;gait training;ROM;strengthening;transfer training   Clinical Impression   Criteria for Skilled Therapeutic Intervention yes, treatment indicated   PT Diagnosis Difficulty with gait.    Influenced by the following impairments Decreased activity tolerance, Generalized weakness, Impaired balance   Functional limitations due to impairments Limited functional mobility requiring AD and assist.    Clinical Presentation Evolving/Changing   Clinical Presentation Rationale Based on PM, current presentation, and social support.    Clinical Decision Making (Complexity) Low complexity   Therapy Frequency` daily   Predicted Duration of Therapy Intervention (days/wks) 4 days   Anticipated Discharge Disposition Transitional Care Facility   Risk & Benefits of therapy have been explained Yes   Patient, Family & other staff in agreement with plan of care Yes   [facility] AM-PAC  \"6 Clicks\" V.2 Basic Mobility Inpatient Short Form   1. Turning from your back to your side while in a flat bed without using bedrails? 4 - None   2. Moving from lying on your back to sitting on the side of a flat bed without using bedrails? 3 - A Little   3. Moving to and from a bed to a chair (including a wheelchair)? 3 - A Little   4. Standing up from a chair using your arms (e.g., wheelchair, or bedside chair)? 3 - A Little   5. To walk in " hospital room? 3 - A Little   6. Climbing 3-5 steps with a railing? 3 - A Little   Basic Mobility Raw Score (Score out of 24.Lower scores equate to lower levels of function) 19   Total Evaluation Time   Total Evaluation Time (Minutes) 8

## 2018-05-23 NOTE — PHARMACY-ADMISSION MEDICATION HISTORY
"Admission medication history interview status for the 5/22/2018  admission is complete. See EPIC admission navigator for prior to admission medications     Medication history source reliability:Good- patient, family, personal med list    Actions taken by pharmacist (provider contacted, etc): Reviewed SureScripts     Additional medication history information not noted on PTA med list :  -She has been taking Norco 5/325 2 tab QID for \"years\" but family cut back to 1 tab in past week due to recent symptoms.  -Had Medrol dose topher 5/8/18 & reports completing.    Medication reconciliation/reorder completed by provider prior to medication history? No    Time spent in this activity: 10 min    Prior to Admission medications    Medication Sig Last Dose Taking? Auth Provider   atenolol (TENORMIN) 25 MG tablet TAKE 3 TABLETS DAILY 5/22/2018 at am Yes Majo Alfaro MD   atorvastatin (LIPITOR) 20 MG tablet TAKE 1 TABLET DAILY 5/22/2018 at am Yes Majo Alfaro MD   HYDROcodone-acetaminophen (NORCO) 5-325 MG per tablet Take 1-2 tablets by mouth every 6 hours as needed for moderate to severe pain 5/22/2018 at Unknown time Yes Majo Alfaro MD   hydroxychloroquine (PLAQUENIL) 200 MG tablet Take 200 mg by mouth daily  5/22/2018 at am Yes Reported, Patient   IBUPROFEN PO Take 200 mg by mouth every 4 hours as needed for moderate pain Past Week at Unknown time Yes Unknown, Entered By History   lisinopril (PRINIVIL/ZESTRIL) 20 MG tablet TAKE 1 TABLET DAILY (HYPERTENSION GOAL BLOOD PRESSURE BELOW 140/90) 5/22/2018 at am Yes Majo Alfaro MD   NIFEdipine ER osmotic (PROCARDIA XL) 90 MG 24 hr tablet Take 1 tablet (90 mg) by mouth daily 5/22/2018 at am Yes Majo Alfaro MD   zoledronic Acid (RECLAST) 5 MG/100ML SOLN Inject 5 mg into the vein Yearly Past year Yes Majo Alfaro MD   HYDROcodone-acetaminophen (NORCO) 5-325 MG per tablet Take 1-2 tablets by mouth every 6 hours as needed for moderate to severe pain   House, " Majo JOHNS MD   HYDROcodone-acetaminophen (NORCO) 5-325 MG per tablet Take 1-2 tablets by mouth every 6 hours as needed for moderate to severe pain   Majo Alfaro MD   order for DME Equipment being ordered: blood pressure cuff and monitor   Majo Alfaro MD   tocilizumab (ACTEMRA) 80 MG/4ML Inject into the vein every 30 days  More than a month at Unknown time  Spatz, Lisa, MD

## 2018-05-23 NOTE — PROGRESS NOTES
"[facility] Nurse Inpatient Adult Pressure Injury (PI) Assessment     Initial Assessment of PI(s) on pt's:   Coccyx    Data:   Patient History:      per MD note(s): Celestina Tejada is a 87 year old female with a history of rheumatoid arthritis who presents with abnormal labs. The patient's daughter reports that the patient was seen at [facility] 11 days ago due to a sharp recent decrease in activity, thought to be caused by her arthritis - daughter notes \"bone on bone\" L2 and L3 with no disc present, and that the patient has chronic pain from this. At [facility] the patient was administered a \"spine injection\" which should have taken full effect in 10 days (yesterday). She was evaluated yesterday by Dr. Majo Alfaro who ordered blood tests which showed a sodium level of 121, prompting the patient's visit to the emergency department today. Chest x-ray was also conducted, impression below.     Patient's daughter reports noticing increased confusion, short-term memory loss, and decrease in appetite in the patient in recent weeks. Daughter states that the patient hasn't gotten up off the couch in the last 3 weeks. The patient knows the year, month, day, and president, however is generally unsure if she has taken her morning medication or eaten breakfast, pert daughter. Daughter notes that she is normally sharp, and that this behavior is a definite change from baseline. The patient sees Dr. Armstrong and receives ACTEMRA infusions for her rheumatoid arthritis. Patient denies shortness of breath, chest pain, urinary problems, recent steroid use, numbness or weakness, new medications, recent fall or head injury, or other complaint.        Dylan Assessment and sub scores:   Dylan Score  Av  Min: 20  Max: 20    Positioning: Pillows    Mattress:  Standard , Atmos Air mattress       Moisture Management:  Incontinence Protocol and Diaper      Current Diet / Nutrition:           Active Diet " Order      Combination Diet Regular Diet Adult    Labs:   Recent Labs   Lab Test  05/23/18   0535   05/21/18   1618  04/30/18   1439   ALBUMIN   --    --    --   4.4   HGB  9.4*   < >   --   11.9   INR   --    --    --   0.97   WBC  13.5*   < >   --   9.2   CRP   --    --   82.0*   --     < > = values in this interval not displayed.                                                                                                                        Pressure Injury Assessment  (location):   Coccyx/left upper buttock  Wound History:   Present on admission.  Pt reports recent limited mobility due to right leg pain.  Per report, pt has basically stayed on the couch for past few weeks.      Wound Base: pink and white moist tissue with macerated edges and skin bridge    Specific Dimensions (length x width x depth, in cm) :   Confluent wounds in an approx. 4 x 4 x 0.2-0.3cm area     Tunneling:  N/A    Undermining: N/A    Palpation of the wound bed:  normal    Slough appearance:  none    Eschar appearance:  none    Periwound Skin: defined large area of deep pink blanchable tissue, likely has had some MASD vs IAD/rash here    Drainage:  Small to moderate serosang        Odor: none    Pain:  minimal and moderate          Intervention:     Patient's chart evaluated.      Dylan Interventions:  Current Dylan Interventions and Care Plan reviewed and updated, appropriate at this time.    Wound cleansed, dressed    Orders  Written    Supplies  gathered    Discussed plan of care with Patient and Nurse           Assessment:     Pressure Injury (PI) located on coccyx: Stage 3, present on admission.  No s/s infection.  Wound is relatively shallow and clean.      Pt rolls well onto her sides with minimal assist.  Expect wound will improve with good PIP measures and wound cares.           Plan:     Nursing to notify the Provider(s) and re-consult the [facility] Nurse if wound(s) deteriorate(s)or if the wound care plan needs  reevaluation.      Plan for wound care to coccyx: every other day and prn if loose/soiled:  1.  Cleanse with MicroKlenz.  2.  If periwound appears rashy, apply antifungal powder, rub in well.  3.  Dab periwound with Cavilon no-sting barrier film, let dry.   4.  Apply Aquacel Ag over open wounds, cutting a piece to fit area.    5.  Cover with Mepilex Sacral, pointy end towards head, ensuring good seal above perianal area.   6.  Label with time/date/initials and a 'T' for Treatment of a wound.  7.  PIP measures - reposition every 1-2 hrs side to side, HOB as low as tolerated.      WOC Nurse will return: weekly and prn

## 2018-05-23 NOTE — PLAN OF CARE
Problem: Pain, Chronic (Adult)  Intervention: Manage Persistent Pain Analgesia  Pt oriented x2-3. Forgetful. Anxious but pleasant and cooperative.DNR/DNI.  Family assisted with admit questions. Wound to buttocks. mepilex applied. WOC consult in. Pain 8-9/10 reduced to 6/10 with 10mg oxy. Reg diet. 1200 fluid restriction. Assist of 1-2 to bathroom with walker and GB. . MD desiring fluids on hold until midnight recheck of sodium. Scheduled q6hr. Pt has lost 10lb in the past two weeks per family. Encouraged food. Daughter to order meals from home for pt. Pt desiring purse to be kept in bed with her. Family updated on plan and cooperative. Will continue to monitor.

## 2018-05-23 NOTE — H&P
[facility]    History and Physical  Hospitalist       Date of Admission:  5/22/2018    Assessment & Plan   Celestina Tejada is a 87 year old female who presents to the ED at the request of her primary care provider after outpatient labs revealed hyponatremia. Admitted 5/22/2018.     Hyponatremia  Clinical history suggestive of hypovolemia, as her family reports that she has not been eating or drinking as well over the past 3 weeks due to pain.  However, she does not appear markedly dry on exam, her function is at baseline and she is not tachycardic which would argue against a significant hypovolemia.  She has been in a substantial amount of pain for the past 3 weeks and SIADH from uncontrolled pain is a consideration. She is using NSAIDs as well which may contribute. Urine studies were added on in the Emergency Department, and are more suggestive of SIADH with urine sodium of 25 and urine osm of 385.  She appears minimally symptomatic, with a headache at present and mild cognitive difficulties noted by family, although she is alert and conversant.   - Start 1200 cc fluid restriction  - Sodium q6H  - Manage pain as below   - She has received 500 cc normal saline bolus in ED. Hold on additional fluids pending next sodium level.   - TSH within normal limits 5/21/18  - Check cortisol in AM  - Temporarily hold lisinopril     Hypertension  - Continue prior to admission atenolol, nifedipine  - Temporarily hold lisinopril as may contribute to hyponatremia    Rheumatoid arthritis  - Continue prior to admission hydroxychloroquine     Hyperlipidemia  - Can temporarily hold statin while hospitalized    Subacute low back pain with reported severe degenerative disc disease, with sciatica   Recently evaluated by [facility], and had epidural injection.   - Hold NSAIDs  - Continue PTA hydrocodone-acetaminophen PRN    # Pain Assessment:  Current Pain Score 5/22/2018   Patient currently in pain? -   Pain score (0-10) 10    Pain location -   Pain descriptors -   - Celestina is experiencing pain due to lumbar disc disease. Pain management was discussed with Celestina and her family and the plan was created in a collaborative fashion.  Celestina's response to the current recommendations: compliant  - Please see the plan for pain management as documented above    DVT Prophylaxis: Pneumatic Compression Devices  Code Status: DNR / DNI    Disposition: Admit to inpatient. Anticipate greater than or equal to 2 midnights prior to discharge.  Joe Vora    Primary Care Physician   Majo Alfaro MD    Chief Complaint   Low sodium    History is obtained from the patient and her sister and daughter at bedside.     History of Present Illness   Celestina Tejada is a 87 year old female who presents with the above chief complaint.    The patient was initially seen at [facility] emergency department on 4/30/2018 for low back and hip pain.  She had imaging of that time including a CT of her right hip and pelvis which was negative for fracture but did demonstrate significant arthritis.  She subsequently was seen by [facility] and diagnosed with significant degenerative disc disease in her back by family report.  She had received an epidural injection 10 days ago.  Since that time she has continued to report significant pain with ambulation, radiating into her right leg.  Associated with this, she has had decreased activity level which has resulted in generally decreased level of functioning including decreased intake.  Both her sister and daughter have been visiting her and encouraging her to eat although she has been eating about 50% of baseline.  She has been drinking fluids consistently.  She was subsequently seen by her primary care provider the day prior to admission.  Routine labs at that time demonstrated a sodium of 121.  She was contacted by her primary care provider and instructed to present to the emergency department for  further evaluation.  At present the patient reports a headache which is not atypical for her.  She denies any other acute complaints other than her ongoing back pain described above.  She has been managing her back pain with hydrocodone-acetaminophen, which she has been on for years.  She denies any difficulty with bowel or bladder associated with her back pain.  She denies any nausea or vomiting.  Her family has noted some mild cognitive difficulties for the past few weeks.  He denies any recent medication changes.    In the Emergency Department, the patient was seen by Dr. Jacob Ambriz, with whom I discussed the patient's presenting symptoms and emergency department course.  Initial vital signs were a temperature of 97.8F, HR 75, /46, RR 18, SpO2 98% on room air. Work-up notable for a repeat sodium of 123, WBC 16.5, unremarkable UA, CXR with likely fibrotic change. She was given 500 cc of normal saline and hospitalists were contacted for admission to the hospital.     Past Medical History    I have reviewed this patient's medical history and updated it with pertinent information if needed.   Past Medical History:   Diagnosis Date     CKD (chronic kidney disease) stage 3, GFR 30-59 ml/min      Hyperlipidemia LDL goal < 100      Hypertension goal BP (blood pressure) < 140/90      Osteoarthritis 2/9/2012     Osteopenia      Rheumatoid arthritis(714.0)        Past Surgical History   I have reviewed this patient's surgical history and updated it with pertinent information if needed.  Past Surgical History:   Procedure Laterality Date     C APPENDECTOMY  1950     SURGICAL HISTORY OF -       2 normal vaginal births       Prior to Admission Medications   Prior to Admission Medications   Prescriptions Last Dose Informant Patient Reported? Taking?   Cholecalciferol (VITAMIN D3 PO)   Yes No   Sig: Take 2,000 Units by mouth daily   HYDROcodone-acetaminophen (NORCO) 5-325 MG per tablet   No No   Sig: Take 1-2  tablets by mouth every 6 hours as needed for moderate to severe pain   HYDROcodone-acetaminophen (NORCO) 5-325 MG per tablet   No No   Sig: Take 1-2 tablets by mouth every 6 hours as needed for moderate to severe pain   HYDROcodone-acetaminophen (NORCO) 5-325 MG per tablet   No No   Sig: Take 1-2 tablets by mouth every 6 hours as needed for moderate to severe pain   MULTIVITAMIN TABS   OR   No No   Si tab daily   NIFEdipine ER osmotic (PROCARDIA XL) 90 MG 24 hr tablet   No No   Sig: Take 1 tablet (90 mg) by mouth daily   atenolol (TENORMIN) 25 MG tablet   No No   Sig: TAKE 3 TABLETS DAILY   atorvastatin (LIPITOR) 20 MG tablet   No No   Sig: TAKE 1 TABLET DAILY   hydroxychloroquine (PLAQUENIL) 200 MG tablet   Yes No   Sig: Take 200 mg by mouth daily    lisinopril (PRINIVIL/ZESTRIL) 20 MG tablet   No No   Sig: TAKE 1 TABLET DAILY (HYPERTENSION GOAL BLOOD PRESSURE BELOW 140/90)   order for DME   No No   Sig: Equipment being ordered: blood pressure cuff and monitor   tocilizumab (ACTEMRA) 80 MG/4ML   Yes No   Sig: Inject into the vein every 30 days    zoledronic Acid (RECLAST) 5 MG/100ML SOLN   Yes No   Sig: Inject 5 mg into the vein Yearly      Facility-Administered Medications: None     Allergies   Allergies   Allergen Reactions     Hydrochlorothiazide      hyponatremia       Social History   Previously quit smoking 25 years ago, due to recent mobility issues has been smoking a few cigarettes a day again. Denies alcohol use. No illicit or IV drug use    Lives alone typically, although her sister and daughter have been helping extensively with recent decreased mobility.    Family History   I have reviewed this patient's family history and updated it with pertinent information if needed.   Family History   Problem Relation Age of Onset     HEART DISEASE Mother      CHF     Respiratory Mother      lung disease     C.A.D. Sister      heart by-pass     Arthritis Sister      Family History Negative Brother      Family  History Negative Daughter      Family History Negative Daughter        Review of Systems   The 10 point Review of Systems is negative other than noted in the HPI or here.     Physical Exam   Temp: 98.7  F (37.1  C) Temp src: Temporal BP: 105/57   Heart Rate: 75 Resp: 18 SpO2: 94 % O2 Device: None (Room air)    Vital Signs with Ranges  Temp:  [98.7  F (37.1  C)] 98.7  F (37.1  C)  Heart Rate:  [75] 75  Resp:  [18] 18  BP: (105-132)/(46-57) 105/57  SpO2:  [94 %-98 %] 94 %  101 lbs 0 oz    Constitutional: Well-appearing, NAD  Eyes: PERRL, EOMI  HENT: Oropharynx clear, MMM  Respiratory: Clear to auscultation bilaterally, good air movement, normal effort   Cardiovascular: RRR, no m/r/g. 1+ lower extremity edema to lower 1/3 of shin.  GI: Soft, non-tender, non-distended. No rebound tenderness or guarding. BS normoactive.   Skin: Warm, dry  Neurologic: Alert. Responding to questions appropriately. Following commands. Oriented to person, place, month, year, approximate day of week. 5/5 lower extremity strength symmetric bilaterally.   Psychiatric: Normal affect, appropriate      Data   Data reviewed today:  I personally reviewed no images or EKG's today.    Recent Labs  Lab 05/22/18  1728 05/21/18  1618   WBC 16.5*  --    HGB 11.0*  --    MCV 89  --      --    * 121*   POTASSIUM 4.2 4.6   CHLORIDE 86* 89*   CO2 23 23   BUN 18 19   CR 0.72 0.82   ANIONGAP 14 9   JOSÉ 9.1 9.1   * 83       No results found for this or any previous visit (from the past 24 hour(s)).

## 2018-05-23 NOTE — ED NOTES
Assumed care at this time.  Admitting MD at bedside. AVSS.  Pt advised on wait time for admission.  Juany Davidson RN,.......................................... 5/22/2018   7:06 PM

## 2018-05-23 NOTE — PROGRESS NOTES
Care Transition Initial Assessment -   Reason For Consult: discharge planning  Met with: Patient    Active Problems:    Hyponatremia       DATA  Lives With: alone  Living Arrangements: apartment  Description of Support System: Involved, Supportive  Who is your support system?: Children  Support Assessment: Adequate family and caregiver support.   Identified issues/concerns regarding health management: PT recommends TCU. Pt in agreement. She would like referrals to [facility]. She is fine with a double room. Anticiapte her [facility] will require a prior-auth.       Transportation Available: car (Pt drives at baseline. )    ASSESSMENT  Cognitive Status: Alert and oriented per nursing. Will involved family as needed/appropriate. She asked her sister to be added to her contact list.  Concerns to be addressed: On-going DC planning.     PLAN  Financial costs for the patient includes: None.  Patient given options and choices for discharge: Yes.  Patient/family is agreeable to the plan? YES  Patient Goals and Preferences: TCU.  Patient anticipates discharging to: TCU.    AARON Bey, LGSW  [id number]

## 2018-05-23 NOTE — PROGRESS NOTES
RECEIVING UNIT ED HANDOFF REVIEW    ED Nurse Handoff Report was reviewed by: Laura Hernandez on May 22, 2018 at 7:05 PM

## 2018-05-24 ENCOUNTER — APPOINTMENT (OUTPATIENT)
Dept: PHYSICAL THERAPY | Facility: CLINIC | Age: 83
DRG: 643 | End: 2018-05-24
Attending: INTERNAL MEDICINE
Payer: COMMERCIAL

## 2018-05-24 ENCOUNTER — PATIENT OUTREACH (OUTPATIENT)
Dept: CARE COORDINATION | Facility: CLINIC | Age: 83
End: 2018-05-24

## 2018-05-24 LAB
ANION GAP SERPL CALCULATED.3IONS-SCNC: 9 MMOL/L (ref 3–14)
BUN SERPL-MCNC: 13 MG/DL (ref 7–30)
CALCIUM SERPL-MCNC: 8.5 MG/DL (ref 8.5–10.1)
CHLORIDE SERPL-SCNC: 96 MMOL/L (ref 94–109)
CO2 SERPL-SCNC: 23 MMOL/L (ref 20–32)
CREAT SERPL-MCNC: 0.66 MG/DL (ref 0.52–1.04)
ERYTHROCYTE [DISTWIDTH] IN BLOOD BY AUTOMATED COUNT: 13.3 % (ref 10–15)
GFR SERPL CREATININE-BSD FRML MDRD: 84 ML/MIN/1.7M2
GLUCOSE SERPL-MCNC: 100 MG/DL (ref 70–99)
HCT VFR BLD AUTO: 27.3 % (ref 35–47)
HGB BLD-MCNC: 9.7 G/DL (ref 11.7–15.7)
MCH RBC QN AUTO: 31.7 PG (ref 26.5–33)
MCHC RBC AUTO-ENTMCNC: 35.5 G/DL (ref 31.5–36.5)
MCV RBC AUTO: 89 FL (ref 78–100)
OSMOLALITY UR: 303 MMOL/KG (ref 100–1200)
PLATELET # BLD AUTO: 227 10E9/L (ref 150–450)
POTASSIUM SERPL-SCNC: 4.3 MMOL/L (ref 3.4–5.3)
RBC # BLD AUTO: 3.06 10E12/L (ref 3.8–5.2)
SODIUM SERPL-SCNC: 127 MMOL/L (ref 133–144)
SODIUM SERPL-SCNC: 128 MMOL/L (ref 133–144)
SODIUM SERPL-SCNC: 128 MMOL/L (ref 133–144)
SODIUM UR-SCNC: 36 MMOL/L
WBC # BLD AUTO: 18.1 10E9/L (ref 4–11)

## 2018-05-24 PROCEDURE — 97110 THERAPEUTIC EXERCISES: CPT | Mod: GP

## 2018-05-24 PROCEDURE — 25000132 ZZH RX MED GY IP 250 OP 250 PS 637: Performed by: INTERNAL MEDICINE

## 2018-05-24 PROCEDURE — 36415 COLL VENOUS BLD VENIPUNCTURE: CPT | Performed by: INTERNAL MEDICINE

## 2018-05-24 PROCEDURE — 99232 SBSQ HOSP IP/OBS MODERATE 35: CPT | Performed by: INTERNAL MEDICINE

## 2018-05-24 PROCEDURE — 80048 BASIC METABOLIC PNL TOTAL CA: CPT | Performed by: INTERNAL MEDICINE

## 2018-05-24 PROCEDURE — 99207 ZZC CDG-MDM COMPONENT: MEETS LOW - DOWN CODED: CPT | Performed by: INTERNAL MEDICINE

## 2018-05-24 PROCEDURE — 85027 COMPLETE CBC AUTOMATED: CPT | Performed by: INTERNAL MEDICINE

## 2018-05-24 PROCEDURE — 83935 ASSAY OF URINE OSMOLALITY: CPT | Performed by: INTERNAL MEDICINE

## 2018-05-24 PROCEDURE — 84300 ASSAY OF URINE SODIUM: CPT | Performed by: INTERNAL MEDICINE

## 2018-05-24 PROCEDURE — 40000193 ZZH STATISTIC PT WARD VISIT

## 2018-05-24 PROCEDURE — 84295 ASSAY OF SERUM SODIUM: CPT | Performed by: INTERNAL MEDICINE

## 2018-05-24 PROCEDURE — 12000000 ZZH R&B MED SURG/OB

## 2018-05-24 PROCEDURE — 97116 GAIT TRAINING THERAPY: CPT | Mod: GP

## 2018-05-24 RX ADMIN — HYDROCODONE BITARTRATE AND ACETAMINOPHEN 2 TABLET: 5; 325 TABLET ORAL at 09:16

## 2018-05-24 RX ADMIN — NIFEDIPINE 90 MG: 90 TABLET, FILM COATED, EXTENDED RELEASE ORAL at 09:01

## 2018-05-24 RX ADMIN — ACETAMINOPHEN 650 MG: 325 TABLET ORAL at 17:06

## 2018-05-24 RX ADMIN — GABAPENTIN 100 MG: 100 CAPSULE ORAL at 21:34

## 2018-05-24 RX ADMIN — ATENOLOL 75 MG: 50 TABLET ORAL at 09:01

## 2018-05-24 RX ADMIN — HYDROXYCHLOROQUINE SULFATE 200 MG: 200 TABLET, FILM COATED ENTERAL at 09:01

## 2018-05-24 RX ADMIN — ACETAMINOPHEN 650 MG: 325 TABLET ORAL at 21:34

## 2018-05-24 RX ADMIN — ACETAMINOPHEN 650 MG: 325 TABLET ORAL at 09:01

## 2018-05-24 RX ADMIN — SENNOSIDES AND DOCUSATE SODIUM 1 TABLET: 8.6; 5 TABLET ORAL at 19:18

## 2018-05-24 RX ADMIN — HYDROCODONE BITARTRATE AND ACETAMINOPHEN 2 TABLET: 5; 325 TABLET ORAL at 13:49

## 2018-05-24 NOTE — PROGRESS NOTES
Clinic Care Coordination Contact  Care Coordination Transition Communication  CC received referral today---    PCP referral :  Complex Medical Concerns/Education, and Patient/Caregiver Support: Resources for Support    Additional pertinent details:  Celestina lives at home and has needed the help of her daughter and sister in recent weeks due to her back/leg/hip pain, lack of appetite and energy. Her family would like to know if she could get home care services and what resources are available for support         Clinical Data: Patient was hospitalized at [facility] admission from 5/22/2018 to anticipated discharge 5/25/2018 with diagnosis of . Hyponatremia/confusion     Transition to Facility:              Facility Name: Hospital SW documentation copied below  I: Pt has been accepted at [facility]. SW updated pt and dtr Tawnya. They are in agreement. Tawnya will transport.   P: SW will follow. Anticipate DC Friday.      AARON Bey, SW  [id number]                Contact name and phone number/fax: [phone] Delmi CARTER  Plan: RN/SW Care Coordinator will await notification from facility staff informing RN/SW Care Coordinator of patient's discharge plans/needs. RN/SW Care Coordinator will review chart and outreach to facility staff every 4 weeks and as needed.     Nina Holly RN / Clinical Care Coordinator     [facility]   [email or web address]  Office :  [phone] / Fax :  [phone]

## 2018-05-24 NOTE — PLAN OF CARE
Problem: Patient Care Overview  Goal: Plan of Care/Patient Progress Review  PT:  Discharge Planner PT   Patient plan for discharge: Go to rehab  Current status: Pt required SBA for supine to sit, CGA sit to stand with FWW, CGA for gait of 100 ft with FWW with slow shuffled pace and dec balance noted. Participated in supine strengthening. Declined up to chair or other forms of exercise.  Barriers to return to prior living situation: Falls risk, needs assist with mobility, weakness limiting safety and independence, new AD use.  Recommendations for discharge: TCU  Rationale for recommendations: Pt would benefit from continued PT to improve strength, balance, mobility to increase independence, reduce falls risk and increase safety before returning home.       Entered by: Nidia Sanchez 05/24/2018 3:56 PM

## 2018-05-24 NOTE — PLAN OF CARE
Problem: Patient Care Overview  Goal: Plan of Care/Patient Progress Review  Outcome: No Change  A&O x 3. Disoriented to situation. Forgetful. Denied pain. IV SL. Sodium 128. Up to the bathroom assist of one gait belt and walker. Mepilex to coccyx CDI. Slept well throughout the night. Assisted with turns and repositioning. Turns self independently at times. Continue on fluid restriction of 1,200 ml. Urine sample pending. Will continue to monitor.
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
## 2018-05-24 NOTE — TELEPHONE ENCOUNTER
This refill had come in prior to patient's appointment on Monday 5/21/18.  Patient is currently admitted to hospital. Low sodium.  Provider please review.    Do you want to wait for patient to be discharged to refill?  Yuliet Banda RN

## 2018-05-24 NOTE — PROGRESS NOTES
[facility]    Hospitalist Progress Note      Assessment & Plan   Celestina Tejada is a 87 year old female with a past medical history of Htn, CKD, RA who was admitted on 5/22/2018 with hyponatremia.    Hyponatremia  Clinical history suggestive of hypovolemia, as her family reports that she has not been eating or drinking as well over the past 3 weeks due to pain.  However, she does not appear markedly dry on exam, her function is at baseline and she is not tachycardic which would argue against a significant hypovolemia.  She has been in a substantial amount of pain for the past 3 weeks and SIADH from uncontrolled pain is a consideration. She is using NSAIDs as well which may contribute. Urine studies are more suggestive of SIADH with urine sodium of 25 and urine osm of 385.  She appears minimally symptomatic, with mild cognitive difficulties noted by family, although she is alert and conversant.  Cortisol just mildly above normal.   - continue 1200 cc fluid restriction  - Sodium this evening and tomorrow morning  - Manage pain as below   - hold lisinopril   - repeat urine sodium and osms today 5/24     Hypertension  - Continue prior to admission atenolol, nifedipine  - Temporarily hold lisinopril as may contribute to hyponatremia - blood pressure hasn't required this at all so likely dc permanently     Rheumatoid arthritis  - Continue prior to admission hydroxychloroquine      Hyperlipidemia  - Can temporarily hold statin while hospitalized     Subacute low back pain with reported severe degenerative disc disease, with sciatica   Recently evaluated by [facility], and had epidural injection approximately 9-10 days PTA.   - Hold NSAIDs - consider prn as sodium improves  - Continue PTA hydrocodone-acetaminophen PRN  - scheduled tylenol  - low dose gabapentin at hs started 5/23  - could consider lidoderm patch  - physical therapy     Pressure Injury located on coccyx: Stage 3, present on admission.  No s/s  infection.  Wound is relatively shallow and clean.   -wound RN following    Pain Assessment:  Current Pain Score 5/24/2018   Patient currently in pain? sleeping: patient not able to self report   Pain score (0-10) -   Pain location -   Pain descriptors -   - Celestina is experiencing pain due to her back. Pain management was discussed and the plan was created in a collaborative fashion.  Celestina's response to the current recommendations: engaged  - see above    DVT Prophylaxis: Pneumatic Compression Devices  Code Status: DNR/DNI    Disposition: DC to TCU on Friday if stable sodium, pain controlled and bed found.      Yao Sun    Interval History   Better today, sodium improved and back pain is tolerable.  Still not 100% at her cogitative baseline. No chest pain or shortness of breath.  Discussed the need for TCU which patient agrees to.      -Data reviewed today: I reviewed all new labs and imaging results over the last 24 hours. I personally reviewed no images or EKG's today.    Physical Exam   Temp: 98.6  F (37  C) Temp src: Oral BP: 102/50 Pulse: 58 Heart Rate: 66 Resp: 16 SpO2: 97 % O2 Device: None (Room air)    Vitals:    05/22/18 1624 05/23/18 0606   Weight: 45.8 kg (101 lb) 46.7 kg (102 lb 15.3 oz)     Vital Signs with Ranges  Temp:  [97.8  F (36.6  C)-98.8  F (37.1  C)] 98.6  F (37  C)  Pulse:  [58] 58  Heart Rate:  [60-66] 66  Resp:  [16] 16  BP: ()/(45-51) 102/50  SpO2:  [96 %-97 %] 97 %  I/O last 3 completed shifts:  In: 420 [P.O.:420]  Out: 1600 [Urine:1600]    Constitutional: awake, alert, cooperative    Respiratory: Clear to auscultation bilaterally, no crackles or wheezing noted.  Good air exchange.     Cardiovascular: Regular rate and rhythm.  No murmur, rub or gallop noted.     GI: Positive bowel sounds, soft, non-distended, non-tender.  No masses or organomegaly noted.     Musculoskeletal: Full range of motion.  Tone is normal.  No acute abnormalities.     Neurologic: Awake, alert and  oriented to name, place and time.  Cranial nerves II-XII are grossly intact.      Lymphatic: trace edema of BLE    Skin: no rash    Medications       sodium chloride 0.9%  500 mL Intravenous Once     acetaminophen  650 mg Oral TID     atenolol  75 mg Oral Daily     gabapentin  100 mg Oral At Bedtime     hydroxychloroquine  200 mg Oral Daily     NIFEdipine ER osmotic  90 mg Oral Daily       Data     Recent Labs  Lab 05/24/18  0820 05/24/18  0050 05/23/18  2230  05/23/18  0535  05/22/18  1728   WBC 18.1*  --   --   --  13.5*  --  16.5*   HGB 9.7*  --   --   --  9.4*  --  11.0*   MCV 89  --   --   --  89  --  89     --   --   --  216  --  267   * 128* 126*  < > 126*  < > 123*   POTASSIUM 4.3  --   --   --  4.3  --  4.2   CHLORIDE 96  --   --   --  92*  --  86*   CO2 23  --   --   --  23  --  23   BUN 13  --   --   --  16  --  18   CR 0.66  --   --   --  0.76  --  0.72   ANIONGAP 9  --   --   --  11  --  14   JOSÉ 8.5  --   --   --  8.1*  --  9.1   *  --   --   --  84  --  114*   < > = values in this interval not displayed.    No results found for this or any previous visit (from the past 24 hour(s)).

## 2018-05-24 NOTE — PLAN OF CARE
Problem: Patient Care Overview  Goal: Plan of Care/Patient Progress Review  Outcome: No Change  [id number]. A&Ox2-3. Disoriented to situation and time. Forgetful. PIV SL. Sodium 128. Up to the bathroom assist of one gait belt and walker. Mepilex to coccyx CDI. Assisted with turns and repositioning, Turns self independently at times. Continue on fluid restriction of 1,200 ml, this shift total 840 ml. Discharge pending Friday 5/25 to [facility]

## 2018-05-24 NOTE — PROGRESS NOTES
D: SW following for DC planning.   I: Pt has been accepted at [facility]. SW updated pt and dtr Tawnya. They are in agreement. Tawnya will transport.   P: SW will follow. Anticipate DC Friday.     AARON Bey, LGSW  [id number]

## 2018-05-25 VITALS
HEART RATE: 58 BPM | RESPIRATION RATE: 16 BRPM | SYSTOLIC BLOOD PRESSURE: 111 MMHG | WEIGHT: 102.95 LBS | DIASTOLIC BLOOD PRESSURE: 53 MMHG | TEMPERATURE: 98.2 F | OXYGEN SATURATION: 95 % | BODY MASS INDEX: 18.95 KG/M2 | HEIGHT: 62 IN

## 2018-05-25 LAB
ANION GAP SERPL CALCULATED.3IONS-SCNC: 8 MMOL/L (ref 3–14)
BUN SERPL-MCNC: 15 MG/DL (ref 7–30)
CALCIUM SERPL-MCNC: 8.3 MG/DL (ref 8.5–10.1)
CHLORIDE SERPL-SCNC: 97 MMOL/L (ref 94–109)
CO2 SERPL-SCNC: 23 MMOL/L (ref 20–32)
CREAT SERPL-MCNC: 0.6 MG/DL (ref 0.52–1.04)
ERYTHROCYTE [DISTWIDTH] IN BLOOD BY AUTOMATED COUNT: 13.3 % (ref 10–15)
GFR SERPL CREATININE-BSD FRML MDRD: >90 ML/MIN/1.7M2
GLUCOSE SERPL-MCNC: 91 MG/DL (ref 70–99)
HCT VFR BLD AUTO: 27.5 % (ref 35–47)
HGB BLD-MCNC: 9.8 G/DL (ref 11.7–15.7)
MAGNESIUM SERPL-MCNC: 1.5 MG/DL (ref 1.6–2.3)
MCH RBC QN AUTO: 31.7 PG (ref 26.5–33)
MCHC RBC AUTO-ENTMCNC: 35.6 G/DL (ref 31.5–36.5)
MCV RBC AUTO: 89 FL (ref 78–100)
PLATELET # BLD AUTO: 220 10E9/L (ref 150–450)
POTASSIUM SERPL-SCNC: 4.2 MMOL/L (ref 3.4–5.3)
RBC # BLD AUTO: 3.09 10E12/L (ref 3.8–5.2)
SODIUM SERPL-SCNC: 128 MMOL/L (ref 133–144)
WBC # BLD AUTO: 14.2 10E9/L (ref 4–11)

## 2018-05-25 PROCEDURE — 80048 BASIC METABOLIC PNL TOTAL CA: CPT | Performed by: INTERNAL MEDICINE

## 2018-05-25 PROCEDURE — 99239 HOSP IP/OBS DSCHRG MGMT >30: CPT | Performed by: INTERNAL MEDICINE

## 2018-05-25 PROCEDURE — 83735 ASSAY OF MAGNESIUM: CPT | Performed by: INTERNAL MEDICINE

## 2018-05-25 PROCEDURE — 25000132 ZZH RX MED GY IP 250 OP 250 PS 637: Performed by: INTERNAL MEDICINE

## 2018-05-25 PROCEDURE — 36415 COLL VENOUS BLD VENIPUNCTURE: CPT | Performed by: INTERNAL MEDICINE

## 2018-05-25 PROCEDURE — 85027 COMPLETE CBC AUTOMATED: CPT | Performed by: INTERNAL MEDICINE

## 2018-05-25 RX ORDER — HYDROCODONE BITARTRATE AND ACETAMINOPHEN 5; 325 MG/1; MG/1
1 TABLET ORAL EVERY 6 HOURS PRN
Qty: 15 TABLET | Refills: 0 | Status: SHIPPED | OUTPATIENT
Start: 2018-05-25 | End: 2018-05-30 | Stop reason: DRUGHIGH

## 2018-05-25 RX ORDER — ACETAMINOPHEN 325 MG/1
650 TABLET ORAL 3 TIMES DAILY
Qty: 100 TABLET | Refills: 0
Start: 2018-05-25 | End: 2020-01-01

## 2018-05-25 RX ORDER — HYDROCODONE BITARTRATE AND ACETAMINOPHEN 5; 325 MG/1; MG/1
1 TABLET ORAL EVERY 6 HOURS PRN
Qty: 15 TABLET | Refills: 0 | Status: SHIPPED | OUTPATIENT
Start: 2018-06-21 | End: 2018-05-25

## 2018-05-25 RX ORDER — MAGNESIUM OXIDE 400 MG/1
800 TABLET ORAL ONCE
Status: COMPLETED | OUTPATIENT
Start: 2018-05-25 | End: 2018-05-25

## 2018-05-25 RX ORDER — GABAPENTIN 100 MG/1
100 CAPSULE ORAL AT BEDTIME
Qty: 30 CAPSULE | Refills: 0 | DISCHARGE
Start: 2018-05-25 | End: 2018-06-27

## 2018-05-25 RX ADMIN — NIFEDIPINE 90 MG: 90 TABLET, FILM COATED, EXTENDED RELEASE ORAL at 08:55

## 2018-05-25 RX ADMIN — HYDROCODONE BITARTRATE AND ACETAMINOPHEN 2 TABLET: 5; 325 TABLET ORAL at 14:02

## 2018-05-25 RX ADMIN — ATENOLOL 75 MG: 50 TABLET ORAL at 08:55

## 2018-05-25 RX ADMIN — BISACODYL 10 MG: 10 SUPPOSITORY RECTAL at 09:04

## 2018-05-25 RX ADMIN — Medication 800 MG: at 13:09

## 2018-05-25 RX ADMIN — HYDROXYCHLOROQUINE SULFATE 200 MG: 200 TABLET, FILM COATED ENTERAL at 08:55

## 2018-05-25 RX ADMIN — ACETAMINOPHEN 650 MG: 325 TABLET ORAL at 08:53

## 2018-05-25 NOTE — DISCHARGE SUMMARY
[facility]    Discharge Summary  Hospitalist    Date of Admission:  5/22/2018  Date of Discharge:  5/25/2018  Discharging Provider: Jose E Clement MD    Discharge Diagnoses      Hyponatremia, multifactorial  Hypertension  Rheumatoid arthritis  Hyperlipidemia  Subacute low back pain with reported severe degenerative disc disease, with sciatica   Pressure Injury located on coccyx    Hospital Course       Celestina Tejada is a 87 year old female with a past medical history of Htn, CKD, RA who was admitted on 5/22/2018 with hyponatremia.     Hyponatremia  Clinical history suggestive of hypovolemia, as her family reports that she has not been eating or drinking as well over the past 3 weeks due to pain.  However she was not markedly dry on presentation.  She has been in a substantial amount of pain for the past 3 weeks and SIADH from uncontrolled pain is a consideration.Urine studies are more suggestive of SIADH with urine sodium of 25 and urine osm of 385.  She appears minimally symptomatic.  -Lisinopril was held, recommend continuing to hold at discharge for now, blood pressure seems to be doing well without it.    -Hyponatremia is likely mixed, patient at risk for dehydration due to inadequate oral intake, this was discussed extensively with the daughter, emphasized to her that they will also have to watch for this closely.  -Also recommend that staff at her short-term care facility be aware and monitor her oral fluid intake.  -Sodium at presentation was 121, improving and stable at 128 at the time of discharge  -Recommend checking BMP early next week to make sure it is stable or improving.  -Physical therapy recommending discharge to TCU.      Hypertension  -Continue prior to admission atenolol, nifedipine  -Temporarily hold lisinopril as may contribute to hyponatremia - blood pressure hasn't required this at all so likely dc permanently      Rheumatoid arthritis  -Continue prior to admission  hydroxychloroquine   -Recommend discontinuing use of NSAID's for now.      Subacute low back pain with reported severe degenerative disc disease, with sciatica   Recently evaluated by [facility], and had epidural injection approximately 9-10 days PTA.   -Hold NSAIDs - consider prn as sodium improves  -Continue PTA hydrocodone-acetaminophen PRN  -scheduled tylenol  -low dose gabapentin at hs started 5/23; this can be uptitrated as outpatient  -Outpatient follow-up with [facility]     Pressure Injury located on coccyx: Stage 3, present on admission.  No s/s infection.  Wound is relatively shallow and clean.   -wound RN consulted    Leukocytosis:  -This was fluctuant however decreasing on the day of discharge, no evidence of infection, no pneumonia on x-ray, urine analysis without evidence for UTI.  No clinical signs or symptoms concerning for infectious etiology.  -Apparently per the daughter patient was recently on a tapering course of steroid for her back pain, presumably that is the most likely explanation  -Repeat CBC early next week.      # Discharge Pain Plan:   - During her hospitalization, Celestina experienced pain due to subacute low back pain with degenerative disc disease.  The pain plan for discharge was discussed with Celestina and her daughter and the plan was created in a collaborative fashion.    Please see orders for pain regimen.      Jose E Clement MD    Significant Results and Procedures   See below    Pending Results     Unresulted Labs Ordered in the Past 30 Days of this Admission     No orders found from 3/23/2018 to 5/23/2018.          Code Status   DNR / DNI       Primary Care Physician   Majo Alfaro MD    Physical Exam   Temp: 98.2  F (36.8  C) Temp src: Oral BP: 111/53   Heart Rate: 65 Resp: 16 SpO2: 95 % O2 Device: None (Room air)      Constitutional: AAOX3, NAD; thinly built, frail  Respiratory: CTA B/L, Normal WOB  Cardiovascular: RRR, No murmur  GI: Soft, Non- tender, BS- normoactive  Neuro: CN-  grossly intact     Discharge Disposition   Discharged to short-term care facility  Condition at discharge: Stable    Consultations This Hospital Stay   WOUND OSTOMY CONTINENCE NURSE  IP CONSULT  PHYSICAL THERAPY ADULT IP CONSULT  PHYSICAL THERAPY ADULT IP CONSULT  OCCUPATIONAL THERAPY ADULT IP CONSULT    Time Spent on this Encounter   Jose E KEARNEY, personally saw the patient today and spent greater than 30 minutes discharging this patient.    Discharge Orders     General info for SNF   Length of Stay Estimate: Short Term Care: Estimated # of Days <30  Condition at Discharge: Improving  Level of care:skilled   Rehabilitation Potential: Good  Admission H&P remains valid and up-to-date: Yes  Recent Chemotherapy: N/A  Use Nursing Home Standing Orders: N/A     Mantoux instructions   Give two-step Mantoux (PPD) Per Facility Policy Yes     Follow Up and recommended labs and tests   Follow up with [facility] physician.  The following labs/tests are recommended: CBC and BMP on 5/29/18     Activity - Up with nursing assistance     Reason for your hospital stay   Severe hyponatremia     Intake and output   Every shift     Additional Discharge Instructions   Please closely monitor adequacy of PO fluid intake     DNR/DNI     Physical Therapy Adult Consult   Evaluate and treat as clinically indicated.    Reason:     Occupational Therapy Adult Consult   Evaluate and treat as clinically indicated.    Reason:     Fall precautions     Advance Diet as Tolerated   Follow this diet upon discharge: Orders Placed This Encounter     Fluid restriction 1200 ML FLUID     Snacks/Supplements Adult: Boost Shake; Between Meals     Room Service     Combination Diet Regular Diet Adult         Discharge Medications   Current Discharge Medication List      START taking these medications    Details   acetaminophen (TYLENOL) 325 MG tablet Take 2 tablets (650 mg) by mouth 3 times daily  Qty: 100 tablet, Refills: 0    Associated Diagnoses:  Rheumatoid arthritis, involving unspecified site, unspecified rheumatoid factor presence (H)      gabapentin (NEURONTIN) 100 MG capsule Take 1 capsule (100 mg) by mouth At Bedtime  Qty: 30 capsule, Refills: 0    Associated Diagnoses: Osteoarthritis, unspecified osteoarthritis type, unspecified site         CONTINUE these medications which have CHANGED    Details   HYDROcodone-acetaminophen (NORCO) 5-325 MG per tablet Take 1 tablet by mouth every 6 hours as needed for moderate to severe pain  Qty: 15 tablet, Refills: 0    Associated Diagnoses: Osteoarthritis, unspecified osteoarthritis type, unspecified site; Unintentional weight loss; Protein-calorie malnutrition, unspecified severity (H); Other chronic pain; Rheumatoid arthritis, involving unspecified site, unspecified rheumatoid factor presence (H)         CONTINUE these medications which have NOT CHANGED    Details   atenolol (TENORMIN) 25 MG tablet TAKE 3 TABLETS DAILY  Qty: 270 tablet, Refills: 3    Associated Diagnoses: Hypertension goal BP (blood pressure) < 140/90      atorvastatin (LIPITOR) 20 MG tablet TAKE 1 TABLET DAILY  Qty: 90 tablet, Refills: 3    Associated Diagnoses: Hyperlipidemia with target LDL less than 100      hydroxychloroquine (PLAQUENIL) 200 MG tablet Take 200 mg by mouth daily   Refills: 3      NIFEdipine ER osmotic (PROCARDIA XL) 90 MG 24 hr tablet Take 1 tablet (90 mg) by mouth daily  Qty: 90 tablet, Refills: 1    Comments: Sent to local pharmacy.  Associated Diagnoses: Hypertension goal BP (blood pressure) < 140/90      zoledronic Acid (RECLAST) 5 MG/100ML SOLN Inject 5 mg into the vein Yearly  Qty: 100 mL, Refills: 0      order for DME Equipment being ordered: blood pressure cuff and monitor  Qty: 1 each, Refills: 0    Associated Diagnoses: Hypertension goal BP (blood pressure) < 140/90      tocilizumab (ACTEMRA) 80 MG/4ML Inject into the vein every 30 days     Associated Diagnoses: RA (rheumatoid arthritis) (H)         STOP taking  these medications       IBUPROFEN PO Comments:   Reason for Stopping:         lisinopril (PRINIVIL/ZESTRIL) 20 MG tablet Comments:   Reason for Stopping:             Allergies   Allergies   Allergen Reactions     Hydrochlorothiazide      hyponatremia     Data   Most Recent 3 CBC's:  Recent Labs   Lab Test  05/25/18   0800  05/24/18   0820  05/23/18   0535   WBC  14.2*  18.1*  13.5*   HGB  9.8*  9.7*  9.4*   MCV  89  89  89   PLT  220  227  216      Most Recent 3 BMP's:  Recent Labs   Lab Test  05/25/18   0800  05/24/18   1810  05/24/18   0820   05/23/18   0535   NA  128*  127*  128*   < >  126*   POTASSIUM  4.2   --   4.3   --   4.3   CHLORIDE  97   --   96   --   92*   CO2  23   --   23   --   23   BUN  15   --   13   --   16   CR  0.60   --   0.66   --   0.76   ANIONGAP  8   --   9   --   11   JOSÉ  8.3*   --   8.5   --   8.1*   GLC  91   --   100*   --   84    < > = values in this interval not displayed.     Most Recent 2 LFT's:  Recent Labs   Lab Test  04/30/18   1439 04/04/18 09/12/17   1236   AST  44  37*   < >  37   ALT  28  23   < >  31   ALKPHOS  106   --    --   73   BILITOTAL  1.1   --    --   0.6    < > = values in this interval not displayed.     Most Recent INR's and Anticoagulation Dosing History:  Anticoagulation Dose History     Recent Dosing and Labs Latest Ref Rng & Units 4/30/2018    INR 0.86 - 1.14 0.97        Most Recent 3 Troponin's:No lab results found.  Most Recent Cholesterol Panel:  Recent Labs   Lab Test  09/12/17   1236   CHOL  133   LDL  50   HDL  65   TRIG  89     Most Recent 6 Bacteria Isolates From Any Culture (See EPIC Reports for Culture Details):  Recent Labs   Lab Test  10/12/17   1556   CULT  >100,000 colonies/mL  Escherichia coli  *  10,000 to 50,000 colonies/mL  Klebsiella pneumoniae  *     Most Recent TSH, T4 and A1c Labs:  Recent Labs   Lab Test  05/21/18   1618   TSH  1.56       Results for orders placed or performed in visit on 05/21/18   XR Chest 2 Views    Narrative     CHEST TWO VIEWS  5/21/2018 4:55 PM     HISTORY: Unintentional weight loss. Protein-calorie malnutrition,  unspecified severity (H). Osteoarthritis, unspecified osteoarthritis  type, unspecified site. Other chronic pain. Rheumatoid arthritis,  involving unspecified site, unspecified rheumatoid factor presence  (H).    COMPARISON: 08/09/2016.      Impression    IMPRESSION: There is minimal interstitial changes in the lung bases  particularly in the right posterior lung base which appears chronic  and is probably due to fibrotic change. Lungs are hyperinflated. Heart  size and pulmonary vasculature are normal. No pleural effusions.    KHALIDA GARNER MD

## 2018-05-25 NOTE — PLAN OF CARE
Problem: Patient Care Overview  Goal: Plan of Care/Patient Progress Review  Outcome: Improving  Pt A&Ox3-4. Forgetful. DNR/DNI. IV bloody but patent. Saline locked.  at 6:30pm. A-1 with walker and GB. Dressing changed today per order every other day. Changed positions q2hr with encouragement. Patient experiencing pain in bottom, back and right leg. Scheduled tylenol and gabapentin ordered. Norco available PRN. Possible d/c to TCU tomorrow. Continue to monitor.

## 2018-05-25 NOTE — PLAN OF CARE
Problem: Patient Care Overview  Goal: Plan of Care/Patient Progress Review  PT: Attempted AM PT session; pt currently using BSC and requesting increased time.    Pt discharging to TCU today.    PT goals not met.

## 2018-05-25 NOTE — PLAN OF CARE
Problem: Patient Care Overview  Goal: Plan of Care/Patient Progress Review  Outcome: Improving  A&Ox4, forgetful. DNR/DNI. VSS on RA. Assist x1 with GB and walker. 1200 Fluid restriction. C/o back pain, declines intervention. Pressure ulcer on coccyx covered, CDI. Bilateral knees +1 edema. Possible dc to TCU today. Will continue to monitor.

## 2018-05-25 NOTE — PROGRESS NOTES
D: SW following for DC planning.   I: Pt will DC today to [facility] via family at 1400. Orders faxed and facility updated. Pt and dtr in agreement. Nursing aware.   P: DC today.     AARON Bey, LGSW  [id number]    PAS-RR    D: Per DHS regulation, SW completed and submitted PAS-RR to [place] [employer] Direct Connect via the Senior LinkAge Line.  PAS-RR confirmation # is : [id number]    I: SW spoke with pt and they are aware a PAS-RR has been submitted.  SW reviewed with pt that they may be contacted for a follow up appointment within 10 days of hospital discharge if their SNF stay is < 30 days.  Contact information for [facility] LinkAge Line was also provided.    A: Pt verbalized understanding.    P: Further questions may be directed to [facility] LinkAge Line at #[phone], option #4 for PAS-RR staff.

## 2018-05-25 NOTE — PROGRESS NOTES
"SPIRITUAL HEALTH SERVICES Progress Note  [facility]    Visited pt per staff request. Pt's nurse noted that pt had been crying this morning and pt's nurse requested SH. When I went in, pt stated that she was not doing well and needed to use the bathroom. I attempted several visits and each time I returned, pt was feeling incontinent and our visit was interrupted so she could use the bathroom. While we were wanting for the nursing aid, pt stated, \"I can't believe I'm in the hospital.\" Pt states that she has not been hospitalized since the birth of her children. Pt states that she has a problem with her leg that isn't getting better. Pt requested prayer and stated that she is [employer] but sometimes attends a [employer] with her daughter.  provided prayer and assistance contacting staff when pt became incontinent while waiting for the nursing aid to return. As our visit got cut short again,  will attempt to return later today.      Lilli Hou  Chaplain Resident  Pager: [phone]  Office: [phone]  "

## 2018-05-25 NOTE — PLAN OF CARE
Problem: Patient Care Overview  Goal: Plan of Care/Patient Progress Review  Outcome: Adequate for Discharge Date Met: 05/25/18  Discharge    Patient discharged to [facility] via wheelchair to door 6 with family (daughter Tawnya)    A/Ox4, pain managed with scheduled Tylenol.  Suppository with good result.  Large, brown BM after several small BMs to start.  Coccyx dressing CD&I.  Na 128, mag ox PO 800mg given prior to d/c.    Listed belongings gathered and returned to patient. Yes  Care Plan and Patient education resolved: Yes  Prescriptions if needed, hard copies sent with patient  Yes  Home and hospital acquired medications returned to patient: NA  Medication Bin checked and emptied on discharge Yes  Follow up appointment made for patient: No D/C to TCU

## 2018-05-29 ENCOUNTER — HOSPITAL LABORATORY (OUTPATIENT)
Dept: OTHER | Facility: CLINIC | Age: 83
End: 2018-05-29

## 2018-05-29 ENCOUNTER — TELEPHONE (OUTPATIENT)
Dept: FAMILY MEDICINE | Facility: CLINIC | Age: 83
End: 2018-05-29

## 2018-05-29 VITALS
DIASTOLIC BLOOD PRESSURE: 67 MMHG | BODY MASS INDEX: 17.66 KG/M2 | SYSTOLIC BLOOD PRESSURE: 135 MMHG | RESPIRATION RATE: 16 BRPM | TEMPERATURE: 97.5 F | OXYGEN SATURATION: 97 % | HEART RATE: 67 BPM | HEIGHT: 62 IN | WEIGHT: 96 LBS

## 2018-05-29 NOTE — TELEPHONE ENCOUNTER
See care coordination encounter.  Patient is at [facility]    Nina Holly RN / Clinical Care Coordinator     [facility]   [email or web address]  Office :  [phone] / Fax :  [phone]

## 2018-05-29 NOTE — PROGRESS NOTES
[place] [facility]  PRIMARY CARE PROVIDER AND CLINIC:  Majo Alfaro [address] / [facility] [address]  Chief Complaint   Patient presents with     Hospital F/U     [place] Medical Record Number:  [id number]    HPI:    Celestina Tejada is a 87 year old  (4/2/1931),admitted to the [facility] from [facility].  Hospital stay 5/22/18 through 5/25/18.  Admitted to this facility for  rehab, medical management and nursing care.  HPI information obtained from: facility chart records, facility staff, patient report and [facility] chart review.      Celestina Deleon is a 87 yr old female admitted to [facility] s/p hospitalization [facility] 5/22-5/25/18 for hyponatremia. PMHx Rheumatoid Arthritis, chronic back pain, hypertension, chronic kidney disease & pressure ulcer (coccyx)    Current issues are:             Hyponatremia  Per hospital note  Clinical history suggestive of hypovolemia, as her family reports that she has not been eating or drinking as well over the past 3 weeks due to pain. However she was not markedly dry on presentation.  She has been in a substantial amount of pain for the past 3 weeks and SIADH from uncontrolled pain is a consideration.Urine studies are more suggestive of SIADH with urine sodium of 25 and urine osm of 385.  She appears minimally symptomatic.  Sodium 121 on hospital admission; improving and stable at 128 at the time of discharge  Came on fluid restriction 1200cc; sodium improve 5/29/18 to 130, discuss with patient to hydrate with variety of beverages besides water   Has 3xwk weight; noted gradual weight loss over time (pt states was 120lb prior & now 96lb)      Hypertension  On atenolol, nifedipine  Lisinopril held due to concern hyponatremia; blood pressure controlled & likely not restart  chronic kidney disease stage III    Rheumatoid arthritis  On hydroxychloroquine   Subacute low back pain with reported severe  degenerative disc disease, with sciatica   [facility] evaluated by [facility], and had epidural injection approximately 9-10 days PTA.   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 Patient describes pain as achy vs shooting  Noted to have bruise on thigh; patient states she uses heating pads on thigh; after discussion with daughter this bruise is burn from heating pad  On Reclast for bone health  & receives IV infusion Actemra every 30 days  Weakness  On therapies, walks with walker  Goal to return to independently living apartment  Daughter Shalonda assist with IADL , daughter lives in apartment building near her  Patient sister lives in her apartment   Pressure Injury located on coccyx: Stage 3, present on admission.  No s/s infection.  Wound is relatively shallow and clean.      Leukocytosis:  Per hospital note  fluctuant however decreasing on the day of discharge, no evidence of infection, no pneumonia on x-ray, urine analysis without evidence for UTI.  No clinical signs or symptoms concerning for infectious etiology.  -Apparently per the daughter patient was recently on a tapering course of steroid for her back pain, presumably that is the most likely explanation  Resolved, WBC within normal limits 5/29/18     Advanced care directive  Reviewed CODE status with daughter & patient and plan is DNR/DNI      CODE STATUS/ADVANCE DIRECTIVES DISCUSSION:   DNR / DNI  Patient's living condition: lives alone, daughter Shalonda assist with  IADL    ALLERGIES:Hydrochlorothiazide  PAST MEDICAL HISTORY:  has a past medical history of CKD (chronic kidney disease) stage 3, GFR 30-59 ml/min; Hyperlipidemia LDL goal < 100; Hypertension goal BP (blood pressure) < 140/90; Osteoarthritis (2/9/2012); Osteopenia; and Rheumatoid arthritis(714.0).  PAST SURGICAL HISTORY:  has a past surgical history that includes APPENDECTOMY (1950) and surgical history of - .  FAMILY HISTORY: family history includes Arthritis in her sister; C.A.D. in her sister; Family History Negative in her brother, daughter, and daughter; HEART DISEASE in her mother; Respiratory in her mother.  SOCIAL HISTORY:  reports that she quit smoking about 17 years ago. Her smoking use included Cigarettes. She has a 10.00 pack-year smoking history. She has never used smokeless tobacco. She reports that she drinks alcohol. She reports that she does not use illicit drugs.    Post Discharge Medication Reconciliation Status: discharge medications reconciled and changed, per note/orders (see AVS).  Current Outpatient Prescriptions   Medication Sig Dispense Refill     acetaminophen (TYLENOL) 325 MG tablet Take 2 tablets (650 mg) by mouth 3 times daily 100 tablet 0     Amino Acids-Protein Hydrolys (PRO-STAT AWC) LIQD Take 1 oz by mouth 2 times daily       atenolol (TENORMIN) 25 MG tablet TAKE 3 TABLETS DAILY 270 tablet 3     atorvastatin (LIPITOR) 20 MG tablet TAKE 1 TABLET DAILY 90 tablet 3     diclofenac (VOLTAREN) 1 % GEL topical gel Place 2 g onto the skin 2 times daily Apply to R thigh       gabapentin (NEURONTIN) 100 MG capsule Take 1 capsule (100 mg) by mouth At Bedtime 30 capsule 0     HYDROcodone-acetaminophen (NORCO) 5-325 MG per tablet Take 1 tablet by mouth every 6 hours       hydroxychloroquine (PLAQUENIL) 200 MG tablet Take 200 mg by mouth daily   3     NIFEdipine ER osmotic (PROCARDIA XL) 90 MG 24 hr tablet Take 1 tablet (90 mg) by mouth daily 90 tablet 1     Nutritional Supplements (ENSURE PLUS)  "LIQD Take 8 oz by mouth daily (with lunch)       senna-docusate (SENOKOT-S;PERICOLACE) 8.6-50 MG per tablet Take 2 tablets by mouth daily And 2 tabs daily prn         ROS:  10 point ROS of systems including Constitutional, Eyes, Respiratory, Cardiovascular, Gastroenterology, Genitourinary, Integumentary, Muscularskeletal, Psychiatric were all negative except for pertinent positives noted in my HPI.    Exam:  /67  Pulse 67  Temp 97.5  F (36.4  C)  Resp 16  Ht 5' 2\" (1.575 m)  Wt 96 lb (43.5 kg)  SpO2 97%  BMI 17.56 kg/m2  GENERAL APPEARANCE:  Alert, in no distress  ENT:  Mouth and posterior oropharynx normal, moist mucous membranes  EYES:  EOM, conjunctivae, lids, pupils and irises normal, glasses  NECK:  No adenopathy,masses or thyromegaly  RESP:  respiratory effort and palpation of chest normal, lungs clear to auscultation, no respiratory distress  CV:  Palpation and auscultation of heart done, regular rate and rhythm, no murmur, rub, or gallop  ABDOMEN:  normal bowel sounds, soft, nontender, no hepatosplenomegaly or other masses  M/S:   lying in bed  SKIN:  Inspection of skin and subcutaneous tissue baseline, bruising on right thigh; 2 open area on coccyx, superficial with healthy granulation tissues  NEURO:   Cranial nerves 2-12 are normal tested and grossly at patient's baseline  PSYCH:  oriented X 3    BP Readings from Last 3 Encounters:   05/29/18 135/67   05/25/18 111/53   05/21/18 110/49     Pulse Readings from Last 4 Encounters:   05/29/18 67   05/23/18 58   05/21/18 69   04/30/18 66     Wt Readings from Last 5 Encounters:   05/29/18 96 lb (43.5 kg)   05/25/18 102 lb 15.3 oz (46.7 kg)   05/21/18 101 lb 4 oz (45.9 kg)   04/30/18 105 lb (47.6 kg)   02/13/18 112 lb 8 oz (51 kg)       Lab/Diagnostic data:  CBC RESULTS:   Recent Labs   Lab Test  05/25/18   0800  05/24/18   0820   WBC  14.2*  18.1*   RBC  3.09*  3.06*   HGB  9.8*  9.7*   HCT  27.5*  27.3*   MCV  89  89   MCH  31.7  31.7   MCHC  35.6  " 35.5   RDW  13.3  13.3   PLT  220  227       Last Basic Metabolic Panel:  Recent Labs   Lab Test  05/25/18   0800  05/24/18   1810  05/24/18   0820   NA  128*  127*  128*   POTASSIUM  4.2   --   4.3   CHLORIDE  97   --   96   JOSÉ  8.3*   --   8.5   CO2  23   --   23   BUN  15   --   13   CR  0.60   --   0.66   GLC  91   --   100*       Liver Function Studies -   Recent Labs   Lab Test  04/30/18   1439 04/04/18 09/12/17   1236   PROTTOTAL  6.9   --    --   6.7*   ALBUMIN  4.4   --    --   4.2   BILITOTAL  1.1   --    --   0.6   ALKPHOS  106   --    --   73   AST  44  37*   < >  37   ALT  28  23   < >  31    < > = values in this interval not displayed.       TSH   Date Value Ref Range Status   05/21/2018 1.56 0.40 - 4.00 mU/L Final   08/11/2016 1.95 0.40 - 4.00 mU/L Final         ASSESSMENT/PLAN:  Hyponatremia with decreased serum osmolality  Improving, remains on fluid restriction; encourage hydration with variety beverages  Dietary evaluate and treat, monitor meal intake & calories  Nutritional supplements    Hypertension goal BP (blood pressure) < 140/90  No longer on lisinopril  Continue on atenolol, nifedipine    CKD (chronic kidney disease) stage 3, GFR 30-59 ml/min  Stable, monitor     Rheumatoid arthritis, involving unspecified site, unspecified rheumatoid factor presence (H)  Other chronic pain  Schedule Norco 5-325mg every 6hrs as prior to hospital admission per patient & daughter request to manage pain that was limiting function prior  Continue on Neurontin low dose HS & scheduled tylenol  Add senna s for bowel movement management due to narcotic   Follow up [facility]  Consider back brace  Topical voltaren gel ordered for muscle type pain in right thigh    Decubitus ulcer of coccygeal region, stage 3 (H)  Reposition every 2hrs,   Daily dressing change  Good hygiene  Promote adequate nutritional intake  Referral to house wound MD     Physical deconditioning  Comment: d/t recent hospitalization & comorbidity,  expect delay rehabilitation d/t age & comorbidity  Plan: PT/OT eval & treat, monitor    involved in safe plan home    BMP,CBC 6/1/18    Total time spent with patient visit at the skilled nursing facility was 40 min including patient visit and review of past records. Greater than 50% of total time spent with counseling and coordinating care due to patient status, hyponatremia, rheumatoid arthritis and weakness    Electronically signed by:  STEVE Shoemaker CNP

## 2018-05-30 ENCOUNTER — NURSING HOME VISIT (OUTPATIENT)
Dept: GERIATRICS | Facility: CLINIC | Age: 83
End: 2018-05-30
Payer: COMMERCIAL

## 2018-05-30 DIAGNOSIS — R53.81 PHYSICAL DECONDITIONING: ICD-10-CM

## 2018-05-30 DIAGNOSIS — E87.1 HYPONATREMIA WITH DECREASED SERUM OSMOLALITY: Primary | ICD-10-CM

## 2018-05-30 DIAGNOSIS — L89.153 DECUBITUS ULCER OF COCCYGEAL REGION, STAGE 3 (H): ICD-10-CM

## 2018-05-30 DIAGNOSIS — N18.30 CKD (CHRONIC KIDNEY DISEASE) STAGE 3, GFR 30-59 ML/MIN (H): ICD-10-CM

## 2018-05-30 DIAGNOSIS — G89.29 OTHER CHRONIC PAIN: ICD-10-CM

## 2018-05-30 DIAGNOSIS — M06.9 RHEUMATOID ARTHRITIS, INVOLVING UNSPECIFIED SITE, UNSPECIFIED RHEUMATOID FACTOR PRESENCE: ICD-10-CM

## 2018-05-30 DIAGNOSIS — I10 HYPERTENSION GOAL BP (BLOOD PRESSURE) < 140/90: ICD-10-CM

## 2018-05-30 PROCEDURE — 99310 SBSQ NF CARE HIGH MDM 45: CPT | Performed by: NURSE PRACTITIONER

## 2018-05-30 RX ORDER — AMINO ACIDS/PROTEIN HYDROLYS 15G-100/30
1 LIQUID (ML) ORAL DAILY
COMMUNITY
End: 2018-06-11

## 2018-05-30 RX ORDER — AMOXICILLIN 250 MG
2 CAPSULE ORAL DAILY
COMMUNITY
End: 2020-01-01

## 2018-05-30 RX ORDER — HYDROCODONE BITARTRATE AND ACETAMINOPHEN 5; 325 MG/1; MG/1
1 TABLET ORAL EVERY 6 HOURS
COMMUNITY
End: 2019-01-08

## 2018-05-30 RX ORDER — LACTOSE-REDUCED FOOD
LIQUID (ML) ORAL 2 TIMES DAILY
COMMUNITY
End: 2019-07-25

## 2018-05-30 NOTE — LETTER
5/30/2018        RE: Celestina Tejada  [address]  [facility] [address]        [place] [facility]  PRIMARY CARE PROVIDER AND CLINIC:  Majo Alfaro [address] / [facility] [address]  Chief Complaint   Patient presents with     Hospital F/U     [place] Medical Record Number:  [id number]    HPI:    Celestina Tejada is a 87 year old  (4/2/1931),admitted to the [facility]] from [facility].  Hospital stay 5/22/18 through 5/25/18.  Admitted to this facility for  rehab, medical management and nursing care.  HPI information obtained from: facility chart records, facility staff, patient report and [facility] chart review.      Celestina Deleon is a 87 yr old female admitted to [facility]] s/p hospitalization [facility] 5/22-5/25/18 for hyponatremia. PMHx Rheumatoid Arthritis, chronic back pain, hypertension, chronic kidney disease & pressure ulcer (coccyx)    Current issues are:             Hyponatremia  Per hospital note  Clinical history suggestive of hypovolemia, as her family reports that she has not been eating or drinking as well over the past 3 weeks due to pain. However she was not markedly dry on presentation.  She has been in a substantial amount of pain for the past 3 weeks and SIADH from uncontrolled pain is a consideration.Urine studies are more suggestive of SIADH with urine sodium of 25 and urine osm of 385.  She appears minimally symptomatic.  Sodium 121 on hospital admission; improving and stable at 128 at the time of discharge  Came on fluid restriction 1200cc; sodium improve 5/29/18 to 130, discuss with patient to hydrate with variety of beverages besides water   Has 3xwk weight; noted gradual weight loss over time (pt states was 120lb prior & now 96lb)      Hypertension  On atenolol, nifedipine  Lisinopril held due to concern hyponatremia; blood pressure controlled & likely not restart  chronic kidney disease stage  III    Rheumatoid arthritis  On hydroxychloroquine   Subacute low back pain with reported severe degenerative disc disease, with sciatica   [facility] evaluated by [facility], and had epidural injection approximately 9-10 days PTA.   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 Patient describes pain as achy vs shooting  Noted to have bruise on thigh; patient states she uses heating pads on thigh; after discussion with daughter this bruise is burn from heating pad  On Reclast for bone health  & receives IV infusion Actemra every 30 days  Weakness  On therapies, walks with walker  Goal to return to independently living apartment  Daughter Shalonda assist with IADL , daughter lives in apartment building near her  Patient sister lives in her apartment   Pressure Injury located on coccyx: Stage 3, present on admission.  No s/s infection.  Wound is relatively shallow and clean.      Leukocytosis:  Per hospital note  fluctuant however decreasing on the day of discharge, no evidence of infection, no pneumonia on x-ray, urine analysis without evidence for UTI.  No clinical signs or symptoms concerning for infectious etiology.  -Apparently per the daughter patient was recently on a tapering course of steroid for her back pain, presumably that is the most likely explanation  Resolved, WBC within normal limits 5/29/18     Advanced care directive  Reviewed CODE status with daughter & patient and plan is DNR/DNI      CODE STATUS/ADVANCE DIRECTIVES  DISCUSSION:   DNR / DNI  Patient's living condition: lives alone, daughter Shalonda assist with IADL    ALLERGIES:Hydrochlorothiazide  PAST MEDICAL HISTORY:  has a past medical history of CKD (chronic kidney disease) stage 3, GFR 30-59 ml/min; Hyperlipidemia LDL goal < 100; Hypertension goal BP (blood pressure) < 140/90; Osteoarthritis (2/9/2012); Osteopenia; and Rheumatoid arthritis(714.0).  PAST SURGICAL HISTORY:  has a past surgical history that includes APPENDECTOMY (1950) and surgical history of - .  FAMILY HISTORY: family history includes Arthritis in her sister; C.A.D. in her sister; Family History Negative in her brother, daughter, and daughter; HEART DISEASE in her mother; Respiratory in her mother.  SOCIAL HISTORY:  reports that she quit smoking about 17 years ago. Her smoking use included Cigarettes. She has a 10.00 pack-year smoking history. She has never used smokeless tobacco. She reports that she drinks alcohol. She reports that she does not use illicit drugs.    Post Discharge Medication Reconciliation Status: discharge medications reconciled and changed, per note/orders (see AVS).  Current Outpatient Prescriptions   Medication Sig Dispense Refill     acetaminophen (TYLENOL) 325 MG tablet Take 2 tablets (650 mg) by mouth 3 times daily 100 tablet 0     Amino Acids-Protein Hydrolys (PRO-STAT AWC) LIQD Take 1 oz by mouth 2 times daily       atenolol (TENORMIN) 25 MG tablet TAKE 3 TABLETS DAILY 270 tablet 3     atorvastatin (LIPITOR) 20 MG tablet TAKE 1 TABLET DAILY 90 tablet 3     diclofenac (VOLTAREN) 1 % GEL topical gel Place 2 g onto the skin 2 times daily Apply to R thigh       gabapentin (NEURONTIN) 100 MG capsule Take 1 capsule (100 mg) by mouth At Bedtime 30 capsule 0     HYDROcodone-acetaminophen (NORCO) 5-325 MG per tablet Take 1 tablet by mouth every 6 hours       hydroxychloroquine (PLAQUENIL) 200 MG tablet Take 200 mg by mouth daily   3     NIFEdipine ER osmotic (PROCARDIA XL) 90 MG 24 hr  "tablet Take 1 tablet (90 mg) by mouth daily 90 tablet 1     Nutritional Supplements (ENSURE PLUS) LIQD Take 8 oz by mouth daily (with lunch)       senna-docusate (SENOKOT-S;PERICOLACE) 8.6-50 MG per tablet Take 2 tablets by mouth daily And 2 tabs daily prn         ROS:  10 point ROS of systems including Constitutional, Eyes, Respiratory, Cardiovascular, Gastroenterology, Genitourinary, Integumentary, Muscularskeletal, Psychiatric were all negative except for pertinent positives noted in my HPI.    Exam:  /67  Pulse 67  Temp 97.5  F (36.4  C)  Resp 16  Ht 5' 2\" (1.575 m)  Wt 96 lb (43.5 kg)  SpO2 97%  BMI 17.56 kg/m2  GENERAL APPEARANCE:  Alert, in no distress  ENT:  Mouth and posterior oropharynx normal, moist mucous membranes  EYES:  EOM, conjunctivae, lids, pupils and irises normal, glasses  NECK:  No adenopathy,masses or thyromegaly  RESP:  respiratory effort and palpation of chest normal, lungs clear to auscultation, no respiratory distress  CV:  Palpation and auscultation of heart done, regular rate and rhythm, no murmur, rub, or gallop  ABDOMEN:  normal bowel sounds, soft, nontender, no hepatosplenomegaly or other masses  M/S:   lying in bed  SKIN:  Inspection of skin and subcutaneous tissue baseline, bruising on right thigh; 2 open area on coccyx, superficial with healthy granulation tissues  NEURO:   Cranial nerves 2-12 are normal tested and grossly at patient's baseline  PSYCH:  oriented X 3    BP Readings from Last 3 Encounters:   05/29/18 135/67   05/25/18 111/53   05/21/18 110/49     Pulse Readings from Last 4 Encounters:   05/29/18 67   05/23/18 58   05/21/18 69   04/30/18 66     Wt Readings from Last 5 Encounters:   05/29/18 96 lb (43.5 kg)   05/25/18 102 lb 15.3 oz (46.7 kg)   05/21/18 101 lb 4 oz (45.9 kg)   04/30/18 105 lb (47.6 kg)   02/13/18 112 lb 8 oz (51 kg)       Lab/Diagnostic data:  CBC RESULTS:   Recent Labs   Lab Test  05/25/18   0800  05/24/18   0820   WBC  14.2*  18.1*   RBC "  3.09*  3.06*   HGB  9.8*  9.7*   HCT  27.5*  27.3*   MCV  89  89   MCH  31.7  31.7   MCHC  35.6  35.5   RDW  13.3  13.3   PLT  220  227       Last Basic Metabolic Panel:  Recent Labs   Lab Test  05/25/18   0800  05/24/18   1810  05/24/18   0820   NA  128*  127*  128*   POTASSIUM  4.2   --   4.3   CHLORIDE  97   --   96   JOSÉ  8.3*   --   8.5   CO2  23   --   23   BUN  15   --   13   CR  0.60   --   0.66   GLC  91   --   100*       Liver Function Studies -   Recent Labs   Lab Test  04/30/18   1439 04/04/18 09/12/17   1236   PROTTOTAL  6.9   --    --   6.7*   ALBUMIN  4.4   --    --   4.2   BILITOTAL  1.1   --    --   0.6   ALKPHOS  106   --    --   73   AST  44  37*   < >  37   ALT  28  23   < >  31    < > = values in this interval not displayed.       TSH   Date Value Ref Range Status   05/21/2018 1.56 0.40 - 4.00 mU/L Final   08/11/2016 1.95 0.40 - 4.00 mU/L Final         ASSESSMENT/PLAN:  Hyponatremia with decreased serum osmolality  Improving, remains on fluid restriction; encourage hydration with variety beverages  Dietary evaluate and treat, monitor meal intake & calories  Nutritional supplements    Hypertension goal BP (blood pressure) < 140/90  No longer on lisinopril  Continue on atenolol, nifedipine    CKD (chronic kidney disease) stage 3, GFR 30-59 ml/min  Stable, monitor     Rheumatoid arthritis, involving unspecified site, unspecified rheumatoid factor presence (H)  Other chronic pain  Schedule Norco 5-325mg every 6hrs as prior to hospital admission per patient & daughter request to manage pain that was limiting function prior  Continue on Neurontin low dose HS & scheduled tylenol  Add senna s for bowel movement management due to narcotic   Follow up [facility]  Consider back brace  Topical voltaren gel ordered for muscle type pain in right thigh    Decubitus ulcer of coccygeal region, stage 3 (H)  Reposition every 2hrs,   Daily dressing change  Good hygiene  Promote adequate nutritional intake  Referral to  house wound MD     Physical deconditioning  Comment: d/t recent hospitalization & comorbidity, expect delay rehabilitation d/t age & comorbidity  Plan: PT/OT eval & treat, monitor    involved in safe plan home    BMP,CBC 6/1/18    Total time spent with patient visit at the skilled nursing facility was 40 min including patient visit and review of past records. Greater than 50% of total time spent with counseling and coordinating care due to patient status, hyponatremia, rheumatoid arthritis and weakness    Electronically signed by:  STEVE Shoemaker CNP                    Sincerely,        STEVE Shoemaker CNP

## 2018-05-31 ENCOUNTER — HOSPITAL LABORATORY (OUTPATIENT)
Dept: OTHER | Facility: CLINIC | Age: 83
End: 2018-05-31

## 2018-05-31 RX ORDER — ZOLEDRONIC ACID 5 MG/100ML
5 INJECTION, SOLUTION INTRAVENOUS ONCE
COMMUNITY
End: 2018-06-18

## 2018-06-01 LAB
ALBUMIN SERPL-MCNC: 2.7 G/DL (ref 3.4–5)
ALP SERPL-CCNC: 97 U/L (ref 40–150)
ALT SERPL W P-5'-P-CCNC: 22 U/L (ref 0–50)
ANION GAP SERPL CALCULATED.3IONS-SCNC: 10 MMOL/L (ref 3–14)
ANION GAP SERPL CALCULATED.3IONS-SCNC: 4 MMOL/L (ref 3–14)
AST SERPL W P-5'-P-CCNC: 26 U/L (ref 0–45)
BILIRUB SERPL-MCNC: 0.5 MG/DL (ref 0.2–1.3)
BUN SERPL-MCNC: 12 MG/DL (ref 7–30)
BUN SERPL-MCNC: 16 MG/DL (ref 7–30)
CALCIUM SERPL-MCNC: 8.2 MG/DL (ref 8.5–10.1)
CALCIUM SERPL-MCNC: 8.4 MG/DL (ref 8.5–10.1)
CHLORIDE SERPL-SCNC: 100 MMOL/L (ref 94–109)
CHLORIDE SERPL-SCNC: 98 MMOL/L (ref 94–109)
CO2 SERPL-SCNC: 23 MMOL/L (ref 20–32)
CO2 SERPL-SCNC: 28 MMOL/L (ref 20–32)
CREAT SERPL-MCNC: 0.67 MG/DL (ref 0.52–1.04)
CREAT SERPL-MCNC: 0.81 MG/DL (ref 0.52–1.04)
DEPRECATED CALCIDIOL+CALCIFEROL SERPL-MC: 49 UG/L (ref 20–75)
ERYTHROCYTE [DISTWIDTH] IN BLOOD BY AUTOMATED COUNT: 13.6 % (ref 10–15)
GFR SERPL CREATININE-BSD FRML MDRD: 67 ML/MIN/1.7M2
GFR SERPL CREATININE-BSD FRML MDRD: 84 ML/MIN/1.7M2
GLUCOSE SERPL-MCNC: 69 MG/DL (ref 70–99)
GLUCOSE SERPL-MCNC: 79 MG/DL (ref 70–99)
HCT VFR BLD AUTO: 26.1 % (ref 35–47)
HGB BLD-MCNC: 8.9 G/DL (ref 11.7–15.7)
MCH RBC QN AUTO: 30.9 PG (ref 26.5–33)
MCHC RBC AUTO-ENTMCNC: 34.1 G/DL (ref 31.5–36.5)
MCV RBC AUTO: 91 FL (ref 78–100)
PLATELET # BLD AUTO: 248 10E9/L (ref 150–450)
POTASSIUM SERPL-SCNC: 4.1 MMOL/L (ref 3.4–5.3)
POTASSIUM SERPL-SCNC: 4.6 MMOL/L (ref 3.4–5.3)
PROT SERPL-MCNC: 5.7 G/DL (ref 6.8–8.8)
RBC # BLD AUTO: 2.88 10E12/L (ref 3.8–5.2)
SODIUM SERPL-SCNC: 131 MMOL/L (ref 133–144)
SODIUM SERPL-SCNC: 132 MMOL/L (ref 133–144)
WBC # BLD AUTO: 8 10E9/L (ref 4–11)

## 2018-06-04 ENCOUNTER — HOSPITAL LABORATORY (OUTPATIENT)
Dept: OTHER | Facility: CLINIC | Age: 83
End: 2018-06-04

## 2018-06-04 ENCOUNTER — NURSING HOME VISIT (OUTPATIENT)
Dept: GERIATRICS | Facility: CLINIC | Age: 83
End: 2018-06-04
Payer: COMMERCIAL

## 2018-06-04 VITALS
TEMPERATURE: 96.9 F | OXYGEN SATURATION: 96 % | HEART RATE: 65 BPM | HEIGHT: 62 IN | BODY MASS INDEX: 17.3 KG/M2 | RESPIRATION RATE: 16 BRPM | DIASTOLIC BLOOD PRESSURE: 78 MMHG | WEIGHT: 94 LBS | SYSTOLIC BLOOD PRESSURE: 116 MMHG

## 2018-06-04 DIAGNOSIS — E87.1 HYPONATREMIA WITH DECREASED SERUM OSMOLALITY: Primary | ICD-10-CM

## 2018-06-04 DIAGNOSIS — E46 PROTEIN-CALORIE MALNUTRITION, UNSPECIFIED SEVERITY (H): ICD-10-CM

## 2018-06-04 DIAGNOSIS — M06.9 RHEUMATOID ARTHRITIS, INVOLVING UNSPECIFIED SITE, UNSPECIFIED RHEUMATOID FACTOR PRESENCE: ICD-10-CM

## 2018-06-04 LAB
ANION GAP SERPL CALCULATED.3IONS-SCNC: 7 MMOL/L (ref 3–14)
BUN SERPL-MCNC: 26 MG/DL (ref 7–30)
CALCIUM SERPL-MCNC: 8.4 MG/DL (ref 8.5–10.1)
CHLORIDE SERPL-SCNC: 99 MMOL/L (ref 94–109)
CO2 SERPL-SCNC: 26 MMOL/L (ref 20–32)
CREAT SERPL-MCNC: 0.69 MG/DL (ref 0.52–1.04)
ERYTHROCYTE [DISTWIDTH] IN BLOOD BY AUTOMATED COUNT: 14 % (ref 10–15)
GFR SERPL CREATININE-BSD FRML MDRD: 80 ML/MIN/1.7M2
GLUCOSE SERPL-MCNC: 81 MG/DL (ref 70–99)
HCT VFR BLD AUTO: 27.7 % (ref 35–47)
HGB BLD-MCNC: 9.4 G/DL (ref 11.7–15.7)
MCH RBC QN AUTO: 31.3 PG (ref 26.5–33)
MCHC RBC AUTO-ENTMCNC: 33.9 G/DL (ref 31.5–36.5)
MCV RBC AUTO: 92 FL (ref 78–100)
PLATELET # BLD AUTO: 320 10E9/L (ref 150–450)
POTASSIUM SERPL-SCNC: 4.6 MMOL/L (ref 3.4–5.3)
RBC # BLD AUTO: 3 10E12/L (ref 3.8–5.2)
SODIUM SERPL-SCNC: 132 MMOL/L (ref 133–144)
WBC # BLD AUTO: 12.5 10E9/L (ref 4–11)

## 2018-06-04 PROCEDURE — 99309 SBSQ NF CARE MODERATE MDM 30: CPT | Performed by: NURSE PRACTITIONER

## 2018-06-04 RX ORDER — LISINOPRIL 20 MG/1
TABLET ORAL
Qty: 90 TABLET | Refills: 0 | OUTPATIENT
Start: 2018-06-04

## 2018-06-04 NOTE — TELEPHONE ENCOUNTER
This med was d/c on hosp d/c AVS.  FYI to pcp.   Seems like we can just remove the med and close.  DONTE Charles

## 2018-06-04 NOTE — LETTER
6/4/2018        RE: Celestina Tejada  [address]  [facility] [address]        [place] [facility]    Chief Complaint   Patient presents with     detention Acute       [place] Medical Record Number:  [id number]    HPI:    Celestina Tejada is a 87 year old  (4/2/1931), who is being seen today for an episodic care visit at [facility].  HPI information obtained from: facility chart records, facility staff, patient report and [facility] chart review.Today's concern is:    Celestina Deleon is a 87 yr old female admitted to [facility] s/p hospitalization [facility] 5/22-5/25/18 for hyponatremia. PMHx Rheumatoid Arthritis, chronic back pain, hypertension, chronic kidney disease & pressure ulcer (coccyx)     Hyponatremia with decreased serum osmolality  Rheumatoid arthritis, involving unspecified site, unspecified rheumatoid factor presence (H)  Protein-calorie malnutrition, unspecified severity (H)     pain managed, walking in therapies,    Poor appetite, dietary involved  Patient states eat ~50% meals  On DD3 diet per patient/family request due to no bottom teeth; may help allow patient eat more  Family request Remeron due to decrease appetite    Na near normal,   WBC up, no signs and symptoms infection & no complains  Updated daughter Shalonda patient status, patient recently had repeat steroid back injection in back    Desires to return home in future, lives alone      ALLERGIES: Hydrochlorothiazide  Past Medical, Surgical, Family and Social History reviewed and updated in [facility].    Current Outpatient Prescriptions   Medication Sig Dispense Refill     acetaminophen (TYLENOL) 325 MG tablet Take 2 tablets (650 mg) by mouth 3 times daily 100 tablet 0     Amino Acids-Protein Hydrolys (PRO-STAT AWC) LIQD Take 1 oz by mouth daily        atenolol (TENORMIN) 25 MG tablet TAKE 3 TABLETS DAILY 270 tablet 3     atorvastatin (LIPITOR) 20 MG tablet TAKE 1 TABLET DAILY 90  "tablet 3     gabapentin (NEURONTIN) 100 MG capsule Take 1 capsule (100 mg) by mouth At Bedtime 30 capsule 0     HYDROcodone-acetaminophen (NORCO) 5-325 MG per tablet Take 1 tablet by mouth every 6 hours       hydroxychloroquine (PLAQUENIL) 200 MG tablet Take 200 mg by mouth daily   3     Mirtazapine (REMERON PO) Take 15 mg by mouth At Bedtime       NIFEdipine ER osmotic (PROCARDIA XL) 90 MG 24 hr tablet Take 1 tablet (90 mg) by mouth daily 90 tablet 1     Nutritional Supplements (ENSURE PLUS) LIQD Take 8 oz by mouth daily (with lunch)       senna-docusate (SENOKOT-S;PERICOLACE) 8.6-50 MG per tablet Take 2 tablets by mouth daily And 2 tabs daily prn       tocilizumab (ACTEMRA) 80 MG/4ML Inject into the vein once Every 30 days       zoledronic Acid (RECLAST) 5 MG/100ML SOLN infusion Inject 5 mg into the vein once Annually       Medications reviewed:  Medications reconciled to facility chart and changes were made to reflect current medications as identified as above med list. Below are the changes that were made:   Medications stopped since last EPIC medication reconciliation:   There are no discontinued medications.    Medications started since last [facility] medication reconciliation:  No orders of the defined types were placed in this encounter.      REVIEW OF SYSTEMS:  10 point ROS of systems including Constitutional, Eyes, Respiratory, Cardiovascular, Gastroenterology, Genitourinary, Integumentary, Muscularskeletal, Psychiatric were all negative except for pertinent positives noted in my HPI.    Physical Exam:  /78  Pulse 65  Temp 96.9  F (36.1  C)  Resp 16  Ht 5' 2\" (1.575 m)  Wt 94 lb (42.6 kg)  SpO2 96%  BMI 17.19 kg/m2  GENERAL APPEARANCE:  Alert, in no distress  ENT:  Mouth and posterior oropharynx normal, moist mucous membranes  EYES:  EOM, conjunctivae, lids, pupils and irises normal  NECK:  No adenopathy,masses or thyromegaly  RESP:  respiratory effort and palpation of chest normal, lungs clear to " auscultation , no respiratory distress  CV:  Palpation and auscultation of heart done , regular rate and rhythm, no murmur, rub, or gallop  ABDOMEN:  normal bowel sounds, soft, nontender, no hepatosplenomegaly or other masses  M/S:   lying in bed  SKIN:  Inspection of skin and subcutaneous tissue baseline  NEURO:   Cranial nerves 2-12 are normal tested and grossly at patient's baseline  PSYCH:  oriented X 3    BP Readings from Last 3 Encounters:   06/04/18 116/78   05/29/18 135/67   05/25/18 111/53     Pulse Readings from Last 4 Encounters:   06/04/18 65   05/29/18 67   05/23/18 58   05/21/18 69     Wt Readings from Last 5 Encounters:   06/04/18 94 lb (42.6 kg)   05/29/18 96 lb (43.5 kg)   05/25/18 102 lb 15.3 oz (46.7 kg)   05/21/18 101 lb 4 oz (45.9 kg)   04/30/18 105 lb (47.6 kg)       Recent Labs:   CBC RESULTS:   Recent Labs   Lab Test  06/04/18   0632  05/31/18   0704   WBC  12.5*  8.0   RBC  3.00*  2.88*   HGB  9.4*  8.9*   HCT  27.7*  26.1*   MCV  92  91   MCH  31.3  30.9   MCHC  33.9  34.1   RDW  14.0  13.6   PLT  320  248       Last Basic Metabolic Panel:  Recent Labs   Lab Test  06/04/18   0632  05/31/18   0704   NA  132*  132*   POTASSIUM  4.6  4.6   CHLORIDE  99  100   JOSÉ  8.4*  8.2*   CO2  26  28   BUN  26  16   CR  0.69  0.67   GLC  81  79       Liver Function Studies -   Recent Labs   Lab Test  05/31/18   0704  04/30/18   1439   PROTTOTAL  5.7*  6.9   ALBUMIN  2.7*  4.4   BILITOTAL  0.5  1.1   ALKPHOS  97  106   AST  26  44   ALT  22  28       TSH   Date Value Ref Range Status   05/21/2018 1.56 0.40 - 4.00 mU/L Final   08/11/2016 1.95 0.40 - 4.00 mU/L Final         Assessment/Plan:     Hyponatremia with decreased serum osmolality  Rheumatoid arthritis, involving unspecified site, unspecified rheumatoid factor presence (H)  Protein-calorie malnutrition, unspecified severity (H)    Family request Remeron, will trial low dose HS due to poor appeite  On DD3 diet per patient/family request due to no  bottom teeth; may help allow patient eat more  Monitor intake at meal    BMP,CBC 6/8/18 repeat BMP, CBC   Na near normal, monitor. Continue on fluid restriction at this time  WBC up, no signs and symptoms infection & no complains, likely due to recent steroid shot in back), monitor     Desires to return home in future, lives alone  Signs and symptoms involved in safe plan home    Discontinue Voltaren gel , pain managed. Continue on schedule Norco & Neurontin    Electronically signed by  STEVE Shoemaker CNP                      Sincerely,        STEVE Shoemaker CNP

## 2018-06-04 NOTE — PROGRESS NOTES
[place] [facility]    Chief Complaint   Patient presents with     skilled nursing Acute       [place] Medical Record Number:  [id number]    HPI:    Celestina Tejada is a 87 year old  (4/2/1931), who is being seen today for an episodic care visit at [facility].  HPI information obtained from: facility chart records, facility staff, patient report and [facility] chart review.Today's concern is:    Celestina Deleon is a 87 yr old female admitted to [facility] s/p hospitalization [facility] 5/22-5/25/18 for hyponatremia. PMHx Rheumatoid Arthritis, chronic back pain, hypertension, chronic kidney disease & pressure ulcer (coccyx)     Hyponatremia with decreased serum osmolality  Rheumatoid arthritis, involving unspecified site, unspecified rheumatoid factor presence (H)  Protein-calorie malnutrition, unspecified severity (H)     pain managed, walking in therapies,    Poor appetite, dietary involved  Patient states eat ~50% meals  On DD3 diet per patient/family request due to no bottom teeth; may help allow patient eat more  Family request Remeron due to decrease appetite    Na near normal,   WBC up, no signs and symptoms infection & no complains  Updated daughter Shalonda patient status, patient recently had repeat steroid back injection in back    Desires to return home in future, lives alone      ALLERGIES: Hydrochlorothiazide  Past Medical, Surgical, Family and Social History reviewed and updated in [facility].    Current Outpatient Prescriptions   Medication Sig Dispense Refill     acetaminophen (TYLENOL) 325 MG tablet Take 2 tablets (650 mg) by mouth 3 times daily 100 tablet 0     Amino Acids-Protein Hydrolys (PRO-STAT AWC) LIQD Take 1 oz by mouth daily        atenolol (TENORMIN) 25 MG tablet TAKE 3 TABLETS DAILY 270 tablet 3     atorvastatin (LIPITOR) 20 MG tablet TAKE 1 TABLET DAILY 90 tablet 3     gabapentin (NEURONTIN) 100 MG capsule Take 1 capsule (100 mg) by mouth At Bedtime 30 capsule  "0     HYDROcodone-acetaminophen (NORCO) 5-325 MG per tablet Take 1 tablet by mouth every 6 hours       hydroxychloroquine (PLAQUENIL) 200 MG tablet Take 200 mg by mouth daily   3     Mirtazapine (REMERON PO) Take 15 mg by mouth At Bedtime       NIFEdipine ER osmotic (PROCARDIA XL) 90 MG 24 hr tablet Take 1 tablet (90 mg) by mouth daily 90 tablet 1     Nutritional Supplements (ENSURE PLUS) LIQD Take 8 oz by mouth daily (with lunch)       senna-docusate (SENOKOT-S;PERICOLACE) 8.6-50 MG per tablet Take 2 tablets by mouth daily And 2 tabs daily prn       tocilizumab (ACTEMRA) 80 MG/4ML Inject into the vein once Every 30 days       zoledronic Acid (RECLAST) 5 MG/100ML SOLN infusion Inject 5 mg into the vein once Annually       Medications reviewed:  Medications reconciled to facility chart and changes were made to reflect current medications as identified as above med list. Below are the changes that were made:   Medications stopped since last EPIC medication reconciliation:   There are no discontinued medications.    Medications started since last [facility] medication reconciliation:  No orders of the defined types were placed in this encounter.      REVIEW OF SYSTEMS:  10 point ROS of systems including Constitutional, Eyes, Respiratory, Cardiovascular, Gastroenterology, Genitourinary, Integumentary, Muscularskeletal, Psychiatric were all negative except for pertinent positives noted in my HPI.    Physical Exam:  /78  Pulse 65  Temp 96.9  F (36.1  C)  Resp 16  Ht 5' 2\" (1.575 m)  Wt 94 lb (42.6 kg)  SpO2 96%  BMI 17.19 kg/m2  GENERAL APPEARANCE:  Alert, in no distress  ENT:  Mouth and posterior oropharynx normal, moist mucous membranes  EYES:  EOM, conjunctivae, lids, pupils and irises normal  NECK:  No adenopathy,masses or thyromegaly  RESP:  respiratory effort and palpation of chest normal, lungs clear to auscultation , no respiratory distress  CV:  Palpation and auscultation of heart done , regular rate and " rhythm, no murmur, rub, or gallop  ABDOMEN:  normal bowel sounds, soft, nontender, no hepatosplenomegaly or other masses  M/S:   lying in bed  SKIN:  Inspection of skin and subcutaneous tissue baseline  NEURO:   Cranial nerves 2-12 are normal tested and grossly at patient's baseline  PSYCH:  oriented X 3    BP Readings from Last 3 Encounters:   06/04/18 116/78   05/29/18 135/67   05/25/18 111/53     Pulse Readings from Last 4 Encounters:   06/04/18 65   05/29/18 67   05/23/18 58   05/21/18 69     Wt Readings from Last 5 Encounters:   06/04/18 94 lb (42.6 kg)   05/29/18 96 lb (43.5 kg)   05/25/18 102 lb 15.3 oz (46.7 kg)   05/21/18 101 lb 4 oz (45.9 kg)   04/30/18 105 lb (47.6 kg)       Recent Labs:   CBC RESULTS:   Recent Labs   Lab Test  06/04/18   0632  05/31/18   0704   WBC  12.5*  8.0   RBC  3.00*  2.88*   HGB  9.4*  8.9*   HCT  27.7*  26.1*   MCV  92  91   MCH  31.3  30.9   MCHC  33.9  34.1   RDW  14.0  13.6   PLT  320  248       Last Basic Metabolic Panel:  Recent Labs   Lab Test  06/04/18   0632  05/31/18   0704   NA  132*  132*   POTASSIUM  4.6  4.6   CHLORIDE  99  100   JOSÉ  8.4*  8.2*   CO2  26  28   BUN  26  16   CR  0.69  0.67   GLC  81  79       Liver Function Studies -   Recent Labs   Lab Test  05/31/18   0704  04/30/18   1439   PROTTOTAL  5.7*  6.9   ALBUMIN  2.7*  4.4   BILITOTAL  0.5  1.1   ALKPHOS  97  106   AST  26  44   ALT  22  28       TSH   Date Value Ref Range Status   05/21/2018 1.56 0.40 - 4.00 mU/L Final   08/11/2016 1.95 0.40 - 4.00 mU/L Final         Assessment/Plan:     Hyponatremia with decreased serum osmolality  Rheumatoid arthritis, involving unspecified site, unspecified rheumatoid factor presence (H)  Protein-calorie malnutrition, unspecified severity (H)    Family request Remeron, will trial low dose HS due to poor appeite  On DD3 diet per patient/family request due to no bottom teeth; may help allow patient eat more  Monitor intake at meal    BMP,CBC 6/8/18 repeat BMP, CBC   Na  near normal, monitor. Continue on fluid restriction at this time  WBC up, no signs and symptoms infection & no complains, likely due to recent steroid shot in back), monitor     Desires to return home in future, lives alone  Signs and symptoms involved in safe plan home    Discontinue Voltaren gel , pain managed. Continue on schedule Norco & Neurontin    Electronically signed by  STEVE Shoemaker CNP

## 2018-06-05 PROBLEM — E87.1 HYPONATREMIA: Status: RESOLVED | Noted: 2018-05-22 | Resolved: 2018-06-05

## 2018-06-05 PROBLEM — G89.29 OTHER CHRONIC PAIN: Status: RESOLVED | Noted: 2017-11-20 | Resolved: 2018-06-05

## 2018-06-07 ENCOUNTER — NURSING HOME VISIT (OUTPATIENT)
Dept: GERIATRICS | Facility: CLINIC | Age: 83
End: 2018-06-07
Payer: COMMERCIAL

## 2018-06-07 DIAGNOSIS — M06.9 RHEUMATOID ARTHRITIS, INVOLVING UNSPECIFIED SITE, UNSPECIFIED RHEUMATOID FACTOR PRESENCE: ICD-10-CM

## 2018-06-07 DIAGNOSIS — E87.1 HYPONATREMIA WITH DECREASED SERUM OSMOLALITY: Primary | ICD-10-CM

## 2018-06-07 DIAGNOSIS — L89.153 DECUBITUS ULCER OF COCCYGEAL REGION, STAGE 3 (H): ICD-10-CM

## 2018-06-07 DIAGNOSIS — I10 HYPERTENSION GOAL BP (BLOOD PRESSURE) < 140/90: ICD-10-CM

## 2018-06-07 PROCEDURE — 99306 1ST NF CARE HIGH MDM 50: CPT | Performed by: INTERNAL MEDICINE

## 2018-06-08 ENCOUNTER — HOSPITAL LABORATORY (OUTPATIENT)
Dept: OTHER | Facility: CLINIC | Age: 83
End: 2018-06-08

## 2018-06-08 LAB
ANION GAP SERPL CALCULATED.3IONS-SCNC: 7 MMOL/L (ref 3–14)
BUN SERPL-MCNC: 25 MG/DL (ref 7–30)
CALCIUM SERPL-MCNC: 8.5 MG/DL (ref 8.5–10.1)
CHLORIDE SERPL-SCNC: 104 MMOL/L (ref 94–109)
CO2 SERPL-SCNC: 25 MMOL/L (ref 20–32)
CREAT SERPL-MCNC: 0.72 MG/DL (ref 0.52–1.04)
ERYTHROCYTE [DISTWIDTH] IN BLOOD BY AUTOMATED COUNT: 14.1 % (ref 10–15)
GFR SERPL CREATININE-BSD FRML MDRD: 76 ML/MIN/1.7M2
GLUCOSE SERPL-MCNC: 72 MG/DL (ref 70–99)
HCT VFR BLD AUTO: 27 % (ref 35–47)
HGB BLD-MCNC: 8.9 G/DL (ref 11.7–15.7)
MCH RBC QN AUTO: 31.3 PG (ref 26.5–33)
MCHC RBC AUTO-ENTMCNC: 33 G/DL (ref 31.5–36.5)
MCV RBC AUTO: 95 FL (ref 78–100)
PLATELET # BLD AUTO: 297 10E9/L (ref 150–450)
POTASSIUM SERPL-SCNC: 4.4 MMOL/L (ref 3.4–5.3)
RBC # BLD AUTO: 2.84 10E12/L (ref 3.8–5.2)
SODIUM SERPL-SCNC: 136 MMOL/L (ref 133–144)
WBC # BLD AUTO: 8.9 10E9/L (ref 4–11)

## 2018-06-08 NOTE — PROGRESS NOTES
[place] [facility]  PRIMARY CARE PROVIDER AND CLINIC:  Dominic Majo K [address] [facility] [address] / [facility] [address]      Pt was seen by Dr Hinkle on 6/7/18 for an initial TCU visit      HPI:    Celestina Tejdaa is a 87 year old  (4/2/1931), history of HTN, RA, who was admitted to the [facility] from [facility].  Hospital stay 5/22/18 through 5/25/18 for the evaluation of hyponatremia     Admitted to this facility for  rehab, medical management and nursing care.     Hospital course reviewed by me, is as per the hospital dc summary and NP note    Pt was admitted to the hospital for the management of hyponatremia  Na 121 on admission, was 128 at time of d/c  Lab studies most c/w SIADH.  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    Sodium has been in the low 130s since admission to the TCU    Pt states she is feeling a little stronger, is anxious to d/c to home  Chronic back and B LE pain is under fair control on norco and gabapentin, she continues to have occ severe pain R LE  She is walking with walker in therapy    She has f/u with ortho and her rheumatologist  Appetite is fair  She denies abd pain, nausea or diarrhea    DNR/DNI      CODE STATUS/ADVANCE DIRECTIVES DISCUSSION:   DNR / DNI  Patient's living condition: lives alone, daughter Shalonda assist with IADL    ALLERGIES:Hydrochlorothiazide  PAST MEDICAL HISTORY:  has a past medical history of CKD (chronic kidney disease) stage 3, GFR 30-59 ml/min; Hyperlipidemia LDL goal < 100; Hypertension goal BP (blood pressure) < 140/90; Osteoarthritis (2/9/2012); Osteopenia; and Rheumatoid arthritis(714.0).  PAST SURGICAL HISTORY:  has a past surgical history that includes APPENDECTOMY (1950) and surgical history of - .  FAMILY HISTORY: family history includes  Arthritis in her sister; C.A.D. in her sister; Family History Negative in her brother, daughter, and daughter; HEART DISEASE in her mother; Respiratory in her mother.  SOCIAL HISTORY:  reports that she quit smoking about 17 years ago. Her smoking use included Cigarettes. She has a 10.00 pack-year smoking history. She has never used smokeless tobacco. She reports that she drinks alcohol. She reports that she does not use illicit drugs.    Post Discharge Medication Reconciliation Status:   .  Current Outpatient Prescriptions   Medication Sig Dispense Refill     acetaminophen (TYLENOL) 325 MG tablet Take 2 tablets (650 mg) by mouth 3 times daily 100 tablet 0     Amino Acids-Protein Hydrolys (PRO-STAT AWC) LIQD Take 1 oz by mouth daily        atenolol (TENORMIN) 25 MG tablet TAKE 3 TABLETS DAILY 270 tablet 3     atorvastatin (LIPITOR) 20 MG tablet TAKE 1 TABLET DAILY 90 tablet 3     gabapentin (NEURONTIN) 100 MG capsule Take 1 capsule (100 mg) by mouth At Bedtime 30 capsule 0     HYDROcodone-acetaminophen (NORCO) 5-325 MG per tablet Take 1 tablet by mouth every 6 hours       hydroxychloroquine (PLAQUENIL) 200 MG tablet Take 200 mg by mouth daily   3     Mirtazapine (REMERON PO) Take 15 mg by mouth At Bedtime       NIFEdipine ER osmotic (PROCARDIA XL) 90 MG 24 hr tablet Take 1 tablet (90 mg) by mouth daily 90 tablet 1     Nutritional Supplements (ENSURE PLUS) LIQD Take 8 oz by mouth daily (with lunch)       senna-docusate (SENOKOT-S;PERICOLACE) 8.6-50 MG per tablet Take 2 tablets by mouth daily And 2 tabs daily prn       tocilizumab (ACTEMRA) 80 MG/4ML Inject into the vein once Every 30 days       zoledronic Acid (RECLAST) 5 MG/100ML SOLN infusion Inject 5 mg into the vein once Annually         ROS:  10 point ROS neg except as noted above.    Exam:    GENERAL APPEARANCE:  Alert, in no distress, thin, lying in bed  ENT:  Mouth and posterior oropharynx normal, moist mucous membranes  EYES:  EOM, conjunctivae, lids, pupils  and irises normal  NECK:  supple  RESP: Lungs clear  CV:  RRR no M  ABDOMEN: soft, non-tender  M/S:  No LE edema  SKIN:  Coccyx ulcer was not examined  NEURO:   Cranial nerves 2-12 are normal. No focal weakness  PSYCH:  oriented X 3, pleasant in good spirits    BP Readings from Last 3 Encounters:   06/04/18 116/78   05/29/18 135/67   05/25/18 111/53     Pulse Readings from Last 4 Encounters:   06/04/18 65   05/29/18 67   05/23/18 58   05/21/18 69     Wt Readings from Last 5 Encounters:   06/04/18 94 lb (42.6 kg)   05/29/18 96 lb (43.5 kg)   05/25/18 102 lb 15.3 oz (46.7 kg)   05/21/18 101 lb 4 oz (45.9 kg)   04/30/18 105 lb (47.6 kg)       Lab/Diagnostic data:  CBC RESULTS:   Recent Labs   Lab Test  05/25/18   0800  05/24/18   0820   WBC  14.2*  18.1*   RBC  3.09*  3.06*   HGB  9.8*  9.7*   HCT  27.5*  27.3*   MCV  89  89   MCH  31.7  31.7   MCHC  35.6  35.5   RDW  13.3  13.3   PLT  220  227       Last Basic Metabolic Panel:  Recent Labs   Lab Test  05/25/18   0800  05/24/18   1810  05/24/18   0820   NA  128*  127*  128*   POTASSIUM  4.2   --   4.3   CHLORIDE  97   --   96   JOSÉ  8.3*   --   8.5   CO2  23   --   23   BUN  15   --   13   CR  0.60   --   0.66   GLC  91   --   100*       Liver Function Studies -   Recent Labs   Lab Test  04/30/18   1439 04/04/18 09/12/17   1236   PROTTOTAL  6.9   --    --   6.7*   ALBUMIN  4.4   --    --   4.2   BILITOTAL  1.1   --    --   0.6   ALKPHOS  106   --    --   73   AST  44  37*   < >  37   ALT  28  23   < >  31    < > = values in this interval not displayed.       TSH   Date Value Ref Range Status   05/21/2018 1.56 0.40 - 4.00 mU/L Final   08/11/2016 1.95 0.40 - 4.00 mU/L Final         ASSESSMENT/PLAN:    Hyponatremia with decreased serum osmolality  Likely represented SIADH, possibly related to pain, vs effects of recent steroid injection, NSAIDs and lisinopril  Na stable  Plan monitor intake, BMP, liberalize FR      Hypertension goal BP (blood pressure) < 140/90  Plan  monitor off lisinopril    Rheumatoid arthritis, involving unspecified site, unspecified rheumatoid factor presence (H)  Other chronic pain  Plan continue scheduled Norco 5-325mg every 6hrs and gabapentin  Ortho and Rheum f/u  PT/OT    Decubitus ulcer of coccygeal region, stage 3 (H)  Stable per nursing report  Plan monitor ulcer.  House wound MD is following  Monitor nutritional status      Michele Hinkle MD

## 2018-06-11 ENCOUNTER — NURSING HOME VISIT (OUTPATIENT)
Dept: GERIATRICS | Facility: CLINIC | Age: 83
End: 2018-06-11
Payer: COMMERCIAL

## 2018-06-11 VITALS
SYSTOLIC BLOOD PRESSURE: 127 MMHG | OXYGEN SATURATION: 96 % | RESPIRATION RATE: 16 BRPM | DIASTOLIC BLOOD PRESSURE: 57 MMHG | BODY MASS INDEX: 16.9 KG/M2 | HEART RATE: 59 BPM | TEMPERATURE: 97.2 F | WEIGHT: 92.4 LBS

## 2018-06-11 DIAGNOSIS — E46 PROTEIN-CALORIE MALNUTRITION, UNSPECIFIED SEVERITY (H): ICD-10-CM

## 2018-06-11 DIAGNOSIS — I10 HYPERTENSION GOAL BP (BLOOD PRESSURE) < 140/90: ICD-10-CM

## 2018-06-11 DIAGNOSIS — M06.9 RHEUMATOID ARTHRITIS, INVOLVING UNSPECIFIED SITE, UNSPECIFIED RHEUMATOID FACTOR PRESENCE: Primary | ICD-10-CM

## 2018-06-11 PROCEDURE — 99309 SBSQ NF CARE MODERATE MDM 30: CPT | Performed by: NURSE PRACTITIONER

## 2018-06-11 NOTE — LETTER
6/11/2018        RE: Celestina Tejada  [address]  [facility] [address]        [place] [facility]    Chief Complaint   Patient presents with     long-term Acute       [facility] Medical Record Number:  [id number]    HPI:    Celestina Tejada is a 87 year old  (4/2/1931), who is being seen today for an episodic care visit at [facility].  HPI information obtained from: facility chart records, facility staff, patient report and [facility] chart review.Today's concern is:    Celestina Deleon is a 87 yr old female admitted to [facility] s/p hospitalization [facility] 5/22-5/25/18 for hyponatremia. PMHx Rheumatoid Arthritis, chronic back pain, hypertension, chronic kidney disease & pressure ulcer (coccyx)     Rheumatoid arthritis, involving unspecified site, unspecified rheumatoid factor presence (H)  Protein-calorie malnutrition, unspecified severity (H)  Hypertension goal BP (blood pressure) < 140/90     Pain managed  Progressing in therapies, stand by assist transfers  Variable intake in meals  NA within normal limits   Calorie count   6/6 ~720 kcals  6/2 965,   6/3 1775   2day average 1370      ALLERGIES: Hydrochlorothiazide  Past Medical, Surgical, Family and Social History reviewed and updated in [facility].    Current Outpatient Prescriptions   Medication Sig Dispense Refill     acetaminophen (TYLENOL) 325 MG tablet Take 2 tablets (650 mg) by mouth 3 times daily 100 tablet 0     atenolol (TENORMIN) 25 MG tablet TAKE 3 TABLETS DAILY 270 tablet 3     atorvastatin (LIPITOR) 20 MG tablet TAKE 1 TABLET DAILY 90 tablet 3     Dimethicone (CAVILON DURABLE BARRIER EX) Externally apply topically 2 times daily       gabapentin (NEURONTIN) 100 MG capsule Take 1 capsule (100 mg) by mouth At Bedtime 30 capsule 0     HYDROcodone-acetaminophen (NORCO) 5-325 MG per tablet Take 1 tablet by mouth every 6 hours       hydroxychloroquine (PLAQUENIL) 200 MG tablet Take 200 mg by mouth  daily   3     Mirtazapine (REMERON PO) Take 15 mg by mouth At Bedtime       NIFEdipine ER osmotic (PROCARDIA XL) 90 MG 24 hr tablet Take 1 tablet (90 mg) by mouth daily 90 tablet 1     Nutritional Supplements (ENSURE PLUS) LIQD Take 8 oz by mouth daily (with lunch)       senna-docusate (SENOKOT-S;PERICOLACE) 8.6-50 MG per tablet Take 2 tablets by mouth daily And 2 tabs daily prn       tocilizumab (ACTEMRA) 80 MG/4ML Inject into the vein once Every 30 days       zoledronic Acid (RECLAST) 5 MG/100ML SOLN infusion Inject 5 mg into the vein once Annually       Medications reviewed:  Medications reconciled to facility chart and changes were made to reflect current medications as identified as above med list. Below are the changes that were made:   Medications stopped since last EPIC medication reconciliation:   There are no discontinued medications.    Medications started since last [facility] medication reconciliation:  No orders of the defined types were placed in this encounter.      REVIEW OF SYSTEMS:  10 point ROS of systems including Constitutional, Eyes, Respiratory, Cardiovascular, Gastroenterology, Genitourinary, Integumentary, Muscularskeletal, Psychiatric were all negative except for pertinent positives noted in my HPI.    Physical Exam:  /57  Pulse 59  Temp 97.2  F (36.2  C)  Resp 16  Wt 92 lb 6.4 oz (41.9 kg)  SpO2 96%  BMI 16.9 kg/m2  GENERAL APPEARANCE:  Alert, in no distress  ENT:  Mouth and posterior oropharynx normal, moist mucous membranes  EYES:  EOM, conjunctivae, lids, pupils and irises normal  NECK:  No adenopathy,masses or thyromegaly  RESP:  respiratory effort and palpation of chest normal, lungs clear to auscultation , no respiratory distress  CV:  Palpation and auscultation of heart done , regular rate and rhythm, no murmur, rub, or gallop  ABDOMEN:  normal bowel sounds, soft, nontender, no hepatosplenomegaly or other masses  M/S:   lying in bed  SKIN:  Inspection of skin and subcutaneous  tissue baseline  NEURO:   Cranial nerves 2-12 are normal tested and grossly at patient's baseline  PSYCH:  oriented X 3, forgetful & repeats questions    BP Readings from Last 3 Encounters:   06/11/18 127/57   06/04/18 116/78   05/29/18 135/67     Pulse Readings from Last 4 Encounters:   06/11/18 59   06/04/18 65   05/29/18 67   05/23/18 58     Wt Readings from Last 5 Encounters:   06/11/18 92 lb 6.4 oz (41.9 kg)   06/04/18 94 lb (42.6 kg)   05/29/18 96 lb (43.5 kg)   05/25/18 102 lb 15.3 oz (46.7 kg)   05/21/18 101 lb 4 oz (45.9 kg)         Recent Labs:  CBC RESULTS:   Recent Labs   Lab Test  06/08/18   0700  06/04/18   0632   WBC  8.9  12.5*   RBC  2.84*  3.00*   HGB  8.9*  9.4*   HCT  27.0*  27.7*   MCV  95  92   MCH  31.3  31.3   MCHC  33.0  33.9   RDW  14.1  14.0   PLT  297  320       Last Basic Metabolic Panel:  Recent Labs   Lab Test  06/08/18   0700  06/04/18   0632   NA  136  132*   POTASSIUM  4.4  4.6   CHLORIDE  104  99   JOSÉ  8.5  8.4*   CO2  25  26   BUN  25  26   CR  0.72  0.69   GLC  72  81       Liver Function Studies -   Recent Labs   Lab Test  05/31/18   0704  04/30/18   1439   PROTTOTAL  5.7*  6.9   ALBUMIN  2.7*  4.4   BILITOTAL  0.5  1.1   ALKPHOS  97  106   AST  26  44   ALT  22  28       TSH   Date Value Ref Range Status   05/21/2018 1.56 0.40 - 4.00 mU/L Final   08/11/2016 1.95 0.40 - 4.00 mU/L Final     Assessment/Plan:     Rheumatoid arthritis, involving unspecified site, unspecified rheumatoid factor presence (H)  Protein-calorie malnutrition, unspecified severity (H)  Hypertension goal BP (blood pressure) < 140/90      Discontinue fluid restriction ,NA within normal limits   Variable intake at meals  Continue with Remeron & monitor intake at meal   BMP,CBC 6/13    recommend meals on wheels, assistance with meal on discharge   Progressing in therapies, plan to continue x1 week & discontinue home with homecare    SBP managed, continue on Atenolol & Procardia          Electronically signed  by  STEVE Shoemaker CNP                      Sincerely,        STEVE Shoemaker CNP

## 2018-06-11 NOTE — PROGRESS NOTES
[place] [facility]    Chief Complaint   Patient presents with     prison Acute       [facility] Medical Record Number:  [id number]    HPI:    Celestina Tejada is a 87 year old  (4/2/1931), who is being seen today for an episodic care visit at [facility].  HPI information obtained from: facility chart records, facility staff, patient report and [facility] chart review.Today's concern is:    Celestina Deleon is a 87 yr old female admitted to [facility] s/p hospitalization [facility] 5/22-5/25/18 for hyponatremia. PMHx Rheumatoid Arthritis, chronic back pain, hypertension, chronic kidney disease & pressure ulcer (coccyx)     Rheumatoid arthritis, involving unspecified site, unspecified rheumatoid factor presence (H)  Protein-calorie malnutrition, unspecified severity (H)  Hypertension goal BP (blood pressure) < 140/90     Pain managed  Progressing in therapies, stand by assist transfers  Variable intake in meals  NA within normal limits   Calorie count   6/6 ~720 kcals  6/2 965,   6/3 1775   2day average 1370      ALLERGIES: Hydrochlorothiazide  Past Medical, Surgical, Family and Social History reviewed and updated in [facility].    Current Outpatient Prescriptions   Medication Sig Dispense Refill     acetaminophen (TYLENOL) 325 MG tablet Take 2 tablets (650 mg) by mouth 3 times daily 100 tablet 0     atenolol (TENORMIN) 25 MG tablet TAKE 3 TABLETS DAILY 270 tablet 3     atorvastatin (LIPITOR) 20 MG tablet TAKE 1 TABLET DAILY 90 tablet 3     Dimethicone (CAVILON DURABLE BARRIER EX) Externally apply topically 2 times daily       gabapentin (NEURONTIN) 100 MG capsule Take 1 capsule (100 mg) by mouth At Bedtime 30 capsule 0     HYDROcodone-acetaminophen (NORCO) 5-325 MG per tablet Take 1 tablet by mouth every 6 hours       hydroxychloroquine (PLAQUENIL) 200 MG tablet Take 200 mg by mouth daily   3     Mirtazapine (REMERON PO) Take 15 mg by mouth At Bedtime       NIFEdipine ER osmotic  (PROCARDIA XL) 90 MG 24 hr tablet Take 1 tablet (90 mg) by mouth daily 90 tablet 1     Nutritional Supplements (ENSURE PLUS) LIQD Take 8 oz by mouth daily (with lunch)       senna-docusate (SENOKOT-S;PERICOLACE) 8.6-50 MG per tablet Take 2 tablets by mouth daily And 2 tabs daily prn       tocilizumab (ACTEMRA) 80 MG/4ML Inject into the vein once Every 30 days       zoledronic Acid (RECLAST) 5 MG/100ML SOLN infusion Inject 5 mg into the vein once Annually       Medications reviewed:  Medications reconciled to facility chart and changes were made to reflect current medications as identified as above med list. Below are the changes that were made:   Medications stopped since last EPIC medication reconciliation:   There are no discontinued medications.    Medications started since last [facility] medication reconciliation:  No orders of the defined types were placed in this encounter.      REVIEW OF SYSTEMS:  10 point ROS of systems including Constitutional, Eyes, Respiratory, Cardiovascular, Gastroenterology, Genitourinary, Integumentary, Muscularskeletal, Psychiatric were all negative except for pertinent positives noted in my HPI.    Physical Exam:  /57  Pulse 59  Temp 97.2  F (36.2  C)  Resp 16  Wt 92 lb 6.4 oz (41.9 kg)  SpO2 96%  BMI 16.9 kg/m2  GENERAL APPEARANCE:  Alert, in no distress  ENT:  Mouth and posterior oropharynx normal, moist mucous membranes  EYES:  EOM, conjunctivae, lids, pupils and irises normal  NECK:  No adenopathy,masses or thyromegaly  RESP:  respiratory effort and palpation of chest normal, lungs clear to auscultation , no respiratory distress  CV:  Palpation and auscultation of heart done , regular rate and rhythm, no murmur, rub, or gallop  ABDOMEN:  normal bowel sounds, soft, nontender, no hepatosplenomegaly or other masses  M/S:   lying in bed  SKIN:  Inspection of skin and subcutaneous tissue baseline  NEURO:   Cranial nerves 2-12 are normal tested and grossly at patient's  baseline  PSYCH:  oriented X 3, forgetful & repeats questions    BP Readings from Last 3 Encounters:   06/11/18 127/57   06/04/18 116/78   05/29/18 135/67     Pulse Readings from Last 4 Encounters:   06/11/18 59   06/04/18 65   05/29/18 67   05/23/18 58     Wt Readings from Last 5 Encounters:   06/11/18 92 lb 6.4 oz (41.9 kg)   06/04/18 94 lb (42.6 kg)   05/29/18 96 lb (43.5 kg)   05/25/18 102 lb 15.3 oz (46.7 kg)   05/21/18 101 lb 4 oz (45.9 kg)         Recent Labs:  CBC RESULTS:   Recent Labs   Lab Test  06/08/18   0700  06/04/18   0632   WBC  8.9  12.5*   RBC  2.84*  3.00*   HGB  8.9*  9.4*   HCT  27.0*  27.7*   MCV  95  92   MCH  31.3  31.3   MCHC  33.0  33.9   RDW  14.1  14.0   PLT  297  320       Last Basic Metabolic Panel:  Recent Labs   Lab Test  06/08/18   0700  06/04/18   0632   NA  136  132*   POTASSIUM  4.4  4.6   CHLORIDE  104  99   JOSÉ  8.5  8.4*   CO2  25  26   BUN  25  26   CR  0.72  0.69   GLC  72  81       Liver Function Studies -   Recent Labs   Lab Test  05/31/18   0704  04/30/18   1439   PROTTOTAL  5.7*  6.9   ALBUMIN  2.7*  4.4   BILITOTAL  0.5  1.1   ALKPHOS  97  106   AST  26  44   ALT  22  28       TSH   Date Value Ref Range Status   05/21/2018 1.56 0.40 - 4.00 mU/L Final   08/11/2016 1.95 0.40 - 4.00 mU/L Final     Assessment/Plan:     Rheumatoid arthritis, involving unspecified site, unspecified rheumatoid factor presence (H)  Protein-calorie malnutrition, unspecified severity (H)  Hypertension goal BP (blood pressure) < 140/90      Discontinue fluid restriction ,NA within normal limits   Variable intake at meals  Continue with Remeron & monitor intake at meal   BMP,CBC 6/13    recommend meals on wheels, assistance with meal on discharge   Progressing in therapies, plan to continue x1 week & discontinue home with homecare    SBP managed, continue on Atenolol & Procardia          Electronically signed by  STEVE Shoemaker CNP

## 2018-06-12 VITALS
OXYGEN SATURATION: 93 % | DIASTOLIC BLOOD PRESSURE: 52 MMHG | WEIGHT: 91.9 LBS | TEMPERATURE: 97.7 F | HEIGHT: 62 IN | HEART RATE: 79 BPM | SYSTOLIC BLOOD PRESSURE: 106 MMHG | BODY MASS INDEX: 16.91 KG/M2 | RESPIRATION RATE: 16 BRPM

## 2018-06-12 RX ORDER — AMINO ACIDS/PROTEIN HYDROLYS 15G-100/30
1 LIQUID (ML) ORAL 2 TIMES DAILY
COMMUNITY
End: 2019-07-25

## 2018-06-13 ENCOUNTER — HOSPITAL LABORATORY (OUTPATIENT)
Dept: OTHER | Facility: CLINIC | Age: 83
End: 2018-06-13

## 2018-06-13 ENCOUNTER — DISCHARGE SUMMARY NURSING HOME (OUTPATIENT)
Dept: GERIATRICS | Facility: CLINIC | Age: 83
End: 2018-06-13
Payer: COMMERCIAL

## 2018-06-13 DIAGNOSIS — E87.1 HYPONATREMIA WITH DECREASED SERUM OSMOLALITY: ICD-10-CM

## 2018-06-13 DIAGNOSIS — G89.29 OTHER CHRONIC PAIN: ICD-10-CM

## 2018-06-13 DIAGNOSIS — M62.81 GENERALIZED MUSCLE WEAKNESS: Primary | ICD-10-CM

## 2018-06-13 DIAGNOSIS — N18.30 CKD (CHRONIC KIDNEY DISEASE) STAGE 3, GFR 30-59 ML/MIN (H): ICD-10-CM

## 2018-06-13 DIAGNOSIS — E46 PROTEIN-CALORIE MALNUTRITION, UNSPECIFIED SEVERITY (H): ICD-10-CM

## 2018-06-13 DIAGNOSIS — I10 HYPERTENSION GOAL BP (BLOOD PRESSURE) < 140/90: ICD-10-CM

## 2018-06-13 DIAGNOSIS — M06.9 RHEUMATOID ARTHRITIS, INVOLVING UNSPECIFIED SITE, UNSPECIFIED RHEUMATOID FACTOR PRESENCE: ICD-10-CM

## 2018-06-13 LAB
ANION GAP SERPL CALCULATED.3IONS-SCNC: 8 MMOL/L (ref 3–14)
BUN SERPL-MCNC: 32 MG/DL (ref 7–30)
CALCIUM SERPL-MCNC: 8.5 MG/DL (ref 8.5–10.1)
CHLORIDE SERPL-SCNC: 107 MMOL/L (ref 94–109)
CO2 SERPL-SCNC: 24 MMOL/L (ref 20–32)
CREAT SERPL-MCNC: 0.79 MG/DL (ref 0.52–1.04)
ERYTHROCYTE [DISTWIDTH] IN BLOOD BY AUTOMATED COUNT: 14.4 % (ref 10–15)
GFR SERPL CREATININE-BSD FRML MDRD: 69 ML/MIN/1.7M2
GLUCOSE SERPL-MCNC: 75 MG/DL (ref 70–99)
HCT VFR BLD AUTO: 25.2 % (ref 35–47)
HGB BLD-MCNC: 8.4 G/DL (ref 11.7–15.7)
MCH RBC QN AUTO: 31.5 PG (ref 26.5–33)
MCHC RBC AUTO-ENTMCNC: 33.3 G/DL (ref 31.5–36.5)
MCV RBC AUTO: 94 FL (ref 78–100)
PLATELET # BLD AUTO: 223 10E9/L (ref 150–450)
POTASSIUM SERPL-SCNC: 4.5 MMOL/L (ref 3.4–5.3)
RBC # BLD AUTO: 2.67 10E12/L (ref 3.8–5.2)
SODIUM SERPL-SCNC: 139 MMOL/L (ref 133–144)
WBC # BLD AUTO: 7.9 10E9/L (ref 4–11)

## 2018-06-13 PROCEDURE — 99316 NF DSCHRG MGMT 30 MIN+: CPT | Performed by: NURSE PRACTITIONER

## 2018-06-13 NOTE — LETTER
6/13/2018        RE: Celestina Tejada  [address]  [facility] [address]          [place] [facility] DISCHARGE SUMMARY    PATIENT'S NAME: Celestina Tejada  YOB: 1931  MEDICAL RECORD NUMBER:  [id number]    PRIMARY CARE PROVIDER AND CLINIC RESPONSIBLE AFTER TRANSFER: Majo Alfaro [address] / [facility] [id number]     CODE STATUS/ADVANCE DIRECTIVES DISCUSSION:   DNR / DNI       Allergies   Allergen Reactions     Hydrochlorothiazide      hyponatremia       TRANSFERRING PROVIDERS:STEVE Shoemaker CNP; Michele Hinkle MD  DATE OF SNF ADMISSION:  May / 25 / 2018  DATE OF SNF (anticipated) DISCHARGE: June / 19 / 2018  DISCHARGE DISPOSITION: [facility] Provider   Nursing Facility: [facility] stay 5/22/18 to 5/25/18.     Condition on Discharge:  Stable.  Function/ Cognitive Scores/Equipment:   SLUMS: 15/30  CPT: 4.8/5.6 still need to finish 1 of the subtests  SBA with all ADL's  Rec: continue 1-1.5 weeks-recommend skilled nursing, family wants to try home with homecare with meals on wheels    DISCHARGE DIAGNOSIS:   1. Other chronic pain    2. Protein-calorie malnutrition, unspecified severity (H)    3. Rheumatoid arthritis, involving unspecified site, unspecified rheumatoid factor presence (H)    4. CKD (chronic kidney disease) stage 3, GFR 30-59 ml/min    5. Hypertension goal BP (blood pressure) < 140/90    6. Hyponatremia with decreased serum osmolality        HPI Nursing Facility Course:  HPI information obtained from: facility chart records, facility staff, patient report and [facility] chart review.    Celestina Deleon is a 87 yr old female admitted to [facility] s/p hospitalization [facility] 5/22-5/25/18 for hyponatremia. PMHx Rheumatoid Arthritis, chronic back pain, hypertension, chronic kidney disease & pressure ulcer (coccyx)    Hyponatremia  Per hospital note  Clinical history suggestive of hypovolemia, as  her family reports that she has not been eating or drinking as well over the past 3 weeks due to pain. However she was not markedly dry on presentation.  She has been in a substantial amount of pain for the past 3 weeks and SIADH from uncontrolled pain is a consideration.Urine studies are more suggestive of SIADH with urine sodium of 25 and urine osm of 385.  She appears minimally symptomatic.  Sodium 121 on hospital admission; improving and stable at 128 at the time of discharge from hospital  Came on fluid restriction 1200cc; sodium improve 5/29/18 to 130, discuss with patient to hydrate with variety of beverages besides water   Has 3xwk weight; noted gradual weight loss over time (pt states was 120lb prior & now 96lb)  Fluid restriction discontinue & NA level within normal limits      Hypertension  On atenolol, nifedipine  Lisinopril held due to concern hyponatremia; blood pressure controlled & likely not restart  chronic kidney disease stage III  stable    Rheumatoid arthritis  On hydroxychloroquine   Subacute low back pain with reported severe degenerative disc disease, with sciatica   Rheumatoid Arthritis pain in back with nerve pain down leg with thigh pain  Recently evaluated by [facility] & s/p had epidural injection approximately 9-10 days prior to hospital admission & repeat steroid injection while at [facility]  on hydrocodone-acetaminophen & tylenol scheduled & pain managed  Hold NSAIDs - consider prn if sodium level remain stable  low dose gabapentin at  started 5/23 for 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; this can be uptitrated as outpatient  Outpatient follow-up with [facility], plans for repeat steroid shot, has follow up appointment this week  Note has burn from heating pad on right thigh  On Reclast for bone health  receives IV infusion Actemra every 30 days for Rheumatoid Arthritis & to receive with wound resolved on coccyx)  Weakness  On therapies, walks with walker  Goal to return to independently living  apartment with extra services & homecare  Daughter Shalonda assist with IADL, daughter lives in apartment building near her  Patient sister lives in her apartment   Pressure Injury located on coccyx: Stage 3, present on admission.  No s/s infection.  Wound is relatively shallow and clean  Daily dressing changes, monitor   Pressure reduction  Protein malnutrition  encouraging adequate meal intake  Meals on wheels plan for patient on discharge   Monitor intake & weights  Started on Remeron  On DD3 diet & thin liquids, Does not have lower dentures    Anemia  hgb stable ~8.5-9, however, had been higher prior to hospital admission   Follow up primary care provider, BMP,CBC  Iron studies, B12, TSH, free T4 next week    PAST MEDICAL HISTORY:  has a past medical history of CKD (chronic kidney disease) stage 3, GFR 30-59 ml/min; Hyperlipidemia LDL goal < 100; Hypertension goal BP (blood pressure) < 140/90; Osteoarthritis (2/9/2012); Osteopenia; and Rheumatoid arthritis(714.0).    DISCHARGE MEDICATIONS:  Current Outpatient Prescriptions   Medication Sig Dispense Refill     acetaminophen (TYLENOL) 325 MG tablet Take 2 tablets (650 mg) by mouth 3 times daily 100 tablet 0     Amino Acids-Protein Hydrolys (PRO-STAT AWC) LIQD Take 1 oz by mouth 2 times daily       atenolol (TENORMIN) 25 MG tablet TAKE 3 TABLETS DAILY 270 tablet 3     atorvastatin (LIPITOR) 20 MG tablet TAKE 1 TABLET DAILY 90 tablet 3     gabapentin (NEURONTIN) 100 MG capsule Take 1 capsule (100 mg) by mouth At Bedtime 30 capsule 0     HYDROcodone-acetaminophen (NORCO) 5-325 MG per tablet Take 1 tablet by mouth every 6 hours       hydroxychloroquine (PLAQUENIL) 200 MG tablet Take 200 mg by mouth daily   3     Mirtazapine (REMERON PO) Take 15 mg by mouth At Bedtime       NIFEdipine ER osmotic (PROCARDIA XL) 90 MG 24 hr tablet Take 1 tablet (90 mg) by mouth daily 90 tablet 1     Nutritional Supplements (ENSURE PLUS) LIQD Take by mouth 2 times daily         "senna-docusate (SENOKOT-S;PERICOLACE) 8.6-50 MG per tablet Take 2 tablets by mouth daily And 2 tabs daily prn       tocilizumab (ACTEMRA) 80 MG/4ML Inject into the vein once Every 30 days       zoledronic Acid (RECLAST) 5 MG/100ML SOLN infusion Inject 5 mg into the vein once Annually         MEDICATION CHANGES/RATIONALE:   Controlled medications sent with patient:   no more than 2 wks supply     ROS:    10 point ROS of systems including Constitutional, Eyes, Respiratory, Cardiovascular, Gastroenterology, Genitourinary, Integumentary, Muscularskeletal, Psychiatric were all negative except for pertinent positives noted in my HPI.    Physical Exam:   Vitals: /52  Pulse 79  Temp 97.7  F (36.5  C)  Resp 16  Ht 5' 2\" (1.575 m)  Wt 91 lb 14.4 oz (41.7 kg)  SpO2 93%  BMI 16.81 kg/m2  BMI= Body mass index is 16.81 kg/(m^2).    GENERAL APPEARANCE:  Alert, in no distress  Same as prior    BP Readings from Last 3 Encounters:   06/12/18 106/52   06/11/18 127/57   06/04/18 116/78     Pulse Readings from Last 4 Encounters:   06/12/18 79   06/11/18 59   06/04/18 65   05/29/18 67     Wt Readings from Last 5 Encounters:   06/12/18 91 lb 14.4 oz (41.7 kg)   06/11/18 92 lb 6.4 oz (41.9 kg)   06/04/18 94 lb (42.6 kg)   05/29/18 96 lb (43.5 kg)   05/25/18 102 lb 15.3 oz (46.7 kg)       DISCHARGE PLAN:  Occupational Therapy, Physical Therapy, Registered Nurse, Home Health Aide,  and From:  [facility]  Patient instructed to follow-up with:  PCP in 7 days      Current [facility] scheduled appointments:  Future Appointments  No future appointments.    MTM referral needed and placed by this provider: No      SNF labs   CBC RESULTS:   Recent Labs   Lab Test  06/08/18   0700  06/04/18   0632   WBC  8.9  12.5*   RBC  2.84*  3.00*   HGB  8.9*  9.4*   HCT  27.0*  27.7*   MCV  95  92   MCH  31.3  31.3   MCHC  33.0  33.9   RDW  14.1  14.0   PLT  297  320       Last Basic Metabolic Panel:  Recent Labs   Lab Test  " 06/08/18   0700  06/04/18   0632   NA  136  132*   POTASSIUM  4.4  4.6   CHLORIDE  104  99   JOSÉ  8.5  8.4*   CO2  25  26   BUN  25  26   CR  0.72  0.69   GLC  72  81       Liver Function Studies -   Recent Labs   Lab Test  05/31/18   0704  04/30/18   1439   PROTTOTAL  5.7*  6.9   ALBUMIN  2.7*  4.4   BILITOTAL  0.5  1.1   ALKPHOS  97  106   AST  26  44   ALT  22  28       TSH   Date Value Ref Range Status   05/21/2018 1.56 0.40 - 4.00 mU/L Final   08/11/2016 1.95 0.40 - 4.00 mU/L Final           TOTAL DISCHARGE TIME:   Greater than 30 minutes  Electronically signed by:  STEVE Shoemaker CNP      Sincerely,        STEVE Shoemaker CNP

## 2018-06-13 NOTE — PROGRESS NOTES
[facility] DISCHARGE SUMMARY    PATIENT'S NAME: Celestina Tejada  YOB: 1931  MEDICAL RECORD NUMBER:  [id number]    PRIMARY CARE PROVIDER AND CLINIC RESPONSIBLE AFTER TRANSFER: Majo Alfaro [facility] [place] [address] / [facility] [id number]     CODE STATUS/ADVANCE DIRECTIVES DISCUSSION:   DNR / DNI       Allergies   Allergen Reactions     Hydrochlorothiazide      hyponatremia       TRANSFERRING PROVIDERS:STEVE Shoemaker CNP; Michele Hinkle MD  DATE OF SNF ADMISSION:  May / 25 / 2018  DATE OF SNF (anticipated) DISCHARGE: June / 19 / 2018  DISCHARGE DISPOSITION: FMG Provider   Nursing Facility: [facility] stay 5/22/18 to 5/25/18.     Condition on Discharge:  Stable.  Function/ Cognitive Scores/Equipment:   SLUMS: 15/30  CPT: 4.8/5.6 still need to finish 1 of the subtests  SBA with all ADL's  Rec: continue 1-1.5 weeks-recommend ED, family wants to try home with homecare with meals on wheels    DISCHARGE DIAGNOSIS:   1. Other chronic pain    2. Protein-calorie malnutrition, unspecified severity (H)    3. Rheumatoid arthritis, involving unspecified site, unspecified rheumatoid factor presence (H)    4. CKD (chronic kidney disease) stage 3, GFR 30-59 ml/min    5. Hypertension goal BP (blood pressure) < 140/90    6. Hyponatremia with decreased serum osmolality        HPI Nursing Facility Course:  HPI information obtained from: facility chart records, facility staff, patient report and [facility] chart review.    Celestina Deleon is a 87 yr old female admitted to [facility] s/p hospitalization [facility] 5/22-5/25/18 for hyponatremia. PMHx Rheumatoid Arthritis, chronic back pain, hypertension, chronic kidney disease & pressure ulcer (coccyx)    Hyponatremia  Per hospital note  Clinical history suggestive of hypovolemia, as her family reports that she has not been eating or drinking as well over the past 3 weeks due to pain.  However she was not markedly dry on presentation.  She has been in a substantial amount of pain for the past 3 weeks and SIADH from uncontrolled pain is a consideration.Urine studies are more suggestive of SIADH with urine sodium of 25 and urine osm of 385.  She appears minimally symptomatic.  Sodium 121 on hospital admission; improving and stable at 128 at the time of discharge from hospital  Came on fluid restriction 1200cc; sodium improve 5/29/18 to 130, discuss with patient to hydrate with variety of beverages besides water   Has 3xwk weight; noted gradual weight loss over time (pt states was 120lb prior & now 96lb)  Fluid restriction discontinue & NA level within normal limits      Hypertension  On atenolol, nifedipine  Lisinopril held due to concern hyponatremia; blood pressure controlled & likely not restart  chronic kidney disease stage III  stable    Rheumatoid arthritis  On hydroxychloroquine   Subacute low back pain with reported severe degenerative disc disease, with sciatica   Rheumatoid Arthritis pain in back with nerve pain down leg with thigh pain  Recently evaluated by [facility] & s/p had epidural injection approximately 9-10 days prior to hospital admission & repeat steroid injection while at TCU  on hydrocodone-acetaminophen & tylenol scheduled & pain managed  Hold NSAIDs - consider prn if sodium level remain stable  low dose gabapentin at hs started 5/23 for 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; this can be uptitrated as outpatient  Outpatient follow-up with [facility], plans for repeat steroid shot, has follow up appointment this week  Note has burn from heating pad on right thigh  On Reclast for bone health  receives IV infusion Actemra every 30 days for Rheumatoid Arthritis & to receive with wound resolved on coccyx)  Weakness  On therapies, walks with walker  Goal to return to independently living apartment with extra services & homecare  Daughter Shalonda assist with IADL, daughter lives in apartment  building near her  Patient sister lives in her apartment   Pressure Injury located on coccyx: Stage 3, present on admission.  No s/s infection.  Wound is relatively shallow and clean  Daily dressing changes, monitor   Pressure reduction  Protein malnutrition  encouraging adequate meal intake  Meals on wheels plan for patient on discharge   Monitor intake & weights  Started on Remeron  On DD3 diet & thin liquids, Does not have lower dentures    Anemia  hgb stable ~8.5-9, however, had been higher prior to hospital admission   Follow up primary care provider, BMP,CBC  Iron studies, B12, TSH, free T4 next week    PAST MEDICAL HISTORY:  has a past medical history of CKD (chronic kidney disease) stage 3, GFR 30-59 ml/min; Hyperlipidemia LDL goal < 100; Hypertension goal BP (blood pressure) < 140/90; Osteoarthritis (2/9/2012); Osteopenia; and Rheumatoid arthritis(714.0).    DISCHARGE MEDICATIONS:  Current Outpatient Prescriptions   Medication Sig Dispense Refill     acetaminophen (TYLENOL) 325 MG tablet Take 2 tablets (650 mg) by mouth 3 times daily 100 tablet 0     Amino Acids-Protein Hydrolys (PRO-STAT AWC) LIQD Take 1 oz by mouth 2 times daily       atenolol (TENORMIN) 25 MG tablet TAKE 3 TABLETS DAILY 270 tablet 3     atorvastatin (LIPITOR) 20 MG tablet TAKE 1 TABLET DAILY 90 tablet 3     gabapentin (NEURONTIN) 100 MG capsule Take 1 capsule (100 mg) by mouth At Bedtime 30 capsule 0     HYDROcodone-acetaminophen (NORCO) 5-325 MG per tablet Take 1 tablet by mouth every 6 hours       hydroxychloroquine (PLAQUENIL) 200 MG tablet Take 200 mg by mouth daily   3     Mirtazapine (REMERON PO) Take 15 mg by mouth At Bedtime       NIFEdipine ER osmotic (PROCARDIA XL) 90 MG 24 hr tablet Take 1 tablet (90 mg) by mouth daily 90 tablet 1     Nutritional Supplements (ENSURE PLUS) LIQD Take by mouth 2 times daily        senna-docusate (SENOKOT-S;PERICOLACE) 8.6-50 MG per tablet Take 2 tablets by mouth daily And 2 tabs daily prn        "tocilizumab (ACTEMRA) 80 MG/4ML Inject into the vein once Every 30 days       zoledronic Acid (RECLAST) 5 MG/100ML SOLN infusion Inject 5 mg into the vein once Annually         MEDICATION CHANGES/RATIONALE:   Controlled medications sent with patient:   no more than 2 wks supply     ROS:    10 point ROS of systems including Constitutional, Eyes, Respiratory, Cardiovascular, Gastroenterology, Genitourinary, Integumentary, Muscularskeletal, Psychiatric were all negative except for pertinent positives noted in my HPI.    Physical Exam:   Vitals: /52  Pulse 79  Temp 97.7  F (36.5  C)  Resp 16  Ht 5' 2\" (1.575 m)  Wt 91 lb 14.4 oz (41.7 kg)  SpO2 93%  BMI 16.81 kg/m2  BMI= Body mass index is 16.81 kg/(m^2).    GENERAL APPEARANCE:  Alert, in no distress  Same as prior    BP Readings from Last 3 Encounters:   06/12/18 106/52   06/11/18 127/57   06/04/18 116/78     Pulse Readings from Last 4 Encounters:   06/12/18 79   06/11/18 59   06/04/18 65   05/29/18 67     Wt Readings from Last 5 Encounters:   06/12/18 91 lb 14.4 oz (41.7 kg)   06/11/18 92 lb 6.4 oz (41.9 kg)   06/04/18 94 lb (42.6 kg)   05/29/18 96 lb (43.5 kg)   05/25/18 102 lb 15.3 oz (46.7 kg)       DISCHARGE PLAN:  Occupational Therapy, Physical Therapy, Registered Nurse, Home Health Aide,  and From:  [facility]  Patient instructed to follow-up with:  PCP in 7 days      Current [facility] scheduled appointments:  Future Appointments  No future appointments.    MTM referral needed and placed by this provider: No      SNF labs   CBC RESULTS:   Recent Labs   Lab Test  06/08/18   0700  06/04/18   0632   WBC  8.9  12.5*   RBC  2.84*  3.00*   HGB  8.9*  9.4*   HCT  27.0*  27.7*   MCV  95  92   MCH  31.3  31.3   MCHC  33.0  33.9   RDW  14.1  14.0   PLT  297  320       Last Basic Metabolic Panel:  Recent Labs   Lab Test  06/08/18   0700  06/04/18   0632   NA  136  132*   POTASSIUM  4.4  4.6   CHLORIDE  104  99   JOSÉ  8.5  8.4*   CO2  25  26 "   BUN  25  26   CR  0.72  0.69   GLC  72  81       Liver Function Studies -   Recent Labs   Lab Test  05/31/18   0704  04/30/18   1439   PROTTOTAL  5.7*  6.9   ALBUMIN  2.7*  4.4   BILITOTAL  0.5  1.1   ALKPHOS  97  106   AST  26  44   ALT  22  28       TSH   Date Value Ref Range Status   05/21/2018 1.56 0.40 - 4.00 mU/L Final   08/11/2016 1.95 0.40 - 4.00 mU/L Final           TOTAL DISCHARGE TIME:   Greater than 30 minutes  Electronically signed by:  STEVE Shoemaker CNP

## 2018-06-14 ENCOUNTER — PATIENT OUTREACH (OUTPATIENT)
Dept: CARE COORDINATION | Facility: CLINIC | Age: 83
End: 2018-06-14

## 2018-06-14 DIAGNOSIS — G89.29 OTHER CHRONIC PAIN: Primary | ICD-10-CM

## 2018-06-14 NOTE — PROGRESS NOTES
Clinic Care Coordination Contact  Care Team Conversations    Incoming call from Delmi CARTER( [phone])  Left a message on CC VM that the patient will discharge from TCU June 19 with [place] home care services Medication dispenser,[employer]eline and Meals on Wheels     CC will follow up when the patient is discharged from TCU    Nina Holly RN / Clinical Care Coordinator     [facility]   [email or web address]  Office :  [phone] / Fax :  [phone]

## 2018-06-15 ENCOUNTER — HOSPITAL LABORATORY (OUTPATIENT)
Dept: OTHER | Facility: CLINIC | Age: 83
End: 2018-06-15

## 2018-06-15 LAB
ANION GAP SERPL CALCULATED.3IONS-SCNC: 8 MMOL/L (ref 3–14)
BUN SERPL-MCNC: 31 MG/DL (ref 7–30)
CALCIUM SERPL-MCNC: 8.7 MG/DL (ref 8.5–10.1)
CHLORIDE SERPL-SCNC: 107 MMOL/L (ref 94–109)
CO2 SERPL-SCNC: 24 MMOL/L (ref 20–32)
CREAT SERPL-MCNC: 0.78 MG/DL (ref 0.52–1.04)
ERYTHROCYTE [DISTWIDTH] IN BLOOD BY AUTOMATED COUNT: 14.7 % (ref 10–15)
GFR SERPL CREATININE-BSD FRML MDRD: 70 ML/MIN/1.7M2
GLUCOSE SERPL-MCNC: 78 MG/DL (ref 70–99)
HCT VFR BLD AUTO: 26.1 % (ref 35–47)
HGB BLD-MCNC: 8.6 G/DL (ref 11.7–15.7)
MCH RBC QN AUTO: 31.5 PG (ref 26.5–33)
MCHC RBC AUTO-ENTMCNC: 33 G/DL (ref 31.5–36.5)
MCV RBC AUTO: 96 FL (ref 78–100)
PLATELET # BLD AUTO: 221 10E9/L (ref 150–450)
POTASSIUM SERPL-SCNC: 4.1 MMOL/L (ref 3.4–5.3)
RBC # BLD AUTO: 2.73 10E12/L (ref 3.8–5.2)
SODIUM SERPL-SCNC: 139 MMOL/L (ref 133–144)
WBC # BLD AUTO: 7.3 10E9/L (ref 4–11)

## 2018-06-18 ENCOUNTER — HOSPITAL LABORATORY (OUTPATIENT)
Dept: OTHER | Facility: CLINIC | Age: 83
End: 2018-06-18

## 2018-06-18 ENCOUNTER — NURSING HOME VISIT (OUTPATIENT)
Dept: GERIATRICS | Facility: CLINIC | Age: 83
End: 2018-06-18
Payer: COMMERCIAL

## 2018-06-18 VITALS
SYSTOLIC BLOOD PRESSURE: 111 MMHG | OXYGEN SATURATION: 95 % | WEIGHT: 91.6 LBS | RESPIRATION RATE: 16 BRPM | BODY MASS INDEX: 16.75 KG/M2 | DIASTOLIC BLOOD PRESSURE: 70 MMHG | TEMPERATURE: 98.5 F | HEART RATE: 64 BPM

## 2018-06-18 DIAGNOSIS — L89.159 DECUBITUS ULCER OF COCCYGEAL REGION, UNSPECIFIED ULCER STAGE: Primary | ICD-10-CM

## 2018-06-18 DIAGNOSIS — D50.8 OTHER IRON DEFICIENCY ANEMIA: ICD-10-CM

## 2018-06-18 LAB
IRON SERPL-MCNC: 28 UG/DL (ref 35–180)
T4 FREE SERPL-MCNC: 0.85 NG/DL (ref 0.76–1.46)
TSH SERPL DL<=0.005 MIU/L-ACNC: 1.52 MU/L (ref 0.4–4)
VIT B12 SERPL-MCNC: 1055 PG/ML (ref 193–986)

## 2018-06-18 PROCEDURE — 99308 SBSQ NF CARE LOW MDM 20: CPT | Performed by: NURSE PRACTITIONER

## 2018-06-18 RX ORDER — PNV NO.95/FERROUS FUM/FOLIC AC 28MG-0.8MG
1 TABLET ORAL
COMMUNITY
End: 2018-06-27

## 2018-06-18 NOTE — PROGRESS NOTES
[facility]    Chief Complaint   Patient presents with     retirement Acute       [facility] Medical Record Number:  [id number]    HPI:    Celestina Tejada is a 87 year old  (4/2/1931), who is being seen today for an episodic care visit at [facility].  HPI information obtained from: facility chart records, facility staff and patient report.Today's concern is:    Celestina Deleon is a 87 yr old female admitted to [facility] s/p hospitalization [facility] 5/22-5/25/18 for hyponatremia. PMHx Rheumatoid Arthritis, chronic back pain, hypertension, chronic kidney disease & pressure ulcer (coccyx)     Decubitus ulcer of coccygeal region, unspecified ulcer stage  Other iron deficiency anemia    ALLERGIES: Hydrochlorothiazide  Past Medical, Surgical, Family and Social History reviewed and updated in EPIC.    Current Outpatient Prescriptions   Medication Sig Dispense Refill     acetaminophen (TYLENOL) 325 MG tablet Take 2 tablets (650 mg) by mouth 3 times daily 100 tablet 0     Amino Acids-Protein Hydrolys (PRO-STAT AWC) LIQD Take 1 oz by mouth 2 times daily       atenolol (TENORMIN) 25 MG tablet TAKE 3 TABLETS DAILY 270 tablet 3     atorvastatin (LIPITOR) 20 MG tablet TAKE 1 TABLET DAILY 90 tablet 3     Ferrous Sulfate (IRON) 325 (65 Fe) MG tablet Take 1 tablet by mouth daily (with breakfast)       gabapentin (NEURONTIN) 100 MG capsule Take 1 capsule (100 mg) by mouth At Bedtime 30 capsule 0     HYDROcodone-acetaminophen (NORCO) 5-325 MG per tablet Take 1 tablet by mouth every 6 hours       hydroxychloroquine (PLAQUENIL) 200 MG tablet Take 200 mg by mouth daily   3     Mirtazapine (REMERON PO) Take 15 mg by mouth At Bedtime       NIFEdipine ER osmotic (PROCARDIA XL) 90 MG 24 hr tablet Take 1 tablet (90 mg) by mouth daily 90 tablet 1     Nutritional Supplements (ENSURE PLUS) LIQD Take by mouth 2 times daily        senna-docusate (SENOKOT-S;PERICOLACE) 8.6-50 MG per tablet Take 2 tablets  by mouth daily And 2 tabs daily prn       Medications reviewed:  Medications reconciled to facility chart and changes were made to reflect current medications as identified as above med list. Below are the changes that were made:   Medications stopped since last EPIC medication reconciliation:   There are no discontinued medications.    Medications started since last [facility] medication reconciliation:  No orders of the defined types were placed in this encounter.        REVIEW OF SYSTEMS:  4 point ROS including Respiratory, CV, GI and , other than that noted in the HPI,  is negative    Physical Exam:  /70  Pulse 64  Temp 98.5  F (36.9  C)  Resp 16  Wt 91 lb 9.6 oz (41.5 kg)  SpO2 95%  BMI 16.75 kg/m2  GENERAL APPEARANCE:  Alert, in no distress  SKIN:  Inspection of skin and subcutaneous tissue health granulation tissue on coccyx wound, no signs and symptoms infection    PSYCH:  oriented X 3     BP Readings from Last 3 Encounters:   06/18/18 111/70   06/12/18 106/52   06/11/18 127/57     Pulse Readings from Last 4 Encounters:   06/18/18 64   06/12/18 79   06/11/18 59   06/04/18 65     Wt Readings from Last 5 Encounters:   06/18/18 91 lb 9.6 oz (41.5 kg)   06/12/18 91 lb 14.4 oz (41.7 kg)   06/11/18 92 lb 6.4 oz (41.9 kg)   06/04/18 94 lb (42.6 kg)   05/29/18 96 lb (43.5 kg)         Recent Labs:  CBC RESULTS:   Recent Labs   Lab Test  06/15/18   0617  06/13/18   0633   WBC  7.3  7.9   RBC  2.73*  2.67*   HGB  8.6*  8.4*   HCT  26.1*  25.2*   MCV  96  94   MCH  31.5  31.5   MCHC  33.0  33.3   RDW  14.7  14.4   PLT  221  223       Last Basic Metabolic Panel:  Recent Labs   Lab Test  06/15/18   0617  06/13/18   0633   NA  139  139   POTASSIUM  4.1  4.5   CHLORIDE  107  107   JOSÉ  8.7  8.5   CO2  24  24   BUN  31*  32*   CR  0.78  0.79   GLC  78  75       Liver Function Studies -   Recent Labs   Lab Test  05/31/18   0704  04/30/18   1439   PROTTOTAL  5.7*  6.9   ALBUMIN  2.7*  4.4   BILITOTAL  0.5  1.1    ALKPHOS  97  106   AST  26  44   ALT  22  28       TSH   Date Value Ref Range Status   06/18/2018 1.52 0.40 - 4.00 mU/L Final   05/21/2018 1.56 0.40 - 4.00 mU/L Final         Assessment/Plan:     Decubitus ulcer of coccygeal region, unspecified ulcer stage  Other iron deficiency anemia     Start iron due to low and anemic, monitor, follow up primary care provider   Continue daily wound care with barrier cream (per wound MD, no longer need dressing), good hygiene and monitor signs and symptoms infection  Encourage adequate nutrition intake, repositioning        Electronically signed by  STEVE Shoemaker CNP

## 2018-06-20 ENCOUNTER — TELEPHONE (OUTPATIENT)
Dept: FAMILY MEDICINE | Facility: CLINIC | Age: 83
End: 2018-06-20

## 2018-06-20 NOTE — TELEPHONE ENCOUNTER
"Hospital/TCU/ED for chronic condition Discharge Protocol    \"Hi, my name is Luisito Ohara, a registered nurse, and I am calling from [facility].  I am calling to follow up and see how things are going for you after your recent emergency visit/hospital/TCU stay.\"    Tell me how you are doing now that you are home?\" pt just got home from TCU yest.  Doing fine.  Still has thigh and leg pain. \"They say it will get better with time.\"  Using a borrowed walker. Gives more support.      Discharge Instructions    \"Let's review your discharge instructions.  What is/are the follow-up recommendations?  Pt. Response: has appt set up.    \"Has an appointment with your primary care provider been scheduled?\"   Yes. (confirm)    \"When you see the provider, I would recommend that you bring your medications with you.\"    Medications    \"Tell me what changed about your medicines when you discharged?\"    Changes to chronic meds?    0-1    \"What questions do you have about your medications?\"    None     New diagnoses of heart failure, COPD, diabetes, or MI?    No              Medication reconciliation completed? No, pt said there were no changes. Declined reviewing over the phone.  Was MTM referral placed (*Make sure to put transitions as reason for referral)?   No    Call Summary    \"What questions or concerns do you have about your recent visit and your follow-up care?\"     none    \"If you have questions or things don't continue to improve, we encourage you contact us through the main clinic number (give number).  Even if the clinic is not open, triage nurses are available 24/7 to help you.     We would like you to know that our clinic has extended hours (provide information).  We also have urgent care (provide details on closest location and hours/contact info)\"      \"Thank you for your time and take care!\"  DONTE Charles             "

## 2018-06-21 ENCOUNTER — TELEPHONE (OUTPATIENT)
Dept: FAMILY MEDICINE | Facility: CLINIC | Age: 83
End: 2018-06-21

## 2018-06-21 NOTE — TELEPHONE ENCOUNTER
Reason for Call: Request for an order or referral:    Order or referral being requested: See below    Date needed: as soon as possible    Has the patient been seen by the PCP for this problem? Not Applicable    Additional comments:  Home Care is requesting orders for -  - Skilled Nursing  - Physical Therapy  - Occupational Therapy  -     Phone number Patient can be reached at:  Other phone number: patsy [phone]    Best Time:  Any    Can we leave a detailed message on this number?  YES    Call taken on 6/21/2018 at 2:58 PM by Racheal Rubio

## 2018-06-25 ENCOUNTER — DOCUMENTATION ONLY (OUTPATIENT)
Dept: CARE COORDINATION | Facility: CLINIC | Age: 83
End: 2018-06-25

## 2018-06-25 NOTE — PROGRESS NOTES
Dear Dr. Majo Alfaro  [facility] process is that all ordered disciplines will be involved in the development of the plan of care.  The following disciplines were unable to see Celestina Tejada; MRN [id number] within the 5 day evaluation window.  There will be a delay in the evalation visit by  PT  OT    We will notify you when the evaluation is completed.  The patient has been notified.      Sincerely [facility]  Amee Warner  [phone]

## 2018-06-26 ENCOUNTER — DOCUMENTATION ONLY (OUTPATIENT)
Dept: CARE COORDINATION | Facility: CLINIC | Age: 83
End: 2018-06-26

## 2018-06-26 NOTE — PROGRESS NOTES
[facility] now requests orders and shares plan of care/discharge summaries for some patients through [employer].  Please REPLY TO THIS MESSAGE OR ROUTE BACK TO THE AUTHOR in order to give authorization for orders when needed.  This is considered a verbal order, you will still receive a faxed copy of orders for signature.  Thank you for your assistance in improving collaboration for our patients.    ORDER    Continued home care PT 1x/week for 1 week and 2x/week for 4 weeks for strengthening and mobility training.

## 2018-06-27 ENCOUNTER — OFFICE VISIT (OUTPATIENT)
Dept: FAMILY MEDICINE | Facility: CLINIC | Age: 83
End: 2018-06-27
Payer: COMMERCIAL

## 2018-06-27 VITALS
WEIGHT: 96.75 LBS | DIASTOLIC BLOOD PRESSURE: 59 MMHG | SYSTOLIC BLOOD PRESSURE: 121 MMHG | BODY MASS INDEX: 17.81 KG/M2 | TEMPERATURE: 97 F | HEART RATE: 72 BPM | RESPIRATION RATE: 20 BRPM | HEIGHT: 62 IN | OXYGEN SATURATION: 98 %

## 2018-06-27 DIAGNOSIS — G89.29 CHRONIC LOW BACK PAIN WITH SCIATICA, SCIATICA LATERALITY UNSPECIFIED, UNSPECIFIED BACK PAIN LATERALITY: ICD-10-CM

## 2018-06-27 DIAGNOSIS — I10 HYPERTENSION GOAL BP (BLOOD PRESSURE) < 140/90: ICD-10-CM

## 2018-06-27 DIAGNOSIS — M06.9 RHEUMATOID ARTHRITIS, INVOLVING UNSPECIFIED SITE, UNSPECIFIED RHEUMATOID FACTOR PRESENCE: ICD-10-CM

## 2018-06-27 DIAGNOSIS — D64.9 ANEMIA, UNSPECIFIED TYPE: ICD-10-CM

## 2018-06-27 DIAGNOSIS — M19.90 OSTEOARTHRITIS, UNSPECIFIED OSTEOARTHRITIS TYPE, UNSPECIFIED SITE: ICD-10-CM

## 2018-06-27 DIAGNOSIS — D72.829 LEUKOCYTOSIS, UNSPECIFIED TYPE: ICD-10-CM

## 2018-06-27 DIAGNOSIS — E46 PROTEIN-CALORIE MALNUTRITION, UNSPECIFIED SEVERITY (H): ICD-10-CM

## 2018-06-27 DIAGNOSIS — E87.1 HYPONATREMIA: Primary | ICD-10-CM

## 2018-06-27 DIAGNOSIS — L89.309 DECUBITUS ULCER OF BUTTOCK, UNSPECIFIED LATERALITY, UNSPECIFIED ULCER STAGE: ICD-10-CM

## 2018-06-27 DIAGNOSIS — M54.40 CHRONIC LOW BACK PAIN WITH SCIATICA, SCIATICA LATERALITY UNSPECIFIED, UNSPECIFIED BACK PAIN LATERALITY: ICD-10-CM

## 2018-06-27 LAB
BASOPHILS # BLD AUTO: 0 10E9/L (ref 0–0.2)
BASOPHILS NFR BLD AUTO: 0.1 %
DIFFERENTIAL METHOD BLD: ABNORMAL
EOSINOPHIL # BLD AUTO: 0.1 10E9/L (ref 0–0.7)
EOSINOPHIL NFR BLD AUTO: 0.6 %
ERYTHROCYTE [DISTWIDTH] IN BLOOD BY AUTOMATED COUNT: 14.7 % (ref 10–15)
HCT VFR BLD AUTO: 29.8 % (ref 35–47)
HGB BLD-MCNC: 9.3 G/DL (ref 11.7–15.7)
LYMPHOCYTES # BLD AUTO: 1 10E9/L (ref 0.8–5.3)
LYMPHOCYTES NFR BLD AUTO: 10 %
MCH RBC QN AUTO: 31.5 PG (ref 26.5–33)
MCHC RBC AUTO-ENTMCNC: 31.2 G/DL (ref 31.5–36.5)
MCV RBC AUTO: 101 FL (ref 78–100)
MONOCYTES # BLD AUTO: 1.1 10E9/L (ref 0–1.3)
MONOCYTES NFR BLD AUTO: 10.4 %
NEUTROPHILS # BLD AUTO: 8.1 10E9/L (ref 1.6–8.3)
NEUTROPHILS NFR BLD AUTO: 78.9 %
PLATELET # BLD AUTO: 242 10E9/L (ref 150–450)
RBC # BLD AUTO: 2.95 10E12/L (ref 3.8–5.2)
WBC # BLD AUTO: 10.2 10E9/L (ref 4–11)

## 2018-06-27 PROCEDURE — 80048 BASIC METABOLIC PNL TOTAL CA: CPT | Performed by: FAMILY MEDICINE

## 2018-06-27 PROCEDURE — 99214 OFFICE O/P EST MOD 30 MIN: CPT | Performed by: FAMILY MEDICINE

## 2018-06-27 PROCEDURE — 36415 COLL VENOUS BLD VENIPUNCTURE: CPT | Performed by: FAMILY MEDICINE

## 2018-06-27 PROCEDURE — 85025 COMPLETE CBC W/AUTO DIFF WBC: CPT | Performed by: FAMILY MEDICINE

## 2018-06-27 RX ORDER — GABAPENTIN 100 MG/1
100 CAPSULE ORAL 3 TIMES DAILY
Qty: 90 CAPSULE | Refills: 1 | Status: SHIPPED | OUTPATIENT
Start: 2018-06-27 | End: 2018-09-04

## 2018-06-27 RX ORDER — PNV NO.95/FERROUS FUM/FOLIC AC 28MG-0.8MG
1 TABLET ORAL
Qty: 90 TABLET | Refills: 3 | Status: SHIPPED | OUTPATIENT
Start: 2018-06-27 | End: 2019-01-07

## 2018-06-27 NOTE — PROGRESS NOTES
SUBJECTIVE:   Celestina Tejada is a 87 year old female who presents to clinic today for the following health issues:    Hospital Follow-up Visit:  Hospital/Nursing Home/IP Rehab Facility: [facility]  Date of Admission: 5/22/18  Date of Discharge: 5/25/18  Reason(s) for Admission: Severe hyponatremia            Problems taking medications regularly:  None       Medication changes since discharge: gabapentin 100mg and mirtazapine 15mg given       Problems adhering to non-medication therapy:  None    Summary of hospitalization:  [facility] discharge summary reviewed  Diagnostic Tests/Treatments reviewed.  Follow up needed: none  Other Healthcare Providers Involved in Patient s Care:         Homecare and Surgical follow-up appointment - ortho  Update since discharge: improved. See HPI    Post Discharge Medication Reconciliation: discharge medications reconciled, continue medications without change.  Plan of care communicated with patient and Tawnya, her daughter     Coding guidelines for this visit:  Type of Medical   Decision Making Face-to-Face Visit       within 7 Days of discharge Face-to-Face Visit        within 14 days of discharge   Moderate Complexity [id number]   High Complexity [id number]          Patient reports to the clinic with her daughter. Patient reports that she is still experiencing right leg pain, but otherwise she doesn't feel sick and she feels mentally clear. The home care is currently being set up and she is open to trying it. She states that she wants to be as independent as possible. She states that she has no arthritis pain and her lower leg edema has decreased. Her daughter states that they have gotten her an automated pill dispenser. A  is coming by this afternoon to meet with them. She has gained 4 lbs, but she states that she is still not hungry. She is enjoying some foods. She is drinking Boost when she remembers. She states that foods she usually  "likes still taste good, but not as good as they used to.  However she is actively making an effort to eat.     She states that the sore on her bottom is healing and she continues to put a barrier cream on it covered by a bandage.    Denies chest pain, SOB, fevers, or light headedness. She reports a slight cough, but denies any difficulty breathing.    Her daughter notes that she has not received her infusion therapy for 2 months now and she states that she feels better. She saw the orthopedist and has now received 2 injections, the last one helping more. However her right leg pain is still painful and limits her, but she states that she can live with it. She notes that she has had no problems sleeping.     She notes that she is tolerating her mirtazepine with no reported side effects.      Problem list and histories reviewed & adjusted, as indicated.  Additional history: as documented    5/25/18 hospital discharge note:  \"Hospital Course   Celestina Tejada is a 87 year old female with a past medical history of Htn, CKD, RA who was admitted on 5/22/2018 with hyponatremia.      Hyponatremia  Clinical history suggestive of hypovolemia, as her family reports that she has not been eating or drinking as well over the past 3 weeks due to pain.  However she was not markedly dry on presentation.  She has been in a substantial amount of pain for the past 3 weeks and SIADH from uncontrolled pain is a consideration.Urine studies are more suggestive of SIADH with urine sodium of 25 and urine osm of 385.  She appears minimally symptomatic.  -Lisinopril was held, recommend continuing to hold at discharge for now, blood pressure seems to be doing well without it.    -Hyponatremia is likely mixed, patient at risk for dehydration due to inadequate oral intake, this was discussed extensively with the daughter, emphasized to her that they will also have to watch for this closely.  -Also recommend that staff at her short-term care " "facility be aware and monitor her oral fluid intake.  -Sodium at presentation was 121, improving and stable at 128 at the time of discharge  -Recommend checking BMP early next week to make sure it is stable or improving.  -Physical therapy recommending discharge to TCU.      Hypertension  -Continue prior to admission atenolol, nifedipine  -Temporarily hold lisinopril as may contribute to hyponatremia - blood pressure hasn't required this at all so likely dc permanently      Rheumatoid arthritis  -Continue prior to admission hydroxychloroquine   -Recommend discontinuing use of NSAID's for now.      Subacute low back pain with reported severe degenerative disc disease, with sciatica   Recently evaluated by [facility], and had epidural injection approximately 9-10 days PTA.   -Hold NSAIDs - consider prn as sodium improves  -Continue PTA hydrocodone-acetaminophen PRN  -scheduled tylenol  -low dose gabapentin at hs started 5/23; this can be uptitrated as outpatient  -Outpatient follow-up with [facility]     Pressure Injury located on coccyx: Stage 3, present on admission.  No s/s infection.  Wound is relatively shallow and clean.   -wound RN consulted     Leukocytosis:  -This was fluctuant however decreasing on the day of discharge, no evidence of infection, no pneumonia on x-ray, urine analysis without evidence for UTI.  No clinical signs or symptoms concerning for infectious etiology.  -Apparently per the daughter patient was recently on a tapering course of steroid for her back pain, presumably that is the most likely explanation  -Repeat CBC early next week.     # Discharge Pain Plan:   - During her hospitalization, Celestina experienced pain due to subacute low back pain with degenerative disc disease.  The pain plan for discharge was discussed with Celestina and her daughter and the plan was created in a collaborative fashion. \"    5/22/18 hospital admission HPI:  \" Celestina Tejada is a 87 year old female who presents with low sodium. " History is obtained form the patient and her sister and daughter at bedside.     The patient was initially seen at [facility] emergency department on 4/30/2018 for low back and hip pain.  She had imaging of that time including a CT of her right hip and pelvis which was negative for fracture but did demonstrate significant arthritis.  She subsequently was seen by [facility] and diagnosed with significant degenerative disc disease in her back by family report.  She had received an epidural injection 10 days ago.  Since that time she has continued to report significant pain with ambulation, radiating into her right leg.  Associated with this, she has had decreased activity level which has resulted in generally decreased level of functioning including decreased intake.  Both her sister and daughter have been visiting her and encouraging her to eat although she has been eating about 50% of baseline.  She has been drinking fluids consistently.  She was subsequently seen by her primary care provider the day prior to admission.  Routine labs at that time demonstrated a sodium of 121.  She was contacted by her primary care provider and instructed to present to the emergency department for further evaluation.  At present the patient reports a headache which is not atypical for her.  She denies any other acute complaints other than her ongoing back pain described above.  She has been managing her back pain with hydrocodone-acetaminophen, which she has been on for years.  She denies any difficulty with bowel or bladder associated with her back pain.  She denies any nausea or vomiting.  Her family has noted some mild cognitive difficulties for the past few weeks.  He denies any recent medication changes.     In the Emergency Department, the patient was seen by Dr. Jacob Ambriz, with whom I discussed the patient's presenting symptoms and emergency department course.  Initial vital signs were a  "temperature of 97.8F, HR 75, /46, RR 18, SpO2 98% on room air. Work-up notable for a repeat sodium of 123, WBC 16.5, unremarkable UA, CXR with likely fibrotic change. She was given 500 cc of normal saline and hospitalists were contacted for admission to the hospital.\"    Patient Active Problem List   Diagnosis     Osteopenia     Hypertension goal BP (blood pressure) < 140/90     Rheumatoid arthritis (H)     CKD (chronic kidney disease) stage 3, GFR 30-59 ml/min     Hyperlipidemia with target LDL less than 100     Advanced directives, counseling/discussion     Osteoarthritis     Eczema     Peripheral edema     Hyponatremia with decreased serum osmolality     Chronic pain     Protein-calorie malnutrition (H)     Past Surgical History:   Procedure Laterality Date     C APPENDECTOMY  1950     SURGICAL HISTORY OF -       2 normal vaginal births       Social History   Substance Use Topics     Smoking status: Former Smoker     Packs/day: 0.50     Years: 20.00     Types: Cigarettes     Quit date: 9/20/2000     Smokeless tobacco: Never Used     Alcohol use Yes      Comment: very occ,     Family History   Problem Relation Age of Onset     HEART DISEASE Mother      CHF     Respiratory Mother      lung disease     C.A.D. Sister      heart by-pass     Arthritis Sister      Family History Negative Brother      Family History Negative Daughter      Family History Negative Daughter          Current Outpatient Prescriptions   Medication Sig Dispense Refill     acetaminophen (TYLENOL) 325 MG tablet Take 2 tablets (650 mg) by mouth 3 times daily 100 tablet 0     atenolol (TENORMIN) 25 MG tablet TAKE 3 TABLETS DAILY 270 tablet 3     atorvastatin (LIPITOR) 20 MG tablet TAKE 1 TABLET DAILY 90 tablet 3     Ferrous Sulfate (IRON) 325 (65 Fe) MG tablet Take 1 tablet by mouth daily (with breakfast) 90 tablet 3     gabapentin (NEURONTIN) 100 MG capsule Take 1 capsule (100 mg) by mouth 3 times daily 90 capsule 1     " HYDROcodone-acetaminophen (NORCO) 5-325 MG per tablet Take 1 tablet by mouth every 6 hours       hydroxychloroquine (PLAQUENIL) 200 MG tablet Take 200 mg by mouth daily   3     Mirtazapine (REMERON PO) Take 15 mg by mouth At Bedtime       NIFEdipine ER osmotic (PROCARDIA XL) 90 MG 24 hr tablet Take 1 tablet (90 mg) by mouth daily 90 tablet 1     Nutritional Supplements (ENSURE PLUS) LIQD Take by mouth 2 times daily        senna-docusate (SENOKOT-S;PERICOLACE) 8.6-50 MG per tablet Take 2 tablets by mouth daily And 2 tabs daily prn       Amino Acids-Protein Hydrolys (PRO-STAT AWC) LIQD Take 1 oz by mouth 2 times daily       [DISCONTINUED] gabapentin (NEURONTIN) 100 MG capsule Take 1 capsule (100 mg) by mouth At Bedtime 30 capsule 0     Allergies   Allergen Reactions     Hydrochlorothiazide      hyponatremia     Recent Labs   Lab Test  06/18/18   0600  06/15/18   0617  06/13/18   0633   05/31/18   0704   05/21/18   1618  04/30/18   1439 04/04/18 09/12/17   1236   08/18/16   1322   07/14/16   1305   LDL   --    --    --    --    --    --    --    --    --    --   50   --   49   --   81   HDL   --    --    --    --    --    --    --    --    --    --   65   --   68   --   64   TRIG   --    --    --    --    --    --    --    --    --    --   89   --   85   --   120   ALT   --    --    --    --   22   --    --   28  23   < >  31   < >   --    < >  21   CR   --   0.78  0.79   < >  0.67   < >  0.82  0.89  1.080   < >  1.60*   < >  1.66*   < >  1.41*   GFRESTIMATED   --   70  69   < >  84   < >  66  60*  51.0   < >  31*   < >  29*   < >  35*   GFRESTBLACK   --   84  83   < >  >90   < >  80  73   --    < >  37*   < >  35*   < >  43*   POTASSIUM   --   4.1  4.5   < >  4.6   < >  4.6  4.0   --    < >  4.2   < >  4.7   < >  4.0   TSH  1.52   --    --    --    --    --   1.56   --    --    --    --    --    --    < >   --     < > = values in this interval not displayed.      BP Readings from Last 3 Encounters:   06/27/18 121/59  "  06/18/18 111/70   06/12/18 106/52    Wt Readings from Last 3 Encounters:   06/27/18 96 lb 12 oz (43.9 kg)   06/18/18 91 lb 9.6 oz (41.5 kg)   06/12/18 91 lb 14.4 oz (41.7 kg)        Reviewed and updated as needed this visit by clinical staff  Tobacco  Allergies  Meds  Med Hx  Surg Hx  Fam Hx  Soc Hx      Reviewed and updated as needed this visit by Provider       ROS:  See above  Denies chest pain or SOB    This document serves as a record of the services and decisions personally performed and made by Majo Alfaro MD. It was created on his/her behalf by Jacqui Sylvester, trained medical scribe. The creation of this document is based the provider's statements to the medical scribes.    Scribthomas Sylvester, June 27, 2018  OBJECTIVE:     /59 (BP Location: Right arm, Patient Position: Sitting, Cuff Size: Adult Regular)  Pulse 72  Temp 97  F (36.1  C) (Tympanic)  Resp 20  Ht 5' 2\" (1.575 m)  Wt 96 lb 12 oz (43.9 kg)  SpO2 98%  BMI 17.7 kg/m2  Body mass index is 17.7 kg/(m^2).     Wt Readings from Last 5 Encounters:   06/27/18 96 lb 12 oz (43.9 kg)   06/18/18 91 lb 9.6 oz (41.5 kg)   06/12/18 91 lb 14.4 oz (41.7 kg)   06/11/18 92 lb 6.4 oz (41.9 kg)   06/04/18 94 lb (42.6 kg)     GENERAL: frail elderly female, alert and no distress, walks with walker  RESP: lungs clear to auscultation - no rales, rhonchi or wheezes  CV: regular rate and rhythm, normal S1 S2, no S3 or S4, no murmur, click or rub   PSYCH: mentation appears improved since last visit prior to hospitalization, affect normal/bright    Diagnostic Test Results:  Results for orders placed or performed in visit on 06/18/18   Iron   Result Value Ref Range    Iron 28 (L) 35 - 180 ug/dL   T4 free   Result Value Ref Range    T4 Free 0.85 0.76 - 1.46 ng/dL   TSH   Result Value Ref Range    TSH 1.52 0.40 - 4.00 mU/L   Vitamin B12   Result Value Ref Range    Vitamin B12 1055 (H) 193 - 986 pg/mL        ASSESSMENT/PLAN:     1. Hyponatremia  Will " "continue to hold the lisinopril as her blood pressure is currently well controlled. Will check BMP today     From hospital summary:   \"Clinical history suggestive of hypovolemia, as her family reports that she has not been eating or drinking as well over the past 3 weeks due to pain.  However she was not markedly dry on presentation.  She has been in a substantial amount of pain for the past 3 weeks and SIADH from uncontrolled pain is a consideration.Urine studies are more suggestive of SIADH with urine sodium of 25 and urine osm of 385.  She appears minimally symptomatic.  -Lisinopril was held, recommend continuing to hold at discharge for now, blood pressure seems to be doing well without it.    -Hyponatremia is likely mixed, patient at risk for dehydration due to inadequate oral intake, this was discussed extensively with the daughter, emphasized to her that they will also have to watch for this closely.  -Also recommend that staff at her short-term care facility be aware and monitor her oral fluid intake.  -Sodium at presentation was 121, improving and stable at 128 at the time of discharge  -Recommend checking BMP early next week to make sure it is stable or improving.  -Physical therapy recommending discharge to TCU.\"      2. Hypertension  Continues on atenolol and nifedipine. Will continue to hold the lisinopril due to concern for hyponatremia.     \"-Continue prior to admission atenolol, nifedipine  -Temporarily hold lisinopril as may contribute to hyponatremia - blood pressure hasn't required this at all so likely dc permanently\"      3. Rheumatoid arthritis  Doing very well. Has not had her infusions for two months due to illness, but has not flared in this time. She says her joints feel great.     \"-Continue prior to admission hydroxychloroquine   -Recommend discontinuing use of NSAID's for now.\"      4. Subacute low back pain with reported severe degenerative disc disease, with sciatica   Had second " "steroid injection with more relief than the first. Continues on gabapentin 100mg at bedtime. She is sleeping well. Still has daytime pain. Will increase to 100mg tid and follow up with me in one month.     \"Recently evaluated by [facility], and had epidural injection approximately 9-10 days PTA.   -Hold NSAIDs - consider prn as sodium improves  -Continue PTA hydrocodone-acetaminophen PRN  -scheduled tylenol  -low dose gabapentin at hs started 5/23; this can be uptitrated as outpatient  -Outpatient follow-up with [facility]\"     5. Pressure Injury located on coccyx:  Per pt and her daughter, this is healing well and is currently monitored by her home health nurse.      Stage 3, present on admission.  No s/s infection.  Wound is relatively shallow and clean.   -wound RN consulted     6. Leukocytosis:  Resolved    \"-This was fluctuant however decreasing on the day of discharge, no evidence of infection, no pneumonia on x-ray, urine analysis without evidence for UTI.  No clinical signs or symptoms concerning for infectious etiology.  -Apparently per the daughter patient was recently on a tapering course of steroid for her back pain, presumably that is the most likely explanation  -Repeat CBC early next week.\"    7. Protein malnutrition  Continue recommendations as below.     \"encouraging adequate meal intake  Meals on wheels plan for patient on discharge   Monitor intake & weights  Started on Remeron  On DD3 diet & thin liquids, Does not have lower dentures\"     8. Anemia, iron deficiency  Will recheck CBC today. Start iron supplementation.     hgb stable ~8.5-9, however, had been higher prior to hospital admission   Follow up primary care provider, BMP,CBC  Iron studies, B12, TSH, free T4 next week    My clinical findings of activity intolerance, post hospital weakness and decreased strength/endurnace support the fact that the patient is homebound and in need of requested services.   I certify that the patient is homebound because " of the taxing effort require to leave home. The patient only tolerates infrequent outings of short duration and requires assistance to leave home for safety.      Patient Instructions   1. Increase your Gabapentin to 100mg three times a day for your leg pain.   2. Continue the rest of your medications unchanged.   3. Follow up with me in one month.  4. Start an iron pill once daily. This can cause an upset stomach and dark stools. If you are not tolerating it, you can stop this and we can discuss at your next visit.          The information in this document, created by the medical scribe for me, accurately reflects the services I personally performed and the decisions made by me. I have reviewed and approved this document for accuracy. 06/27/18    Majo Alfaro MD  [facility]

## 2018-06-27 NOTE — PROGRESS NOTES
Return call to Kourtney - physical therapist - [facility] - gave verbal orders as requested    Closing encounter - no further actions needed at this time    Ivan Mcwilliams RN

## 2018-06-27 NOTE — LETTER
July 3, 2018      Celestina Tejada  [address]   [place] [address]        Dear ,    We are writing to inform you of your test results.    Your lab results are stable and your hemoglobin has improved some. Please let us know if you have any questions.     Resulted Orders   Basic metabolic panel   Result Value Ref Range    Sodium Unsatisfactory specimen - improperly centrifuged 133 - 144 mmol/L    Potassium Unsatisfactory specimen - improperly centrifuged 3.4 - 5.3 mmol/L    Chloride Unsatisfactory specimen - improperly centrifuged 94 - 109 mmol/L    Carbon Dioxide Unsatisfactory specimen - improperly centrifuged 20 - 32 mmol/L    Anion Gap Not Calculated 3 - 14 mmol/L    Glucose Unsatisfactory specimen - improperly centrifuged 70 - 99 mg/dL    Urea Nitrogen Unsatisfactory specimen - improperly centrifuged 7 - 30 mg/dL    Creatinine Unsatisfactory specimen - improperly centrifuged 0.52 - 1.04 mg/dL    GFR Estimate Not Calculated >60 mL/min/1.7m2    GFR Estimate If Black Not Calculated >60 mL/min/1.7m2    Calcium Unsatisfactory specimen - improperly centrifuged 8.5 - 10.1 mg/dL   CBC with platelets differential   Result Value Ref Range    WBC 10.2 4.0 - 11.0 10e9/L    RBC Count 2.95 (L) 3.8 - 5.2 10e12/L    Hemoglobin 9.3 (L) 11.7 - 15.7 g/dL    Hematocrit 29.8 (L) 35.0 - 47.0 %     (H) 78 - 100 fl    MCH 31.5 26.5 - 33.0 pg    MCHC 31.2 (L) 31.5 - 36.5 g/dL    RDW 14.7 10.0 - 15.0 %    Platelet Count 242 150 - 450 10e9/L    Diff Method Automated Method     % Neutrophils 78.9 %    % Lymphocytes 10.0 %    % Monocytes 10.4 %    % Eosinophils 0.6 %    % Basophils 0.1 %    Absolute Neutrophil 8.1 1.6 - 8.3 10e9/L    Absolute Lymphocytes 1.0 0.8 - 5.3 10e9/L    Absolute Monocytes 1.1 0.0 - 1.3 10e9/L    Absolute Eosinophils 0.1 0.0 - 0.7 10e9/L    Absolute Basophils 0.0 0.0 - 0.2 10e9/L       If you have any questions or concerns, please call the clinic at the number listed above.        Sincerely,        Majo Alfaro MD/nr

## 2018-06-27 NOTE — PATIENT INSTRUCTIONS
1. Increase your Gabapentin to 100mg three times a day for your leg pain.   2. Continue the rest of your medications unchanged.   3. Follow up with me in one month.  4. Start an iron pill once daily. This can cause an upset stomach and dark stools. If you are not tolerating it, you can stop this and we can discuss at your next visit.

## 2018-06-27 NOTE — PROGRESS NOTES
Kourtney is checking on the status of this. She's requesting for orders listed below. Please reply back to this message & route it to her or give her a call back at [phone], ok to [facility].  She'd like to hear back today if possible. She'll be leaving for vacation @1pm today.

## 2018-06-27 NOTE — MR AVS SNAPSHOT
After Visit Summary   6/27/2018    Celestina Tejada    MRN: [id number]           Patient Information     Date Of Birth          4/2/1931        Visit Information        Provider Department      6/27/2018 11:20 AM Majo Alfaro MD [facility]        Today's Diagnoses     Hyponatremia    -  1    Hypertension goal BP (blood pressure) < 140/90        Rheumatoid arthritis, involving unspecified site, unspecified rheumatoid factor presence (H)        subacute low back pain with sciatica, sciatica laterality unspecified, unspecified back pain laterality        Decubitus ulcer of buttock, unspecified laterality, unspecified ulcer stage        Leukocytosis, unspecified type        Protein-calorie malnutrition, unspecified severity (H)        Anemia, unspecified type        Osteoarthritis, unspecified osteoarthritis type, unspecified site          Care Instructions    1. Increase your Gabapentin to 100mg three times a day for your leg pain.   2. Continue the rest of your medications unchanged.   3. Follow up with me in one month.  4. Start an iron pill once daily. This can cause an upset stomach and dark stools. If you are not tolerating it, you can stop this and we can discuss at your next visit.              Follow-ups after your visit        Who to contact     If you have questions or need follow up information about today's clinic visit or your schedule please contact [facility] directly at [phone].  Normal or non-critical lab and imaging results will be communicated to you by MyChart, letter or phone within 4 business days after the clinic has received the results. If you do not hear from us within 7 days, please contact the clinic through MyChart or phone. If you have a critical or abnormal lab result, we will notify you by phone as soon as possible.  Submit refill requests through [facility] or call your pharmacy and they will forward the refill request to us. Please allow  "3 business days for your refill to be completed.          Additional Information About Your Visit        Care EveryWhere ID     This is your Care EveryWhere ID. This could be used by other organizations to access your [facility] medical records  [id number]        Your Vitals Were     Pulse Temperature Respirations Height Pulse Oximetry BMI (Body Mass Index)    72 97  F (36.1  C) (Tympanic) 20 5' 2\" (1.575 m) 98% 17.7 kg/m2       Blood Pressure from Last 3 Encounters:   06/27/18 121/59   06/18/18 111/70   06/12/18 106/52    Weight from Last 3 Encounters:   06/27/18 96 lb 12 oz (43.9 kg)   06/18/18 91 lb 9.6 oz (41.5 kg)   06/12/18 91 lb 14.4 oz (41.7 kg)              We Performed the Following     Basic metabolic panel     CBC with platelets differential          Today's Medication Changes          These changes are accurate as of 6/27/18 12:22 PM.  If you have any questions, ask your nurse or doctor.               These medicines have changed or have updated prescriptions.        Dose/Directions    gabapentin 100 MG capsule   Commonly known as:  NEURONTIN   This may have changed:  when to take this   Used for:  Osteoarthritis, unspecified osteoarthritis type, unspecified site   Changed by:  Majo Alfaro MD        Dose:  100 mg   Take 1 capsule (100 mg) by mouth 3 times daily   Quantity:  90 capsule   Refills:  1            Where to get your medicines      These medications were sent to [facility] [id number] [place] [address] [address] AT [facility] [address]  [address], [facility] [address]     Phone:  [phone]     gabapentin 100 MG capsule    iron 325 (65 Fe) MG tablet                Primary Care Provider Office Phone # Fax #    Majo Alfaro [phone] [address]  [facility] [address]        Equal Access to Services     HIEN URENA AH: Hadii aad ku hadasho Soomaali, waaxda luqadaha, qaybta kaalmada adeegyarosalinda, alona pillai " ah. So [facility] [phone].    ATENCIÓN: Si estefanía graves, tiene a gallardo disposición servicios gratuitos de asistencia lingüística. Olga Lidia monahan [phone].    We comply with applicable federal civil rights laws and [place] laws. We do not discriminate on the basis of race, color, national origin, age, disability, sex, sexual orientation, or gender identity.            Thank you!     Thank you for choosing [facility]  for your care. Our goal is always to provide you with excellent care. Hearing back from our patients is one way we can continue to improve our services. Please take a few minutes to complete the written survey that you may receive in the mail after your visit with us. Thank you!             Your Updated Medication List - Protect others around you: Learn how to safely use, store and throw away your medicines at www.disposemymeds.org.          This list is accurate as of 6/27/18 12:22 PM.  Always use your most recent med list.                   Brand Name Dispense Instructions for use Diagnosis    acetaminophen 325 MG tablet    TYLENOL    100 tablet    Take 2 tablets (650 mg) by mouth 3 times daily    Rheumatoid arthritis, involving unspecified site, unspecified rheumatoid factor presence (H)       atenolol 25 MG tablet    TENORMIN    270 tablet    TAKE 3 TABLETS DAILY    Hypertension goal BP (blood pressure) < 140/90       atorvastatin 20 MG tablet    LIPITOR    90 tablet    TAKE 1 TABLET DAILY    Hyperlipidemia with target LDL less than 100       ENSURE PLUS Liqd      Take by mouth 2 times daily        gabapentin 100 MG capsule    NEURONTIN    90 capsule    Take 1 capsule (100 mg) by mouth 3 times daily    Osteoarthritis, unspecified osteoarthritis type, unspecified site       HYDROcodone-acetaminophen 5-325 MG per tablet    NORCO     Take 1 tablet by mouth every 6 hours        hydroxychloroquine 200 MG tablet    PLAQUENIL     Take 200 mg by mouth daily        iron 325 (65 Fe) MG tablet     90  tablet    Take 1 tablet by mouth daily (with breakfast)    Anemia, unspecified type       NIFEdipine ER osmotic 90 MG 24 hr tablet    PROCARDIA XL    90 tablet    Take 1 tablet (90 mg) by mouth daily    Hypertension goal BP (blood pressure) < 140/90       PRO-STAT AWC Liqd      Take 1 oz by mouth 2 times daily        REMERON PO      Take 15 mg by mouth At Bedtime        senna-docusate 8.6-50 MG per tablet    SENOKOT-S;PERICOLACE     Take 2 tablets by mouth daily And 2 tabs daily prn

## 2018-06-28 DIAGNOSIS — E87.1 HYPOSMOLALITY SYNDROME: Primary | ICD-10-CM

## 2018-06-28 LAB
ANION GAP SERPL CALCULATED.3IONS-SCNC: NORMAL MMOL/L (ref 3–14)
BUN SERPL-MCNC: NORMAL MG/DL (ref 7–30)
CALCIUM SERPL-MCNC: NORMAL MG/DL (ref 8.5–10.1)
CHLORIDE SERPL-SCNC: NORMAL MMOL/L (ref 94–109)
CO2 SERPL-SCNC: NORMAL MMOL/L (ref 20–32)
CREAT SERPL-MCNC: NORMAL MG/DL (ref 0.52–1.04)
GFR SERPL CREATININE-BSD FRML MDRD: NORMAL ML/MIN/1.7M2
GLUCOSE SERPL-MCNC: NORMAL MG/DL (ref 70–99)
POTASSIUM SERPL-SCNC: NORMAL MMOL/L (ref 3.4–5.3)
SODIUM SERPL-SCNC: NORMAL MMOL/L (ref 133–144)

## 2018-06-28 PROCEDURE — 80048 BASIC METABOLIC PNL TOTAL CA: CPT | Performed by: FAMILY MEDICINE

## 2018-06-28 PROCEDURE — 36415 COLL VENOUS BLD VENIPUNCTURE: CPT | Performed by: FAMILY MEDICINE

## 2018-06-29 LAB
ANION GAP SERPL CALCULATED.3IONS-SCNC: 8 MMOL/L (ref 3–14)
BUN SERPL-MCNC: 22 MG/DL (ref 7–30)
CALCIUM SERPL-MCNC: 8.8 MG/DL (ref 8.5–10.1)
CHLORIDE SERPL-SCNC: 103 MMOL/L (ref 94–109)
CO2 SERPL-SCNC: 26 MMOL/L (ref 20–32)
CREAT SERPL-MCNC: 0.92 MG/DL (ref 0.52–1.04)
GFR SERPL CREATININE-BSD FRML MDRD: 58 ML/MIN/1.7M2
GLUCOSE SERPL-MCNC: 103 MG/DL (ref 70–99)
POTASSIUM SERPL-SCNC: 4.6 MMOL/L (ref 3.4–5.3)
SODIUM SERPL-SCNC: 137 MMOL/L (ref 133–144)

## 2018-07-01 ENCOUNTER — DOCUMENTATION ONLY (OUTPATIENT)
Dept: CARE COORDINATION | Facility: CLINIC | Age: 83
End: 2018-07-01

## 2018-07-02 ENCOUNTER — DOCUMENTATION ONLY (OUTPATIENT)
Dept: CARE COORDINATION | Facility: CLINIC | Age: 83
End: 2018-07-02

## 2018-07-02 NOTE — PROGRESS NOTES
Occupational Therapy to treat 2/w/3 and 1/w/1 for ADL/IADLs, home safety recommendations, equipment recommendations, cognitive assessment with caregiver edcuation, UE HEP, and compensatory techniques for cognitive deficits.     Britney Butler OTR/L

## 2018-07-02 NOTE — PROGRESS NOTES
Your lab results are stable and your hemoglobin has improved some. Please let us know if you have any questions.

## 2018-07-02 NOTE — PROGRESS NOTES
Britney is checking on the status of this request. She is also seeing pt today at 2:30pm & needs orders. Please advise, thank you!    rBitney ph: [phone]

## 2018-07-03 NOTE — PROGRESS NOTES
Patient was seen over the weekend by [facility] for assessment. Patient noted to have increased edema in BLE, nonpitting in RLE and 1 plus pitting in LLE. Patient wearing compression stocking on LLE and elevating BLE intermittently above level of heart although she requires frequent reminders to keep them elevated.    Patient continues poor appetite and intake although family and friends trying to assist her with meals. When no caregiver present, patient is not motivated to go into kitchen to get food or eat. Mirtazapine does not seem to be stimulating appetite, pt/family wondering if more effective option is available.      Thank you,    DONTE Michelle Case Manager  [facility]  [phone]  [email or web address]

## 2018-07-03 NOTE — TELEPHONE ENCOUNTER
She has longstanding BLE edema that has been difficult to control and does fluctuat. She and her daughter discussed her poor appetite with me as well and were working to increase her caloric intake with calorie supplements. I do not currently have additional recommendations, however if she or her family believe she is clinically getting worse than when she was in to see last week,  I recommend a clinic visit .Majo Alfaro M.D.

## 2018-07-06 ENCOUNTER — TELEPHONE (OUTPATIENT)
Dept: FAMILY MEDICINE | Facility: CLINIC | Age: 83
End: 2018-07-06

## 2018-07-06 DIAGNOSIS — R63.0 POOR APPETITE: Primary | ICD-10-CM

## 2018-07-06 RX ORDER — MIRTAZAPINE 15 MG/1
30 TABLET, FILM COATED ORAL AT BEDTIME
Qty: 60 TABLET | Refills: 1 | Status: SHIPPED | OUTPATIENT
Start: 2018-07-06 | End: 2018-07-20

## 2018-07-06 NOTE — TELEPHONE ENCOUNTER
----- Message from Migdalia Odom RN sent at 7/6/2018  1:20 PM CDT -----  Regarding: RE: request for antidepressant dose change per patient/daughter  Thank you Dr. Alfaro.  I will notify the family.  Can you place the order in Epic and send to Pharmacy?    Thanks!  Migdalia  ----- Message -----     From: Majo Alfaro MD     Sent: 7/5/2018  12:41 PM       To: Migdalia Odom RN  Subject: RE: request for antidepressant dose change p#    I am okay with increasing Celestina's dose of remeron to 30mg daily in order to see if this improves her mood and appetite.     Thanks,   Majo Alfaro M.D.      ----- Message -----     From: Migdalia Odom RN     Sent: 7/3/2018   3:13 PM       To: Majo Alfaro MD  Subject: request for antidepressant dose change per p#    I had a visit today with Celestina in her home.  Daughter is requesting increase in dosing of antidepressant Remeron in hopes to improve her mood and appetite.  I am sending this at request of daughter Tawnya.  I believe you recommended follow up in one month.  Daughter will call to schedule.     Thanks!  Migdalia Odom RN Case Manager    [facility]  [address]   [address]  [place] [address]  [email or web address]  [email or web address]   Office: [phone]     Fax [phone]

## 2018-07-12 ENCOUNTER — PATIENT OUTREACH (OUTPATIENT)
Dept: CARE COORDINATION | Facility: CLINIC | Age: 83
End: 2018-07-12

## 2018-07-12 NOTE — PROGRESS NOTES
Clinic Care Coordination Contact  Care Team Conversations    CC spoke with DONTE Michelle Case Manager-[facility]-[phone] to get an update on the patient   Patient has more energy.  Patient is eating better but still small amounts of food  throughout the day. Patient still has some edema in her left lower leg and was encouraged to use compression stockings and elevate legs,  Patient denies any pain unless she applies pressure on one leg.  Patient uses the walker and  continues home PT.  Ulcer on her bottom is healing nicely   Patient appears in better spirits .  Patient will continue home care until August 19  CC will follow up when discharged from home care     Nina Holly RN / Clinical Care Coordinator     [facility]   [email or web address]  Office :  [phone] / Fax :  [phone]

## 2018-07-20 DIAGNOSIS — R63.0 POOR APPETITE: ICD-10-CM

## 2018-07-20 NOTE — TELEPHONE ENCOUNTER
"Requested Prescriptions   Pending Prescriptions Disp Refills     mirtazapine (REMERON) 15 MG tablet  Last Written Prescription Date:  7/6/2018  Last Fill Quantity: 60 tabs,  # refills: 1   Last office visit: 6/27/2018 with prescribing provider:  House   Future Office Visit:     60 tablet 1     Sig: Take 2 tablets (30 mg) by mouth At Bedtime    Atypical Antidepressants Protocol Passed    7/20/2018  2:51 PM       Passed - Recent (12 mo) or future (30 days) visit within the authorizing provider's specialty    Patient had office visit in the last 12 months or has a visit in the next 30 days with authorizing provider or within the authorizing provider's specialty.  See \"Patient Info\" tab in inbasket, or \"Choose Columns\" in Meds & Orders section of the refill encounter.           Passed - Patient is age 18 or older       Passed - No active pregnancy on record       Passed - No positive pregnancy test in past 12 mos          "

## 2018-07-25 ENCOUNTER — TELEPHONE (OUTPATIENT)
Dept: FAMILY MEDICINE | Facility: CLINIC | Age: 83
End: 2018-07-25

## 2018-07-25 RX ORDER — MIRTAZAPINE 15 MG/1
30 TABLET, FILM COATED ORAL AT BEDTIME
Qty: 60 TABLET | Refills: 0 | Status: SHIPPED | OUTPATIENT
Start: 2018-07-25 | End: 2018-09-04

## 2018-07-25 NOTE — TELEPHONE ENCOUNTER
Prescription approved per [facility] Refill Protocol.    Aleyda Santamaria RN -- [facility]

## 2018-07-25 NOTE — TELEPHONE ENCOUNTER
[facility]  Home care calling to get orders:  Continue PT 1 time a week for 2 weeks, for strengthening & mobility.  Please call.  OK to leave message on voicemail.

## 2018-08-03 ENCOUNTER — DOCUMENTATION ONLY (OUTPATIENT)
Dept: FAMILY MEDICINE | Facility: CLINIC | Age: 83
End: 2018-08-03

## 2018-08-09 NOTE — PROGRESS NOTES
Forms completed. Faxed to [facility] at [phone].    Form are place back in to House, Majo JOHNS's folder.       Bear Vazquez MA

## 2018-08-28 ENCOUNTER — MEDICAL CORRESPONDENCE (OUTPATIENT)
Dept: HEALTH INFORMATION MANAGEMENT | Facility: CLINIC | Age: 83
End: 2018-08-28

## 2018-09-04 ENCOUNTER — OFFICE VISIT (OUTPATIENT)
Dept: FAMILY MEDICINE | Facility: CLINIC | Age: 83
End: 2018-09-04
Payer: COMMERCIAL

## 2018-09-04 VITALS
SYSTOLIC BLOOD PRESSURE: 150 MMHG | TEMPERATURE: 97.4 F | HEART RATE: 68 BPM | BODY MASS INDEX: 18.91 KG/M2 | OXYGEN SATURATION: 98 % | HEIGHT: 62 IN | WEIGHT: 102.75 LBS | DIASTOLIC BLOOD PRESSURE: 66 MMHG

## 2018-09-04 DIAGNOSIS — M19.90 OSTEOARTHRITIS, UNSPECIFIED OSTEOARTHRITIS TYPE, UNSPECIFIED SITE: ICD-10-CM

## 2018-09-04 DIAGNOSIS — M06.9 RHEUMATOID ARTHRITIS, INVOLVING UNSPECIFIED SITE, UNSPECIFIED RHEUMATOID FACTOR PRESENCE: ICD-10-CM

## 2018-09-04 DIAGNOSIS — I10 HYPERTENSION GOAL BP (BLOOD PRESSURE) < 140/90: Primary | ICD-10-CM

## 2018-09-04 DIAGNOSIS — D50.9 IRON DEFICIENCY ANEMIA, UNSPECIFIED IRON DEFICIENCY ANEMIA TYPE: ICD-10-CM

## 2018-09-04 DIAGNOSIS — R63.0 POOR APPETITE: ICD-10-CM

## 2018-09-04 DIAGNOSIS — E78.5 HYPERLIPIDEMIA WITH TARGET LDL LESS THAN 100: ICD-10-CM

## 2018-09-04 DIAGNOSIS — G89.29 OTHER CHRONIC PAIN: ICD-10-CM

## 2018-09-04 DIAGNOSIS — R60.0 PERIPHERAL EDEMA: ICD-10-CM

## 2018-09-04 DIAGNOSIS — N18.30 CKD (CHRONIC KIDNEY DISEASE) STAGE 3, GFR 30-59 ML/MIN (H): ICD-10-CM

## 2018-09-04 LAB — HGB BLD-MCNC: 10.2 G/DL (ref 11.7–15.7)

## 2018-09-04 PROCEDURE — 85018 HEMOGLOBIN: CPT | Performed by: FAMILY MEDICINE

## 2018-09-04 PROCEDURE — 36415 COLL VENOUS BLD VENIPUNCTURE: CPT | Performed by: FAMILY MEDICINE

## 2018-09-04 PROCEDURE — 99214 OFFICE O/P EST MOD 30 MIN: CPT | Performed by: FAMILY MEDICINE

## 2018-09-04 RX ORDER — GABAPENTIN 100 MG/1
100 CAPSULE ORAL 3 TIMES DAILY
Qty: 90 CAPSULE | Refills: 1 | Status: CANCELLED | OUTPATIENT
Start: 2018-09-04

## 2018-09-04 RX ORDER — GABAPENTIN 300 MG/1
CAPSULE ORAL
Qty: 90 CAPSULE | Refills: 3 | Status: SHIPPED | OUTPATIENT
Start: 2018-09-04 | End: 2018-11-28

## 2018-09-04 RX ORDER — ATORVASTATIN CALCIUM 20 MG/1
20 TABLET, FILM COATED ORAL DAILY
Qty: 90 TABLET | Refills: 3 | Status: SHIPPED | OUTPATIENT
Start: 2018-09-04 | End: 2018-09-19

## 2018-09-04 RX ORDER — GABAPENTIN 100 MG/1
100 CAPSULE ORAL 3 TIMES DAILY
Qty: 30 CAPSULE | Refills: 0 | Status: SHIPPED | OUTPATIENT
Start: 2018-09-04 | End: 2018-10-14

## 2018-09-04 RX ORDER — MIRTAZAPINE 15 MG/1
30 TABLET, FILM COATED ORAL AT BEDTIME
Qty: 60 TABLET | Refills: 11 | Status: SHIPPED | OUTPATIENT
Start: 2018-09-04 | End: 2018-09-10

## 2018-09-04 RX ORDER — NIFEDIPINE 90 MG/1
90 TABLET, EXTENDED RELEASE ORAL DAILY
Qty: 90 TABLET | Refills: 1 | Status: SHIPPED | OUTPATIENT
Start: 2018-09-04 | End: 2018-09-30

## 2018-09-04 NOTE — PROGRESS NOTES
SUBJECTIVE:   Celestina Tejada is a 87 year old female who presents to clinic today for the following health issues:       Hypertension Follow-up      Outpatient blood pressures are not being checked.    Low Salt Diet: not monitoring salt    Chronic Kidney Disease Follow-up      Current NSAID use?  No      Amount of exercise or physical activity: None    Problems taking medications regularly: No    Medication side effects: none    Diet: regular (no restrictions)    (Remeron) She has been gaining weight which is good because daughter states that she needed to gain weight.   (Gabapentin) taking as prescribed     Celestina is here with her daughter Tawnya today.  Celestina says her right leg still continues to hurt.  However, it has improved since a couple of months ago.  She does think the gabapentin has been helpful, but could use more pain relief.  She will be seeing her spine surgeon next week.  She continues to ambulate with a walker and is limited in how far she can walk.  She would like to get back to going shopping with her daughter and getting out more often.  She has not tried any topical treatments for her pain.  She recently saw her rheumatologist and is back on her infusion therapy now that her decubitus ulcer has healed.  She appreciated the physical therapy she had in home, however, she does not currently wish to pursue outpatient physical therapy.  She feels her peripheral edema has improved.  Her appetite is improving.  She has been able to gain some weight.  She continues to take her iron supplement.  Overall she and her daughter states he feels like she is doing much better.    Problem list and histories reviewed & adjusted, as indicated.  Additional history: as documented    Patient Active Problem List   Diagnosis     Osteopenia     Hypertension goal BP (blood pressure) < 140/90     Rheumatoid arthritis (H)     CKD (chronic kidney disease) stage 3, GFR 30-59 ml/min     Hyperlipidemia with target LDL less  than 100     Advanced directives, counseling/discussion     Osteoarthritis     Eczema     Peripheral edema     Hyponatremia with decreased serum osmolality     Chronic pain     Protein-calorie malnutrition (H)     Past Surgical History:   Procedure Laterality Date     C APPENDECTOMY  1950     SURGICAL HISTORY OF -       2 normal vaginal births       Social History   Substance Use Topics     Smoking status: Former Smoker     Packs/day: 0.50     Years: 20.00     Types: Cigarettes     Quit date: 9/20/2000     Smokeless tobacco: Never Used     Alcohol use Yes      Comment: very occ,     Family History   Problem Relation Age of Onset     HEART DISEASE Mother      CHF     Respiratory Mother      lung disease     C.A.D. Sister      heart by-pass     Arthritis Sister      Family History Negative Brother      Family History Negative Daughter      Family History Negative Daughter          Current Outpatient Prescriptions   Medication Sig Dispense Refill     acetaminophen (TYLENOL) 325 MG tablet Take 2 tablets (650 mg) by mouth 3 times daily 100 tablet 0     Amino Acids-Protein Hydrolys (PRO-STAT AWC) LIQD Take 1 oz by mouth 2 times daily       atenolol (TENORMIN) 25 MG tablet TAKE 3 TABLETS DAILY 270 tablet 3     atorvastatin (LIPITOR) 20 MG tablet Take 1 tablet (20 mg) by mouth daily 90 tablet 3     Ferrous Sulfate (IRON) 325 (65 Fe) MG tablet Take 1 tablet by mouth daily (with breakfast) 90 tablet 3     gabapentin (NEURONTIN) 100 MG capsule Take 1 capsule (100 mg) by mouth 3 times daily 30 capsule 0     gabapentin (NEURONTIN) 300 MG capsule See instructions 90 capsule 3     HYDROcodone-acetaminophen (NORCO) 5-325 MG per tablet Take 1 tablet by mouth every 6 hours       hydroxychloroquine (PLAQUENIL) 200 MG tablet Take 200 mg by mouth daily   3     mirtazapine (REMERON) 15 MG tablet Take 2 tablets (30 mg) by mouth At Bedtime 60 tablet 11     NIFEdipine ER osmotic (PROCARDIA XL) 90 MG 24 hr tablet Take 1 tablet (90 mg) by  mouth daily 90 tablet 1     Nutritional Supplements (ENSURE PLUS) LIQD Take by mouth 2 times daily        senna-docusate (SENOKOT-S;PERICOLACE) 8.6-50 MG per tablet Take 2 tablets by mouth daily And 2 tabs daily prn       [DISCONTINUED] atorvastatin (LIPITOR) 20 MG tablet TAKE 1 TABLET DAILY 90 tablet 3     [DISCONTINUED] gabapentin (NEURONTIN) 100 MG capsule Take 1 capsule (100 mg) by mouth 3 times daily 90 capsule 1     [DISCONTINUED] mirtazapine (REMERON) 15 MG tablet Take 2 tablets (30 mg) by mouth At Bedtime 60 tablet 0     [DISCONTINUED] NIFEdipine ER osmotic (PROCARDIA XL) 90 MG 24 hr tablet Take 1 tablet (90 mg) by mouth daily 90 tablet 1     Allergies   Allergen Reactions     Hydrochlorothiazide      hyponatremia     Recent Labs   Lab Test  06/28/18   1636  06/27/18   1231  06/18/18   0600   05/31/18   0704   05/21/18   1618  04/30/18   1439 04/04/18 09/12/17   1236   08/18/16   1322   07/14/16   1305   LDL   --    --    --    --    --    --    --    --    --    --   50   --   49   --   81   HDL   --    --    --    --    --    --    --    --    --    --   65   --   68   --   64   TRIG   --    --    --    --    --    --    --    --    --    --   89   --   85   --   120   ALT   --    --    --    --   22   --    --   28  23   < >  31   < >   --    < >  21   CR  0.92  Unsatisfactory specimen - improperly centrifuged   --    < >  0.67   < >  0.82  0.89  1.080   < >  1.60*   < >  1.66*   < >  1.41*   GFRESTIMATED  58*  Not Calculated   --    < >  84   < >  66  60*  51.0   < >  31*   < >  29*   < >  35*   GFRESTBLACK  70  Not Calculated   --    < >  >90   < >  80  73   --    < >  37*   < >  35*   < >  43*   POTASSIUM  4.6  Unsatisfactory specimen - improperly centrifuged   --    < >  4.6   < >  4.6  4.0   --    < >  4.2   < >  4.7   < >  4.0   TSH   --    --   1.52   --    --    --   1.56   --    --    --    --    --    --    < >   --     < > = values in this interval not displayed.      BP Readings from Last 3  "Encounters:   09/04/18 150/66   06/27/18 121/59   06/18/18 111/70    Wt Readings from Last 3 Encounters:   09/04/18 102 lb 12 oz (46.6 kg)   06/27/18 96 lb 12 oz (43.9 kg)   06/18/18 91 lb 9.6 oz (41.5 kg)              Reviewed and updated as needed this visit by clinical staff  Tobacco  Allergies  Meds       Reviewed and updated as needed this visit by Provider         ROS:  As above     OBJECTIVE:     /66  Pulse 68  Temp 97.4  F (36.3  C) (Tympanic)  Ht 5' 2\" (1.575 m)  Wt 102 lb 12 oz (46.6 kg)  SpO2 98%  BMI 18.79 kg/m2  Body mass index is 18.79 kg/(m^2).   Wt Readings from Last 5 Encounters:   09/04/18 102 lb 12 oz (46.6 kg)   06/27/18 96 lb 12 oz (43.9 kg)   06/18/18 91 lb 9.6 oz (41.5 kg)   06/12/18 91 lb 14.4 oz (41.7 kg)   06/11/18 92 lb 6.4 oz (41.9 kg)      GENERAL: thin, elderly female, walks with walker, appears improved since last visit in June, alert and no distress    Diagnostic Test Results:  Results for orders placed or performed in visit on 09/04/18 (from the past 24 hour(s))   Hemoglobin   Result Value Ref Range    Hemoglobin 10.2 (L) 11.7 - 15.7 g/dL       ASSESSMENT/PLAN:          1. Hypertension goal BP (blood pressure) < 140/90  Reasonable control less than 150/90 and home bp has been in the normal range.  Continues on atenolol and nifedipine.  Will not add back lisinopril due to good blood pressure control and risk of hyponatremia.  - NIFEdipine ER osmotic (PROCARDIA XL) 90 MG 24 hr tablet; Take 1 tablet (90 mg) by mouth daily  Dispense: 90 tablet; Refill: 1    2. CKD (chronic kidney disease) stage 3, GFR 30-59 ml/min  Stable.    3. Other chronic pain  Continued right leg pain.  Will increase gabapentin to 300 mg 3 times a day over the next week.  She has an appointment with her spine specialist at [facility] orthopedics next week.  She will follow-up with me in 1-2 months.  - gabapentin (NEURONTIN) 300 MG capsule; See instructions  Dispense: 90 capsule; Refill: 3    4. " Peripheral edema  Stable.    5. Rheumatoid arthritis, involving unspecified site, unspecified rheumatoid factor presence (H)  Followed by rheumatology.  She has resumed her infusion therapy and her joint pains have improved since that time.  Therapy had been held until her decubitus ulcer had healed.    6. Iron deficiency anemia, unspecified iron deficiency anemia type  Rechecking her hemoglobin today and it appears improved.  She will continue on iron supplement.  - Hemoglobin    7. Osteoarthritis, unspecified osteoarthritis type, unspecified site     - gabapentin (NEURONTIN) 100 MG capsule; Take 1 capsule (100 mg) by mouth 3 times daily  Dispense: 30 capsule; Refill: 0    8. Hyperlipidemia with target LDL less than 100  Needs refills.  - atorvastatin (LIPITOR) 20 MG tablet; Take 1 tablet (20 mg) by mouth daily  Dispense: 90 tablet; Refill: 3    9. Poor appetite  Improved. Gaining weight and appetite is better.   - mirtazapine (REMERON) 15 MG tablet; Take 2 tablets (30 mg) by mouth At Bedtime  Dispense: 60 tablet; Refill: 11    Patient Instructions   1) Take gabapentin 100mg in the morning, 100mg at noon and 300mg in the evening for three days, then take 300mg in the morning, 100mg at noon and 300mg in the evening for 3 days, then increase to 300mg three times a day and stay at that dose.   2) Follow up with me in 1-2 months.       Majo Alfaro M.D.        [facility]

## 2018-09-04 NOTE — PATIENT INSTRUCTIONS
1) Take gabapentin 100mg in the morning, 100mg at noon and 300mg in the evening for three days, then take 300mg in the morning, 100mg at noon and 300mg in the evening for 3 days, then increase to 300mg three times a day and stay at that dose.   2) Follow up with me in 1-2 months.

## 2018-09-04 NOTE — LETTER
September 4, 2018      Celestina Mallory  [address]   [facility] [address]        Dear ,    We are writing to inform you of your test results.    Your hemoglobin is improving with your iron supplement. I do recommend you continue on the iron for now. I will see you soon and I hope you get some relief from your leg pain in the meantime!    Resulted Orders   Hemoglobin   Result Value Ref Range    Hemoglobin 10.2 (L) 11.7 - 15.7 g/dL       If you have any questions or concerns, please call the clinic at the number listed above.       Sincerely,        Majo Alfaro MD/nr

## 2018-09-04 NOTE — MR AVS SNAPSHOT
After Visit Summary   9/4/2018    Celestina Tejada    MRN: [id number]           Patient Information     Date Of Birth          4/2/1931        Visit Information        Provider Department      9/4/2018 10:40 AM Majo Alfaro MD [facility]        Today's Diagnoses     Hypertension goal BP (blood pressure) < 140/90    -  1    CKD (chronic kidney disease) stage 3, GFR 30-59 ml/min        Other chronic pain        Peripheral edema        Rheumatoid arthritis, involving unspecified site, unspecified rheumatoid factor presence (H)        Iron deficiency anemia, unspecified iron deficiency anemia type        Osteoarthritis, unspecified osteoarthritis type, unspecified site        Hyperlipidemia with target LDL less than 100        Poor appetite          Care Instructions    1) Take gabapentin 100mg in the morning, 100mg at noon and 300mg in the evening for three days, then take 300mg in the morning, 100mg at noon and 300mg in the evening for 3 days, then increase to 300mg three times a day and stay at that dose.   2) Follow up with me in 1-2 months.           Follow-ups after your visit        Who to contact     If you have questions or need follow up information about today's clinic visit or your schedule please contact [facility] directly at [phone].  Normal or non-critical lab and imaging results will be communicated to you by MyChart, letter or phone within 4 business days after the clinic has received the results. If you do not hear from us within 7 days, please contact the clinic through MyChart or phone. If you have a critical or abnormal lab result, we will notify you by phone as soon as possible.  Submit refill requests through [employer] or call your pharmacy and they will forward the refill request to us. Please allow 3 business days for your refill to be completed.          Additional Information About Your Visit        Care EveryWhere ID     This is your Care  "EveryWhere ID. This could be used by other organizations to access your [facility] medical records  [id number]        Your Vitals Were     Pulse Temperature Height Pulse Oximetry BMI (Body Mass Index)       68 97.4  F (36.3  C) (Tympanic) 5' 2\" (1.575 m) 98% 18.79 kg/m2        Blood Pressure from Last 3 Encounters:   09/04/18 150/66   06/27/18 121/59   06/18/18 111/70    Weight from Last 3 Encounters:   09/04/18 102 lb 12 oz (46.6 kg)   06/27/18 96 lb 12 oz (43.9 kg)   06/18/18 91 lb 9.6 oz (41.5 kg)              We Performed the Following     Hemoglobin          Today's Medication Changes          These changes are accurate as of 9/4/18 11:21 AM.  If you have any questions, ask your nurse or doctor.               These medicines have changed or have updated prescriptions.        Dose/Directions    atorvastatin 20 MG tablet   Commonly known as:  LIPITOR   This may have changed:  See the new instructions.   Used for:  Hyperlipidemia with target LDL less than 100   Changed by:  Majo Alfaro MD        Dose:  20 mg   Take 1 tablet (20 mg) by mouth daily   Quantity:  90 tablet   Refills:  3       * gabapentin 300 MG capsule   Commonly known as:  NEURONTIN   This may have changed:  You were already taking a medication with the same name, and this prescription was added. Make sure you understand how and when to take each.   Used for:  Other chronic pain   Changed by:  Majo Alfaro MD        See instructions   Quantity:  90 capsule   Refills:  3       * gabapentin 100 MG capsule   Commonly known as:  NEURONTIN   This may have changed:  Another medication with the same name was added. Make sure you understand how and when to take each.   Used for:  Osteoarthritis, unspecified osteoarthritis type, unspecified site   Changed by:  Majo Alfaro MD        Dose:  100 mg   Take 1 capsule (100 mg) by mouth 3 times daily   Quantity:  30 capsule   Refills:  0       * Notice:  This list has 2 medication(s) that are the same " as other medications prescribed for you. Read the directions carefully, and ask your doctor or other care provider to review them with you.         Where to get your medicines      These medications were sent to [id number] - [place], [place] - [address] AT [facility] [address], [facility] [address]     Phone:  [phone]     atorvastatin 20 MG tablet    gabapentin 100 MG capsule    gabapentin 300 MG capsule    mirtazapine 15 MG tablet    NIFEdipine ER osmotic 90 MG 24 hr tablet                Primary Care Provider Office Phone # Fax #    Majo Alfaro [phone] [address]  [facility] [address]        Equal Access to Services     HIEN URENA : Hadii melodie giordano hadasho Soana mariaali, waaxda luqadaha, qaybta kaalmada adeegyada, alona hampton. So [facility] [phone].    ATENCIÓN: Si habla español, tiene a gallardo disposición servicios gratuitos de asistencia lingüística. [facility] [phone].    We comply with applicable federal civil rights laws and [place] laws. We do not discriminate on the basis of race, color, national origin, age, disability, sex, sexual orientation, or gender identity.            Thank you!     Thank you for choosing [facility]  for your care. Our goal is always to provide you with excellent care. Hearing back from our patients is one way we can continue to improve our services. Please take a few minutes to complete the written survey that you may receive in the mail after your visit with us. Thank you!             Your Updated Medication List - Protect others around you: Learn how to safely use, store and throw away your medicines at www.disposemymeds.org.          This list is accurate as of 9/4/18 11:21 AM.  Always use your most recent med list.                   Brand Name Dispense Instructions for use Diagnosis    acetaminophen 325 MG tablet    TYLENOL    100 tablet    Take 2  tablets (650 mg) by mouth 3 times daily    Rheumatoid arthritis, involving unspecified site, unspecified rheumatoid factor presence (H)       atenolol 25 MG tablet    TENORMIN    270 tablet    TAKE 3 TABLETS DAILY    Hypertension goal BP (blood pressure) < 140/90       atorvastatin 20 MG tablet    LIPITOR    90 tablet    Take 1 tablet (20 mg) by mouth daily    Hyperlipidemia with target LDL less than 100       ENSURE PLUS Liqd      Take by mouth 2 times daily        * gabapentin 300 MG capsule    NEURONTIN    90 capsule    See instructions    Other chronic pain       * gabapentin 100 MG capsule    NEURONTIN    30 capsule    Take 1 capsule (100 mg) by mouth 3 times daily    Osteoarthritis, unspecified osteoarthritis type, unspecified site       HYDROcodone-acetaminophen 5-325 MG per tablet    NORCO     Take 1 tablet by mouth every 6 hours        hydroxychloroquine 200 MG tablet    PLAQUENIL     Take 200 mg by mouth daily        iron 325 (65 Fe) MG tablet     90 tablet    Take 1 tablet by mouth daily (with breakfast)    Anemia, unspecified type       mirtazapine 15 MG tablet    REMERON    60 tablet    Take 2 tablets (30 mg) by mouth At Bedtime    Poor appetite       NIFEdipine ER osmotic 90 MG 24 hr tablet    PROCARDIA XL    90 tablet    Take 1 tablet (90 mg) by mouth daily    Hypertension goal BP (blood pressure) < 140/90       PRO-STAT AWC Liqd      Take 1 oz by mouth 2 times daily        senna-docusate 8.6-50 MG per tablet    SENOKOT-S;PERICOLACE     Take 2 tablets by mouth daily And 2 tabs daily prn        * Notice:  This list has 2 medication(s) that are the same as other medications prescribed for you. Read the directions carefully, and ask your doctor or other care provider to review them with you.

## 2018-09-07 ENCOUNTER — TRANSFERRED RECORDS (OUTPATIENT)
Dept: HEALTH INFORMATION MANAGEMENT | Facility: CLINIC | Age: 83
End: 2018-09-07

## 2018-09-10 DIAGNOSIS — R63.0 POOR APPETITE: ICD-10-CM

## 2018-09-11 DIAGNOSIS — Z53.9 DIAGNOSIS NOT YET DEFINED: Primary | ICD-10-CM

## 2018-09-11 PROCEDURE — G0180 MD CERTIFICATION HHA PATIENT: HCPCS | Performed by: FAMILY MEDICINE

## 2018-09-12 ENCOUNTER — PATIENT OUTREACH (OUTPATIENT)
Dept: CARE COORDINATION | Facility: CLINIC | Age: 83
End: 2018-09-12

## 2018-09-12 DIAGNOSIS — G89.29 OTHER CHRONIC PAIN: Primary | ICD-10-CM

## 2018-09-12 NOTE — PROGRESS NOTES
Clinic Care Coordination Contact    Clinic Care Coordination Contact  OUTREACH    Referral Information:  PCP referral :  Complex Medical Concerns/Education, and Patient/Caregiver Support: Resources for Support    Additional pertinent details:  Celestina lives at home and has needed the help of her daughter and sister in recent weeks due to her back/leg/hip pain, lack of appetite and energy. Her family would like to know if she could get home care services and what resources are available for support          Primary Diagnosis: Orthopedic    Chief Complaint   Patient presents with     Clinic Care Coordination - Follow-up     Clinic Care Coordination RN         Universal Utilization:  5/22-5/25/2018--Discharge Diagnoses         Hyponatremia, multifactorial  Hypertension  Rheumatoid arthritis  Hyperlipidemia  Subacute low back pain with reported severe degenerative disc disease, with sciatica   Pressure Injury located on coccyx  Clinic Utilization  Difficulty keeping appointments:: No  Compliance Concerns: No  No-Show Concerns: No  No PCP office visit in Past Year: No  Utilization    Last refreshed: 9/12/2018  3:49 PM:  No Show Count (past year) 0       Last refreshed: 9/12/2018  3:49 PM:  ED visits 1       Last refreshed: 9/12/2018  3:49 PM:  Hospital admissions 1          Current as of: 9/12/2018  3:49 PM             Clinical Concerns:  Current Medical Concerns:  Patient no longer has home care services.  Patient reports her daughter sets up the Automatic pill dispenser.  Patient states the increased Gabapentin has decreased right leg pain  But continues to walk slowly due to making a step aggravates the discomfort.  Patient has an appointment for infusion therapy per Rheumatologist once a month and next infusion is 9/24/2018.  CC will follow up after the Infusion therapy and get an update on the leg pain      Type: Chronic (>3mo)  Progression: Improving  Description of pain: Aching  Limitation of routine activities due  to chronic pain:: Yes  Alleviating Factors: Rest, Pain Medication  Aggravating Factors: Activity  Health Maintenance Reviewed: Not assessed  Clinical Pathway: None    Medication Management:  Reviewed Gabapentin use      Functional Status:       Living Situation:  Current living arrangement:: I live alone  Type of residence:: Apartment    Diet/Exercise/Sleep:  Food Insecurity: No  Tube Feeding: No  Exercise:: Unable to exercise       Goals:   Goals        General    # 1 Pain Management (pt-stated)     Notes - Note created  9/12/2018  3:52 PM by Nina Holly RN    Goal Statement: I will decrease right leg pain   Measure of Success: I will be able to have more mobility with daily activity   Supportive Steps to Achieve:   I will continue to increase Gabapentin per Dr House advice at last visit   I will continue to follow Rheumatologist and infusion therapy once a month   Barriers: right leg pain limiting activity   Strengths: Supportive daughter sets up medication in automatic medication dispenser   Date to Achieve By:ongoing   Patient expressed understanding of goal: Yes             Outreach Frequency: 2 weeks      Plan: CC will follow up in 1-2 weeks     Nina Holly RN / Clinical Care Coordinator     [facility] [place]   [email or web address]  Office :  [phone] / Fax :  [phone]

## 2018-09-12 NOTE — LETTER
[facility]  Complex Care Plan  About Me  Patient Name:  Celestina Tejada    YOB: 1931  Age:     87 year old   [facility] MRN:   [id number] Telephone Information:    Home Phone [phone]   Mobile [phone]       Address:    [address]  [facility] [address] Email address:  No e-mail address on record      Emergency Contact(s)  Name Relationship Lgl Grd Work Phone Home Phone Mobile Phone   1. AMBER YOUSIF Daughter   [phone]   2. GISELA THAPA Sister   [phone]    3. FIORELLA MCADAMS Relative    [phone]           Primary language:  English     needed? No   [facility]:  [phone] op. 1  Other communication barriers:    Preferred Method of Communication:  Phone  Current living arrangement: I live alone  Mobility Status/ Medical Equipment:      Health Maintenance  Health Maintenance Reviewed: Not assessed    My Access Plan  Medical Emergency 911   Primary Clinic Line [facility] [phone]   24 Hour Appointment Line [phone] or  [phone]) (toll-free)   24 Hour Nurse Line [phone] (toll-free)   Preferred Urgent Care     Preferred Hospital     [facility] [address] AT [facility]     Behavioral Health Crisis Line The National Suicide Prevention Lifeline at 1-658.742.4991 or 911     My Care Team Members    Patient Care Team       Relationship Specialty Notifications Start End    Majo Alfaro MD PCP - General Family Practice  10/20/14     Phone: [phone] Fax: [phone]         [address] [facility] [address]    Nina Holly, RN Lead Care Coordinator Primary Care - CC  5/24/18     Phone: [phone] Fax: [phone]                My Care Plans  Self Management and Treatment Plan  Goals and (Comments)  Goals        General    # 1 Pain Management (pt-stated)     Notes - Note created   9/12/2018  3:52 PM by Nina Holly, RN    Goal Statement: I will decrease right leg pain   Measure of Success: I will be able to have more mobility with daily activity   Supportive Steps to Achieve:   I will continue to increase Gabapentin per Dr House advice at last visit   I will continue to follow Rheumatologist and infusion therapy once a month   Barriers: right leg pain limiting activity   Strengths: Supportive daughter sets up medication in automatic medication dispenser   Date to Achieve By:ongoing   Patient expressed understanding of goal: Yes                Action Plans on File: None                      Advance Care Plans/Directives Type: DNR/DNI       My Medical and Care Information  Problem List   Patient Active Problem List   Diagnosis     Osteopenia     Hypertension goal BP (blood pressure) < 140/90     Rheumatoid arthritis (H)     CKD (chronic kidney disease) stage 3, GFR 30-59 ml/min     Hyperlipidemia with target LDL less than 100     Advanced directives, counseling/discussion     Osteoarthritis     Eczema     Peripheral edema     Hyponatremia with decreased serum osmolality     Chronic pain     Protein-calorie malnutrition (H)      Current Medications and Allergies:  See printed Medication Report.    Care Coordination Start Date: 9/12/2018   Frequency of Care Coordination: 2 weeks   Form Last Updated: 09/12/2018

## 2018-09-12 NOTE — LETTER
[facility]     9/12/2018       Celestina Tejada  [address]   [place] [facility] [address]        Dear Celestina,    It was a pleasure to speak with you.  I would like to provide you with the enclosed emergency contact  information for your records.  As part of care coordination, we are developing care plans to assist in accomplishing your health care goals.  When we speak next, please feel free to let me know if you want to add or change any information on your care plans.    As always, feel free to contact me if you have any questions or concerns.  I look forward to working with you in the effort to achieve your health care and wellness goals .        Sincerely,        Nina Holly RN / Clinical Care Coordinator     [facility]   [email or web address]  Office :  [phone] / Fax :  [phone]

## 2018-09-12 NOTE — TELEPHONE ENCOUNTER
"Requested Prescriptions   Pending Prescriptions Disp Refills     mirtazapine (REMERON) 15 MG tablet  Last Written Prescription Date:  9/4/2018  Last Fill Quantity: 60 tablet,  # refills: 11   Last Office Visit: 9/4/2018   Future Office Visit:      60 tablet 11     Sig: Take 2 tablets (30 mg) by mouth At Bedtime    Atypical Antidepressants Protocol Passed    9/10/2018  5:37 PM       Passed - Recent (12 mo) or future (30 days) visit within the authorizing provider's specialty    Patient had office visit in the last 12 months or has a visit in the next 30 days with authorizing provider or within the authorizing provider's specialty.  See \"Patient Info\" tab in inbasket, or \"Choose Columns\" in Meds & Orders section of the refill encounter.           Passed - Patient is age 18 or older       Passed - No active pregnancy on record       Passed - No positive pregnancy test in past 12 mos          "

## 2018-09-13 RX ORDER — MIRTAZAPINE 15 MG/1
30 TABLET, FILM COATED ORAL AT BEDTIME
Qty: 60 TABLET | Refills: 11 | Status: SHIPPED | OUTPATIENT
Start: 2018-09-13 | End: 2019-01-07 | Stop reason: SINTOL

## 2018-09-13 NOTE — TELEPHONE ENCOUNTER
Routing refill request to provider for review/approval because:  Medication being used for appetite.  Yuliet Banda RN

## 2018-09-19 DIAGNOSIS — E78.5 HYPERLIPIDEMIA WITH TARGET LDL LESS THAN 100: ICD-10-CM

## 2018-09-20 RX ORDER — ATORVASTATIN CALCIUM 20 MG/1
TABLET, FILM COATED ORAL
Qty: 90 TABLET | Refills: 2 | Status: SHIPPED | OUTPATIENT
Start: 2018-09-20 | End: 2019-01-07

## 2018-09-20 NOTE — TELEPHONE ENCOUNTER
"Requested Prescriptions   Pending Prescriptions Disp Refills     atorvastatin (LIPITOR) 20 MG tablet [Pharmacy Med Name: ATORVASTATIN TABS 20MG]  Last Written Prescription Date:  9/4/2018  Last Fill Quantity: 90 tablet,  # refills: 3   Last Office Visit: 9/4/2018   Future Office Visit:      90 tablet 3     Sig: TAKE 1 TABLET DAILY    Statins Protocol Failed    9/19/2018 11:38 PM       Failed - LDL on file in past 12 months    Recent Labs   Lab Test  09/12/17   1236   LDL  50          Passed - No abnormal creatine kinase in past 12 months    No lab results found.          Passed - Recent (12 mo) or future (30 days) visit within the authorizing provider's specialty    Patient had office visit in the last 12 months or has a visit in the next 30 days with authorizing provider or within the authorizing provider's specialty.  See \"Patient Info\" tab in inbasket, or \"Choose Columns\" in Meds & Orders section of the refill encounter.           Passed - Patient is age 18 or older       Passed - No active pregnancy on record       Passed - No positive pregnancy test in past 12 months          " IRENA ambulatory encounter  FAMILY PRACTICE OFFICE VISIT    CHIEF COMPLAINT:    Chief Complaint   Patient presents with   • Knee     right knee pain x 1 week   • Hypertension     6 month       SUBJECTIVE:  Elisabeth Starkey is a 74 year old female who presented requesting evaluation for a follow up visit.       1. Hypertensive Heart Disease: This is a chronic problem, which started more than 1 year ago. The problem has been waxing and waning. She denies shortness of breath or chest pain. Current treatment includes beta blockers and calcium channel blockers. Treatment has provided mild relief.  2. Right Knee Pain: The patient presents for evaluation on this ongoing problem onset one week ago. Patient complains of persistent right knee pain. She describes the pain as aching and sharp intermittently. Per patient pain is primarily located in the anterior aspect of knee. Aggravating factors include flexion of knee and weight bearing. Patient states the pain has been causing difficulty ambulating; \"it hurts so bad I can hardly walk\". Patient denies specific/known injury, deformity, or swelling. Treatments tired include rest and Ibuprofen as needed with no relief.   3. Epistaxis: The patient presents for evaluation on this recurrent problem. Patient states she has been experiencing on and off bleeding of her right nostril for the last couple weeks. She denies blood in gums, stool, or urine. Treatments tried include applying pressure during onset. Blood pressure readings have been under good control.       Review of systems:   Constitutional: Negative for fever and chills.   Skin: Negative for rash.   Respiratory: Negative for cough or shortness of breath.   Cardiovascular: Negative for chest pain or chest pressure.   Gastrointestinal: Negative for nausea, vomiting, diarrhea or abdominal pain.        OBJECTIVE:  PROBLEM LIST:   Patient Active Problem List   Diagnosis   • Essential hypertension   • Generalized  osteoarthrosis, unspecified site   • Actinic keratosis   • Other seborrheic keratosis   • Angioma   • Benign neoplasm of skin, site unspecified       PAST HISTORIES:   I have reviewed the past medical history, family history, social history, medications and allergies listed in the medical record as obtained by my nursing staff and support staff and agree with their documentation.  ALLERGIES:   Allergen Reactions   • Bee Sting SWELLING     Current Outpatient Prescriptions   Medication Sig Dispense Refill   • amLODIPine (NORVASC) 5 MG tablet Take 1 tablet by mouth daily. 90 tablet 3   • metoPROLOL (LOPRESSOR) 50 MG tablet TAKE 50 mg in the AM and 25 mg in evening 180 tablet 3   • Calcium Carb-Cholecalciferol (CALCIUM + D3 PO) Calcium 1200IU/Vitamin D3 600IU     • EPINEPHrine (EPIPEN) 0.3 MG/0.3ML SOAJ Inject 1 application as directed once as needed (bee sting). 1 Device 0   • Multiple Vitamin (MULTI-VITAMIN) tablet Take 1 tablet by mouth daily.     • Omega-3 Fatty Acids (FISH OIL) 1200 MG capsule Take 1,200 mg by mouth daily.     • aspirin 81 MG tablet Take 1 tablet by mouth daily.     • ibuprofen (MOTRIN) 200 MG tablet Take 2 tablets by mouth as needed for Pain. 30 tablet 0     No current facility-administered medications for this visit.      Immunization History   Administered Date(s) Administered   • Hep B, adult 10/01/2001   • Hepatitis A - Adult 02/20/2001   • Influenza, high dose seasonal, preservative-free 09/16/2016, 09/25/2017   • Influenza, seasonal, injectable, preservative free 09/23/2013   • Influenza, seasonal, injectable, trivalent 12/29/2000, 10/02/2001, 10/30/2002, 10/29/2003, 12/15/2004, 12/12/2005, 11/20/2006, 10/25/2008, 10/11/2010, 10/11/2011, 09/24/2012, 09/29/2014, 09/21/2015   • Novel Influenza G6T0-87, Unspecified Formulation 11/23/2009   • Pneumococcal Conjugate 13 valent 09/21/2015   • Pneumococcal polysaccharide, adult, 23 valent 04/03/2009   • Td:Adult type tetanus/diphtheria 12/16/2010    • Zoster Shingles 03/27/2007     Past Medical History:   Diagnosis Date   • Arthritis    • Cataract    • Essential (primary) hypertension      Past Surgical History:   Procedure Laterality Date   • Eye surgery     • Hysterectomy     • Tonsillectomy       Social History     Social History   • Marital status: Single     Spouse name: N/A   • Number of children: N/A   • Years of education: N/A     Social History Main Topics   • Smoking status: Never Smoker   • Smokeless tobacco: Never Used   • Alcohol use 1.8 oz/week     3 Standard drinks or equivalent per week   • Drug use: No   • Sexual activity: No     Other Topics Concern   • None     Social History Narrative   • None     History   Smoking Status   • Never Smoker   Smokeless Tobacco   • Never Used     History   Alcohol Use   • 1.8 oz/week   • 3 Standard drinks or equivalent per week     Family History   Problem Relation Age of Onset   • Cancer Mother         breast cancer    • High blood pressure Mother    • High cholesterol Mother    • Stroke Mother    • Psychiatric Mother    • Arthritis Mother    • Heart disease Mother    • Stroke Father    • High blood pressure Father    • Arthritis Father    • Arthritis Brother    • High blood pressure Brother      Health Maintenance   Topic Date Due   • DTaP/Tdap/Td Vaccine (1 - Tdap) 12/17/2010   • Medicare Wellness 65+  12/06/2018   • Depression Screening  12/06/2018   • Breast Cancer Screening  07/31/2019   • Colorectal Cancer Screening-Colonoscopy  05/24/2020   • Osteoporosis Screening  Completed   • Pneumococcal Vaccine Adult  Completed   • Influenza Vaccine  Completed       Physical Exam   Constitutional: She is oriented to person, place, and time and well-developed, well-nourished, and in no distress. No distress.   HENT:   Head: Normocephalic and atraumatic.   Nose: Nose normal.   Mouth/Throat: No oropharyngeal exudate.   Right nares dry no lesion.    Eyes: Conjunctivae are normal. Right eye exhibits no discharge.  Left eye exhibits no discharge. No scleral icterus.   Neck: Normal range of motion. Neck supple. No tracheal deviation present. No thyromegaly present.   Cardiovascular: Normal rate, regular rhythm and normal heart sounds.  Exam reveals no gallop and no friction rub.    No murmur heard.  Pulmonary/Chest: Effort normal. No respiratory distress. She has no wheezes. She has no rales.   Musculoskeletal:        Legs:  Lymphadenopathy:     She has no cervical adenopathy.   Neurological: She is alert and oriented to person, place, and time. No cranial nerve deficit.   Skin: Skin is warm and dry. She is not diaphoretic.   Psychiatric: Mood, memory, affect and judgment normal.   Nursing note and vitals reviewed.      LAB RESULTS:   All pertinent laboratory results were reviewed.      ASSESSMENT:   1. Hypertensive heart disease without heart failure    2. Right knee pain, unspecified chronicity    3. Epistaxis        PLAN:   Orders Placed This Encounter   • XR Knee 3 View Right       1. Continue with current medication regimen of Amlodipine 5 MG daily as well as Metoprolol 50 MG in the AM and 25 MG in the evening. Will continue to monitor blood pressures.  2. Will have patient complete a right knee xray. Further recommendations will be made based on test results.   3. Patient is currently stable. Advised patient to try over-the-counter saline nasal spray. Will continue to monitor symptoms and make adjustments as needed.     Return if symptoms worsen or fail to improve.    Instructions provided as documented in the after visit summary.    The patient indicated understanding of the diagnosis and agreed with the plan of care.       On 5/25/2018, IMiguelina RN scribed the services personally performed by Aric Mcdaniels MD      I attest that I performed all of the work for this encounter, and the scribe merely recorded my findings.  Aric Mcdaniels MD.

## 2018-09-20 NOTE — TELEPHONE ENCOUNTER
[facility]    Signed Prescriptions:                        Disp   Refills    atorvastatin (LIPITOR) 20 MG tablet        90 tab*2        Sig: TAKE 1 TABLET DAILY  Authorizing Provider: PRAMOD WONG  Ordering User: TONY ACE      Closing encounter - no further actions needed at this time    Tony Ace RN

## 2018-09-27 ENCOUNTER — TRANSFERRED RECORDS (OUTPATIENT)
Dept: HEALTH INFORMATION MANAGEMENT | Facility: CLINIC | Age: 83
End: 2018-09-27

## 2018-09-30 DIAGNOSIS — I10 HYPERTENSION GOAL BP (BLOOD PRESSURE) < 140/90: ICD-10-CM

## 2018-10-01 NOTE — TELEPHONE ENCOUNTER
"Requested Prescriptions   Pending Prescriptions Disp Refills     NIFEdipine ER osmotic (PROCARDIA XL) 90 MG 24 hr tablet [Pharmacy Med Name: NIFEDIPINE ER (XL) TABS 90MG]  Last Written Prescription Date:  9/4/2018  Last Fill Quantity: 90 tablet,  # refills: 1   Last Office Visit: 9/4/2018   Future Office Visit:      90 tablet 2     Sig: TAKE 1 TABLET DAILY    Calcium Channel Blockers Protocol  Failed    9/30/2018  5:25 AM       Failed - Blood pressure under 140/90 in past 12 months    BP Readings from Last 3 Encounters:   09/04/18 150/66   06/27/18 121/59   06/18/18 111/70          Passed - Recent (12 mo) or future (30 days) visit within the authorizing provider's specialty    Patient had office visit in the last 12 months or has a visit in the next 30 days with authorizing provider or within the authorizing provider's specialty.  See \"Patient Info\" tab in inbasket, or \"Choose Columns\" in Meds & Orders section of the refill encounter.           Passed - Patient is age 18 or older       Passed - No active pregnancy on record       Passed - Normal serum creatinine on file in past 12 months    Recent Labs   Lab Test  06/28/18   1636   CR  0.92          Passed - No positive pregnancy test in past 12 months          "

## 2018-10-02 RX ORDER — NIFEDIPINE 90 MG/1
TABLET, EXTENDED RELEASE ORAL
Qty: 90 TABLET | Refills: 2 | Status: SHIPPED | OUTPATIENT
Start: 2018-10-02 | End: 2019-01-07

## 2018-10-02 NOTE — TELEPHONE ENCOUNTER
Routing refill request to provider for review/approval because:  Blood Pressure above Threshold  Last Office visit was 9/4/18:  1. Hypertension goal BP (blood pressure) < 140/90  Reasonable control less than 150/90 and home bp has been in the normal range.  Continues on atenolol and nifedipine.  Will not add back lisinopril due to good blood pressure control and risk of hyponatremia.  - NIFEdipine ER osmotic (PROCARDIA XL) 90 MG 24 hr tablet; Take 1 tablet (90 mg) by mouth daily  Dispense: 90 tablet; Refill: 1

## 2018-10-04 ENCOUNTER — PATIENT OUTREACH (OUTPATIENT)
Dept: CARE COORDINATION | Facility: CLINIC | Age: 83
End: 2018-10-04

## 2018-10-04 DIAGNOSIS — M19.90 OSTEOARTHRITIS, UNSPECIFIED OSTEOARTHRITIS TYPE, UNSPECIFIED SITE: Primary | ICD-10-CM

## 2018-10-04 NOTE — LETTER
[facility]  Complex Care Plan  About Me  Patient Name:  Celestina Tejada    YOB: 1931  Age:     87 year old   Melba MRN:   [id number] Telephone Information:    Home Phone [phone]   Mobile [phone]       Address:    [address]  [facility] [address] Email address:  No e-mail address on record      Emergency Contact(s)  Name Relationship Lgl Grd Work Phone Home Phone Mobile Phone   1. AMBER YOUSIF Daughter   [phone]   2. GISELA THAPA Sister   [phone]    3. FIORELLA MCADAMS Relative    [phone]           Primary language:  English     needed? No   [employer]:  [phone] op. 1  Other communication barriers:    Preferred Method of Communication:  Phone  Current living arrangement: I live alone  Mobility Status/ Medical Equipment: Independent w/Device    Health Maintenance  Health Maintenance Reviewed: Not assessed    My Access Plan  Medical Emergency 911   Primary Clinic Line [facility] [phone]   24 Hour Appointment Line [phone] or  [phone]) (toll-free)   24 Hour Nurse Line [phone] (toll-free)   Preferred Urgent Care     Preferred Hospital     Preferred Pharmacy [id number] - [facility] [address] AT [facility] [address]     Behavioral Health Crisis Line The National Suicide Prevention Lifeline at 1-536.818.9561 or 911     My Care Team Members    Patient Care Team       Relationship Specialty Notifications Start Majo Maria MD PCP - General Family Practice  10/20/14     Phone: [phone] Fax: [phone] [address] [facility] [address]    Nina Holly, RN Lead Care Coordinator Primary Care - CC  5/24/18     Phone: [phone] Fax: [phone]                My Care Plans  Self Management and Treatment Plan  Goals and (Comments)  Goals        General    # 1 Pain Management (pt-stated)      Notes - Note created  9/12/2018  3:52 PM by Nina Holly, RN    Goal Statement: I will decrease right leg pain   Measure of Success: I will be able to have more mobility with daily activity   Supportive Steps to Achieve:   I will continue to increase Gabapentin per Dr House advice at last visit   I will continue to follow Rheumatologist and infusion therapy once a month   Barriers: right leg pain limiting activity   Strengths: Supportive daughter sets up medication in automatic medication dispenser   Date to Achieve By:ongoing   Patient expressed understanding of goal: Yes         #1 Being Active (pt-stated)     Notes - Note created  10/4/2018  1:13 PM by Nina Holly, RN    Goal Statement: I will increase my strength    Measure of Success: I will have more strength with daily activities   Supportive Steps to Achieve:I will walk the [place] hallways with my walker 1-2 times a week   Barriers: Deconditioned   Strengths: Supportive daughter   Date to Achieve By: ongoing  Patient expressed understanding of goal: yes               Action Plans on File: None                      Advance Care Plans/Directives Type: DNR/DNI POLST        My Medical and Care Information  Problem List   Patient Active Problem List   Diagnosis     Osteopenia     Hypertension goal BP (blood pressure) < 140/90     Rheumatoid arthritis (H)     CKD (chronic kidney disease) stage 3, GFR 30-59 ml/min (H)     Hyperlipidemia with target LDL less than 100     Advanced directives, counseling/discussion     Osteoarthritis     Eczema     Peripheral edema     Hyponatremia with decreased serum osmolality     Chronic pain     Protein-calorie malnutrition (H)      Current Medications and Allergies:  See printed Medication Report.    Care Coordination Start Date: 9/12/2018   Frequency of Care Coordination: 2 weeks   Form Last Updated: 10/04/2018

## 2018-10-04 NOTE — PROGRESS NOTES
Clinic Care Coordination Contact    Clinic Care Coordination Contact  OUTREACH    Referral Information:       Primary Diagnosis: Orthopedic    Chief Complaint   Patient presents with     Clinic Care Coordination - Follow-up     Clinic Care Coordination RN         Universal Utilization: Referral Information:  PCP referral :  Complex Medical Concerns/Education, and Patient/Caregiver Support: Resources for Support    Additional pertinent details:  Celestina lives at home and has needed the help of her daughter and sister in recent weeks due to her back/leg/hip pain, lack of appetite and energy. Her family would like to know if she could get home care services and what resources are available for support  Clinic Utilization  Difficulty keeping appointments:: No  Compliance Concerns: No  No-Show Concerns: No  No PCP office visit in Past Year: No  Utilization    Last refreshed: 10/2/2018 12:19 PM:  No Show Count (past year) 0       Last refreshed: 10/2/2018 12:19 PM:  ED visits 1       Last refreshed: 10/2/2018 12:19 PM:  Hospital admissions 1          Current as of: 10/2/2018 12:19 PM             Clinical Concerns:  Current Medical Concerns:  Patient reports her back-hip leg pain has improved since last IV infusion 9/24 and Gabapentin increase .  Patient lives in a Condo and when she feels good she walks the hallways with her walker     Pain  Type: Chronic (>3mo)  Progression: Improving  Description: Able to do light housework  Alleviating Factors: Rest, Pain Medication  Aggravating Factors: Activity  Health Maintenance Reviewed: Not assessed  Clinical Pathway: None    Medication Management:  Gabapentin increase every 3 days  discussed     Functional Status:  Dependent ADLs:: Ambulation-walker  Bed or wheelchair confined:: No  Mobility Status: Independent w/Device    Living Situation:  Current living arrangement:: I live alone  Type of residence:: Apartment    Diet/Exercise/Sleep:  Food Insecurity: No  Tube Feeding:  No  Exercise:: Unable to exercise  Inadequate activity/exercise (GOAL):: Yes  Significant changes in sleep pattern (GOAL): No    Transportation:  Transportation concerns (GOAL):: No  Transportation means:: Family     Psychosocial:  Mental health DX:: No  Mental health management concern (GOAL):: No  Informal Support system:: Children        Equipment Currently Used at Home: walker, rolling          Goals:   Goals        General    # 1 Pain Management (pt-stated)     Notes - Note created  9/12/2018  3:52 PM by Nina Holly RN    Goal Statement: I will decrease right leg pain   Measure of Success: I will be able to have more mobility with daily activity   Supportive Steps to Achieve:   I will continue to increase Gabapentin per Dr House advice at last visit   I will continue to follow Rheumatologist and infusion therapy once a month   Barriers: right leg pain limiting activity   Strengths: Supportive daughter sets up medication in automatic medication dispenser   Date to Achieve By:ongoing   Patient expressed understanding of goal: Yes                 Outreach Frequency: 2 weeks      Plan: CC will follow up in 1 month  Patient has CC contact for future questions or concerns     iNna Holly RN / Clinical Care Coordinator     [facility]   [email or web address]  Office :  [phone] / Fax :  [phone]

## 2018-10-14 DIAGNOSIS — M19.90 OSTEOARTHRITIS, UNSPECIFIED OSTEOARTHRITIS TYPE, UNSPECIFIED SITE: ICD-10-CM

## 2018-10-15 RX ORDER — GABAPENTIN 100 MG/1
CAPSULE ORAL
Qty: 90 CAPSULE | Refills: 1 | Status: SHIPPED | OUTPATIENT
Start: 2018-10-15 | End: 2019-01-07

## 2018-11-05 ENCOUNTER — PATIENT OUTREACH (OUTPATIENT)
Dept: CARE COORDINATION | Facility: CLINIC | Age: 83
End: 2018-11-05

## 2018-11-05 DIAGNOSIS — G89.29 OTHER CHRONIC PAIN: ICD-10-CM

## 2018-11-05 DIAGNOSIS — M19.90 OSTEOARTHRITIS, UNSPECIFIED OSTEOARTHRITIS TYPE, UNSPECIFIED SITE: Primary | ICD-10-CM

## 2018-11-05 NOTE — PROGRESS NOTES
Clinic Care Coordination Contact    Clinic Care Coordination Contact  OUTREACH    Referral Information:  PCP referral :  Complex Medical Concerns/Education, and Patient/Caregiver Support: Resources for Support    Additional pertinent details:  Celestina lives at home and has needed the help of her daughter and sister in recent weeks due to her back/leg/hip pain, lack of appetite and energy. Her family would like to know if she could get home care services and what resources are available for support       Primary Diagnosis: Orthopedic    Chief Complaint   Patient presents with     Clinic Care Coordination - Follow-up     Clinic Care Coordination RN         Universal Utilization: 5/22-5/25/2018--  Hyponatremia, multifactorial  Hypertension  Rheumatoid arthritis  Hyperlipidemia  Subacute low back pain with reported severe degenerative disc disease, with sciatica   Pressure Injury located on coccyx     Clinic Utilization  No PCP office visit in Past Year: No  Utilization    Last refreshed: 11/1/2018  4:22 PM:  No Show Count (past year) 0       Last refreshed: 11/1/2018  4:22 PM:  ED visits 1       Last refreshed: 11/1/2018  4:22 PM:  Hospital admissions 1          Current as of: 11/1/2018  4:22 PM             Clinical Concerns:  Current Medical Concerns:  Patient reports she has a new walker and tries to walk the hallways on her good day.  Patient plans to walk the hallways 1-2 times a week going forward.  Patient reports the increase Gabapentin dose has improved back and hip pain      Daughter sets up automatic pill dispenser weekly and patient is not sure how often she is getting Hydrocodone   Pain  Pain (GOAL):: Yes  Type: Chronic (>3mo)  Location of chronic pain:: Hip and back  Radiating: Yes  Chronic pain severity:: 5  Limitation of routine activities due to chronic pain:: Yes  Description: Able to do light housework  Alleviating Factors: Rest, Pain Medication  Aggravating Factors: Activity  Health Maintenance  Reviewed: Not assessed  Clinical Pathway: None    Medication Management:  Pain medication discussed      Functional Status:  Dependent ADLs:: Ambulation-walker  Bed or wheelchair confined:: No  Mobility Status: Independent w/Device    Living Situation:  Current living arrangement:: I live alone  Type of residence:: Apartment    Diet/Exercise/Sleep:  Food Insecurity: No  Tube Feeding: No  Exercise:: Unable to exercise  Inadequate activity/exercise (GOAL):: Yes  Significant changes in sleep pattern (GOAL): No    Transportation: family provide transportation            Psychosocial:  Mental health DX:: No  Mental health management concern (GOAL):: No  Informal Support system:: Children     Financial/Insurance:   Financial/Insurance concerns (GOAL):: No     Equipment Currently Used at Home: walker, rolling    Advance Care Plan/Directive  Advanced Care Plans/Directives on file:: Yes    Referrals Placed: None     Goals:   Goals        General    # 1 Pain Management (pt-stated)     Notes - Note created  9/12/2018  3:52 PM by Nina Holly RN    Goal Statement: I will decrease right leg pain   Measure of Success: I will be able to have more mobility with daily activity   Supportive Steps to Achieve:   I will continue to increase Gabapentin per Dr House advice at last visit   I will continue to follow Rheumatologist and infusion therapy once a month   Barriers: right leg pain limiting activity   Strengths: Supportive daughter sets up medication in automatic medication dispenser   Date to Achieve By:ongoing   Patient expressed understanding of goal: Yes         #1 Being Active (pt-stated)     Notes - Note created  10/4/2018  1:13 PM by Nina Holly, RN    Goal Statement: I will increase my strength    Measure of Success: I will have more strength with daily activities   Supportive Steps to Achieve:I will walk the [place] hallways with my walker 1-2 times a week   Barriers: Deconditioned   Strengths: Supportive daughter    Date to Achieve By: ongoing  Patient expressed understanding of goal: yes                  Outreach Frequency:Monthly      Plan: Patient agrees with monthly follow up calls     Nina Holly RN / Clinical Care Coordinator     [facility]   [email or web address]  Office :  [phone] / Fax :  [phone]

## 2018-11-12 ENCOUNTER — TRANSFERRED RECORDS (OUTPATIENT)
Dept: HEALTH INFORMATION MANAGEMENT | Facility: CLINIC | Age: 83
End: 2018-11-12

## 2018-11-19 ENCOUNTER — TELEPHONE (OUTPATIENT)
Dept: FAMILY MEDICINE | Facility: CLINIC | Age: 83
End: 2018-11-19

## 2018-11-19 NOTE — TELEPHONE ENCOUNTER
Patient has 11 refills of Mirtazapine ordered on 9/13/2018.    No refills needed. Spoke with patient who requested I update her daughter Shalonda. LVM with patient daughter that no refill nreeded at this time.     Thanks! Rachel Trujillo RN

## 2018-11-28 DIAGNOSIS — G89.29 OTHER CHRONIC PAIN: ICD-10-CM

## 2018-11-28 NOTE — TELEPHONE ENCOUNTER
gabapentin (NEURONTIN) 300 MG capsule        Last Written Prescription Date:  10/15/2018  Last Fill Quantity: 90 capsule,   # refills: 1  Last Office Visit: 9/4/2018  Future Office visit:       Routing refill request to provider for review/approval because:  Drug not on the [facility], [facility] or [facility] refill protocol or controlled substance

## 2018-11-29 RX ORDER — GABAPENTIN 300 MG/1
CAPSULE ORAL
Qty: 90 CAPSULE | Refills: 3 | Status: SHIPPED | OUTPATIENT
Start: 2018-11-29 | End: 2019-01-07

## 2018-11-29 NOTE — TELEPHONE ENCOUNTER
Pt due for follow up appointment with you  Last office visit 9/4/18  Last refill -10/15 for 100's #90 with 1 refill  Last refill 9/4/18- 300's #90 with 3 refills  Please advise on neurontin refill and I will route to reception    Thanks!     Linda Bradford RN

## 2018-11-29 NOTE — TELEPHONE ENCOUNTER
I refilled, but after this took a look at your documentation that indicates she just got a refill.  It looks like she is getting one month at a time, but I would be okay with 3 months. Please confirm that she is taking 300mg tid and find out if she would like 3 month supply and send loaded med with note that this replaces previous prescription. If she is doing well, she can wait to see me in March. If she may need dose adjustment, schedule with me when she is able. Majo Alfaro M.D.

## 2018-11-30 NOTE — TELEPHONE ENCOUNTER
Spoke to patient and she states still taking 300 mg 3 x daily and would like a 90 day supply    Patient also reports she is doing fine

## 2018-12-03 RX ORDER — GABAPENTIN 300 MG/1
300 CAPSULE ORAL 3 TIMES DAILY
Qty: 270 CAPSULE | Refills: 3 | Status: SHIPPED | OUTPATIENT
Start: 2018-12-03 | End: 2019-11-05

## 2018-12-05 ENCOUNTER — PATIENT OUTREACH (OUTPATIENT)
Dept: CARE COORDINATION | Facility: CLINIC | Age: 83
End: 2018-12-05

## 2018-12-05 DIAGNOSIS — M06.9 RHEUMATOID ARTHRITIS (H): ICD-10-CM

## 2018-12-05 DIAGNOSIS — M19.90 OSTEOARTHRITIS, UNSPECIFIED OSTEOARTHRITIS TYPE, UNSPECIFIED SITE: Primary | ICD-10-CM

## 2018-12-05 NOTE — PROGRESS NOTES
Clinic Care Coordination Contact    Clinic Care Coordination Contact  OUTREACH    Referral Information:       Primary Diagnosis: Orthopedic    Chief Complaint   Patient presents with     Clinic Care Coordination - Follow-up     Clinic Care Coordination RN         Universal Utilization: PCP referral :  Complex Medical Concerns/Education, and Patient/Caregiver Support: Resources for Support    Additional pertinent details:  Celestina lives at home and has needed the help of her daughter and sister in recent weeks due to her back/leg/hip pain, lack of appetite and energy. Her family would like to know if she could get home care services and what resources are available for support            Clinical Data: Patient was hospitalized at [facility] admission from 5/22/2018 to anticipated discharge 5/25/2018 with diagnosis of . Hyponatremia/confusion   Clinic Utilization  No PCP office visit in Past Year: No  Utilization    Last refreshed: 12/3/2018 12:19 PM:  No Show Count (past year) 0       Last refreshed: 12/3/2018 12:19 PM:  ED visits 1       Last refreshed: 12/3/2018 12:19 PM:  Hospital admissions 1          Current as of: 12/3/2018 12:19 PM             Clinical Concerns:  Current Medical Concerns:  Patient reports she was up to Gabapentin 300 mg 3 times a day but it made her too tired.  Patient cut back to 300 mg twice a day and is helping right leg pain and not as tired. Patient gets around with the walker and tries to increase her walking on the good days   Patient continues to have Rheumatology infusions once a month along with cortisone injections as needed   Pain  Pain (GOAL):: Yes  Location of chronic pain:: Hip and back  Radiating: Yes  Progression: Unchanged  Description of pain: Aching  Chronic pain severity:: 5  Limitation of routine activities due to chronic pain:: Yes  Description: Able to do light housework  Alleviating Factors: Rest, Pain Medication  Aggravating Factors: Activity  Health  Maintenance Reviewed: Due/Overdue   Clinical Pathway: None    Medication Management:  Patient medication reviewed      Functional Status:  Dependent ADLs:: Ambulation-walker  Bed or wheelchair confined:: No  Mobility Status: Independent w/Device    Living Situation:  Current living arrangement:: I live alone  Type of residence:: Apartment    Diet/Exercise/Sleep:  Food Insecurity: No  Tube Feeding: No  Exercise:: Unable to exercise  Inadequate activity/exercise (GOAL):: Yes  Significant changes in sleep pattern (GOAL): No      Psychosocial:  Mental health DX:: No  Mental health management concern (GOAL):: No  Informal Support system:: Children     Financial/Insurance:     Community Resources: None  Supplies used at home:: None  Equipment Currently Used at Home: walker, rolling    Advance Care Plan/Directive  Advanced Care Plans/Directives on file:: Yes    Referrals Placed: None     Goals:   Goals        General    # 1 Pain Management (pt-stated)     Notes - Note created  9/12/2018  3:52 PM by Nina Holly RN    Goal Statement: I will decrease right leg pain   Measure of Success: I will be able to have more mobility with daily activity   Supportive Steps to Achieve:   I will continue to increase Gabapentin per Dr House advice at last visit   I will continue to follow Rheumatologist and infusion therapy once a month   Barriers: right leg pain limiting activity   Strengths: Supportive daughter sets up medication in automatic medication dispenser   Date to Achieve By:ongoing   Patient expressed understanding of goal: Yes         #1 Being Active (pt-stated)     Notes - Note created  10/4/2018  1:13 PM by Nina Holly, RN    Goal Statement: I will increase my strength    Measure of Success: I will have more strength with daily activities   Supportive Steps to Achieve:I will walk the [employer] hallways with my walker 1-2 times a week   Barriers: Deconditioned   Strengths: Supportive daughter   Date to Achieve By:  ongoing  Patient expressed understanding of goal: yes            Outreach Frequency: every month     Plan: CC will follow up in one month     Nina Holly RN / Clinical Care Coordinator     [facility]   [email or web address]  Office :  [phone] / Fax :  [phone]

## 2019-01-04 ENCOUNTER — PATIENT OUTREACH (OUTPATIENT)
Dept: CARE COORDINATION | Facility: CLINIC | Age: 84
End: 2019-01-04

## 2019-01-04 DIAGNOSIS — M06.9 RHEUMATOID ARTHRITIS (H): Primary | ICD-10-CM

## 2019-01-04 NOTE — LETTER
[facility]  Complex Care Plan  About Me  Patient Name:  Celestina Tejada    YOB: 1931  Age:     87 year old   [place] MRN:   [id number] Telephone Information:  Home Phone [phone]   Mobile [phone]       Address:    [address]  [facility] [address] Email address:  No e-mail address on record      Emergency Contact(s)  Name Relationship Lgl Grd Work Phone Home Phone Mobile Phone   1. AMBER YOUSIF Daughter   [phone]   2. GISELA THAPA Sister   [phone]    3. FIORELLA MCADAMS Relative    [phone]           Primary language:  English     needed? No   [place] [employer] [facility]:  [phone] op. 1  Other communication barriers:    Preferred Method of Communication:  Phone  Current living arrangement:    Mobility Status/ Medical Equipment:      Health Maintenance  Health Maintenance Reviewed:  Lipid check and shingles vaccine     My Access Plan  Medical Emergency 911   Primary Clinic Line [facility] [phone]   24 Hour Appointment Line [phone] or  [phone]) (toll-free)   24 Hour Nurse Line [phone] (toll-free)   Preferred Urgent Care     Preferred Hospital     Preferred Pharmacy [id number] [address] AT [facility] [address]     Behavioral Health Crisis Line The National Suicide Prevention Lifeline at 1-404.233.7112 or 911     My Care Team Members    Patient Care Team       Relationship Specialty Notifications Start End    Majo Alfaro MD PCP - General Family Practice  10/20/14     Phone: [phone] Fax: [phone] [address] [facility] [address]    Sarah Phan APRN CNP PCP - Assigned PCP   12/9/18     Phone: [phone] Fax: [phone] [address] [facility] [address] [facility] [address]    Nina Holly, RN Lead Care Coordinator Primary Care - CC  5/24/18     Phone: [phone] Fax: [phone]                 My Care Plans  Self Management and Treatment Plan  Goals and (Comments)  Goals        General    # 1 Pain Management (pt-stated)     Notes - Note created  9/12/2018  3:52 PM by Nina Holly, RN    Goal Statement: I will decrease right leg pain   Measure of Success: I will be able to have more mobility with daily activity   Supportive Steps to Achieve:   I will continue to increase Gabapentin per Dr House advice at last visit   I will continue to follow Rheumatologist and infusion therapy once a month   Barriers: right leg pain limiting activity   Strengths: Supportive daughter sets up medication in automatic medication dispenser   Date to Achieve By:ongoing   Patient expressed understanding of goal: Yes         #1 Being Active (pt-stated)     Notes - Note created  10/4/2018  1:13 PM by Nian Holly, RN    Goal Statement: I will increase my strength    Measure of Success: I will have more strength with daily activities   Supportive Steps to Achieve: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 1-2 times a week   Barriers: Deconditioned   Strengths: Supportive daughter   Date to Achieve By: ongoing  Patient expressed understanding of goal: yes               Action Plans on File: None                      Advance Care Plans/Directives Type: DNR/DNI       My Medical and Care Information  Problem List   Patient Active Problem List   Diagnosis     Osteopenia     Hypertension goal BP (blood pressure) < 140/90     Rheumatoid arthritis (H)     CKD (chronic kidney disease) stage 3, GFR 30-59 ml/min (H)     Hyperlipidemia with target LDL less than 100     Advanced directives, counseling/discussion     Osteoarthritis     Eczema     Peripheral edema     Hyponatremia with decreased serum osmolality     Chronic pain     Protein-calorie malnutrition (H)      Current Medications and Allergies:  See printed Medication Report.    Care Coordination Start Date: 9/12/2018   Frequency of Care Coordination:  Monthly   Form Last  Updated: 01/04/2019

## 2019-01-04 NOTE — PROGRESS NOTES
Clinic Care Coordination Contact    Clinic Care Coordination Contact  OUTREACH    Referral Information:            Chief Complaint   Patient presents with     Clinic Care Coordination - Follow-up     Clinic Care Coordiantion RN         Universal Utilization:Universal Utilization: PCP referral :  Complex Medical Concerns/Education, and Patient/Caregiver Support: Resources for Support    Additional pertinent details:  Celestina lives at home and has needed the help of her daughter and sister in recent weeks due to her back/leg/hip pain, lack of appetite and energy. Her family would like to know if she could get home care services and what resources are available for support            Clinical Data: Patient was hospitalized at [facility] admission from 5/22/2018 to anticipated discharge 5/25/2018 with diagnosis of . Hyponatremia/confusion      Utilization    Last refreshed: 1/3/2019  6:51 PM:  Hospital Admissions 1           Last refreshed: 1/3/2019  6:51 PM:  ED Visits 1           Last refreshed: 1/3/2019  6:51 PM:  No Show Count (past year) 0              Current as of: 1/3/2019  6:51 PM              Clinical Concerns:  Current Medical Concerns:  Patient continues to take 2 gabapentin and Hydrocodone 3 times a day for leg pain caused by her hip and spine.  Patient continues IV infusion therapy through her Rheumatologist monthly .  Patient states her leg discomfort can go up to a #7 and so has not been walking the [email or web address] hallways for awhile  .     Health Maintenance Reviewed:  Lipid panel and Shingles vaccine   Clinical Pathway: None    Medication Management:  Pain medication discussed    Functional Status: Uses a walker        Transportation: Daughter provides transportation               Goals        General    # 1 Pain Management (pt-stated)     Notes - Note created  9/12/2018  3:52 PM by Nina Holly, RN    Goal Statement: I will decrease right leg pain   Measure of Success: I will be able to have  more mobility with daily activity   Supportive Steps to Achieve:   I will continue to increase Gabapentin per Dr House advice at last visit   I will continue to follow Rheumatologist and infusion therapy once a month   Barriers: right leg pain limiting activity   Strengths: Supportive daughter sets up medication in automatic medication dispenser   Date to Achieve By:ongoing   Patient expressed understanding of goal: Yes         #1 Being Active (pt-stated)     Notes - Note created  10/4/2018  1:13 PM by Nina Holly RN    Goal Statement: I will increase my strength    Measure of Success: I will have more strength with daily activities   Supportive Steps to Achieve: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 1-2 times a week   Barriers: Deconditioned   Strengths: Supportive daughter   Date to Achieve By: ongoing  Patient expressed understanding of goal: yes                   Future Appointments              In 3 days Majo Alfaro MD [facility]          Plan:   Patient will make a follow up appointment with Dr Alfaro  CC will follow up in 1 month     Nina Holly RN / Clinical Care Coordinator     [facility]   [email or web address]  Office :  [phone] / Fax :  [phone]

## 2019-01-07 ENCOUNTER — OFFICE VISIT (OUTPATIENT)
Dept: FAMILY MEDICINE | Facility: CLINIC | Age: 84
End: 2019-01-07
Payer: COMMERCIAL

## 2019-01-07 VITALS
OXYGEN SATURATION: 96 % | BODY MASS INDEX: 20.72 KG/M2 | HEIGHT: 61 IN | WEIGHT: 109.75 LBS | SYSTOLIC BLOOD PRESSURE: 144 MMHG | TEMPERATURE: 97.6 F | HEART RATE: 65 BPM | DIASTOLIC BLOOD PRESSURE: 67 MMHG | RESPIRATION RATE: 16 BRPM

## 2019-01-07 DIAGNOSIS — N18.30 CKD (CHRONIC KIDNEY DISEASE) STAGE 3, GFR 30-59 ML/MIN (H): ICD-10-CM

## 2019-01-07 DIAGNOSIS — R60.0 PERIPHERAL EDEMA: ICD-10-CM

## 2019-01-07 DIAGNOSIS — R63.0 POOR APPETITE: ICD-10-CM

## 2019-01-07 DIAGNOSIS — G89.29 OTHER CHRONIC PAIN: ICD-10-CM

## 2019-01-07 DIAGNOSIS — M81.0 AGE-RELATED OSTEOPOROSIS WITHOUT CURRENT PATHOLOGICAL FRACTURE: ICD-10-CM

## 2019-01-07 DIAGNOSIS — M19.90 OSTEOARTHRITIS, UNSPECIFIED OSTEOARTHRITIS TYPE, UNSPECIFIED SITE: ICD-10-CM

## 2019-01-07 DIAGNOSIS — E78.5 HYPERLIPIDEMIA WITH TARGET LDL LESS THAN 100: ICD-10-CM

## 2019-01-07 DIAGNOSIS — I10 HYPERTENSION GOAL BP (BLOOD PRESSURE) < 140/90: ICD-10-CM

## 2019-01-07 DIAGNOSIS — M06.9 RHEUMATOID ARTHRITIS, INVOLVING UNSPECIFIED SITE, UNSPECIFIED RHEUMATOID FACTOR PRESENCE: ICD-10-CM

## 2019-01-07 DIAGNOSIS — E46 PROTEIN-CALORIE MALNUTRITION, UNSPECIFIED SEVERITY (H): ICD-10-CM

## 2019-01-07 DIAGNOSIS — Z00.00 ENCOUNTER FOR MEDICARE ANNUAL WELLNESS EXAM: Primary | ICD-10-CM

## 2019-01-07 DIAGNOSIS — D64.9 ANEMIA, UNSPECIFIED TYPE: ICD-10-CM

## 2019-01-07 PROCEDURE — 36415 COLL VENOUS BLD VENIPUNCTURE: CPT | Performed by: FAMILY MEDICINE

## 2019-01-07 PROCEDURE — G0438 PPPS, INITIAL VISIT: HCPCS | Performed by: FAMILY MEDICINE

## 2019-01-07 PROCEDURE — 80053 COMPREHEN METABOLIC PANEL: CPT | Performed by: FAMILY MEDICINE

## 2019-01-07 PROCEDURE — 69209 REMOVE IMPACTED EAR WAX UNI: CPT | Mod: 50 | Performed by: FAMILY MEDICINE

## 2019-01-07 RX ORDER — ATORVASTATIN CALCIUM 20 MG/1
20 TABLET, FILM COATED ORAL DAILY
Qty: 90 TABLET | Refills: 2 | Status: SHIPPED | OUTPATIENT
Start: 2019-01-07 | End: 2020-01-21

## 2019-01-07 RX ORDER — MIRTAZAPINE 15 MG/1
30 TABLET, FILM COATED ORAL AT BEDTIME
Qty: 60 TABLET | Refills: 11 | Status: CANCELLED | OUTPATIENT
Start: 2019-01-07

## 2019-01-07 RX ORDER — PNV NO.95/FERROUS FUM/FOLIC AC 28MG-0.8MG
1 TABLET ORAL
Qty: 90 TABLET | Refills: 3 | Status: SHIPPED | OUTPATIENT
Start: 2019-01-07 | End: 2020-01-01

## 2019-01-07 RX ORDER — HYDROCODONE BITARTRATE AND ACETAMINOPHEN 5; 325 MG/1; MG/1
1-2 TABLET ORAL EVERY 6 HOURS PRN
Qty: 150 TABLET | Refills: 0 | Status: SHIPPED | OUTPATIENT
Start: 2019-01-07 | End: 2019-04-08

## 2019-01-07 RX ORDER — NIFEDIPINE 90 MG/1
90 TABLET, EXTENDED RELEASE ORAL DAILY
Qty: 90 TABLET | Refills: 2 | Status: SHIPPED | OUTPATIENT
Start: 2019-01-07 | End: 2019-09-03

## 2019-01-07 RX ORDER — HYDROCODONE BITARTRATE AND ACETAMINOPHEN 5; 325 MG/1; MG/1
1-2 TABLET ORAL EVERY 6 HOURS PRN
Qty: 150 TABLET | Refills: 0 | Status: SHIPPED | OUTPATIENT
Start: 2019-02-07 | End: 2019-04-08

## 2019-01-07 RX ORDER — ATENOLOL 25 MG/1
75 TABLET ORAL DAILY
Qty: 270 TABLET | Refills: 3 | Status: SHIPPED | OUTPATIENT
Start: 2019-01-07 | End: 2020-01-21

## 2019-01-07 RX ORDER — HYDROCODONE BITARTRATE AND ACETAMINOPHEN 5; 325 MG/1; MG/1
1-2 TABLET ORAL EVERY 6 HOURS PRN
Qty: 150 TABLET | Refills: 0 | Status: SHIPPED | OUTPATIENT
Start: 2019-03-07 | End: 2019-04-08

## 2019-01-07 ASSESSMENT — ANXIETY QUESTIONNAIRES
GAD7 TOTAL SCORE: 0
6. BECOMING EASILY ANNOYED OR IRRITABLE: NOT AT ALL
IF YOU CHECKED OFF ANY PROBLEMS ON THIS QUESTIONNAIRE, HOW DIFFICULT HAVE THESE PROBLEMS MADE IT FOR YOU TO DO YOUR WORK, TAKE CARE OF THINGS AT HOME, OR GET ALONG WITH OTHER PEOPLE: NOT DIFFICULT AT ALL
7. FEELING AFRAID AS IF SOMETHING AWFUL MIGHT HAPPEN: NOT AT ALL
1. FEELING NERVOUS, ANXIOUS, OR ON EDGE: NOT AT ALL
5. BEING SO RESTLESS THAT IT IS HARD TO SIT STILL: NOT AT ALL
3. WORRYING TOO MUCH ABOUT DIFFERENT THINGS: NOT AT ALL
2. NOT BEING ABLE TO STOP OR CONTROL WORRYING: NOT AT ALL

## 2019-01-07 ASSESSMENT — MIFFLIN-ST. JEOR: SCORE: 870.2

## 2019-01-07 ASSESSMENT — PATIENT HEALTH QUESTIONNAIRE - PHQ9: 5. POOR APPETITE OR OVEREATING: NOT AT ALL

## 2019-01-07 NOTE — PATIENT INSTRUCTIONS
I recommend getting the new shingles vaccine, Shingrix.  You can call your insurance company to find out if this vaccine is covered.  You may also ask our pharmacy to check if your insurance covers Shingrix if given at the pharmacy.    Stop the Remeron. This may be causing you to feel drowsy and may also cause confusion.       Preventive Health Recommendations    See your health care provider every year to    Review health changes.     Discuss preventive care.      Review your medicines if your doctor has prescribed any.      You no longer need a yearly Pap test unless you've had an abnormal Pap test in the past 10 years. If you have vaginal symptoms, such as bleeding or discharge, be sure to talk with your provider about a Pap test.      Every 1 to 2 years, have a mammogram.  If you are over 69, talk with your health care provider about whether or not you want to continue having screening mammograms.      Every 10 years, have a colonoscopy. Or, have a yearly FIT test (stool test). These exams will check for colon cancer.       Have a cholesterol test every 5 years, or more often if your doctor advises it.       Have a diabetes test (fasting glucose) every three years. If you are at risk for diabetes, you should have this test more often.       At age 65, have a bone density scan (DEXA) to check for osteoporosis (brittle bone disease).    Shots:    Get a flu shot each year.    Get a tetanus shot every 10 years.    Talk to your doctor about your pneumonia vaccines. There are now two you should receive - Pneumovax (PPSV 23) and Prevnar (PCV 13).    Talk to your pharmacist about the shingles vaccine.    Talk to your doctor about the hepatitis B vaccine.    Nutrition:     Eat at least 5 servings of fruits and vegetables each day.      Eat whole-grain bread, whole-wheat pasta and brown rice instead of white grains and rice.      Get adequate Calcium and Vitamin D.     Lifestyle    Exercise at least 150 minutes a week (30  minutes a day, 5 days a week). This will help you control your weight and prevent disease.      Limit alcohol to one drink per day.      No smoking.       Wear sunscreen to prevent skin cancer.       See your dentist twice a year for an exam and cleaning.      See your eye doctor every 1 to 2 years to screen for conditions such as glaucoma, macular degeneration and cataracts.    Personalized Prevention Plan  You are due for the preventive services outlined below.  Your care team is available to assist you in scheduling these services.  If you have already completed any of these items, please share that information with your care team to update in your medical record.  Health Maintenance Due   Topic Date Due     Zoster (Chicken Pox) Vaccine (1 of 2) 04/02/1981     Discuss Advance Directive Planning  08/23/2016     URINE DRUG SCREEN Q1 YR  06/06/2018     Cholesterol Lab - yearly  09/12/2018     NUNO QUESTIONNAIRE 1 YEAR  09/12/2018     Microalbumin Lab - yearly  10/12/2018     Basic Metabolic Lab - every 6 months  12/28/2018

## 2019-01-07 NOTE — PROGRESS NOTES
"  SUBJECTIVE:   Celestina Tejada is a 87 year old female who presents for Preventive Visit.      Are you in the first 12 months of your Medicare Part B coverage?  No    Physical Health:    In general, how would you rate your overall physical health? good    Outside of work, how many days during the week do you exercise? none    Outside of work, approximately how many minutes a day do you exercise?not applicable    If you drink alcohol do you typically have >3 drinks per day or >7 drinks per week? No    Do you usually eat at least 4 servings of fruit and vegetables a day, include whole grains & fiber and avoid regularly eating high fat or \"junk\" foods? Yes    Do you have any problems taking medications regularly?  No    Do you have any side effects from medications? none    Needs assistance for the following daily activities: transportation, shopping and housework    Which of the following safety concerns are present in your home?  none identified     Hearing impairment: No    In the past 6 months, have you been bothered by leaking of urine? no    Mental Health:    In general, how would you rate your overall mental or emotional health? good  PHQ-2 Score:  0    Do you feel safe in your environment? Yes    Do you have a Health Care Directive? No: Advance care planning reviewed with patient; information given to patient to review.    Additional concerns to address?      Fall risk:  Fallen 2 or more times in the past year?: No  Any fall with injury in the past year?: No    Cognitive Screenin) Repeat 3 items (Leader, Season, Table)    2) Clock draw: NORMAL  3) 3 item recall: Recalls 2 objects   Results: NORMAL clock, 1-2 items recalled: COGNITIVE IMPAIRMENT LESS LIKELY    Mini-CogTM Copyright CHAY Campo. Licensed by the author for use in [facility]; reprinted with permission (kylah@.[facility]). All rights reserved.      Do you have sleep apnea, excessive snoring or daytime drowsiness?: no      She reports that " her pain is under fairly good control with the gabapentin 300 mg 3 times a day and hydrocodone 3 times a day.  But the most bothersome pain is her radicular pain for which she has been following with Ortho and has been getting injection therapy.  She says she otherwise feels well.  She and her daughter states they have noticed that she has had some memory decline.  She does not have an advanced directive.  She says she would want to avoid CPR.  She prefers to allow a natural death.      Reviewed and updated as needed this visit by clinical staff  Tobacco  Allergies  Meds  Med Hx  Surg Hx  Fam Hx  Soc Hx        Reviewed and updated as needed this visit by Provider        Social History     Tobacco Use     Smoking status: Former Smoker     Packs/day: 0.50     Years: 20.00     Pack years: 10.00     Types: Cigarettes     Last attempt to quit: 2000     Years since quittin.3     Smokeless tobacco: Never Used   Substance Use Topics     Alcohol use: Yes     Comment: very occ,                           Current providers sharing in care for this patient include:   Patient Care Team:  Majo Alfaro MD as PCP - General (Family Practice)  Sarah Phan APRN CNP as PCP - Assigned PCP  Nina Holly RN as Lead Care Coordinator (Primary Care - CC)    The following health maintenance items are reviewed in Epic and correct as of today:  Health Maintenance   Topic Date Due     ZOSTER IMMUNIZATION (1 of 2) 1981     ADVANCE DIRECTIVE PLANNING Q5 YRS  2016     URINE DRUG SCREEN Q1 YR  2018     LIPID MONITORING Q1 YEAR  2018     NUNO QUESTIONNAIRE 1 YEAR  2018     MICROALBUMIN Q1 YEAR  10/12/2018     BMP Q6 MOS  2018     PHQ-9 Q1YR  2019     FALL RISK ASSESSMENT  2019     HEMOGLOBIN Q1 YR  2019     DTAP/TDAP/TD IMMUNIZATION (3 - Td) 01/15/2023     INFLUENZA VACCINE  Completed     PNEUMOVAX IMMUNIZATION 65+ LOW/MEDIUM RISK  Completed     IPV IMMUNIZATION   Aged Out     MENINGITIS IMMUNIZATION  Aged Out     BP Readings from Last 3 Encounters:   19 144/67   18 150/66   18 121/59    Wt Readings from Last 3 Encounters:   19 49.8 kg (109 lb 12 oz)   18 46.6 kg (102 lb 12 oz)   18 43.9 kg (96 lb 12 oz)                  Patient Active Problem List   Diagnosis     Osteopenia     Hypertension goal BP (blood pressure) < 140/90     Rheumatoid arthritis (H)     CKD (chronic kidney disease) stage 3, GFR 30-59 ml/min (H)     Hyperlipidemia with target LDL less than 100     Advanced directives, counseling/discussion     Osteoarthritis     Eczema     Peripheral edema     Hyponatremia with decreased serum osmolality     Chronic pain     Protein-calorie malnutrition (H)     Age-related osteoporosis without current pathological fracture     Past Surgical History:   Procedure Laterality Date     C APPENDECTOMY  1950     SURGICAL HISTORY OF -       2 normal vaginal births       Social History     Tobacco Use     Smoking status: Former Smoker     Packs/day: 0.50     Years: 20.00     Pack years: 10.00     Types: Cigarettes     Last attempt to quit: 2000     Years since quittin.3     Smokeless tobacco: Never Used   Substance Use Topics     Alcohol use: Yes     Comment: very occ,     Family History   Problem Relation Age of Onset     Heart Disease Mother         CHF     Respiratory Mother         lung disease     C.A.D. Sister         heart by-pass     Arthritis Sister      Family History Negative Brother      Family History Negative Daughter      Family History Negative Daughter          Current Outpatient Medications   Medication Sig Dispense Refill     acetaminophen (TYLENOL) 325 MG tablet Take 2 tablets (650 mg) by mouth 3 times daily 100 tablet 0     Amino Acids-Protein Hydrolys (PRO-STAT AWC) LIQD Take 1 oz by mouth 2 times daily       atenolol (TENORMIN) 25 MG tablet Take 3 tablets (75 mg) by mouth daily 270 tablet 3     atorvastatin  (LIPITOR) 20 MG tablet Take 1 tablet (20 mg) by mouth daily 90 tablet 2     Ferrous Sulfate (IRON) 325 (65 Fe) MG tablet Take 1 tablet by mouth daily (with breakfast) 90 tablet 3     gabapentin (NEURONTIN) 300 MG capsule Take 1 capsule (300 mg) by mouth 3 times daily 270 capsule 3     HYDROcodone-acetaminophen (NORCO) 5-325 MG per tablet Take 1 tablet by mouth every 6 hours       HYDROcodone-acetaminophen (NORCO) 5-325 MG tablet Take 1-2 tablets by mouth every 6 hours as needed for moderate to severe pain 150 tablet 0     [START ON 3/7/2019] HYDROcodone-acetaminophen (NORCO) 5-325 MG tablet Take 1-2 tablets by mouth every 6 hours as needed for moderate to severe pain 150 tablet 0     [START ON 2/7/2019] HYDROcodone-acetaminophen (NORCO) 5-325 MG tablet Take 1-2 tablets by mouth every 6 hours as needed for moderate to severe pain 150 tablet 0     hydroxychloroquine (PLAQUENIL) 200 MG tablet Take 200 mg by mouth daily   3     NIFEdipine ER OSMOTIC (PROCARDIA XL) 90 MG 24 hr tablet Take 1 tablet (90 mg) by mouth daily 90 tablet 2     Nutritional Supplements (ENSURE PLUS) LIQD Take by mouth 2 times daily        senna-docusate (SENOKOT-S;PERICOLACE) 8.6-50 MG per tablet Take 2 tablets by mouth daily And 2 tabs daily prn       Allergies   Allergen Reactions     Hydrochlorothiazide      hyponatremia     Recent Labs   Lab Test 06/28/18  1636 06/27/18  1231 06/18/18  0600  05/31/18  0704  05/21/18  1618 04/30/18  1439 04/04/18 09/12/17  1236  08/18/16  1322  07/14/16  1305   LDL  --   --   --   --   --   --   --   --   --   --  50  --  49  --  81   HDL  --   --   --   --   --   --   --   --   --   --  65  --  68  --  64   TRIG  --   --   --   --   --   --   --   --   --   --  89  --  85  --  120   ALT  --   --   --   --  22  --   --  28 23   < > 31   < >  --    < > 21   CR 0.92 Unsatisfactory specimen - improperly centrifuged  --    < > 0.67   < > 0.82 0.89 1.080   < > 1.60*   < > 1.66*   < > 1.41*   GFRESTIMATED 58* Not  "Calculated  --    < > 84   < > 66 60* 51.0   < > 31*   < > 29*   < > 35*   GFRESTBLACK 70 Not Calculated  --    < > >90   < > 80 73  --    < > 37*   < > 35*   < > 43*   POTASSIUM 4.6 Unsatisfactory specimen - improperly centrifuged  --    < > 4.6   < > 4.6 4.0  --    < > 4.2   < > 4.7   < > 4.0   TSH  --   --  1.52  --   --   --  1.56  --   --   --   --   --   --    < >  --     < > = values in this interval not displayed.        ROS:   ROS: 10 point ROS neg other than the symptoms noted above in the HPI.     OBJECTIVE:   /67 (BP Location: Left arm, Patient Position: Chair, Cuff Size: Adult Small)   Pulse 65   Temp 97.6  F (36.4  C) (Oral)   Resp 16   Ht 1.549 m (5' 1\")   Wt 49.8 kg (109 lb 12 oz)   SpO2 96%   BMI 20.74 kg/m   Estimated body mass index is 20.74 kg/m  as calculated from the following:    Height as of this encounter: 1.549 m (5' 1\").    Weight as of this encounter: 49.8 kg (109 lb 12 oz).   Wt Readings from Last 5 Encounters:   01/07/19 49.8 kg (109 lb 12 oz)   09/04/18 46.6 kg (102 lb 12 oz)   06/27/18 43.9 kg (96 lb 12 oz)   06/18/18 41.5 kg (91 lb 9.6 oz)   06/12/18 41.7 kg (91 lb 14.4 oz)       EXAM:   GENERAL: Thin, frail elderly female, alert and no distress  EYES: Eyes grossly normal to inspection, wears glasses  HENT: ear canals and TM's normal, nose and mouth without ulcers or lesions  NECK: no adenopathy, no asymmetry, masses, or scars and thyroid normal to palpation  RESP: lungs clear to auscultation - no rales, rhonchi or wheezes  CV: regular rate and rhythm, normal S1 S2, no S3 or S4, no murmur, click or rub, 2+ bilateral peripheral edema  ABDOMEN: soft, nontender, no hepatosplenomegaly, no masses and bowel sounds normal  PSYCH: affect normal/bright    Diagnostic Test Results:  none     ASSESSMENT / PLAN:   1. Encounter for Medicare annual wellness exam      2. Hypertension goal BP (blood pressure) < 140/90   reasonable goal below 150/90 and by that standard is controlled.  " Continue current medications  - NIFEdipine ER OSMOTIC (PROCARDIA XL) 90 MG 24 hr tablet; Take 1 tablet (90 mg) by mouth daily  Dispense: 90 tablet; Refill: 2  - atenolol (TENORMIN) 25 MG tablet; Take 3 tablets (75 mg) by mouth daily  Dispense: 270 tablet; Refill: 3  - Comprehensive metabolic panel  - Albumin Random Urine Quantitative with Creat Ratio; Future    3. Rheumatoid arthritis, involving unspecified site, unspecified rheumatoid factor presence (H)  Continues to follow with rheumatology and her pain is under good control  - HYDROcodone-acetaminophen (NORCO) 5-325 MG tablet; Take 1-2 tablets by mouth every 6 hours as needed for moderate to severe pain  Dispense: 150 tablet; Refill: 0  - HYDROcodone-acetaminophen (NORCO) 5-325 MG tablet; Take 1-2 tablets by mouth every 6 hours as needed for moderate to severe pain  Dispense: 150 tablet; Refill: 0  - HYDROcodone-acetaminophen (NORCO) 5-325 MG tablet; Take 1-2 tablets by mouth every 6 hours as needed for moderate to severe pain  Dispense: 150 tablet; Refill: 0    4. CKD (chronic kidney disease) stage 3, GFR 30-59 ml/min (H)     - Comprehensive metabolic panel  - Albumin Random Urine Quantitative with Creat Ratio; Future    5. Hyperlipidemia with target LDL less than 100     - atorvastatin (LIPITOR) 20 MG tablet; Take 1 tablet (20 mg) by mouth daily  Dispense: 90 tablet; Refill: 2  - Comprehensive metabolic panel    6. Peripheral edema  Unchanged.    7. Other chronic pain     - HYDROcodone-acetaminophen (NORCO) 5-325 MG tablet; Take 1-2 tablets by mouth every 6 hours as needed for moderate to severe pain  Dispense: 150 tablet; Refill: 0  - HYDROcodone-acetaminophen (NORCO) 5-325 MG tablet; Take 1-2 tablets by mouth every 6 hours as needed for moderate to severe pain  Dispense: 150 tablet; Refill: 0  - HYDROcodone-acetaminophen (NORCO) 5-325 MG tablet; Take 1-2 tablets by mouth every 6 hours as needed for moderate to severe pain  Dispense: 150 tablet; Refill:  0    8. Protein-calorie malnutrition, unspecified severity (H)  She is gaining weight.  She has been able to eat better as her pain is been under better control.  She has been on Remeron, which she and her daughter felt were helping for her appetite.  However, she does note some drowsiness through the day and with memory changes I am concerned this could be contributing.  To stop the Remeron for now.    9. Age-related osteoporosis without current pathological fracture  Followed by rheumatology.  Had been on Reclast for 2 infusions but was overdue for her third.  He was going to check into funding for the infusion her last visit with rheumatology in November.  She continues on calcium and vitamin D    10. Anemia, unspecified type  Continues iron supplement  - Ferrous Sulfate (IRON) 325 (65 Fe) MG tablet; Take 1 tablet by mouth daily (with breakfast)  Dispense: 90 tablet; Refill: 3    11. Poor appetite  This is improved    12. Osteoarthritis, unspecified osteoarthritis type, unspecified site  Stable  - HYDROcodone-acetaminophen (NORCO) 5-325 MG tablet; Take 1-2 tablets by mouth every 6 hours as needed for moderate to severe pain  Dispense: 150 tablet; Refill: 0  - HYDROcodone-acetaminophen (NORCO) 5-325 MG tablet; Take 1-2 tablets by mouth every 6 hours as needed for moderate to severe pain  Dispense: 150 tablet; Refill: 0  - HYDROcodone-acetaminophen (NORCO) 5-325 MG tablet; Take 1-2 tablets by mouth every 6 hours as needed for moderate to severe pain  Dispense: 150 tablet; Refill: 0      End of Life Planning:  Patient currently has an advanced directive: No.  I have verified the patient's ablity to prepare an advanced directive/make health care decisions.  Literature was provided to assist patient in preparing an advanced directive.    COUNSELING:  Reviewed preventive health counseling, as reflected in patient instructions    BP Readings from Last 1 Encounters:   01/07/19 144/67     Estimated body mass index is  "20.74 kg/m  as calculated from the following:    Height as of this encounter: 1.549 m (5' 1\").    Weight as of this encounter: 49.8 kg (109 lb 12 oz).           reports that she quit smoking about 18 years ago. Her smoking use included cigarettes. She has a 10.00 pack-year smoking history. she has never used smokeless tobacco.      Appropriate preventive services were discussed with this patient, including applicable screening as appropriate for cardiovascular disease, diabetes, osteopenia/osteoporosis, and glaucoma.  As appropriate for age/gender, discussed screening for colorectal cancer, prostate cancer, breast cancer, and cervical cancer. Checklist reviewing preventive services available has been given to the patient.    Reviewed patients plan of care and provided an AVS. The Intermediate Care Plan ( asthma action plan, low back pain action plan, and migraine action plan) for Celestina meets the Care Plan requirement. This Care Plan has been established and reviewed with the Patient.    Counseling Resources:  ATP IV Guidelines  Pooled Cohorts Equation Calculator  Breast Cancer Risk Calculator  FRAX Risk Assessment  ICSI Preventive Guidelines  Dietary Guidelines for Americans, 2010  USDA's MyPlate  ASA Prophylaxis  Lung CA Screening    Majo Alfaro MD, MD  [facility]  "

## 2019-01-07 NOTE — NURSING NOTE
Celestina Tejada is a 87 year old female who presents in clinic with complaint of impacted ear wax (cerumen).  Per the order of Dr Alfaro, ear wax was removed from both sides by flushing with warm water and 3% peroxide solution and manual debridement has not been performed. Patient denies pain/dizziness/discharge/drainage  (if yes, stop procedure and huddle with provider).  Ear wax has been successfully removed. (If not, huddle with provider).   Sofia Cespedes,CMA

## 2019-01-08 ENCOUNTER — TELEPHONE (OUTPATIENT)
Dept: FAMILY MEDICINE | Facility: CLINIC | Age: 84
End: 2019-01-08

## 2019-01-08 DIAGNOSIS — N28.9 ABNORMAL RENAL FUNCTION: Primary | ICD-10-CM

## 2019-01-08 DIAGNOSIS — R74.8 ELEVATED LIVER ENZYMES: ICD-10-CM

## 2019-01-08 LAB
ALBUMIN SERPL-MCNC: 4.3 G/DL (ref 3.4–5)
ALP SERPL-CCNC: 122 U/L (ref 40–150)
ALT SERPL W P-5'-P-CCNC: 99 U/L (ref 0–50)
ANION GAP SERPL CALCULATED.3IONS-SCNC: 10 MMOL/L (ref 3–14)
AST SERPL W P-5'-P-CCNC: 58 U/L (ref 0–45)
BILIRUB SERPL-MCNC: 0.7 MG/DL (ref 0.2–1.3)
BUN SERPL-MCNC: 18 MG/DL (ref 7–30)
CALCIUM SERPL-MCNC: 9.5 MG/DL (ref 8.5–10.1)
CHLORIDE SERPL-SCNC: 107 MMOL/L (ref 94–109)
CO2 SERPL-SCNC: 22 MMOL/L (ref 20–32)
CREAT SERPL-MCNC: 1.38 MG/DL (ref 0.52–1.04)
GFR SERPL CREATININE-BSD FRML MDRD: 34 ML/MIN/{1.73_M2}
GLUCOSE SERPL-MCNC: 76 MG/DL (ref 70–99)
POTASSIUM SERPL-SCNC: 4.1 MMOL/L (ref 3.4–5.3)
PROT SERPL-MCNC: 7 G/DL (ref 6.8–8.8)
SODIUM SERPL-SCNC: 139 MMOL/L (ref 133–144)

## 2019-01-08 NOTE — TELEPHONE ENCOUNTER
RN -- If consent to communicate is present, please call Celestina's daughter Tawnya to let her know that Celestina's blood test showed mildly abnormal kidney function and liver function, which are new compared with last test. For now, I recommend reducing her gabapentin to 300mg twice daily instead of three times daily. Tawnya sets up her medications. We should recheck her labs next week. Please help schedule lab visit and she should hydrate well and eat well. I placed the lab order. Majo Alfaro M.D.          Results for orders placed or performed in visit on 01/07/19   Comprehensive metabolic panel   Result Value Ref Range    Sodium 139 133 - 144 mmol/L    Potassium 4.1 3.4 - 5.3 mmol/L    Chloride 107 94 - 109 mmol/L    Carbon Dioxide 22 20 - 32 mmol/L    Anion Gap 10 3 - 14 mmol/L    Glucose 76 70 - 99 mg/dL    Urea Nitrogen 18 7 - 30 mg/dL    Creatinine 1.38 (H) 0.52 - 1.04 mg/dL    GFR Estimate 34 (L) >60 mL/min/[1.73_m2]    GFR Estimate If Black 40 (L) >60 mL/min/[1.73_m2]    Calcium 9.5 8.5 - 10.1 mg/dL    Bilirubin Total 0.7 0.2 - 1.3 mg/dL    Albumin 4.3 3.4 - 5.0 g/dL    Protein Total 7.0 6.8 - 8.8 g/dL    Alkaline Phosphatase 122 40 - 150 U/L    ALT 99 (H) 0 - 50 U/L    AST 58 (H) 0 - 45 U/L

## 2019-01-09 ASSESSMENT — ANXIETY QUESTIONNAIRES: GAD7 TOTAL SCORE: 0

## 2019-01-17 DIAGNOSIS — R74.8 ELEVATED LIVER ENZYMES: ICD-10-CM

## 2019-01-17 DIAGNOSIS — N28.9 ABNORMAL RENAL FUNCTION: ICD-10-CM

## 2019-01-17 PROCEDURE — 80053 COMPREHEN METABOLIC PANEL: CPT | Performed by: FAMILY MEDICINE

## 2019-01-17 PROCEDURE — 36415 COLL VENOUS BLD VENIPUNCTURE: CPT | Performed by: FAMILY MEDICINE

## 2019-01-18 LAB
ALBUMIN SERPL-MCNC: 4 G/DL (ref 3.4–5)
ALP SERPL-CCNC: 93 U/L (ref 40–150)
ALT SERPL W P-5'-P-CCNC: 36 U/L (ref 0–50)
ANION GAP SERPL CALCULATED.3IONS-SCNC: 6 MMOL/L (ref 3–14)
AST SERPL W P-5'-P-CCNC: 26 U/L (ref 0–45)
BILIRUB SERPL-MCNC: 0.6 MG/DL (ref 0.2–1.3)
BUN SERPL-MCNC: 23 MG/DL (ref 7–30)
CALCIUM SERPL-MCNC: 9.3 MG/DL (ref 8.5–10.1)
CHLORIDE SERPL-SCNC: 106 MMOL/L (ref 94–109)
CO2 SERPL-SCNC: 25 MMOL/L (ref 20–32)
CREAT SERPL-MCNC: 1.3 MG/DL (ref 0.52–1.04)
GFR SERPL CREATININE-BSD FRML MDRD: 37 ML/MIN/{1.73_M2}
GLUCOSE SERPL-MCNC: 75 MG/DL (ref 70–99)
POTASSIUM SERPL-SCNC: 4.2 MMOL/L (ref 3.4–5.3)
PROT SERPL-MCNC: 6.4 G/DL (ref 6.8–8.8)
SODIUM SERPL-SCNC: 137 MMOL/L (ref 133–144)

## 2019-01-22 ENCOUNTER — TELEPHONE (OUTPATIENT)
Dept: FAMILY MEDICINE | Facility: CLINIC | Age: 84
End: 2019-01-22

## 2019-01-22 NOTE — TELEPHONE ENCOUNTER
RN -- please call Celestina Bowling's daughter, to let her know that the kidney function test returned about the same, mildly decreased. It has been in this range before. She has seen nephrology in the past for this as well. Given it is not worsening, I think it would be reasonable to recheck in a month. Please have her schedule with me in a month. If she is feeling ill, would recheck earlier. Her liver function tests returned normal. Majo Alfaro M.D.          Results for orders placed or performed in visit on 01/17/19   Comprehensive metabolic panel   Result Value Ref Range    Sodium 137 133 - 144 mmol/L    Potassium 4.2 3.4 - 5.3 mmol/L    Chloride 106 94 - 109 mmol/L    Carbon Dioxide 25 20 - 32 mmol/L    Anion Gap 6 3 - 14 mmol/L    Glucose 75 70 - 99 mg/dL    Urea Nitrogen 23 7 - 30 mg/dL    Creatinine 1.30 (H) 0.52 - 1.04 mg/dL    GFR Estimate 37 (L) >60 mL/min/[1.73_m2]    GFR Estimate If Black 42 (L) >60 mL/min/[1.73_m2]    Calcium 9.3 8.5 - 10.1 mg/dL    Bilirubin Total 0.6 0.2 - 1.3 mg/dL    Albumin 4.0 3.4 - 5.0 g/dL    Protein Total 6.4 (L) 6.8 - 8.8 g/dL    Alkaline Phosphatase 93 40 - 150 U/L    ALT 36 0 - 50 U/L    AST 26 0 - 45 U/L

## 2019-01-22 NOTE — TELEPHONE ENCOUNTER
Writer called Tawnya (consent to communicate on file), reviewed message per Dr. Alfaro.    Tawnya verbalized understanding and in agreement with plan.    Tawnya asked writer to inform patient writer already spoke with Tawnya, when writer calls patient to review Dr. Alfaro's message.    Tawnya asked if there are any medication changes needed and writer informed Tawnya Alfaro does not specify any medication changes at this time.    Writer next called patient, informed her message from Dr. Alfaro reviewed with Tawnya and then reviewed message per Dr. Alfaro with patient.    Patient verbalized understanding and in agreement with plan.  ALAN RoseN, RN

## 2019-02-06 ENCOUNTER — PATIENT OUTREACH (OUTPATIENT)
Dept: CARE COORDINATION | Facility: CLINIC | Age: 84
End: 2019-02-06

## 2019-02-06 DIAGNOSIS — M06.9 RHEUMATOID ARTHRITIS (H): Primary | ICD-10-CM

## 2019-02-06 NOTE — PROGRESS NOTES
Clinic Care Coordination Contact    Clinic Care Coordination Contact  OUTREACH    Referral Information: PCP referral :  Complex Medical Concerns/Education, and Patient/Caregiver Support: Resources for Support    Additional pertinent details:  Celestina lives at home and has needed the help of her daughter and sister in recent weeks due to her back/leg/hip pain, lack of appetite and energy. Her family would like to know if she could get home care services and what resources are available for support            Clinical Data: Patient was hospitalized at [facility] admission from 5/22/2018 to anticipated discharge 5/25/2018 with diagnosis of . Hyponatremia/confusion             Chief Complaint   Patient presents with     Clinic Care Coordination - Follow-up     Clinic Care Coordination RN           Utilization    Last refreshed: 2/6/2019 11:37 AM:  Hospital Admissions 1           Last refreshed: 2/6/2019 11:37 AM:  ED Visits 1           Last refreshed: 2/6/2019 11:37 AM:  No Show Count (past year) 0              Current as of: 2/6/2019 11:37 AM              Clinical Concerns:  Current Medical Concerns:  Patient continues to have leg pain caused by the nerves in her back.  Patient is taking Hydrocodone and Gabapentin and IV infusions for Rheumatoid Arthritis      Health Maintenance Reviewed: Not assessed  Clinical Pathway: None    Medication Management:  Pain medication discussed           Future Appointments              In 2 months Majo Alfaro MD [facility]          Plan: No unmet needs.  Patient will continue to follow the Rheumatologist .  Closed to care coordination     Nina Holly RN / Clinical Care Coordinator     [facility]   [email or web address]  Office :  [phone] / Fax :  [phone]

## 2019-04-08 ENCOUNTER — OFFICE VISIT (OUTPATIENT)
Dept: FAMILY MEDICINE | Facility: CLINIC | Age: 84
End: 2019-04-08
Payer: COMMERCIAL

## 2019-04-08 VITALS
SYSTOLIC BLOOD PRESSURE: 129 MMHG | BODY MASS INDEX: 20.03 KG/M2 | HEART RATE: 74 BPM | WEIGHT: 106 LBS | DIASTOLIC BLOOD PRESSURE: 65 MMHG | OXYGEN SATURATION: 93 %

## 2019-04-08 DIAGNOSIS — I10 HYPERTENSION GOAL BP (BLOOD PRESSURE) < 140/90: Primary | ICD-10-CM

## 2019-04-08 DIAGNOSIS — D50.9 IRON DEFICIENCY ANEMIA, UNSPECIFIED IRON DEFICIENCY ANEMIA TYPE: ICD-10-CM

## 2019-04-08 DIAGNOSIS — M81.0 AGE-RELATED OSTEOPOROSIS WITHOUT CURRENT PATHOLOGICAL FRACTURE: ICD-10-CM

## 2019-04-08 DIAGNOSIS — E78.5 HYPERLIPIDEMIA WITH TARGET LDL LESS THAN 100: ICD-10-CM

## 2019-04-08 DIAGNOSIS — G89.29 OTHER CHRONIC PAIN: ICD-10-CM

## 2019-04-08 DIAGNOSIS — N18.30 CKD (CHRONIC KIDNEY DISEASE) STAGE 3, GFR 30-59 ML/MIN (H): ICD-10-CM

## 2019-04-08 DIAGNOSIS — E46 PROTEIN-CALORIE MALNUTRITION, UNSPECIFIED SEVERITY (H): ICD-10-CM

## 2019-04-08 DIAGNOSIS — M06.9 RHEUMATOID ARTHRITIS, INVOLVING UNSPECIFIED SITE, UNSPECIFIED RHEUMATOID FACTOR PRESENCE: ICD-10-CM

## 2019-04-08 DIAGNOSIS — R60.0 PERIPHERAL EDEMA: ICD-10-CM

## 2019-04-08 DIAGNOSIS — M19.90 OSTEOARTHRITIS, UNSPECIFIED OSTEOARTHRITIS TYPE, UNSPECIFIED SITE: ICD-10-CM

## 2019-04-08 PROCEDURE — 99214 OFFICE O/P EST MOD 30 MIN: CPT | Performed by: FAMILY MEDICINE

## 2019-04-08 RX ORDER — HYDROCODONE BITARTRATE AND ACETAMINOPHEN 5; 325 MG/1; MG/1
1-2 TABLET ORAL EVERY 6 HOURS PRN
Qty: 150 TABLET | Refills: 0 | Status: SHIPPED | OUTPATIENT
Start: 2019-04-08 | End: 2019-07-23

## 2019-04-08 RX ORDER — HYDROCODONE BITARTRATE AND ACETAMINOPHEN 5; 325 MG/1; MG/1
1-2 TABLET ORAL EVERY 6 HOURS PRN
Qty: 150 TABLET | Refills: 0 | Status: SHIPPED | OUTPATIENT
Start: 2019-06-08 | End: 2019-07-23

## 2019-04-08 RX ORDER — HYDROCODONE BITARTRATE AND ACETAMINOPHEN 5; 325 MG/1; MG/1
1-2 TABLET ORAL EVERY 6 HOURS PRN
Qty: 150 TABLET | Refills: 0 | Status: SHIPPED | OUTPATIENT
Start: 2019-05-08 | End: 2019-07-23

## 2019-04-08 NOTE — PROGRESS NOTES
"  SUBJECTIVE:   Celestina Tejada is a 87 year old female who presents to clinic today for the following health issues:     Medication is working and she is not experiencing any side effects    She says that her blood pressure has been very good.  Her peripheral edema has improved and she was able to put on her dress shoes today.    Her mood has been good.  She is still getting out of the house with her daughter Tawnya.  States he says Celestina's mood has remained fairly upbeat.    She continues to have days when her arthritis pain is worse.    The thing that bothers her most is a recent diagnosis of macular degeneration.  She has been following with ophthalmology.  She left to read and this is been difficult for her.    She has been eating fairly well.  Has even gained a little weight since she went off Remeron.  Episodes of confusion have resolved since she stopped the Remeron as well.  Does note some mild memory loss, but that seems stable.    Her daughter Tawnya is helping her look for assisted living facilities.  She is pretty social and is looking forward to the possibility of living in a place where there are social events and prepared meals.    From 1/7/19:  \"She reports that her pain is under fairly good control with the gabapentin 300 mg 3 times a day and hydrocodone 3 times a day.  But the most bothersome pain is her radicular pain for which she has been following with Ortho and has been getting injection therapy.  She says she otherwise feels well.  She and her daughter states they have noticed that she has had some memory decline.  She does not have an advanced directive.  She says she would want to avoid CPR.  She prefers to allow a natural death.\"    From 9/4/18:   \"  Hypertension Follow-up      Outpatient blood pressures are not being checked.    Low Salt Diet: not monitoring salt    Chronic Kidney Disease Follow-up      Current NSAID use?  No      Amount of exercise or physical activity: None    Problems " "taking medications regularly: No    Medication side effects: none    Diet: regular (no restrictions)    (Remeron) She has been gaining weight which is good because daughter states that she needed to gain weight.   (Gabapentin) taking as prescribed     Celestina is here with her daughter Tawnya today.  Celestina says her right leg still continues to hurt.  However, it has improved since a couple of months ago.  She does think the gabapentin has been helpful, but could use more pain relief.  She will be seeing her spine surgeon next week.  She continues to ambulate with a walker and is limited in how far she can walk.  She would like to get back to going shopping with her daughter and getting out more often.  She has not tried any topical treatments for her pain.  She recently saw her rheumatologist and is back on her infusion therapy now that her decubitus ulcer has healed.  She appreciated the physical therapy she had in home, however, she does not currently wish to pursue outpatient physical therapy.  She feels her peripheral edema has improved.  Her appetite is improving.  She has been able to gain some weight.  She continues to take her iron supplement.  Overall she and her daughter states he feels like she is doing much better.\"    Problem list and histories reviewed & adjusted, as indicated.  Additional history: as documented    Patient Active Problem List   Diagnosis     Osteopenia     Hypertension goal BP (blood pressure) < 140/90     Rheumatoid arthritis (H)     CKD (chronic kidney disease) stage 3, GFR 30-59 ml/min (H)     Hyperlipidemia with target LDL less than 100     Advanced directives, counseling/discussion     Osteoarthritis     Eczema     Peripheral edema     Hyponatremia with decreased serum osmolality     Chronic pain     Protein-calorie malnutrition (H)     Age-related osteoporosis without current pathological fracture     Past Surgical History:   Procedure Laterality Date     C APPENDECTOMY  1950     " SURGICAL HISTORY OF -       2 normal vaginal births       Social History     Tobacco Use     Smoking status: Former Smoker     Packs/day: 0.50     Years: 20.00     Pack years: 10.00     Types: Cigarettes     Last attempt to quit: 2000     Years since quittin.5     Smokeless tobacco: Never Used   Substance Use Topics     Alcohol use: Yes     Comment: very occ,     Family History   Problem Relation Age of Onset     Heart Disease Mother         CHF     Respiratory Mother         lung disease     C.A.D. Sister         heart by-pass     Arthritis Sister      Family History Negative Brother      Family History Negative Daughter      Family History Negative Daughter          Current Outpatient Medications   Medication Sig Dispense Refill     acetaminophen (TYLENOL) 325 MG tablet Take 2 tablets (650 mg) by mouth 3 times daily 100 tablet 0     Amino Acids-Protein Hydrolys (PRO-STAT AWC) LIQD Take 1 oz by mouth 2 times daily       atenolol (TENORMIN) 25 MG tablet Take 3 tablets (75 mg) by mouth daily 270 tablet 3     atorvastatin (LIPITOR) 20 MG tablet Take 1 tablet (20 mg) by mouth daily 90 tablet 2     Ferrous Sulfate (IRON) 325 (65 Fe) MG tablet Take 1 tablet by mouth daily (with breakfast) 90 tablet 3     gabapentin (NEURONTIN) 300 MG capsule Take 1 capsule (300 mg) by mouth 3 times daily 270 capsule 3     HYDROcodone-acetaminophen (NORCO) 5-325 MG tablet Take 1-2 tablets by mouth every 6 hours as needed for moderate to severe pain 150 tablet 0     [START ON 2019] HYDROcodone-acetaminophen (NORCO) 5-325 MG tablet Take 1-2 tablets by mouth every 6 hours as needed for moderate to severe pain 150 tablet 0     [START ON 2019] HYDROcodone-acetaminophen (NORCO) 5-325 MG tablet Take 1-2 tablets by mouth every 6 hours as needed for moderate to severe pain 150 tablet 0     hydroxychloroquine (PLAQUENIL) 200 MG tablet Take 200 mg by mouth daily   3     NIFEdipine ER OSMOTIC (PROCARDIA XL) 90 MG 24 hr tablet Take 1  tablet (90 mg) by mouth daily 90 tablet 2     Nutritional Supplements (ENSURE PLUS) LIQD Take by mouth 2 times daily        senna-docusate (SENOKOT-S;PERICOLACE) 8.6-50 MG per tablet Take 2 tablets by mouth daily And 2 tabs daily prn       Allergies   Allergen Reactions     Hydrochlorothiazide      hyponatremia     Recent Labs   Lab Test 01/17/19  1257 01/07/19  1236  06/18/18  0600  05/31/18  0704  05/21/18  1618  09/12/17  1236  08/18/16  1322  07/14/16  1305   LDL  --   --   --   --   --   --   --   --   --  50  --  49  --  81   HDL  --   --   --   --   --   --   --   --   --  65  --  68  --  64   TRIG  --   --   --   --   --   --   --   --   --  89  --  85  --  120   ALT 36 99*  --   --   --  22  --   --    < > 31   < >  --    < > 21   CR 1.30* 1.38*   < >  --    < > 0.67   < > 0.82   < > 1.60*   < > 1.66*   < > 1.41*   GFRESTIMATED 37* 34*   < >  --    < > 84   < > 66   < > 31*   < > 29*   < > 35*   GFRESTBLACK 42* 40*   < >  --    < > >90   < > 80   < > 37*   < > 35*   < > 43*   POTASSIUM 4.2 4.1   < >  --    < > 4.6   < > 4.6   < > 4.2   < > 4.7   < > 4.0   TSH  --   --   --  1.52  --   --   --  1.56  --   --   --   --    < >  --     < > = values in this interval not displayed.      BP Readings from Last 3 Encounters:   04/08/19 129/65   01/07/19 144/67   09/04/18 150/66    Wt Readings from Last 3 Encounters:   04/08/19 48.1 kg (106 lb)   01/07/19 49.8 kg (109 lb 12 oz)   09/04/18 46.6 kg (102 lb 12 oz)              Reviewed and updated as needed this visit by clinical staff  Tobacco  Allergies  Meds       Reviewed and updated as needed this visit by Provider         ROS:  As above     OBJECTIVE:     /65 (BP Location: Left arm, Patient Position: Sitting, Cuff Size: Adult Small)   Pulse 74   Wt 48.1 kg (106 lb)   SpO2 93%   BMI 20.03 kg/m    Body mass index is 20.03 kg/m .   Wt Readings from Last 5 Encounters:   04/08/19 48.1 kg (106 lb)   01/07/19 49.8 kg (109 lb 12 oz)   09/04/18 46.6 kg (102 lb  12 oz)   06/27/18 43.9 kg (96 lb 12 oz)   06/18/18 41.5 kg (91 lb 9.6 oz)      GENERAL: thin, elderly female, walks with walker, appears improved since last visit in June, alert and no distress    Diagnostic Test Results:  No results found for this or any previous visit (from the past 24 hour(s)).    ASSESSMENT/PLAN:          1. Hypertension goal BP (blood pressure) < 140/90  Controlled.  Continues on atenolol 75mg daily and nifedipine 90mg daily.  Will not add back lisinopril due to good blood pressure control and risk of hyponatremia.       2. CKD (chronic kidney disease) stage 3, GFR 30-59 ml/min  Stable. BMP next visit     3. Other chronic pain  Continues gabapentin to 300 mg 3 times a day.  She also follows with spine specialist   Refilled norco 5-325 1-2 tablets by mouth q6hrs prn pain #150 per month with refills. Return for follow up in 3 months.     4. Peripheral edema  Improved.     5. Rheumatoid arthritis, involving unspecified site, unspecified rheumatoid factor presence (H)  Followed by rheumatology.  She has resumed her infusion therapy and her joint pains have improved since that time.       6. Iron deficiency anemia, unspecified iron deficiency anemia type  Rechecked her hemoglobin last visit and it was improved.  She will continue on iron supplement.       7. Osteoarthritis, unspecified osteoarthritis type, unspecified site   stable  Refilled norco as above       8. Hyperlipidemia with target LDL less than 100  Stable. Will check lipids at next visit    9.  Protein-calorie malnutrition, unspecified severity (H)  She is gaining weight.  She has been able to eat better as her pain is been under better control.  Doing well off remeron. She did have some confusion on remeron and that has resolved.    10. Osteoporosis  Followed by rheumatology. Had been on Reclast for 2 infusions but was overdue for her third.  He was going to check into funding for the infusion her last visit with rheumatology in  November.  She continues on calcium and vitamin D     She will follow up with me in 3 months.       Patient Instructions   I recommend getting the new shingles vaccine, Shingrix.  You can call your insurance company to find out if this vaccine is covered.  You may also ask our pharmacy to check if your insurance covers Shingrix if given at the pharmacy.        Majo Alfaro M.D.        [facility]

## 2019-04-18 ENCOUNTER — TRANSFERRED RECORDS (OUTPATIENT)
Dept: HEALTH INFORMATION MANAGEMENT | Facility: CLINIC | Age: 84
End: 2019-04-18

## 2019-05-13 ENCOUNTER — TRANSFERRED RECORDS (OUTPATIENT)
Dept: HEALTH INFORMATION MANAGEMENT | Facility: CLINIC | Age: 84
End: 2019-05-13

## 2019-06-28 DIAGNOSIS — D64.9 ANEMIA, UNSPECIFIED TYPE: ICD-10-CM

## 2019-06-28 RX ORDER — FERROUS SULFATE 325(65) MG
TABLET ORAL
Qty: 90 TABLET | Refills: 0 | OUTPATIENT
Start: 2019-06-28

## 2019-06-28 NOTE — TELEPHONE ENCOUNTER
Med filled on 1/7/2019  For 1 year.   Patient has refills at [id number] - [facility] [address] AT [facility] [address]    Thanks! Rachel Trujillo RN

## 2019-07-11 ENCOUNTER — TRANSFERRED RECORDS (OUTPATIENT)
Dept: HEALTH INFORMATION MANAGEMENT | Facility: CLINIC | Age: 84
End: 2019-07-11

## 2019-07-23 NOTE — PROGRESS NOTES
"  SUBJECTIVE:   Celestina Tejada is a 87 year old female who presents to clinic today for the following health issues:     Chronic Pain Follow-Up       Type / Location of Pain: rheumatoid arthritis pain, hands and wrist back    Analgesia/pain control:       Recent changes:  same      Overall control: Tolerable with discomfort  Activity level/function:      Daily activities:  Able to do light housework, cooking  Adverse effects:  No  Adherance    Taking medication as directed?  Yes  Risk Factors:    Sleep:  Good    Mood/anxiety:  She feels slightly down every once in a while but it does not affect her being able to do her own thing, boredom     Recent family or social stressors:  none noted    Other aggravating factors: hurts more when she walks then it once did  PHQ-9 SCORE 9/19/2016 9/12/2017 2/13/2018   PHQ-9 Total Score 7 7 6     NUNO-7 SCORE 9/19/2016 9/12/2017 1/7/2019   Total Score 0 2 0     She is getting a spinal block tomorrow     From clinic visit on 4/8/2019:  \" Medication is working and she is not experiencing any side effects    She says that her blood pressure has been very good.  Her peripheral edema has improved and she was able to put on her dress shoes today.    Her mood has been good.  She is still getting out of the house with her daughter Tawnya.  States he says Celestina's mood has remained fairly upbeat.    She continues to have days when her arthritis pain is worse.    The thing that bothers her most is a recent diagnosis of macular degeneration.  She has been following with ophthalmology.  She left to read and this is been difficult for her.    She has been eating fairly well.  Has even gained a little weight since she went off Remeron.  Episodes of confusion have resolved since she stopped the Remeron as well.  Does note some mild memory loss, but that seems stable.    Her daughter Tawnya is helping her look for assisted living facilities.  She is pretty social and is looking forward to the " "possibility of living in a place where there are social events and prepared meals.\"    From 1/7/19:  \"She reports that her pain is under fairly good control with the gabapentin 300 mg 3 times a day and hydrocodone 3 times a day.  But the most bothersome pain is her radicular pain for which she has been following with Ortho and has been getting injection therapy.  She says she otherwise feels well.  She and her daughter states they have noticed that she has had some memory decline.  She does not have an advanced directive.  She says she would want to avoid CPR.  She prefers to allow a natural death.\"    From 9/4/18:   \"  Hypertension Follow-up      Outpatient blood pressures are not being checked.    Low Salt Diet: not monitoring salt    Chronic Kidney Disease Follow-up      Current NSAID use?  No      Amount of exercise or physical activity: None    Problems taking medications regularly: No    Medication side effects: none    Diet: regular (no restrictions)    (Remeron) She has been gaining weight which is good because daughter states that she needed to gain weight.   (Gabapentin) taking as prescribed     Celestina is here with her daughter Tawnya today.  Celestina says her right leg still continues to hurt.  However, it has improved since a couple of months ago.  She does think the gabapentin has been helpful, but could use more pain relief.  She will be seeing her spine surgeon next week.  She continues to ambulate with a walker and is limited in how far she can walk.  She would like to get back to going shopping with her daughter and getting out more often.  She has not tried any topical treatments for her pain.  She recently saw her rheumatologist and is back on her infusion therapy now that her decubitus ulcer has healed.  She appreciated the physical therapy she had in home, however, she does not currently wish to pursue outpatient physical therapy.  She feels her peripheral edema has improved.  Her appetite is " "improving.  She has been able to gain some weight.  She continues to take her iron supplement.  Overall she and her daughter states he feels like she is doing much better.\"    Problem list and histories reviewed & adjusted, as indicated.  Additional history: as documented    Patient Active Problem List   Diagnosis     Osteopenia     Hypertension goal BP (blood pressure) < 140/90     Rheumatoid arthritis (H)     CKD (chronic kidney disease) stage 3, GFR 30-59 ml/min (H)     Hyperlipidemia with target LDL less than 100     Advanced directives, counseling/discussion     Osteoarthritis     Eczema     Peripheral edema     Hyponatremia with decreased serum osmolality     Chronic pain     Protein-calorie malnutrition (H)     Age-related osteoporosis without current pathological fracture     Past Surgical History:   Procedure Laterality Date     C APPENDECTOMY  1950     SURGICAL HISTORY OF -       2 normal vaginal births       Social History     Tobacco Use     Smoking status: Former Smoker     Packs/day: 0.50     Years: 20.00     Pack years: 10.00     Types: Cigarettes     Last attempt to quit: 2000     Years since quittin.8     Smokeless tobacco: Never Used   Substance Use Topics     Alcohol use: Yes     Comment: very occ,     Family History   Problem Relation Age of Onset     Heart Disease Mother         CHF     Respiratory Mother         lung disease     C.A.D. Sister         heart by-pass     Arthritis Sister      Family History Negative Brother      Family History Negative Daughter      Family History Negative Daughter          Current Outpatient Medications   Medication Sig Dispense Refill     acetaminophen (TYLENOL) 325 MG tablet Take 2 tablets (650 mg) by mouth 3 times daily 100 tablet 0     atenolol (TENORMIN) 25 MG tablet Take 3 tablets (75 mg) by mouth daily 270 tablet 3     atorvastatin (LIPITOR) 20 MG tablet Take 1 tablet (20 mg) by mouth daily 90 tablet 2     Ferrous Sulfate (IRON) 325 (65 Fe) MG " tablet Take 1 tablet by mouth daily (with breakfast) 90 tablet 3     gabapentin (NEURONTIN) 300 MG capsule Take 1 capsule (300 mg) by mouth 3 times daily 270 capsule 3     HYDROcodone-acetaminophen (NORCO) 5-325 MG tablet Take 1-2 tablets by mouth every 6 hours as needed for moderate to severe pain 140 tablet 0     [START ON 8/22/2019] HYDROcodone-acetaminophen (NORCO) 5-325 MG tablet Take 1-2 tablets by mouth every 6 hours as needed for moderate to severe pain 140 tablet 0     [START ON 9/19/2019] HYDROcodone-acetaminophen (NORCO) 5-325 MG tablet Take 1-2 tablets by mouth every 6 hours as needed for moderate to severe pain 140 tablet 0     hydroxychloroquine (PLAQUENIL) 200 MG tablet Take 200 mg by mouth daily   3     NIFEdipine ER OSMOTIC (PROCARDIA XL) 90 MG 24 hr tablet Take 1 tablet (90 mg) by mouth daily 90 tablet 2     senna-docusate (SENOKOT-S;PERICOLACE) 8.6-50 MG per tablet Take 2 tablets by mouth daily And 2 tabs daily prn       Allergies   Allergen Reactions     Hydrochlorothiazide      hyponatremia     Recent Labs   Lab Test 01/17/19  1257 01/07/19  1236  06/18/18  0600  05/31/18  0704  05/21/18  1618  09/12/17  1236  08/18/16  1322  07/14/16  1305   LDL  --   --   --   --   --   --   --   --   --  50  --  49  --  81   HDL  --   --   --   --   --   --   --   --   --  65  --  68  --  64   TRIG  --   --   --   --   --   --   --   --   --  89  --  85  --  120   ALT 36 99*  --   --   --  22  --   --    < > 31   < >  --    < > 21   CR 1.30* 1.38*   < >  --    < > 0.67   < > 0.82   < > 1.60*   < > 1.66*   < > 1.41*   GFRESTIMATED 37* 34*   < >  --    < > 84   < > 66   < > 31*   < > 29*   < > 35*   GFRESTBLACK 42* 40*   < >  --    < > >90   < > 80   < > 37*   < > 35*   < > 43*   POTASSIUM 4.2 4.1   < >  --    < > 4.6   < > 4.6   < > 4.2   < > 4.7   < > 4.0   TSH  --   --   --  1.52  --   --   --  1.56  --   --   --   --    < >  --     < > = values in this interval not displayed.      BP Readings from Last 3  Encounters:   07/25/19 121/59   04/08/19 129/65   01/07/19 144/67    Wt Readings from Last 3 Encounters:   07/25/19 52.4 kg (115 lb 8 oz)   04/08/19 48.1 kg (106 lb)   01/07/19 49.8 kg (109 lb 12 oz)              Reviewed and updated as needed this visit by clinical staff  Tobacco  Allergies  Meds       Reviewed and updated as needed this visit by Provider         ROS:  As above     OBJECTIVE:     /59 (BP Location: Left arm, Patient Position: Sitting, Cuff Size: Adult Regular)   Pulse 67   Temp 97.8  F (36.6  C) (Oral)   Resp 14   Wt 52.4 kg (115 lb 8 oz)   SpO2 95%   BMI 21.82 kg/m    Body mass index is 21.82 kg/m .   Wt Readings from Last 5 Encounters:   07/25/19 52.4 kg (115 lb 8 oz)   04/08/19 48.1 kg (106 lb)   01/07/19 49.8 kg (109 lb 12 oz)   09/04/18 46.6 kg (102 lb 12 oz)   06/27/18 43.9 kg (96 lb 12 oz)      GENERAL: thin, elderly female, walks with walker, appears improved since last visit in June, alert and no distress    Diagnostic Test Results:  No results found for this or any previous visit (from the past 24 hour(s)).    ASSESSMENT/PLAN:          1. Hypertension goal BP (blood pressure) < 140/90  Controlled.  Continues on atenolol 75mg daily and nifedipine 90mg daily. She has been taking lisinopril 20mg daily still as she still had a supply.  Previous plan had been to not add back the lisinopril due to lower blood pressure and history of hyponatremia. She and her daughter were under the impression the lisinopril should be continued, so she has been taking it. Will check BMP today. If stable, will continue the medication for now.        2. CKD (chronic kidney disease) stage 3, GFR 30-59 ml/min  Stable. BMP today    3. Other chronic pain  Continues gabapentin to 300 mg 3 times a day.  She also follows with spine specialist and she has a spinal block scheduled   Refilled norco 5-325 1-2 tablets by mouth q6hrs prn pain #140 per month with refills q28 days. Limited to two refills today due  to new [place] prescribing law. Okay to send 3rd refill when due. Return for follow up in 3 months.     4. Peripheral edema  Improved.     5. Rheumatoid arthritis, involving unspecified site, unspecified rheumatoid factor presence (H)  Followed by rheumatology.  She has resumed her infusion therapy and her joint pains have improved since that time.       6. Iron deficiency anemia, unspecified iron deficiency anemia type  Rechecked her hemoglobin previous visit and it was improved.  She will continue on iron supplement.       7. Osteoarthritis, unspecified osteoarthritis type, unspecified site   stable  Refilled norco as above       8. Hyperlipidemia with target LDL less than 100  Stable. Will check lipids at next visit    9.  Protein-calorie malnutrition, unspecified severity (H)  She is gaining weight.  She has been able to eat better as her pain is been under better control.  Doing well off remeron. She did have some confusion on remeron and that has resolved.    10. Osteoporosis  Followed by rheumatology. Had been on Reclast for 2 infusions but was overdue for her third.  He was going to check into funding for the infusion her visit with rheumatology in November.  She continues on calcium and vitamin D     She will follow up with me in 3 months.       Patient Instructions   See me for follow-up in 3 months.  He will need to call when you are due for your third refill of [place].      Majo Alfaro M.D.        [facility]

## 2019-07-25 ENCOUNTER — OFFICE VISIT (OUTPATIENT)
Dept: FAMILY MEDICINE | Facility: CLINIC | Age: 84
End: 2019-07-25
Payer: COMMERCIAL

## 2019-07-25 VITALS
TEMPERATURE: 97.8 F | HEART RATE: 67 BPM | BODY MASS INDEX: 21.82 KG/M2 | RESPIRATION RATE: 14 BRPM | OXYGEN SATURATION: 95 % | SYSTOLIC BLOOD PRESSURE: 121 MMHG | DIASTOLIC BLOOD PRESSURE: 59 MMHG | WEIGHT: 115.5 LBS

## 2019-07-25 DIAGNOSIS — M81.0 AGE-RELATED OSTEOPOROSIS WITHOUT CURRENT PATHOLOGICAL FRACTURE: ICD-10-CM

## 2019-07-25 DIAGNOSIS — R60.0 PERIPHERAL EDEMA: ICD-10-CM

## 2019-07-25 DIAGNOSIS — E78.5 HYPERLIPIDEMIA WITH TARGET LDL LESS THAN 100: ICD-10-CM

## 2019-07-25 DIAGNOSIS — I10 HYPERTENSION GOAL BP (BLOOD PRESSURE) < 140/90: Primary | ICD-10-CM

## 2019-07-25 DIAGNOSIS — M19.90 OSTEOARTHRITIS, UNSPECIFIED OSTEOARTHRITIS TYPE, UNSPECIFIED SITE: ICD-10-CM

## 2019-07-25 DIAGNOSIS — E46 PROTEIN-CALORIE MALNUTRITION, UNSPECIFIED SEVERITY (H): ICD-10-CM

## 2019-07-25 DIAGNOSIS — N18.30 CKD (CHRONIC KIDNEY DISEASE) STAGE 3, GFR 30-59 ML/MIN (H): ICD-10-CM

## 2019-07-25 DIAGNOSIS — M06.9 RHEUMATOID ARTHRITIS, INVOLVING UNSPECIFIED SITE, UNSPECIFIED RHEUMATOID FACTOR PRESENCE: ICD-10-CM

## 2019-07-25 DIAGNOSIS — G89.29 OTHER CHRONIC PAIN: ICD-10-CM

## 2019-07-25 LAB
ANION GAP SERPL CALCULATED.3IONS-SCNC: 6 MMOL/L (ref 3–14)
BUN SERPL-MCNC: 32 MG/DL (ref 7–30)
CALCIUM SERPL-MCNC: 8.9 MG/DL (ref 8.5–10.1)
CHLORIDE SERPL-SCNC: 103 MMOL/L (ref 94–109)
CHOLEST SERPL-MCNC: 174 MG/DL
CO2 SERPL-SCNC: 28 MMOL/L (ref 20–32)
CREAT SERPL-MCNC: 0.96 MG/DL (ref 0.52–1.04)
GFR SERPL CREATININE-BSD FRML MDRD: 53 ML/MIN/{1.73_M2}
GLUCOSE SERPL-MCNC: 87 MG/DL (ref 70–99)
HDLC SERPL-MCNC: 103 MG/DL
LDLC SERPL CALC-MCNC: 55 MG/DL
NONHDLC SERPL-MCNC: 71 MG/DL
POTASSIUM SERPL-SCNC: 4.9 MMOL/L (ref 3.4–5.3)
SODIUM SERPL-SCNC: 137 MMOL/L (ref 133–144)
TRIGL SERPL-MCNC: 78 MG/DL

## 2019-07-25 PROCEDURE — 80061 LIPID PANEL: CPT | Performed by: FAMILY MEDICINE

## 2019-07-25 PROCEDURE — 36415 COLL VENOUS BLD VENIPUNCTURE: CPT | Performed by: FAMILY MEDICINE

## 2019-07-25 PROCEDURE — 99214 OFFICE O/P EST MOD 30 MIN: CPT | Performed by: FAMILY MEDICINE

## 2019-07-25 PROCEDURE — 80048 BASIC METABOLIC PNL TOTAL CA: CPT | Performed by: FAMILY MEDICINE

## 2019-07-25 RX ORDER — HYDROCODONE BITARTRATE AND ACETAMINOPHEN 5; 325 MG/1; MG/1
1-2 TABLET ORAL EVERY 6 HOURS PRN
Qty: 140 TABLET | Refills: 0 | Status: SHIPPED | OUTPATIENT
Start: 2019-07-25 | End: 2019-08-27

## 2019-07-25 RX ORDER — HYDROCODONE BITARTRATE AND ACETAMINOPHEN 5; 325 MG/1; MG/1
1-2 TABLET ORAL EVERY 6 HOURS PRN
Qty: 140 TABLET | Refills: 0 | COMMUNITY
Start: 2019-09-19 | End: 2019-10-21

## 2019-07-25 RX ORDER — HYDROCODONE BITARTRATE AND ACETAMINOPHEN 5; 325 MG/1; MG/1
1-2 TABLET ORAL EVERY 6 HOURS PRN
Qty: 140 TABLET | Refills: 0 | Status: SHIPPED | OUTPATIENT
Start: 2019-08-22 | End: 2019-10-21

## 2019-07-25 NOTE — PATIENT INSTRUCTIONS
See me for follow-up in 3 months.  You will need to call clinic when you are due for your third refill of Norco.

## 2019-08-08 ENCOUNTER — TELEPHONE (OUTPATIENT)
Dept: FAMILY MEDICINE | Facility: CLINIC | Age: 84
End: 2019-08-08

## 2019-08-08 NOTE — TELEPHONE ENCOUNTER
Reason for Call:  Form, our goal is to have forms completed with 72 hours, however, some forms may require a visit or additional information.    Type of letter, form or note:  Delta    Who is the form from?: Patient    Where did the form come from: Patient or family brought in       What clinic location was the form placed at?: [facility]    Where the form was placed: Forms folder Box/Folder    What number is listed as a contact on the form?: [phone]       Additional comments: Please complete and fax to [phone]    Call taken on 8/8/2019 at 9:05 AM by Lance Levy

## 2019-08-27 ENCOUNTER — TELEPHONE (OUTPATIENT)
Dept: FAMILY MEDICINE | Facility: CLINIC | Age: 84
End: 2019-08-27

## 2019-08-27 DIAGNOSIS — M06.9 RHEUMATOID ARTHRITIS, INVOLVING UNSPECIFIED SITE, UNSPECIFIED RHEUMATOID FACTOR PRESENCE: ICD-10-CM

## 2019-08-27 DIAGNOSIS — M19.90 OSTEOARTHRITIS, UNSPECIFIED OSTEOARTHRITIS TYPE, UNSPECIFIED SITE: ICD-10-CM

## 2019-08-27 DIAGNOSIS — G89.29 OTHER CHRONIC PAIN: ICD-10-CM

## 2019-08-27 RX ORDER — HYDROCODONE BITARTRATE AND ACETAMINOPHEN 5; 325 MG/1; MG/1
1-2 TABLET ORAL EVERY 6 HOURS PRN
Qty: 140 TABLET | Refills: 0 | Status: SHIPPED | OUTPATIENT
Start: 2019-08-27 | End: 2019-10-21

## 2019-08-27 NOTE — TELEPHONE ENCOUNTER
Dr. Alfaro-Please review and sign if agree.    Patient has active script on med list for 8/22/19 start date.    Thank you!  ALAN RoseN, RN

## 2019-08-27 NOTE — TELEPHONE ENCOUNTER
I have attempted to contact this patient by phone with the following results: with Cindy, cindy will like her rx to be filled at  pharmacy [place]. Also, will be picking up PHQ9 and a HCD while she is here.       Please provide a envelope if pt need it to mail PHQ9 back.            Bear Vazquez MA on 8/27/2019 at 3:42 PM

## 2019-08-27 NOTE — TELEPHONE ENCOUNTER
Reason for Call:  Medication or medication refill:    Do you use a [facility]?  Name of the pharmacy and phone number for the current request:  [id number] [place], [place] - [address] AT [facility] [address]     Name of the medication requested: HYDROcodone-acetaminophen (NORCO) 5-325 MG tablet    Other request: Patient daughter is requesting for a new script due to new opioid law. Please advise, thank you!    *Consent to communicate with Shalonda on file*    Can we leave a detailed message on this number? YES    Phone number patient can be reached at: Other phone number:  [phone]*    Best Time: anytime    Call taken on 8/27/2019 at 11:29 AM by Jaleesa Cano

## 2019-08-27 NOTE — TELEPHONE ENCOUNTER
Panel Management Review      Patient has the following on her problem list: None      Composite cancer screening  Chart review shows that this patient is due/due soon for the following None  Summary:    Patient is due/failing the following:   HCD and PHQ9    Action needed:   Patient needs to do PHQ9.    Type of outreach:    Copy of ACT mailed to patient, will reach out in 5 days.   Shalonda- will be picking up these form at . Please provide prepaid envelope if needed.         Questions for provider review:    none                                                                                        Bear Vazquez MA

## 2019-09-03 DIAGNOSIS — I10 HYPERTENSION GOAL BP (BLOOD PRESSURE) < 140/90: ICD-10-CM

## 2019-09-03 RX ORDER — NIFEDIPINE 90 MG/1
90 TABLET, EXTENDED RELEASE ORAL DAILY
Qty: 30 TABLET | Refills: 0 | Status: SHIPPED | OUTPATIENT
Start: 2019-09-03 | End: 2020-01-21

## 2019-09-03 NOTE — TELEPHONE ENCOUNTER
Reason for Call:  Medication or medication refill:    Do you use a [facility]?  Name of the pharmacy and phone number for the current request:  [id number] [place], [place] - [address] AT [facility] [address]     Name of the medication requested: NIFEdipine ER OSMOTIC (PROCARDIA XL) 90 MG 24 hr tablet    Other request: Patients daughter states that her mail order pharmacy will take another week or so until they receive rx. Patient is completely out and her daughter is requesting for a refill to last her until the next shipment. Please advise, thank you!    Can we leave a detailed message on this number? YES    Phone number patient can be reached at: Other phone number:  [phone]*    Best Time: anytime    Call taken on 9/3/2019 at 2:56 PM by Jaleesa Cano

## 2019-09-03 NOTE — TELEPHONE ENCOUNTER
"Requested Prescriptions   Pending Prescriptions Disp Refills     NIFEdipine ER OSMOTIC (PROCARDIA XL) 90 MG 24 hr tablet 30 tablet 0     Sig: Take 1 tablet (90 mg) by mouth daily       Calcium Channel Blockers Protocol  Passed - 9/3/2019  2:59 PM        Passed - Blood pressure under 140/90 in past 12 months     BP Readings from Last 3 Encounters:   07/25/19 121/59   04/08/19 129/65   01/07/19 144/67                 Passed - Recent (12 mo) or future (30 days) visit within the authorizing provider's specialty     Patient had office visit in the last 12 months or has a visit in the next 30 days with authorizing provider or within the authorizing provider's specialty.  See \"Patient Info\" tab in inbasket, or \"Choose Columns\" in Meds & Orders section of the refill encounter.              Passed - Medication is active on med list        Passed - Patient is age 18 or older        Passed - No active pregnancy on record        Passed - Normal serum creatinine on file in past 12 months     Recent Labs   Lab Test 07/25/19  1046   CR 0.96             Passed - No positive pregnancy test in past 12 months          "

## 2019-09-05 DIAGNOSIS — I10 HYPERTENSION GOAL BP (BLOOD PRESSURE) < 140/90: ICD-10-CM

## 2019-09-05 RX ORDER — NIFEDIPINE 90 MG/1
90 TABLET, EXTENDED RELEASE ORAL DAILY
Qty: 30 TABLET | Refills: 0 | Status: CANCELLED | OUTPATIENT
Start: 2019-09-05

## 2019-09-05 NOTE — TELEPHONE ENCOUNTER
"Requested Prescriptions   Pending Prescriptions Disp Refills     NIFEdipine ER OSMOTIC (PROCARDIA XL) 90 MG 24 hr tablet 30 tablet 0     Sig: Take 1 tablet (90 mg) by mouth daily  Last Written Prescription Date:  9/3/2019  Last Fill Quantity: 30 tablet,  # refills: 0   Last Office Visit: 7/25/2019   Future Office Visit:    Next 5 appointments (look out 90 days)    Oct 21, 2019  2:40 PM CDT  Office Visit with Majo Alfaro MD  [facility] ([facility]) [facility] [place] [id number] [phone]              Calcium Channel Blockers Protocol  Passed - 9/5/2019 11:36 AM        Passed - Blood pressure under 140/90 in past 12 months     BP Readings from Last 3 Encounters:   07/25/19 121/59   04/08/19 129/65   01/07/19 144/67           Passed - Recent (12 mo) or future (30 days) visit within the authorizing provider's specialty     Patient had office visit in the last 12 months or has a visit in the next 30 days with authorizing provider or within the authorizing provider's specialty.  See \"Patient Info\" tab in inbasket, or \"Choose Columns\" in Meds & Orders section of the refill encounter.            Passed - Medication is active on med list        Passed - Patient is age 18 or older        Passed - No active pregnancy on record        Passed - Normal serum creatinine on file in past 12 months     Recent Labs   Lab Test 07/25/19  1046   CR 0.96           Passed - No positive pregnancy test in past 12 months          "

## 2019-09-05 NOTE — TELEPHONE ENCOUNTER
Patient daughter Tawnya called explaining they do not want to use the mail order pharmacy anymore so would like future refills sent to [facility]     Tawnya would like to be called with any questions & there is consent to communicate in chart     Ok to leave a message

## 2019-10-03 ENCOUNTER — TRANSFERRED RECORDS (OUTPATIENT)
Dept: HEALTH INFORMATION MANAGEMENT | Facility: CLINIC | Age: 84
End: 2019-10-03

## 2019-10-21 ENCOUNTER — OFFICE VISIT (OUTPATIENT)
Dept: FAMILY MEDICINE | Facility: CLINIC | Age: 84
End: 2019-10-21
Payer: COMMERCIAL

## 2019-10-21 VITALS
OXYGEN SATURATION: 94 % | SYSTOLIC BLOOD PRESSURE: 130 MMHG | TEMPERATURE: 98 F | BODY MASS INDEX: 22.96 KG/M2 | DIASTOLIC BLOOD PRESSURE: 60 MMHG | HEART RATE: 67 BPM | WEIGHT: 121.5 LBS

## 2019-10-21 DIAGNOSIS — D50.9 IRON DEFICIENCY ANEMIA, UNSPECIFIED IRON DEFICIENCY ANEMIA TYPE: ICD-10-CM

## 2019-10-21 DIAGNOSIS — M19.90 OSTEOARTHRITIS, UNSPECIFIED OSTEOARTHRITIS TYPE, UNSPECIFIED SITE: ICD-10-CM

## 2019-10-21 DIAGNOSIS — Z23 NEED FOR PROPHYLACTIC VACCINATION AND INOCULATION AGAINST INFLUENZA: ICD-10-CM

## 2019-10-21 DIAGNOSIS — G89.29 OTHER CHRONIC PAIN: ICD-10-CM

## 2019-10-21 DIAGNOSIS — N18.30 CKD (CHRONIC KIDNEY DISEASE) STAGE 3, GFR 30-59 ML/MIN (H): ICD-10-CM

## 2019-10-21 DIAGNOSIS — R60.0 PERIPHERAL EDEMA: ICD-10-CM

## 2019-10-21 DIAGNOSIS — E78.5 HYPERLIPIDEMIA WITH TARGET LDL LESS THAN 100: ICD-10-CM

## 2019-10-21 DIAGNOSIS — I10 HYPERTENSION GOAL BP (BLOOD PRESSURE) < 140/90: Primary | ICD-10-CM

## 2019-10-21 DIAGNOSIS — E46 PROTEIN-CALORIE MALNUTRITION, UNSPECIFIED SEVERITY (H): ICD-10-CM

## 2019-10-21 DIAGNOSIS — R05.9 COUGH: ICD-10-CM

## 2019-10-21 DIAGNOSIS — R53.81 DECLINING FUNCTIONAL STATUS: ICD-10-CM

## 2019-10-21 DIAGNOSIS — M81.0 AGE-RELATED OSTEOPOROSIS WITHOUT CURRENT PATHOLOGICAL FRACTURE: ICD-10-CM

## 2019-10-21 DIAGNOSIS — M06.9 RHEUMATOID ARTHRITIS, INVOLVING UNSPECIFIED SITE, UNSPECIFIED RHEUMATOID FACTOR PRESENCE: ICD-10-CM

## 2019-10-21 PROCEDURE — 90662 IIV NO PRSV INCREASED AG IM: CPT | Performed by: FAMILY MEDICINE

## 2019-10-21 PROCEDURE — G0008 ADMIN INFLUENZA VIRUS VAC: HCPCS | Performed by: FAMILY MEDICINE

## 2019-10-21 PROCEDURE — 99214 OFFICE O/P EST MOD 30 MIN: CPT | Mod: 25 | Performed by: FAMILY MEDICINE

## 2019-10-21 RX ORDER — HYDROCODONE BITARTRATE AND ACETAMINOPHEN 5; 325 MG/1; MG/1
1-2 TABLET ORAL EVERY 6 HOURS PRN
Qty: 140 TABLET | Refills: 0 | Status: SHIPPED | OUTPATIENT
Start: 2019-10-21 | End: 2020-01-21

## 2019-10-21 RX ORDER — HYDROCODONE BITARTRATE AND ACETAMINOPHEN 5; 325 MG/1; MG/1
1-2 TABLET ORAL EVERY 6 HOURS PRN
Qty: 140 TABLET | Refills: 0 | COMMUNITY
Start: 2019-12-16 | End: 2019-12-16

## 2019-10-21 RX ORDER — HYDROCODONE BITARTRATE AND ACETAMINOPHEN 5; 325 MG/1; MG/1
1-2 TABLET ORAL EVERY 6 HOURS PRN
Qty: 140 TABLET | Refills: 0 | Status: SHIPPED | OUTPATIENT
Start: 2019-11-18 | End: 2020-01-21

## 2019-10-21 ASSESSMENT — PATIENT HEALTH QUESTIONNAIRE - PHQ9: SUM OF ALL RESPONSES TO PHQ QUESTIONS 1-9: 2

## 2019-10-21 NOTE — PROGRESS NOTES
"  SUBJECTIVE:   Celestina Tejada is a 87 year old female who presents to clinic today for the following health issues:     Chronic Pain Follow-Up       Type / Location of Pain: rheumatoid arthritis pain, hands and wrist back    Analgesia/pain control:       Recent changes:  same      Overall control: Tolerable with discomfort  Activity level/function:      Daily activities:  Able to do moderate activities  Adverse effects:  No  Adherance    Taking medication as directed?  Yes    Participating in other treatments: yes  Risk Factors:    Sleep:  Good    Mood/anxiety:  Worsened she has been feeling a little depressed since she is stuck at home all the time, they are working on getting her in an assisted living home because mostly she just feels bored and isolated. Does get out with her daughter Tawnya, who takes her on errands and out to eat. She enjoys those activities. Cant really read easily anymore because of her macular degeneration    Recent family or social stressors:  none noted    Other aggravating factors: prolongued walking and standing   PHQ-9 SCORE 9/19/2016 9/12/2017 2/13/2018   PHQ-9 Total Score 7 7 6     NUNO-7 SCORE 9/19/2016 9/12/2017 1/7/2019   Total Score 0 2 0     Encounter-Level CSA:    There are no encounter-level csa.     Patient-Level CSA:    There are no patient-level csa.       Wt Readings from Last 5 Encounters:   10/21/19 55.1 kg (121 lb 8 oz)   07/25/19 52.4 kg (115 lb 8 oz)   04/08/19 48.1 kg (106 lb)   01/07/19 49.8 kg (109 lb 12 oz)   09/04/18 46.6 kg (102 lb 12 oz)        How many servings of fruits and vegetables do you eat daily?  1-2    On average, how many sweetened beverages do you drink each day (soda, juice, sweet tea, etc)?   1    How many days per week do you miss taking your medication? 0    From clinic visit on 7/25/2019:  \"  Chronic Pain Follow-Up       Type / Location of Pain: rheumatoid arthritis pain, hands and wrist back    Analgesia/pain control:       Recent changes:  " "same      Overall control: Tolerable with discomfort  Activity level/function:      Daily activities:  Able to do light housework, cooking  Adverse effects:  No  Adherance    Taking medication as directed?  Yes  Risk Factors:    Sleep:  Good    Mood/anxiety:  She feels slightly down every once in a while but it does not affect her being able to do her own thing, boredom     Recent family or social stressors:  none noted    Other aggravating factors: hurts more when she walks then it once did  PHQ-9 SCORE 9/19/2016 9/12/2017 2/13/2018   PHQ-9 Total Score 7 7 6     NUNO-7 SCORE 9/19/2016 9/12/2017 1/7/2019   Total Score 0 2 0     She is getting a spinal block tomorrow     From clinic visit on 4/8/2019:  \" Medication is working and she is not experiencing any side effects    She says that her blood pressure has been very good.  Her peripheral edema has improved and she was able to put on her dress shoes today.    Her mood has been good.  She is still getting out of the house with her daughter Tawnya.  States he says Celestina's mood has remained fairly upbeat.    She continues to have days when her arthritis pain is worse.    The thing that bothers her most is a recent diagnosis of macular degeneration.  She has been following with ophthalmology.  She left to read and this is been difficult for her.    She has been eating fairly well.  Has even gained a little weight since she went off Remeron.  Episodes of confusion have resolved since she stopped the Remeron as well.  Does note some mild memory loss, but that seems stable.    Her daughter Tawnya is helping her look for assisted living facilities.  She is pretty social and is looking forward to the possibility of living in a place where there are social events and prepared meals.\"    From 1/7/19:  \"She reports that her pain is under fairly good control with the gabapentin 300 mg 3 times a day and hydrocodone 3 times a day.  But the most bothersome pain is her radicular pain " "for which she has been following with Ortho and has been getting injection therapy.  She says she otherwise feels well.  She and her daughter states they have noticed that she has had some memory decline.  She does not have an advanced directive.  She says she would want to avoid CPR.  She prefers to allow a natural death.\"    From 9/4/18:   \"  Hypertension Follow-up      Outpatient blood pressures are not being checked.    Low Salt Diet: not monitoring salt    Chronic Kidney Disease Follow-up      Current NSAID use?  No      Amount of exercise or physical activity: None    Problems taking medications regularly: No    Medication side effects: none    Diet: regular (no restrictions)    (Remeron) She has been gaining weight which is good because daughter states that she needed to gain weight.   (Gabapentin) taking as prescribed     Celestina is here with her daughter Tawnya today.  Celestina says her right leg still continues to hurt.  However, it has improved since a couple of months ago.  She does think the gabapentin has been helpful, but could use more pain relief.  She will be seeing her spine surgeon next week.  She continues to ambulate with a walker and is limited in how far she can walk.  She would like to get back to going shopping with her daughter and getting out more often.  She has not tried any topical treatments for her pain.  She recently saw her rheumatologist and is back on her infusion therapy now that her decubitus ulcer has healed.  She appreciated the physical therapy she had in home, however, she does not currently wish to pursue outpatient physical therapy.  She feels her peripheral edema has improved.  Her appetite is improving.  She has been able to gain some weight.  She continues to take her iron supplement.  Overall she and her daughter states he feels like she is doing much better.\"    Problem list and histories reviewed & adjusted, as indicated.  Additional history: as documented    Patient " Active Problem List   Diagnosis     Osteopenia     Hypertension goal BP (blood pressure) < 140/90     Rheumatoid arthritis (H)     CKD (chronic kidney disease) stage 3, GFR 30-59 ml/min (H)     Hyperlipidemia with target LDL less than 100     Advanced directives, counseling/discussion     Osteoarthritis     Eczema     Peripheral edema     Hyponatremia with decreased serum osmolality     Chronic pain     Protein-calorie malnutrition (H)     Age-related osteoporosis without current pathological fracture     Past Surgical History:   Procedure Laterality Date     C APPENDECTOMY  1950     SURGICAL HISTORY OF -       2 normal vaginal births       Social History     Tobacco Use     Smoking status: Former Smoker     Packs/day: 0.50     Years: 20.00     Pack years: 10.00     Types: Cigarettes     Last attempt to quit: 2000     Years since quittin.0     Smokeless tobacco: Never Used   Substance Use Topics     Alcohol use: Yes     Comment: very occ,     Family History   Problem Relation Age of Onset     Heart Disease Mother         CHF     Respiratory Mother         lung disease     C.A.D. Sister         heart by-pass     Arthritis Sister      Family History Negative Brother      Family History Negative Daughter      Family History Negative Daughter          Current Outpatient Medications   Medication Sig Dispense Refill     acetaminophen (TYLENOL) 325 MG tablet Take 2 tablets (650 mg) by mouth 3 times daily 100 tablet 0     atenolol (TENORMIN) 25 MG tablet Take 3 tablets (75 mg) by mouth daily 270 tablet 3     atorvastatin (LIPITOR) 20 MG tablet Take 1 tablet (20 mg) by mouth daily 90 tablet 2     Ferrous Sulfate (IRON) 325 (65 Fe) MG tablet Take 1 tablet by mouth daily (with breakfast) 90 tablet 3     gabapentin (NEURONTIN) 300 MG capsule Take 1 capsule (300 mg) by mouth 3 times daily 270 capsule 3     HYDROcodone-acetaminophen (NORCO) 5-325 MG tablet Take 1-2 tablets by mouth every 6 hours as needed for moderate  to severe pain 140 tablet 0     HYDROcodone-acetaminophen (NORCO) 5-325 MG tablet Take 1-2 tablets by mouth every 6 hours as needed for moderate to severe pain 140 tablet 0     HYDROcodone-acetaminophen (NORCO) 5-325 MG tablet Take 1-2 tablets by mouth every 6 hours as needed for moderate to severe pain 140 tablet 0     hydroxychloroquine (PLAQUENIL) 200 MG tablet Take 200 mg by mouth daily   3     NIFEdipine ER OSMOTIC (PROCARDIA XL) 90 MG 24 hr tablet Take 1 tablet (90 mg) by mouth daily 30 tablet 0     senna-docusate (SENOKOT-S;PERICOLACE) 8.6-50 MG per tablet Take 2 tablets by mouth daily And 2 tabs daily prn       Allergies   Allergen Reactions     Hydrochlorothiazide      hyponatremia     Recent Labs   Lab Test 07/25/19  1046 01/17/19  1257 01/07/19  1236  06/18/18  0600  05/31/18  0704  05/21/18  1618  09/12/17  1236  08/18/16  1322   LDL 55  --   --   --   --   --   --   --   --   --  50  --  49     --   --   --   --   --   --   --   --   --  65  --  68   TRIG 78  --   --   --   --   --   --   --   --   --  89  --  85   ALT  --  36 99*  --   --   --  22  --   --    < > 31   < >  --    CR 0.96 1.30* 1.38*   < >  --    < > 0.67   < > 0.82   < > 1.60*   < > 1.66*   GFRESTIMATED 53* 37* 34*   < >  --    < > 84   < > 66   < > 31*   < > 29*   GFRESTBLACK 61 42* 40*   < >  --    < > >90   < > 80   < > 37*   < > 35*   POTASSIUM 4.9 4.2 4.1   < >  --    < > 4.6   < > 4.6   < > 4.2   < > 4.7   TSH  --   --   --   --  1.52  --   --   --  1.56  --   --   --   --     < > = values in this interval not displayed.      BP Readings from Last 3 Encounters:   10/21/19 130/60   07/25/19 121/59   04/08/19 129/65    Wt Readings from Last 3 Encounters:   10/21/19 55.1 kg (121 lb 8 oz)   07/25/19 52.4 kg (115 lb 8 oz)   04/08/19 48.1 kg (106 lb)              Reviewed and updated as needed this visit by clinical staff  Tobacco  Allergies  Meds       Reviewed and updated as needed this visit by Provider         ROS:  As  above     OBJECTIVE:     /60 (BP Location: Left arm, Patient Position: Sitting, Cuff Size: Adult Regular)   Pulse 67   Temp 98  F (36.7  C) (Oral)   Wt 55.1 kg (121 lb 8 oz)   SpO2 94%   BMI 22.96 kg/m    Body mass index is 22.96 kg/m .   Wt Readings from Last 5 Encounters:   10/21/19 55.1 kg (121 lb 8 oz)   07/25/19 52.4 kg (115 lb 8 oz)   04/08/19 48.1 kg (106 lb)   01/07/19 49.8 kg (109 lb 12 oz)   09/04/18 46.6 kg (102 lb 12 oz)      GENERAL: thin, elderly female, walks with walker  CV: rrr, no m/g/r  Lungs: ctab, no w/c/r    Diagnostic Test Results:  No results found for this or any previous visit (from the past 24 hour(s)).    ASSESSMENT/PLAN:          1. Hypertension goal BP (blood pressure) < 140/90  Controlled.  Continues on atenolol 75mg daily and nifedipine 90mg daily. No longer on the lisinopril due to lower blood pressure and history of hyponatremia.         2. CKD (chronic kidney disease) stage 3, GFR 30-59 ml/min  Stable. BMP today    3. Other chronic pain  Continues gabapentin to 300 mg 3 times a day.  She also follows with spine specialist and she has a spinal block scheduled   Refilled norco 5-325 1-2 tablets by mouth q6hrs prn pain #140 per month with refills q28 days. Limited to two refills today due to [place] prescribing law. Okay to send 3rd refill when due. Return for follow up in 3 months.     4. Peripheral edema  Improved.     5. Rheumatoid arthritis, involving unspecified site, unspecified rheumatoid factor presence (H)  Followed by rheumatology and doing well     6. Iron deficiency anemia, unspecified iron deficiency anemia type  Rechecked her hemoglobin previous visit and it was improved.  She will continue on iron supplement.     7. Osteoarthritis, unspecified osteoarthritis type, unspecified site   stable  Refilled norco as above       8. Hyperlipidemia with target LDL less than 100  Stable.      9.  Protein-calorie malnutrition, unspecified severity (H)  She is gaining weight.   She has been able to eat better as her pain is been under better control.  Doing well off remeron. She did have some confusion on remeron and that has resolved.    10. Osteoporosis  Followed by rheumatology. Had been on Reclast for 2 infusions but was overdue for her third.  He was going to check into funding for the infusion her visit with rheumatology in November.  She continues on calcium and vitamin D     11. Declining functional status due to age and chronic diseases.   Depending on her daughter for more help over time, which is becoming more difficult for her daughter. I encouraged her to consider transition to assisted living. She is reluctant to move out of her home where she has lived for many years, but does see the benefit in transitioning to assisted living.     12. Cough  Chronic cough for many years. Brought up by me today. She was a smoker for decades from age 16 to twenty-some years ago. Smoked maybe 1/2 ppd for years. Denies any shortness of breath or wheezing or increase in cough over time. Denies frequent colds or bronchitis. Just has longstanding productive cough. Previous CXRs have shown some hyperinflation. Suspect COPD. She is not interested in further evaluation.     She will follow up with me in 3 months.          Majo Alfaro M.D.        [facility] [place]

## 2019-10-23 DIAGNOSIS — R63.0 POOR APPETITE: ICD-10-CM

## 2019-10-23 DIAGNOSIS — M19.90 OSTEOARTHRITIS, UNSPECIFIED OSTEOARTHRITIS TYPE, UNSPECIFIED SITE: ICD-10-CM

## 2019-10-23 DIAGNOSIS — I10 HYPERTENSION GOAL BP (BLOOD PRESSURE) < 140/90: ICD-10-CM

## 2019-10-23 NOTE — TELEPHONE ENCOUNTER
"Requested Prescriptions   Pending Prescriptions Disp Refills     NIFEdipine ER OSMOTIC (PROCARDIA XL) 90 MG 24 hr tablet [Pharmacy Med Name: NIFEDIPINE 90MG ER (XL/OSM) TABLET] 90 tablet 0     Sig: TAKE 1 TABLET BY MOUTH DAILY  Last Written Prescription Date:  9/3/2019  Last Fill Quantity: 30 tablet,  # refills: 0   Last Office Visit: 10/21/2019   Future Office Visit:    Next 5 appointments (look out 90 days)    Jan 21, 2020 11:20 AM CST  SHORT with Majo Alfaro MD  [facility] ([facility]) [address] [place] [id number] [phone]              Calcium Channel Blockers Protocol  Passed - 10/23/2019  8:06 AM        Passed - Blood pressure under 140/90 in past 12 months     BP Readings from Last 3 Encounters:   10/21/19 130/60   07/25/19 121/59   04/08/19 129/65           Passed - Recent (12 mo) or future (30 days) visit within the authorizing provider's specialty     Patient has had an office visit with the authorizing provider or a provider within the authorizing providers department within the previous 12 mos or has a future within next 30 days. See \"Patient Info\" tab in inbasket, or \"Choose Columns\" in Meds & Orders section of the refill encounter.            Passed - Medication is active on med list        Passed - Patient is age 18 or older        Passed - No active pregnancy on record        Passed - Normal serum creatinine on file in past 12 months     Recent Labs   Lab Test 07/25/19  1046   CR 0.96           Passed - No positive pregnancy test in past 12 months     _________________________________________________________________       mirtazapine (REMERON) 15 MG tablet [Pharmacy Med Name: MIRTAZAPINE 15MG TABLETS] 60 tablet 0     Sig: TAKE 2 TABLETS(30 MG) BY MOUTH AT BEDTIME  Not on current med list  Last Office Visit: 10/21/2019   Future Office Visit:    Next 5 appointments (look out 90 days)    Jan 21, 2020 11:20 AM CST  SHORT with Majo Alfaro, " "MD  [facility] ([facility]) [address] [place] [id number] [phone]              Atypical Antidepressants Protocol Failed - 10/23/2019  8:06 AM        Failed - Medication active on med list        Passed - Recent (12 mo) or future (30 days) visit within the authorizing provider's specialty     Patient has had an office visit with the authorizing provider or a provider within the authorizing providers department within the previous 12 mos or has a future within next 30 days. See \"Patient Info\" tab in inbasket, or \"Choose Columns\" in Meds & Orders section of the refill encounter.            Passed - Patient is age 18 or older        Passed - No active pregnancy on record        Passed - No positive pregnancy test in past 12 mos     _________________________________________________________________                                                                                                 _________________________________________________________________       GABAPENTIN 100MG CAPSULES  Last Written Prescription Date:  12/3/2018  Last Fill Quantity: 270 capsule,   # refills: 3  Last Office Visit: 10/21/2019   Future Office visit:    Next 5 appointments (look out 90 days)    Jan 21, 2020 11:20 AM CST  SHORT [facility]  [facility] ([facility] [address] [address] [phone]           Routing refill request to provider for review/approval because:  Drug not on the [facility], [facility] or  Health refill protocol or controlled substance\                        "

## 2019-10-24 RX ORDER — NIFEDIPINE 90 MG/1
TABLET, EXTENDED RELEASE ORAL
Qty: 90 TABLET | Refills: 0 | Status: SHIPPED | OUTPATIENT
Start: 2019-10-24 | End: 2020-01-21

## 2019-10-24 RX ORDER — NIFEDIPINE 90 MG/1
TABLET, EXTENDED RELEASE ORAL
Qty: 30 TABLET | Refills: 0 | OUTPATIENT
Start: 2019-10-24

## 2019-10-24 NOTE — TELEPHONE ENCOUNTER
Team coordinators-Please clarify with patient if she requested refill of Mirtazapine?  Medication not currently active on med list.    Nifedipine refilled according to refill protocol.    Thank you!  ALAN RoseN, RN

## 2019-10-25 NOTE — TELEPHONE ENCOUNTER
Spoke to patient and she states her daughter takes care of her medications so will have her daughter check and or call us back

## 2019-11-04 DIAGNOSIS — G89.29 OTHER CHRONIC PAIN: ICD-10-CM

## 2019-11-04 NOTE — TELEPHONE ENCOUNTER
Mirtazapine 15mg tablet    gabapentin (NEURONTIN) 300 MG capsule      Last Written Prescription Date:  12/3/2018  Last Fill Quantity: 270 capsule,   # refills: 3  Last Office Visit:10/21/2019  Future Office visit:    Next 5 appointments (look out 90 days)    Jan 21, 2020 11:20 AM CST  SHORT with Majo Alfaro MD  [facility] ([facility]) [address] [place] [id number] [phone]           Routing refill request to provider for review/approval because:  Drug not on the [facility], [facility] or [facility] refill protocol or controlled substance

## 2019-11-05 ENCOUNTER — TRANSFERRED RECORDS (OUTPATIENT)
Dept: HEALTH INFORMATION MANAGEMENT | Facility: CLINIC | Age: 84
End: 2019-11-05

## 2019-11-05 RX ORDER — GABAPENTIN 300 MG/1
300 CAPSULE ORAL 3 TIMES DAILY
Qty: 270 CAPSULE | Refills: 3 | Status: SHIPPED | OUTPATIENT
Start: 2019-11-05 | End: 2020-06-29

## 2019-11-13 RX ORDER — GABAPENTIN 100 MG/1
CAPSULE ORAL
Qty: 90 CAPSULE | Refills: 0 | OUTPATIENT
Start: 2019-11-13

## 2019-11-13 RX ORDER — MIRTAZAPINE 15 MG/1
TABLET, FILM COATED ORAL
Qty: 60 TABLET | Refills: 0 | OUTPATIENT
Start: 2019-11-13

## 2019-11-13 NOTE — TELEPHONE ENCOUNTER
Medication refused.    Closing encounter, no further action required at this time    Thank You!  Verna Paiz, RN  Triage Nurse

## 2019-12-15 DIAGNOSIS — E78.5 HYPERLIPIDEMIA WITH TARGET LDL LESS THAN 100: ICD-10-CM

## 2019-12-15 RX ORDER — ATORVASTATIN CALCIUM 20 MG/1
TABLET, FILM COATED ORAL
Qty: 90 TABLET | Refills: 2 | Status: SHIPPED | OUTPATIENT
Start: 2019-12-15 | End: 2020-01-01

## 2019-12-15 NOTE — TELEPHONE ENCOUNTER
"Requested Prescriptions   Pending Prescriptions Disp Refills     atorvastatin (LIPITOR) 20 MG tablet [Pharmacy Med Name: ATORVASTATIN 20MG TABLETS] 90 tablet 0     Sig: TAKE 1 TABLET(20 MG) BY MOUTH DAILY       Statins Protocol Passed - 12/15/2019 11:40 AM        Passed - LDL on file in past 12 months     Recent Labs   Lab Test 07/25/19  1046   LDL 55             Passed - No abnormal creatine kinase in past 12 months     No lab results found.             Passed - Recent (12 mo) or future (30 days) visit within the authorizing provider's specialty     Patient has had an office visit with the authorizing provider or a provider within the authorizing providers department within the previous 12 mos or has a future within next 30 days. See \"Patient Info\" tab in inbasket, or \"Choose Columns\" in Meds & Orders section of the refill encounter.              Passed - Medication is active on med list        Passed - Patient is age 18 or older        Passed - No active pregnancy on record        Passed - No positive pregnancy test in past 12 months        Signed Prescriptions:                        Disp   Refills    atorvastatin (LIPITOR) 20 MG tablet        90 tab*2        Sig: TAKE 1 TABLET(20 MG) BY MOUTH DAILY  Authorizing Provider: PRAMOD WONG  Ordering User: TONY ACE      "

## 2019-12-16 DIAGNOSIS — G89.29 OTHER CHRONIC PAIN: ICD-10-CM

## 2019-12-16 DIAGNOSIS — M06.9 RHEUMATOID ARTHRITIS, INVOLVING UNSPECIFIED SITE, UNSPECIFIED RHEUMATOID FACTOR PRESENCE: ICD-10-CM

## 2019-12-16 DIAGNOSIS — M19.90 OSTEOARTHRITIS, UNSPECIFIED OSTEOARTHRITIS TYPE, UNSPECIFIED SITE: ICD-10-CM

## 2019-12-16 RX ORDER — HYDROCODONE BITARTRATE AND ACETAMINOPHEN 5; 325 MG/1; MG/1
1-2 TABLET ORAL EVERY 6 HOURS PRN
Qty: 140 TABLET | Refills: 0 | Status: SHIPPED | OUTPATIENT
Start: 2019-12-16 | End: 2020-01-21

## 2019-12-16 NOTE — TELEPHONE ENCOUNTER
10/21/19 OV charting-  3. Other chronic pain  Continues gabapentin to 300 mg 3 times a day.  She also follows with spine specialist and she has a spinal block scheduled   Refilled norco 5-325 1-2 tablets by mouth q6hrs prn pain #140 per month with refills q28 days. Limited to two refills today due to [place] prescribing law. Okay to send 3rd refill when due. Return for follow up in 3 months.       Looks pcp was going to fill the 3rd monthly rx.  DONTE Charles

## 2019-12-23 ENCOUNTER — TELEPHONE (OUTPATIENT)
Dept: FAMILY MEDICINE | Facility: CLINIC | Age: 84
End: 2019-12-23

## 2019-12-23 NOTE — TELEPHONE ENCOUNTER
Prior Authorization Retail Medication Request    Medication/Dose: gabapentin (NEURONTIN) 300 MG capsule  ICD code (if different than what is on RX):  Previously Tried and Failed:  Rationale:    Insurance Name: unknown  Insurance ID: [id number]    Pharmacy Information (if different than what is on RX)  Name: [facility]  Phone: [phone]    Please include previous medications tried and failed.  Please ask insurance for medications on formulary.

## 2019-12-26 NOTE — TELEPHONE ENCOUNTER
Prior Authorization Not Needed per Insurance    Medication: gabapentin (NEURONTIN) 300 MG capsule  Insurance Company: [employer] ([facility]) - Phone [phone] Fax [phone]  Expected CoPay:      Pharmacy Filling the Rx: [id number] - [place], [place] - [address] AT [facility] [address]  Pharmacy Notified: Yes  Patient Notified: Yes **Instructed pharmacy to notify patient when script is ready to /ship.**

## 2019-12-26 NOTE — TELEPHONE ENCOUNTER
Central Prior Authorization Team   Phone: [phone]      PA Initiation    Medication: gabapentin (NEURONTIN) 300 MG capsule  Insurance Company: [employer] ([facility]) - Phone [phone] Fax [phone]  Pharmacy Filling the Rx: [id number] - [place], [place] - [address] AT [facility] [address]  Filling Pharmacy Phone: [phone]  Filling Pharmacy Fax:    Start Date: 12/26/2019

## 2020-01-01 ENCOUNTER — ASSISTED LIVING VISIT (OUTPATIENT)
Dept: GERIATRICS | Facility: CLINIC | Age: 85
End: 2020-01-01
Payer: COMMERCIAL

## 2020-01-01 ENCOUNTER — TRANSFERRED RECORDS (OUTPATIENT)
Dept: HEALTH INFORMATION MANAGEMENT | Facility: CLINIC | Age: 85
End: 2020-01-01

## 2020-01-01 ENCOUNTER — TELEPHONE (OUTPATIENT)
Dept: FAMILY MEDICINE | Facility: CLINIC | Age: 85
End: 2020-01-01

## 2020-01-01 ENCOUNTER — OFFICE VISIT (OUTPATIENT)
Dept: FAMILY MEDICINE | Facility: CLINIC | Age: 85
End: 2020-01-01
Payer: COMMERCIAL

## 2020-01-01 ENCOUNTER — DOCUMENTATION ONLY (OUTPATIENT)
Dept: OTHER | Facility: CLINIC | Age: 85
End: 2020-01-01

## 2020-01-01 ENCOUNTER — DOCUMENTATION ONLY (OUTPATIENT)
Dept: FAMILY MEDICINE | Facility: CLINIC | Age: 85
End: 2020-01-01

## 2020-01-01 ENCOUNTER — VIRTUAL VISIT (OUTPATIENT)
Dept: FAMILY MEDICINE | Facility: CLINIC | Age: 85
End: 2020-01-01
Payer: COMMERCIAL

## 2020-01-01 ENCOUNTER — MYC REFILL (OUTPATIENT)
Dept: FAMILY MEDICINE | Facility: CLINIC | Age: 85
End: 2020-01-01

## 2020-01-01 VITALS
DIASTOLIC BLOOD PRESSURE: 52 MMHG | TEMPERATURE: 96.5 F | HEART RATE: 59 BPM | SYSTOLIC BLOOD PRESSURE: 108 MMHG | OXYGEN SATURATION: 98 % | BODY MASS INDEX: 19.73 KG/M2 | WEIGHT: 111.4 LBS

## 2020-01-01 VITALS
WEIGHT: 111 LBS | HEART RATE: 66 BPM | OXYGEN SATURATION: 92 % | SYSTOLIC BLOOD PRESSURE: 98 MMHG | BODY MASS INDEX: 19.66 KG/M2 | DIASTOLIC BLOOD PRESSURE: 59 MMHG | RESPIRATION RATE: 16 BRPM

## 2020-01-01 VITALS
WEIGHT: 111 LBS | HEART RATE: 66 BPM | SYSTOLIC BLOOD PRESSURE: 98 MMHG | DIASTOLIC BLOOD PRESSURE: 59 MMHG | BODY MASS INDEX: 19.66 KG/M2 | RESPIRATION RATE: 16 BRPM

## 2020-01-01 DIAGNOSIS — D69.6 THROMBOCYTOPENIA (H): ICD-10-CM

## 2020-01-01 DIAGNOSIS — I10 HYPERTENSION GOAL BP (BLOOD PRESSURE) < 140/90: ICD-10-CM

## 2020-01-01 DIAGNOSIS — K82.0 OBSTRUCTION OF CYSTIC DUCT: ICD-10-CM

## 2020-01-01 DIAGNOSIS — D64.9 ANEMIA, UNSPECIFIED TYPE: ICD-10-CM

## 2020-01-01 DIAGNOSIS — E46 MALNUTRITION, UNSPECIFIED TYPE (H): ICD-10-CM

## 2020-01-01 DIAGNOSIS — E78.5 HYPERLIPIDEMIA, UNSPECIFIED HYPERLIPIDEMIA TYPE: ICD-10-CM

## 2020-01-01 DIAGNOSIS — R53.81 PHYSICAL DECONDITIONING: ICD-10-CM

## 2020-01-01 DIAGNOSIS — F43.23 ADJUSTMENT DISORDER WITH MIXED ANXIETY AND DEPRESSED MOOD: ICD-10-CM

## 2020-01-01 DIAGNOSIS — R60.9 EDEMA, UNSPECIFIED TYPE: ICD-10-CM

## 2020-01-01 DIAGNOSIS — I10 HYPERTENSION GOAL BP (BLOOD PRESSURE) < 140/90: Primary | ICD-10-CM

## 2020-01-01 DIAGNOSIS — E46 PROTEIN-CALORIE MALNUTRITION, UNSPECIFIED SEVERITY (H): ICD-10-CM

## 2020-01-01 DIAGNOSIS — M19.90 OSTEOARTHRITIS, UNSPECIFIED OSTEOARTHRITIS TYPE, UNSPECIFIED SITE: ICD-10-CM

## 2020-01-01 DIAGNOSIS — F03.90 DEMENTIA WITHOUT BEHAVIORAL DISTURBANCE, UNSPECIFIED DEMENTIA TYPE: ICD-10-CM

## 2020-01-01 DIAGNOSIS — N18.30 STAGE 3 CHRONIC KIDNEY DISEASE, UNSPECIFIED WHETHER STAGE 3A OR 3B CKD (H): ICD-10-CM

## 2020-01-01 DIAGNOSIS — M81.0 AGE-RELATED OSTEOPOROSIS WITHOUT CURRENT PATHOLOGICAL FRACTURE: ICD-10-CM

## 2020-01-01 DIAGNOSIS — R05.3 CHRONIC COUGH: ICD-10-CM

## 2020-01-01 DIAGNOSIS — M06.9 RHEUMATOID ARTHRITIS, INVOLVING UNSPECIFIED SITE, UNSPECIFIED WHETHER RHEUMATOID FACTOR PRESENT (H): ICD-10-CM

## 2020-01-01 DIAGNOSIS — G89.29 OTHER CHRONIC PAIN: ICD-10-CM

## 2020-01-01 DIAGNOSIS — E55.9 VITAMIN D DEFICIENCY: Primary | ICD-10-CM

## 2020-01-01 DIAGNOSIS — K59.00 CONSTIPATION, UNSPECIFIED CONSTIPATION TYPE: Primary | ICD-10-CM

## 2020-01-01 DIAGNOSIS — E78.5 HYPERLIPIDEMIA WITH TARGET LDL LESS THAN 100: ICD-10-CM

## 2020-01-01 DIAGNOSIS — M05.741 RHEUMATOID ARTHRITIS INVOLVING BOTH HANDS WITH POSITIVE RHEUMATOID FACTOR (H): ICD-10-CM

## 2020-01-01 DIAGNOSIS — R41.3 MEMORY CHANGE: ICD-10-CM

## 2020-01-01 DIAGNOSIS — F43.22 ADJUSTMENT DISORDER WITH ANXIOUS MOOD: ICD-10-CM

## 2020-01-01 DIAGNOSIS — M81.0 OSTEOPOROSIS WITHOUT CURRENT PATHOLOGICAL FRACTURE, UNSPECIFIED OSTEOPOROSIS TYPE: ICD-10-CM

## 2020-01-01 DIAGNOSIS — R41.89 COGNITIVE IMPAIRMENT: ICD-10-CM

## 2020-01-01 DIAGNOSIS — M05.742 RHEUMATOID ARTHRITIS INVOLVING BOTH HANDS WITH POSITIVE RHEUMATOID FACTOR (H): ICD-10-CM

## 2020-01-01 DIAGNOSIS — E55.9 VITAMIN D DEFICIENCY: ICD-10-CM

## 2020-01-01 DIAGNOSIS — G89.4 CHRONIC PAIN SYNDROME: ICD-10-CM

## 2020-01-01 DIAGNOSIS — R41.3 MEMORY CHANGE: Primary | ICD-10-CM

## 2020-01-01 DIAGNOSIS — F43.23 ADJUSTMENT DISORDER WITH MIXED ANXIETY AND DEPRESSED MOOD: Primary | ICD-10-CM

## 2020-01-01 DIAGNOSIS — D50.9 IRON DEFICIENCY ANEMIA, UNSPECIFIED IRON DEFICIENCY ANEMIA TYPE: ICD-10-CM

## 2020-01-01 DIAGNOSIS — F43.23 ADJUSTMENT REACTION WITH ANXIETY AND DEPRESSION: Primary | ICD-10-CM

## 2020-01-01 DIAGNOSIS — M06.9 RHEUMATOID ARTHRITIS, INVOLVING UNSPECIFIED SITE, UNSPECIFIED RHEUMATOID FACTOR PRESENCE: ICD-10-CM

## 2020-01-01 LAB
ALBUMIN SERPL-MCNC: 2.9 G/DL (ref 3.5–5)
ALP SERPL-CCNC: 427 U/L (ref 45–120)
ALT SERPL-CCNC: 136 IU/L (ref 5–35)
ALT SERPL-CCNC: 76 U/L (ref 0–45)
ANION GAP SERPL CALCULATED.3IONS-SCNC: 12 MMOL/L (ref 5–18)
ANION GAP SERPL CALCULATED.3IONS-SCNC: 6 MMOL/L (ref 5–18)
AST SERPL-CCNC: 119 U/L (ref 5–34)
AST SERPL-CCNC: 83 U/L (ref 0–40)
BILIRUB SERPL-MCNC: 0.7 MG/DL (ref 0–1)
BUN SERPL-MCNC: 15 MG/DL (ref 8–28)
BUN SERPL-MCNC: 16 MG/DL (ref 8–28)
CALCIUM SERPL-MCNC: 8.4 MG/DL (ref 8.5–10.5)
CALCIUM SERPL-MCNC: 9 MG/DL (ref 8.5–10.5)
CHLORIDE SERPLBLD-SCNC: 101 MMOL/L (ref 98–107)
CHLORIDE SERPLBLD-SCNC: 99 MMOL/L (ref 98–107)
CHOLEST SERPL-MCNC: 99 MG/DL
CO2 SERPL-SCNC: 24 MMOL/L (ref 22–31)
CO2 SERPL-SCNC: 27 MMOL/L (ref 22–31)
CREAT SERPL-MCNC: 0.84 MG/DL (ref 0.6–1.1)
CREAT SERPL-MCNC: 0.99 MG/DL (ref 0.6–1.1)
DIFFERENTIAL: ABNORMAL
ERYTHROCYTE [DISTWIDTH] IN BLOOD BY AUTOMATED COUNT: 13 % (ref 11–14.5)
ERYTHROCYTE [DISTWIDTH] IN BLOOD BY AUTOMATED COUNT: 14.3 % (ref 11–14.5)
GFR SERPL CREATININE-BSD FRML MDRD: 53 ML/MIN/1.73M2
GFR SERPL CREATININE-BSD FRML MDRD: >60 ML/MIN/1.73M2
GLUCOSE SERPL-MCNC: 65 MG/DL (ref 70–125)
GLUCOSE SERPL-MCNC: 71 MG/DL (ref 70–125)
HCT VFR BLD AUTO: 33.5 % (ref 35–47)
HCT VFR BLD AUTO: 39.4 % (ref 35–47)
HDLC SERPL-MCNC: 45 MG/DL
HEMOGLOBIN: 10.8 G/DL (ref 12–16)
HEMOGLOBIN: 12.2 G/DL (ref 12–16)
LDLC SERPL CALC-MCNC: 40 MG/DL
MCH RBC QN AUTO: 29.9 PG (ref 27–34)
MCH RBC QN AUTO: 30.1 PG (ref 27–34)
MCHC RBC AUTO-ENTMCNC: 31 G/DL (ref 32–36)
MCHC RBC AUTO-ENTMCNC: 32.2 G/DL (ref 32–36)
MCV RBC AUTO: 93 FL (ref 80–100)
MCV RBC AUTO: 97 FL (ref 80–100)
PLATELET # BLD AUTO: 163 THOU/UL (ref 140–440)
PLATELET # BLD AUTO: 236 THOU/UL (ref 140–440)
POTASSIUM SERPL-SCNC: 3.7 MMOL/L (ref 3.5–5)
POTASSIUM SERPL-SCNC: 4.4 MMOL/L (ref 3.5–5)
PROT SERPL-MCNC: 5.8 G/DL (ref 6–8)
RBC # BLD AUTO: 3.61 MILL/UL (ref 3.8–5.4)
RBC # BLD AUTO: 4.05 MILL/UL (ref 3.8–5.4)
SODIUM SERPL-SCNC: 132 MMOL/L (ref 136–145)
SODIUM SERPL-SCNC: 137 MMOL/L (ref 136–145)
TRIGL SERPL-MCNC: 71 MG/DL
TSH SERPL-ACNC: 1.55 UIU/ML (ref 0.3–5)
WBC # BLD AUTO: 7.2 THOU/UL (ref 4–11)
WBC # BLD AUTO: 8.3 THOU/UL (ref 4–11)

## 2020-01-01 PROCEDURE — 99442 PR PHYSICIAN TELEPHONE EVALUATION 11-20 MIN: CPT | Performed by: FAMILY MEDICINE

## 2020-01-01 PROCEDURE — 99207 PR CDG-MDM COMPONENT: MEETS MODERATE - DOWN CODED: CPT | Performed by: NURSE PRACTITIONER

## 2020-01-01 PROCEDURE — 99214 OFFICE O/P EST MOD 30 MIN: CPT | Performed by: FAMILY MEDICINE

## 2020-01-01 RX ORDER — ESCITALOPRAM OXALATE 5 MG/1
10 TABLET ORAL DAILY
Qty: 60 TABLET | Refills: 11 | Status: SHIPPED | OUTPATIENT
Start: 2020-01-01 | End: 2020-01-01

## 2020-01-01 RX ORDER — LACTOSE-REDUCED FOOD
1 LIQUID (ML) ORAL 2 TIMES DAILY
Status: ON HOLD
Start: 2020-01-01 | End: 2021-01-01

## 2020-01-01 RX ORDER — ESCITALOPRAM OXALATE 10 MG/1
10 TABLET ORAL DAILY
Qty: 30 TABLET | Refills: 1 | Status: SHIPPED | OUTPATIENT
Start: 2020-01-01 | End: 2020-01-01

## 2020-01-01 RX ORDER — ATORVASTATIN CALCIUM 20 MG/1
20 TABLET, FILM COATED ORAL DAILY
Qty: 90 TABLET | Refills: 3 | Status: CANCELLED | OUTPATIENT
Start: 2020-01-01

## 2020-01-01 RX ORDER — MULTIPLE VITAMINS W/ MINERALS TAB 9MG-400MCG
1 TAB ORAL DAILY
Qty: 60 TABLET | Refills: 11 | Status: SHIPPED | OUTPATIENT
Start: 2020-01-01

## 2020-01-01 RX ORDER — HYDROCODONE BITARTRATE AND ACETAMINOPHEN 5; 325 MG/1; MG/1
1-2 TABLET ORAL EVERY 6 HOURS PRN
Qty: 140 TABLET | Refills: 0 | Status: SHIPPED | OUTPATIENT
Start: 2020-01-01 | End: 2020-01-01

## 2020-01-01 RX ORDER — ACETAMINOPHEN 325 MG/1
650 TABLET ORAL 2 TIMES DAILY
Qty: 240 TABLET | Refills: 11 | Status: ON HOLD | OUTPATIENT
Start: 2020-01-01 | End: 2021-01-01

## 2020-01-01 RX ORDER — AMOXICILLIN 250 MG
2 CAPSULE ORAL DAILY PRN
Qty: 180 TABLET | Refills: 1 | Status: SHIPPED | OUTPATIENT
Start: 2020-01-01 | End: 2020-01-01

## 2020-01-01 RX ORDER — AMOXICILLIN 250 MG
1 CAPSULE ORAL 2 TIMES DAILY PRN
Qty: 180 TABLET | Refills: 1 | Status: ON HOLD | OUTPATIENT
Start: 2020-01-01 | End: 2021-01-01

## 2020-01-01 RX ORDER — ESCITALOPRAM OXALATE 5 MG/1
10 TABLET ORAL DAILY
Qty: 60 TABLET | Refills: 11 | Status: SHIPPED | OUTPATIENT
Start: 2020-01-01

## 2020-01-01 RX ORDER — ESCITALOPRAM OXALATE 5 MG/1
5 TABLET ORAL DAILY
Qty: 90 TABLET | Refills: 0 | Status: SHIPPED | OUTPATIENT
Start: 2020-01-01 | End: 2020-01-01

## 2020-01-01 RX ORDER — ESCITALOPRAM OXALATE 5 MG/1
5 TABLET ORAL DAILY
COMMUNITY
End: 2020-01-01

## 2020-01-01 RX ORDER — NIFEDIPINE 30 MG
30 TABLET, EXTENDED RELEASE ORAL DAILY
Qty: 30 TABLET | Refills: 11 | Status: ON HOLD | OUTPATIENT
Start: 2020-01-01 | End: 2021-01-01

## 2020-01-01 RX ORDER — PNV NO.95/FERROUS FUM/FOLIC AC 28MG-0.8MG
1 TABLET ORAL
Qty: 90 TABLET | Refills: 3 | Status: SHIPPED | OUTPATIENT
Start: 2020-01-01 | End: 2021-01-01

## 2020-01-01 RX ORDER — ACETAMINOPHEN 500 MG
500-1000 TABLET ORAL 4 TIMES DAILY PRN
Status: ON HOLD | COMMUNITY
End: 2021-01-01

## 2020-01-01 RX ORDER — ATENOLOL 25 MG/1
75 TABLET ORAL DAILY
Qty: 270 TABLET | Refills: 3
Start: 2020-01-01 | End: 2020-01-01

## 2020-01-01 RX ORDER — HYDROCODONE BITARTRATE AND ACETAMINOPHEN 5; 325 MG/1; MG/1
1 TABLET ORAL EVERY 6 HOURS PRN
Qty: 90 TABLET | Refills: 0 | Status: ON HOLD | OUTPATIENT
Start: 2020-01-01 | End: 2021-01-01

## 2020-01-01 RX ORDER — CHOLECALCIFEROL (VITAMIN D3) 50 MCG
1 TABLET ORAL DAILY
Qty: 30 TABLET | Refills: 11 | Status: SHIPPED | OUTPATIENT
Start: 2020-01-01

## 2020-01-01 ASSESSMENT — PATIENT HEALTH QUESTIONNAIRE - PHQ9
SUM OF ALL RESPONSES TO PHQ QUESTIONS 1-9: 11
10. IF YOU CHECKED OFF ANY PROBLEMS, HOW DIFFICULT HAVE THESE PROBLEMS MADE IT FOR YOU TO DO YOUR WORK, TAKE CARE OF THINGS AT HOME, OR GET ALONG WITH OTHER PEOPLE: SOMEWHAT DIFFICULT
SUM OF ALL RESPONSES TO PHQ QUESTIONS 1-9: 11
SUM OF ALL RESPONSES TO PHQ QUESTIONS 1-9: 17
SUM OF ALL RESPONSES TO PHQ QUESTIONS 1-9: 17
10. IF YOU CHECKED OFF ANY PROBLEMS, HOW DIFFICULT HAVE THESE PROBLEMS MADE IT FOR YOU TO DO YOUR WORK, TAKE CARE OF THINGS AT HOME, OR GET ALONG WITH OTHER PEOPLE: VERY DIFFICULT
SUM OF ALL RESPONSES TO PHQ QUESTIONS 1-9: 11
SUM OF ALL RESPONSES TO PHQ QUESTIONS 1-9: 17

## 2020-01-01 ASSESSMENT — ANXIETY QUESTIONNAIRES
3. WORRYING TOO MUCH ABOUT DIFFERENT THINGS: SEVERAL DAYS
2. NOT BEING ABLE TO STOP OR CONTROL WORRYING: MORE THAN HALF THE DAYS
4. TROUBLE RELAXING: MORE THAN HALF THE DAYS
7. FEELING AFRAID AS IF SOMETHING AWFUL MIGHT HAPPEN: NEARLY EVERY DAY
7. FEELING AFRAID AS IF SOMETHING AWFUL MIGHT HAPPEN: NEARLY EVERY DAY
6. BECOMING EASILY ANNOYED OR IRRITABLE: SEVERAL DAYS
1. FEELING NERVOUS, ANXIOUS, OR ON EDGE: MORE THAN HALF THE DAYS
GAD7 TOTAL SCORE: 8
GAD7 TOTAL SCORE: 13
2. NOT BEING ABLE TO STOP OR CONTROL WORRYING: SEVERAL DAYS
5. BEING SO RESTLESS THAT IT IS HARD TO SIT STILL: NOT AT ALL
5. BEING SO RESTLESS THAT IT IS HARD TO SIT STILL: NOT AT ALL
GAD7 TOTAL SCORE: 13
GAD7 TOTAL SCORE: 8
7. FEELING AFRAID AS IF SOMETHING AWFUL MIGHT HAPPEN: NEARLY EVERY DAY
7. FEELING AFRAID AS IF SOMETHING AWFUL MIGHT HAPPEN: NEARLY EVERY DAY
1. FEELING NERVOUS, ANXIOUS, OR ON EDGE: SEVERAL DAYS
3. WORRYING TOO MUCH ABOUT DIFFERENT THINGS: MORE THAN HALF THE DAYS
4. TROUBLE RELAXING: SEVERAL DAYS
6. BECOMING EASILY ANNOYED OR IRRITABLE: MORE THAN HALF THE DAYS
GAD7 TOTAL SCORE: 13
GAD7 TOTAL SCORE: 8
GAD7 TOTAL SCORE: 8
GAD7 TOTAL SCORE: 13

## 2020-01-17 DIAGNOSIS — I10 HYPERTENSION GOAL BP (BLOOD PRESSURE) < 140/90: ICD-10-CM

## 2020-01-17 NOTE — TELEPHONE ENCOUNTER
"Requested Prescriptions   Pending Prescriptions Disp Refills     NIFEdipine ER OSMOTIC (PROCARDIA XL) 90 MG 24 hr tablet [Pharmacy Med Name: NIFEDIPINE 90MG ER (XL/OSM) TABLET]  Last Written Prescription Date:  10/24/2019  Last Fill Quantity: 90 tabs,  # refills: 0   Last office visit: 10/21/2019 with prescribing provider:  House   Future Office Visit:   Next 5 appointments (look out 90 days)    Jan 21, 2020 11:20 AM CST  SHORT with Majo Alfaro MD  [facility] ([facility]) [address] [place] [id number] [phone]        90 tablet 0     Sig: TAKE 1 TABLET(90 MG) BY MOUTH DAILY       Calcium Channel Blockers Protocol  Passed - 1/17/2020  4:28 PM        Passed - Blood pressure under 140/90 in past 12 months     BP Readings from Last 3 Encounters:   10/21/19 130/60   07/25/19 121/59   04/08/19 129/65                 Passed - Recent (12 mo) or future (30 days) visit within the authorizing provider's specialty     Patient has had an office visit with the authorizing provider or a provider within the authorizing providers department within the previous 12 mos or has a future within next 30 days. See \"Patient Info\" tab in inbasket, or \"Choose Columns\" in Meds & Orders section of the refill encounter.              Passed - Medication is active on med list        Passed - Patient is age 18 or older        Passed - No active pregnancy on record        Passed - Normal serum creatinine on file in past 12 months     Recent Labs   Lab Test 07/25/19  1046   CR 0.96             Passed - No positive pregnancy test in past 12 months         "

## 2020-01-20 DIAGNOSIS — E78.5 HYPERLIPIDEMIA WITH TARGET LDL LESS THAN 100: ICD-10-CM

## 2020-01-20 NOTE — PROGRESS NOTES
"  SUBJECTIVE:   Celestina Tejada is a 87 year old female who presents to clinic today for the following health issues:     Hypertension Follow-up      Do you check your blood pressure regularly outside of the clinic? No     Are you following a low salt diet? Yes    Are your blood pressures ever more than 140 on the top number (systolic) OR more   than 90 on the bottom number (diastolic), for example 140/90? No    Chronic Kidney Disease Follow-up      Do you take any over the counter pain medicine?: No    Chronic Pain Follow-Up       Type / Location of Pain: rheumatoid arthritis pain, hands and wrist back    Analgesia/pain control:       Recent changes:  same      Overall control: Tolerable with discomfort and the medication is working but she is still experiencing some pain     She was offered an apartment at [facility] twice now, but declined.  Her daughter Tawnya continues to give her significant amount of help.  They have decided together that the next time she is offered a place she will accept.  She is looking forward to having more activities as she is bored at home.  She also does not like to cook for herself and appreciates the possibility of having a meal plan at assisted living.    From clinic visit on 10/21/19:  \"  Chronic Pain Follow-Up       Type / Location of Pain: rheumatoid arthritis pain, hands and wrist back    Analgesia/pain control:       Recent changes:  same      Overall control: Tolerable with discomfort  Activity level/function:      Daily activities:  Able to do moderate activities  Adverse effects:  No  Adherance    Taking medication as directed?  Yes    Participating in other treatments: yes  Risk Factors:    Sleep:  Good    Mood/anxiety:  Worsened she has been feeling a little depressed since she is stuck at home all the time, they are working on getting her in an assisted living home because mostly she just feels bored and isolated. Does get out with her daughter Tawnya, who takes her " "on errands and out to eat. She enjoys those activities. Cant really read easily anymore because of her macular degeneration    Recent family or social stressors:  none noted    Other aggravating factors: prolongued walking and standing   PHQ-9 SCORE 9/12/2017 2/13/2018 10/21/2019   PHQ-9 Total Score 7 6 2     NUNO-7 SCORE 9/19/2016 9/12/2017 1/7/2019   Total Score 0 2 0     Encounter-Level CSA:    There are no encounter-level csa.     Patient-Level CSA:    There are no patient-level csa.       Wt Readings from Last 5 Encounters:   10/21/19 55.1 kg (121 lb 8 oz)   07/25/19 52.4 kg (115 lb 8 oz)   04/08/19 48.1 kg (106 lb)   01/07/19 49.8 kg (109 lb 12 oz)   09/04/18 46.6 kg (102 lb 12 oz)        How many servings of fruits and vegetables do you eat daily?  1-2    On average, how many sweetened beverages do you drink each day (soda, juice, sweet tea, etc)?   1    How many days per week do you miss taking your medication? 0\"    From clinic visit on 7/25/2019:  \"  Chronic Pain Follow-Up       Type / Location of Pain: rheumatoid arthritis pain, hands and wrist back    Analgesia/pain control:       Recent changes:  same      Overall control: Tolerable with discomfort  Activity level/function:      Daily activities:  Able to do light housework, cooking  Adverse effects:  No  Adherance    Taking medication as directed?  Yes  Risk Factors:    Sleep:  Good    Mood/anxiety:  She feels slightly down every once in a while but it does not affect her being able to do her own thing, boredom     Recent family or social stressors:  none noted    Other aggravating factors: hurts more when she walks then it once did  PHQ-9 SCORE 9/12/2017 2/13/2018 10/21/2019   PHQ-9 Total Score 7 6 2     NUNO-7 SCORE 9/19/2016 9/12/2017 1/7/2019   Total Score 0 2 0     She is getting a spinal block tomorrow     From clinic visit on 4/8/2019:  \" Medication is working and she is not experiencing any side effects    She says that her blood pressure has " "been very good.  Her peripheral edema has improved and she was able to put on her dress shoes today.    Her mood has been good.  She is still getting out of the house with her daughter Tawnya.  States he says Celestina's mood has remained fairly upbeat.    She continues to have days when her arthritis pain is worse.    The thing that bothers her most is a recent diagnosis of macular degeneration.  She has been following with ophthalmology.  She left to read and this is been difficult for her.    She has been eating fairly well.  Has even gained a little weight since she went off Remeron.  Episodes of confusion have resolved since she stopped the Remeron as well.  Does note some mild memory loss, but that seems stable.    Her daughter Tawnya is helping her look for assisted living facilities.  She is pretty social and is looking forward to the possibility of living in a place where there are social events and prepared meals.\"    From 1/7/19:  \"She reports that her pain is under fairly good control with the gabapentin 300 mg 3 times a day and hydrocodone 3 times a day.  But the most bothersome pain is her radicular pain for which she has been following with Ortho and has been getting injection therapy.  She says she otherwise feels well.  She and her daughter states they have noticed that she has had some memory decline.  She does not have an advanced directive.  She says she would want to avoid CPR.  She prefers to allow a natural death.\"   Problem list and histories reviewed & adjusted, as indicated.  Additional history: as documented    Patient Active Problem List   Diagnosis     Osteopenia     Hypertension goal BP (blood pressure) < 140/90     Rheumatoid arthritis (H)     CKD (chronic kidney disease) stage 3, GFR 30-59 ml/min (H)     Hyperlipidemia with target LDL less than 100     Advanced directives, counseling/discussion     Osteoarthritis     Eczema     Peripheral edema     Hyponatremia with decreased serum " osmolality     Chronic pain     Protein-calorie malnutrition (H)     Age-related osteoporosis without current pathological fracture     Past Surgical History:   Procedure Laterality Date     C APPENDECTOMY  1950     SURGICAL HISTORY OF -       2 normal vaginal births       Social History     Tobacco Use     Smoking status: Former Smoker     Packs/day: 0.50     Years: 20.00     Pack years: 10.00     Types: Cigarettes     Last attempt to quit: 2000     Years since quittin.3     Smokeless tobacco: Never Used   Substance Use Topics     Alcohol use: Yes     Comment: very occ,     Family History   Problem Relation Age of Onset     Heart Disease Mother         CHF     Respiratory Mother         lung disease     C.A.D. Sister         heart by-pass     Arthritis Sister      Family History Negative Brother      Family History Negative Daughter      Family History Negative Daughter          Current Outpatient Medications   Medication Sig Dispense Refill     acetaminophen (TYLENOL) 325 MG tablet Take 2 tablets (650 mg) by mouth 3 times daily 100 tablet 0     atenolol (TENORMIN) 25 MG tablet Take 3 tablets (75 mg) by mouth daily 270 tablet 3     atorvastatin (LIPITOR) 20 MG tablet TAKE 1 TABLET(20 MG) BY MOUTH DAILY 90 tablet 2     atorvastatin (LIPITOR) 20 MG tablet Take 1 tablet (20 mg) by mouth daily 90 tablet 2     Ferrous Sulfate (IRON) 325 (65 Fe) MG tablet Take 1 tablet by mouth daily (with breakfast) 90 tablet 3     gabapentin (NEURONTIN) 300 MG capsule Take 1 capsule (300 mg) by mouth 3 times daily 270 capsule 3     HYDROcodone-acetaminophen (NORCO) 5-325 MG tablet Take 1-2 tablets by mouth every 6 hours as needed for moderate to severe pain 140 tablet 0     HYDROcodone-acetaminophen (NORCO) 5-325 MG tablet Take 1-2 tablets by mouth every 6 hours as needed for moderate to severe pain 140 tablet 0     HYDROcodone-acetaminophen (NORCO) 5-325 MG tablet Take 1-2 tablets by mouth every 6 hours as needed for  moderate to severe pain 140 tablet 0     hydroxychloroquine (PLAQUENIL) 200 MG tablet Take 200 mg by mouth daily   3     NIFEdipine ER OSMOTIC (PROCARDIA XL) 90 MG 24 hr tablet TAKE 1 TABLET BY MOUTH DAILY 90 tablet 0     NIFEdipine ER OSMOTIC (PROCARDIA XL) 90 MG 24 hr tablet Take 1 tablet (90 mg) by mouth daily 30 tablet 0     senna-docusate (SENOKOT-S;PERICOLACE) 8.6-50 MG per tablet Take 2 tablets by mouth daily And 2 tabs daily prn       Allergies   Allergen Reactions     Hydrochlorothiazide      hyponatremia     Recent Labs   Lab Test 07/25/19  1046 01/17/19  1257 01/07/19  1236  06/18/18  0600  05/31/18  0704  05/21/18  1618  09/12/17  1236  08/18/16  1322   LDL 55  --   --   --   --   --   --   --   --   --  50  --  49     --   --   --   --   --   --   --   --   --  65  --  68   TRIG 78  --   --   --   --   --   --   --   --   --  89  --  85   ALT  --  36 99*  --   --   --  22  --   --    < > 31   < >  --    CR 0.96 1.30* 1.38*   < >  --    < > 0.67   < > 0.82   < > 1.60*   < > 1.66*   GFRESTIMATED 53* 37* 34*   < >  --    < > 84   < > 66   < > 31*   < > 29*   GFRESTBLACK 61 42* 40*   < >  --    < > >90   < > 80   < > 37*   < > 35*   POTASSIUM 4.9 4.2 4.1   < >  --    < > 4.6   < > 4.6   < > 4.2   < > 4.7   TSH  --   --   --   --  1.52  --   --   --  1.56  --   --   --   --     < > = values in this interval not displayed.      BP Readings from Last 3 Encounters:   10/21/19 130/60   07/25/19 121/59   04/08/19 129/65    Wt Readings from Last 3 Encounters:   10/21/19 55.1 kg (121 lb 8 oz)   07/25/19 52.4 kg (115 lb 8 oz)   04/08/19 48.1 kg (106 lb)              Reviewed and updated as needed this visit by clinical staff       Reviewed and updated as needed this visit by Provider         ROS:  As above     OBJECTIVE:     There were no vitals taken for this visit.  There is no height or weight on file to calculate BMI.   Wt Readings from Last 5 Encounters:   10/21/19 55.1 kg (121 lb 8 oz)   07/25/19 52.4  kg (115 lb 8 oz)   04/08/19 48.1 kg (106 lb)   01/07/19 49.8 kg (109 lb 12 oz)   09/04/18 46.6 kg (102 lb 12 oz)      GENERAL: thin, elderly female, walks with walker  CV: rrr, no m/g/r  Lungs: ctab, no w/c/r    Diagnostic Test Results:  No results found for this or any previous visit (from the past 24 hour(s)).    ASSESSMENT/PLAN:          1. Hypertension goal BP (blood pressure) < 140/90  Controlled.  Continues on atenolol 75mg daily and nifedipine 90mg daily. No longer on the lisinopril due to lower blood pressure and history of hyponatremia.         2. CKD (chronic kidney disease) stage 3, GFR 30-59 ml/min  Stable. BMP today    3. Other chronic pain  Continues gabapentin to 300 mg 3 times a day.  She also follows with spine specialist and she has a spinal block scheduled   Refilled norco 5-325 1-2 tablets by mouth q6hrs prn pain #140 per month with refills q28 days. Limited to two refills today due to [place] prescribing law. Okay to send 3rd refill when due. Return for follow up in 3 months.     4. Peripheral edema  Improved.     5. Rheumatoid arthritis, involving unspecified site, unspecified rheumatoid factor presence (H)  Followed by rheumatology and doing well     6. Iron deficiency anemia, unspecified iron deficiency anemia type  Rechecked her hemoglobin previous visit and it was improved.  She will continue on iron supplement.     7. Osteoarthritis, unspecified osteoarthritis type, unspecified site   stable  Refilled norco as above       8. Hyperlipidemia with target LDL less than 100  Stable.      9.  Protein-calorie malnutrition, unspecified severity (H)  She has been able to eat better as her pain is been under better control.      Doing well off remeron. She did have some confusion on remeron in the past.    10. Osteoporosis  Followed by rheumatology. Had been on Reclast.  she was going to check into funding for the infusion her visit with rheumatologyr.  She continues on calcium and vitamin D     11.  Declining functional status due to age and chronic diseases.   Depending on her daughter for more help over time, which is becoming more difficult for her daughter. I encouraged her to consider transition to assisted living. She is reluctant to move out of her home where she has lived for many years, but does see the benefit in transitioning to assisted living. She believes she will accept the next apartment offered to her.     12. Cough  Chronic cough for many years. Brought up by me at her last visit. She was a smoker for decades from age 16 to twenty-some years ago. Smoked maybe 1/2 ppd for years. Denies any shortness of breath or wheezing or increase in cough over time. Denies frequent colds or bronchitis. Just has longstanding productive cough. Previous CXRs have shown some hyperinflation. Suspect COPD. She is not interested in further evaluation or medication.     She will follow up with me in 3 months.          Majo Alfaro M.D.        [facility]

## 2020-01-20 NOTE — TELEPHONE ENCOUNTER
"Requested Prescriptions   Pending Prescriptions Disp Refills     atorvastatin (LIPITOR) 20 MG tablet [Pharmacy Med Name: ATORVASTATIN 20MG TABLETS] 90 tablet 2     Sig: TAKE 1 TABLET(20 MG) BY MOUTH DAILY  Last Written Prescription Date:  1/7/2019  Last Fill Quantity: 90 tablet,  # refills: 2   Last Office Visit: 10/21/2019   Future Office Visit:    Next 5 appointments (look out 90 days)    Jan 21, 2020 11:20 AM CST  SHORT with Majo Alfaro MD  [facility] ([facility]) [address] [place] [id number] [phone]              Statins Protocol Passed - 1/20/2020  3:39 PM        Passed - LDL on file in past 12 months     Recent Labs   Lab Test 07/25/19  1046   LDL 55           Passed - No abnormal creatine kinase in past 12 months     No lab results found.           Passed - Recent (12 mo) or future (30 days) visit within the authorizing provider's specialty     Patient has had an office visit with the authorizing provider or a provider within the authorizing providers department within the previous 12 mos or has a future within next 30 days. See \"Patient Info\" tab in inbasket, or \"Choose Columns\" in Meds & Orders section of the refill encounter.            Passed - Medication is active on med list        Passed - Patient is age 18 or older        Passed - No active pregnancy on record        Passed - No positive pregnancy test in past 12 months          "

## 2020-01-21 ENCOUNTER — OFFICE VISIT (OUTPATIENT)
Dept: FAMILY MEDICINE | Facility: CLINIC | Age: 85
End: 2020-01-21
Payer: COMMERCIAL

## 2020-01-21 VITALS
OXYGEN SATURATION: 96 % | HEIGHT: 61 IN | DIASTOLIC BLOOD PRESSURE: 62 MMHG | HEART RATE: 71 BPM | WEIGHT: 118.5 LBS | BODY MASS INDEX: 22.37 KG/M2 | TEMPERATURE: 97.5 F | SYSTOLIC BLOOD PRESSURE: 130 MMHG

## 2020-01-21 DIAGNOSIS — I10 HYPERTENSION GOAL BP (BLOOD PRESSURE) < 140/90: ICD-10-CM

## 2020-01-21 DIAGNOSIS — M06.9 RHEUMATOID ARTHRITIS, INVOLVING UNSPECIFIED SITE, UNSPECIFIED RHEUMATOID FACTOR PRESENCE: ICD-10-CM

## 2020-01-21 DIAGNOSIS — M19.90 OSTEOARTHRITIS, UNSPECIFIED OSTEOARTHRITIS TYPE, UNSPECIFIED SITE: ICD-10-CM

## 2020-01-21 DIAGNOSIS — G89.29 OTHER CHRONIC PAIN: ICD-10-CM

## 2020-01-21 PROCEDURE — 99214 OFFICE O/P EST MOD 30 MIN: CPT | Performed by: FAMILY MEDICINE

## 2020-01-21 RX ORDER — ATENOLOL 25 MG/1
75 TABLET ORAL DAILY
Qty: 270 TABLET | Refills: 3 | Status: SHIPPED | OUTPATIENT
Start: 2020-01-21 | End: 2020-01-01

## 2020-01-21 RX ORDER — NIFEDIPINE 90 MG/1
90 TABLET, EXTENDED RELEASE ORAL DAILY
Qty: 90 TABLET | Refills: 3 | Status: SHIPPED | OUTPATIENT
Start: 2020-01-21 | End: 2020-01-01

## 2020-01-21 RX ORDER — HYDROCODONE BITARTRATE AND ACETAMINOPHEN 5; 325 MG/1; MG/1
1-2 TABLET ORAL EVERY 6 HOURS PRN
Qty: 140 TABLET | Refills: 0 | Status: SHIPPED | OUTPATIENT
Start: 2020-01-21 | End: 2020-04-15

## 2020-01-21 RX ORDER — HYDROCODONE BITARTRATE AND ACETAMINOPHEN 5; 325 MG/1; MG/1
1-2 TABLET ORAL EVERY 6 HOURS PRN
Qty: 140 TABLET | Refills: 0 | COMMUNITY
Start: 2020-03-17 | End: 2020-03-20

## 2020-01-21 RX ORDER — HYDROCODONE BITARTRATE AND ACETAMINOPHEN 5; 325 MG/1; MG/1
1-2 TABLET ORAL EVERY 6 HOURS PRN
Qty: 140 TABLET | Refills: 0 | Status: SHIPPED | OUTPATIENT
Start: 2020-02-18 | End: 2020-04-15

## 2020-01-21 ASSESSMENT — MIFFLIN-ST. JEOR: SCORE: 908.85

## 2020-01-22 RX ORDER — NIFEDIPINE 90 MG/1
TABLET, EXTENDED RELEASE ORAL
Qty: 90 TABLET | Refills: 3 | Status: SHIPPED | OUTPATIENT
Start: 2020-01-22 | End: 2020-06-03

## 2020-01-24 RX ORDER — ATORVASTATIN CALCIUM 20 MG/1
TABLET, FILM COATED ORAL
Qty: 90 TABLET | Refills: 0 | Status: SHIPPED | OUTPATIENT
Start: 2020-01-24 | End: 2020-06-03

## 2020-02-03 DIAGNOSIS — I10 HYPERTENSION GOAL BP (BLOOD PRESSURE) < 140/90: ICD-10-CM

## 2020-02-03 RX ORDER — ATENOLOL 25 MG/1
TABLET ORAL
Qty: 270 TABLET | Refills: 3 | OUTPATIENT
Start: 2020-02-03

## 2020-02-03 NOTE — TELEPHONE ENCOUNTER
"Requested Prescriptions   Pending Prescriptions Disp Refills     atenolol (TENORMIN) 25 MG tablet [Pharmacy Med Name: ATENOLOL 25MG TABLETS] 270 tablet 3     Sig: TAKE 3 TABLETS(75 MG) BY MOUTH DAILY  Last Written Prescription Date:  1/21/2020  Last Fill Quantity: 270 tablet,  # refills: 3   Last Office Visit: 1/21/2020   Future Office Visit:    Next 5 appointments (look out 90 days)    Apr 20, 2020  2:00 PM CDT  SHORT with Majo Alfaro MD  [facility] ([facility]) [address] [place] [id number] [phone]              Beta-Blockers Protocol Passed - 2/3/2020  3:16 AM        Passed - Blood pressure under 140/90 in past 12 months     BP Readings from Last 3 Encounters:   01/21/20 130/62   10/21/19 130/60   07/25/19 121/59           Passed - Patient is age 6 or older        Passed - Recent (12 mo) or future (30 days) visit within the authorizing provider's specialty     Patient has had an office visit with the authorizing provider or a provider within the authorizing providers department within the previous 12 mos or has a future within next 30 days. See \"Patient Info\" tab in inbasket, or \"Choose Columns\" in Meds & Orders section of the refill encounter.            Passed - Medication is active on med list          "

## 2020-02-17 ENCOUNTER — TELEPHONE (OUTPATIENT)
Dept: FAMILY MEDICINE | Facility: CLINIC | Age: 85
End: 2020-02-17

## 2020-02-17 NOTE — TELEPHONE ENCOUNTER
Consent to communicate with Tawnya on file.    Writer called Tawnya who stated:  1. Patient would like to restart Mirtazapine because it previously helped her mood  2. Thinks patient is feeling down due to weather and the amount of time it is taking to get into assisted living    Writer discussed with Tawnya typically an office visit is needed to discuss/address restarting a medication.    Tawnya verbalized understanding and in agreement with plan.  Appt scheduled for patient on 2/18/20.  Appt date, time and location confirmed with Tawnya.  Tawnya stated she will bring patient to appt.    DM Morgan, ALANN, RN

## 2020-02-17 NOTE — TELEPHONE ENCOUNTER
Patient's daughter, Tawnya Rosario, is requesting a call from Dr Alfaro or her triage nurse.      Patient is feeling depressed.  Wondering if she could get back on antidepressant that she was on approx 6 months ago.  Both pt and daughter feel it would be helpful.  Please call daughter today to discuss this.  OK to LM on VLM.    Patient uses [facility] on [address]

## 2020-02-20 ENCOUNTER — TRANSFERRED RECORDS (OUTPATIENT)
Dept: HEALTH INFORMATION MANAGEMENT | Facility: CLINIC | Age: 85
End: 2020-02-20

## 2020-04-15 ENCOUNTER — VIRTUAL VISIT (OUTPATIENT)
Dept: FAMILY MEDICINE | Facility: CLINIC | Age: 85
End: 2020-04-15
Payer: COMMERCIAL

## 2020-04-15 VITALS — HEIGHT: 61 IN | WEIGHT: 115 LBS | BODY MASS INDEX: 21.71 KG/M2

## 2020-04-15 DIAGNOSIS — G89.29 OTHER CHRONIC PAIN: ICD-10-CM

## 2020-04-15 DIAGNOSIS — M19.90 OSTEOARTHRITIS, UNSPECIFIED OSTEOARTHRITIS TYPE, UNSPECIFIED SITE: ICD-10-CM

## 2020-04-15 DIAGNOSIS — M06.9 RHEUMATOID ARTHRITIS, INVOLVING UNSPECIFIED SITE, UNSPECIFIED RHEUMATOID FACTOR PRESENCE: ICD-10-CM

## 2020-04-15 PROCEDURE — 99213 OFFICE O/P EST LOW 20 MIN: CPT | Mod: 95 | Performed by: FAMILY MEDICINE

## 2020-04-15 RX ORDER — HYDROCODONE BITARTRATE AND ACETAMINOPHEN 5; 325 MG/1; MG/1
1-2 TABLET ORAL EVERY 6 HOURS PRN
Qty: 140 TABLET | Refills: 0 | Status: SHIPPED | OUTPATIENT
Start: 2020-04-15 | End: 2020-08-13

## 2020-04-15 RX ORDER — HYDROCODONE BITARTRATE AND ACETAMINOPHEN 5; 325 MG/1; MG/1
1-2 TABLET ORAL EVERY 6 HOURS PRN
Qty: 140 TABLET | Refills: 0 | Status: SHIPPED | OUTPATIENT
Start: 2020-06-15 | End: 2020-08-10

## 2020-04-15 RX ORDER — HYDROCODONE BITARTRATE AND ACETAMINOPHEN 5; 325 MG/1; MG/1
1-2 TABLET ORAL EVERY 6 HOURS PRN
Qty: 140 TABLET | Refills: 0 | Status: SHIPPED | OUTPATIENT
Start: 2020-05-15 | End: 2020-08-13

## 2020-04-15 ASSESSMENT — MIFFLIN-ST. JEOR: SCORE: 884.02

## 2020-04-15 NOTE — PROGRESS NOTES
"Celestina Tejada is a 89 year old female who is being evaluated via a billable telephone visit.      The patient has been notified of following:     \"This telephone visit will be conducted via a call between you and your physician/provider. We have found that certain health care needs can be provided without the need for a physical exam.  This service lets us provide the care you need with a short phone conversation.  If a prescription is necessary we can send it directly to your pharmacy.  If lab work is needed we can place an order for that and you can then stop by our lab to have the test done at a later time.    Telephone visits are billed at different rates depending on your insurance coverage. During this emergency period, for some insurers they may be billed the same as an in-person visit.  Please reach out to your insurance provider with any questions.    If during the course of the call the physician/provider feels a telephone visit is not appropriate, you will not be charged for this service.\"    Patient has given verbal consent for Telephone visit?  {YES-NO  Default Yes:4444::\"Yes\"}    How would you like to obtain your AVS? {AVS Preference:235210}    Subjective     Celestina Tejada is a 89 year old female who presents to clinic today for the following health issues:    {SUPERLIST (Optional):312600}  {PEDS Chronic and Acute Problems (Optional):505937}     {additonal problems for provider to add (Optional):179013}    {HIST REVIEW/ LINKS 2 (Optional):703371}    Reviewed and updated as needed this visit by Provider         Review of Systems   {ROS COMP (Optional):777462}       Objective   Reported vitals:  There were no vitals taken for this visit.   {GENERAL APPEARANCE:50::\"healthy\",\"alert\",\"no distress\"}  PSYCH: Alert and oriented times 3; coherent speech, normal   rate and volume, able to articulate logical thoughts, able   to abstract reason, no tangential thoughts, no hallucinations   or delusions  Her " "affect is { :9368620::\"normal\"}  RESP: No cough, no audible wheezing, able to talk in full sentences  Remainder of exam unable to be completed due to telephone visits    {Diagnostic Test Results (Optional):912884::\"Diagnostic Test Results:\",\"Labs reviewed in Epic\"}        Assessment/Plan:  {Diagnosis, Associated Orders and Comment:852244}    No follow-ups on file.      Phone call duration:  *** minutes    {signature options:487665}   .telephone    SUBJECTIVE:   Celestina Tejada is a 87 year old female who presents to clinic today for the following health issues:     Hypertension Follow-up      Do you check your blood pressure regularly outside of the clinic? No     Are you following a low salt diet? Yes    Are your blood pressures ever more than 140 on the top number (systolic) OR more   than 90 on the bottom number (diastolic), for example 140/90? No    Chronic Kidney Disease Follow-up      Do you take any over the counter pain medicine?: No    Chronic Pain Follow-Up       Type / Location of Pain: rheumatoid arthritis pain, hands and wrist back    Analgesia/pain control:       Recent changes:  same      Overall control: Tolerable with discomfort and the medication is working but she is still experiencing some pain     She was offered an apartment at [place] twice now, but declined.  Her daughter Tawnya continues to give her significant amount of help.  They have decided together that the next time she is offered a place she will accept.  She is looking forward to having more activities as she is bored at home.  She also does not like to cook for herself and appreciates the possibility of having a meal plan at assisted living.    From clinic visit on 10/21/19:  \"  Chronic Pain Follow-Up       Type / Location of Pain: rheumatoid arthritis pain, hands and wrist back    Analgesia/pain control:       Recent changes:  same      Overall control: Tolerable with discomfort  Activity level/function:      Daily " "activities:  Able to do moderate activities  Adverse effects:  No  Adherance    Taking medication as directed?  Yes    Participating in other treatments: yes  Risk Factors:    Sleep:  Good    Mood/anxiety:  Worsened she has been feeling a little depressed since she is stuck at home all the time, they are working on getting her in an assisted living home because mostly she just feels bored and isolated. Does get out with her daughter Tawnya, who takes her on errands and out to eat. She enjoys those activities. Cant really read easily anymore because of her macular degeneration    Recent family or social stressors:  none noted    Other aggravating factors: prolongued walking and standing   PHQ-9 SCORE 9/12/2017 2/13/2018 10/21/2019   PHQ-9 Total Score 7 6 2     NUNO-7 SCORE 9/19/2016 9/12/2017 1/7/2019   Total Score 0 2 0     Encounter-Level CSA:    There are no encounter-level csa.     Patient-Level CSA:    There are no patient-level csa.       Wt Readings from Last 5 Encounters:   01/21/20 53.8 kg (118 lb 8 oz)   10/21/19 55.1 kg (121 lb 8 oz)   07/25/19 52.4 kg (115 lb 8 oz)   04/08/19 48.1 kg (106 lb)   01/07/19 49.8 kg (109 lb 12 oz)        How many servings of fruits and vegetables do you eat daily?  1-2    On average, how many sweetened beverages do you drink each day (soda, juice, sweet tea, etc)?   1    How many days per week do you miss taking your medication? 0\"    From clinic visit on 7/25/2019:  \"  Chronic Pain Follow-Up       Type / Location of Pain: rheumatoid arthritis pain, hands and wrist back    Analgesia/pain control:       Recent changes:  same      Overall control: Tolerable with discomfort  Activity level/function:      Daily activities:  Able to do light housework, cooking  Adverse effects:  No  Adherance    Taking medication as directed?  Yes  Risk Factors:    Sleep:  Good    Mood/anxiety:  She feels slightly down every once in a while but it does not affect her being able to do her own thing, " "boredom     Recent family or social stressors:  none noted    Other aggravating factors: hurts more when she walks then it once did  PHQ-9 SCORE 9/12/2017 2/13/2018 10/21/2019   PHQ-9 Total Score 7 6 2     NUNO-7 SCORE 9/19/2016 9/12/2017 1/7/2019   Total Score 0 2 0     She is getting a spinal block tomorrow     From clinic visit on 4/8/2019:  \" Medication is working and she is not experiencing any side effects    She says that her blood pressure has been very good.  Her peripheral edema has improved and she was able to put on her dress shoes today.    Her mood has been good.  She is still getting out of the house with her daughter Tawnya.  States he says Celestina's mood has remained fairly upbeat.    She continues to have days when her arthritis pain is worse.    The thing that bothers her most is a recent diagnosis of macular degeneration.  She has been following with ophthalmology.  She left to read and this is been difficult for her.    She has been eating fairly well.  Has even gained a little weight since she went off Remeron.  Episodes of confusion have resolved since she stopped the Remeron as well.  Does note some mild memory loss, but that seems stable.    Her daughter Tawnya is helping her look for assisted living facilities.  She is pretty social and is looking forward to the possibility of living in a place where there are social events and prepared meals.\"    From 1/7/19:  \"She reports that her pain is under fairly good control with the gabapentin 300 mg 3 times a day and hydrocodone 3 times a day.  But the most bothersome pain is her radicular pain for which she has been following with Ortho and has been getting injection therapy.  She says she otherwise feels well.  She and her daughter states they have noticed that she has had some memory decline.  She does not have an advanced directive.  She says she would want to avoid CPR.  She prefers to allow a natural death.\"   Problem list and histories reviewed " & adjusted, as indicated.  Additional history: as documented    Patient Active Problem List   Diagnosis     Osteopenia     Hypertension goal BP (blood pressure) < 140/90     Rheumatoid arthritis (H)     CKD (chronic kidney disease) stage 3, GFR 30-59 ml/min (H)     Hyperlipidemia with target LDL less than 100     Advanced directives, counseling/discussion     Osteoarthritis     Eczema     Peripheral edema     Hyponatremia with decreased serum osmolality     Chronic pain     Protein-calorie malnutrition (H)     Age-related osteoporosis without current pathological fracture     Past Surgical History:   Procedure Laterality Date     C APPENDECTOMY  1950     SURGICAL HISTORY OF -       2 normal vaginal births       Social History     Tobacco Use     Smoking status: Former Smoker     Packs/day: 0.50     Years: 20.00     Pack years: 10.00     Types: Cigarettes     Last attempt to quit: 2000     Years since quittin.5     Smokeless tobacco: Never Used   Substance Use Topics     Alcohol use: Yes     Comment: very occ,     Family History   Problem Relation Age of Onset     Heart Disease Mother         CHF     Respiratory Mother         lung disease     C.A.D. Sister         heart by-pass     Arthritis Sister      Family History Negative Brother      Family History Negative Daughter      Family History Negative Daughter          Current Outpatient Medications   Medication Sig Dispense Refill     acetaminophen (TYLENOL) 325 MG tablet Take 2 tablets (650 mg) by mouth 3 times daily 100 tablet 0     atenolol (TENORMIN) 25 MG tablet Take 3 tablets (75 mg) by mouth daily 270 tablet 3     atorvastatin (LIPITOR) 20 MG tablet TAKE 1 TABLET(20 MG) BY MOUTH DAILY 90 tablet 0     atorvastatin (LIPITOR) 20 MG tablet TAKE 1 TABLET(20 MG) BY MOUTH DAILY 90 tablet 2     Ferrous Sulfate (IRON) 325 (65 Fe) MG tablet Take 1 tablet by mouth daily (with breakfast) 90 tablet 3     gabapentin (NEURONTIN) 300 MG capsule Take 1 capsule (300  mg) by mouth 3 times daily 270 capsule 3     HYDROcodone-acetaminophen (NORCO) 5-325 MG tablet Take 1-2 tablets by mouth every 6 hours as needed for moderate to severe pain 140 tablet 0     HYDROcodone-acetaminophen (NORCO) 5-325 MG tablet Take 1-2 tablets by mouth every 6 hours as needed for moderate to severe pain 140 tablet 0     HYDROcodone-acetaminophen (NORCO) 5-325 MG tablet Take 1-2 tablets by mouth every 6 hours as needed for moderate to severe pain 140 tablet 0     hydroxychloroquine (PLAQUENIL) 200 MG tablet Take 200 mg by mouth daily   3     NIFEdipine ER OSMOTIC (PROCARDIA XL) 90 MG 24 hr tablet TAKE 1 TABLET(90 MG) BY MOUTH DAILY 90 tablet 3     NIFEdipine ER OSMOTIC (PROCARDIA XL) 90 MG 24 hr tablet Take 1 tablet (90 mg) by mouth daily 90 tablet 3     senna-docusate (SENOKOT-S;PERICOLACE) 8.6-50 MG per tablet Take 2 tablets by mouth daily And 2 tabs daily prn       Allergies   Allergen Reactions     Hydrochlorothiazide      hyponatremia     Recent Labs   Lab Test 07/25/19  1046 01/17/19  1257 01/07/19  1236  06/18/18  0600  05/31/18  0704  05/21/18  1618  09/12/17  1236  08/18/16  1322   LDL 55  --   --   --   --   --   --   --   --   --  50  --  49     --   --   --   --   --   --   --   --   --  65  --  68   TRIG 78  --   --   --   --   --   --   --   --   --  89  --  85   ALT  --  36 99*  --   --   --  22  --   --    < > 31   < >  --    CR 0.96 1.30* 1.38*   < >  --    < > 0.67   < > 0.82   < > 1.60*   < > 1.66*   GFRESTIMATED 53* 37* 34*   < >  --    < > 84   < > 66   < > 31*   < > 29*   GFRESTBLACK 61 42* 40*   < >  --    < > >90   < > 80   < > 37*   < > 35*   POTASSIUM 4.9 4.2 4.1   < >  --    < > 4.6   < > 4.6   < > 4.2   < > 4.7   TSH  --   --   --   --  1.52  --   --   --  1.56  --   --   --   --     < > = values in this interval not displayed.      BP Readings from Last 3 Encounters:   01/21/20 130/62   10/21/19 130/60   07/25/19 121/59    Wt Readings from Last 3 Encounters:   01/21/20  53.8 kg (118 lb 8 oz)   10/21/19 55.1 kg (121 lb 8 oz)   07/25/19 52.4 kg (115 lb 8 oz)              Reviewed and updated as needed this visit by clinical staff       Reviewed and updated as needed this visit by Provider         ROS:  As above     OBJECTIVE:     There were no vitals taken for this visit.  There is no height or weight on file to calculate BMI.   Wt Readings from Last 5 Encounters:   01/21/20 53.8 kg (118 lb 8 oz)   10/21/19 55.1 kg (121 lb 8 oz)   07/25/19 52.4 kg (115 lb 8 oz)   04/08/19 48.1 kg (106 lb)   01/07/19 49.8 kg (109 lb 12 oz)      GENERAL: thin, elderly female, walks with walker  CV: rrr, no m/g/r  Lungs: ctab, no w/c/r    Diagnostic Test Results:  No results found for this or any previous visit (from the past 24 hour(s)).    ASSESSMENT/PLAN:          1. Hypertension goal BP (blood pressure) < 140/90  Controlled.  Continues on atenolol 75mg daily and nifedipine 90mg daily. No longer on the lisinopril due to lower blood pressure and history of hyponatremia.         2. CKD (chronic kidney disease) stage 3, GFR 30-59 ml/min  Stable. BMP today    3. Other chronic pain  Continues gabapentin to 300 mg 3 times a day.  She also follows with spine specialist and she has a spinal block scheduled   Refilled norco 5-325 1-2 tablets by mouth q6hrs prn pain #140 per month with refills q28 days. Limited to two refills today due to [place] prescribing law. Okay to send 3rd refill when due. Return for follow up in 3 months.     4. Peripheral edema  Improved.     5. Rheumatoid arthritis, involving unspecified site, unspecified rheumatoid factor presence (H)  Followed by rheumatology and doing well     6. Iron deficiency anemia, unspecified iron deficiency anemia type  Rechecked her hemoglobin previous visit and it was improved.  She will continue on iron supplement.     7. Osteoarthritis, unspecified osteoarthritis type, unspecified site   stable  Refilled norco as above       8. Hyperlipidemia with target LDL  less than 100  Stable.      9.  Protein-calorie malnutrition, unspecified severity (H)  She has been able to eat better as her pain is been under better control.      Doing well off remeron. She did have some confusion on remeron in the past.    10. Osteoporosis  Followed by rheumatology. Had been on Reclast.  she was going to check into funding for the infusion her visit with rheumatologyr.  She continues on calcium and vitamin D     11. Declining functional status due to age and chronic diseases.   Depending on her daughter for more help over time, which is becoming more difficult for her daughter. I encouraged her to consider transition to assisted living. She is reluctant to move out of her home where she has lived for many years, but does see the benefit in transitioning to assisted living. She believes she will accept the next apartment offered to her.     12. Cough  Chronic cough for many years. Brought up by me at her last visit. She was a smoker for decades from age 16 to twenty-some years ago. Smoked maybe 1/2 ppd for years. Denies any shortness of breath or wheezing or increase in cough over time. Denies frequent colds or bronchitis. Just has longstanding productive cough. Previous CXRs have shown some hyperinflation. Suspect COPD. She is not interested in further evaluation or medication.     She will follow up with me in 3 months.          Majo Alfaro M.D.        [facility]

## 2020-04-15 NOTE — PROGRESS NOTES
"Celestina Tejada is a 89 year old female who is being evaluated via a billable telephone visit.      The patient has been notified of following:     \"This telephone visit will be conducted via a call between you and your physician/provider. We have found that certain health care needs can be provided without the need for a physical exam.  This service lets us provide the care you need with a short phone conversation.  If a prescription is necessary we can send it directly to your pharmacy.  If lab work is needed we can place an order for that and you can then stop by our lab to have the test done at a later time.    Telephone visits are billed at different rates depending on your insurance coverage. During this emergency period, for some insurers they may be billed the same as an in-person visit.  Please reach out to your insurance provider with any questions.    If during the course of the call the physician/provider feels a telephone visit is not appropriate, you will not be charged for this service.\"    Patient has given verbal consent for Telephone visit?  Yes    How would you like to obtain your AVS? Mail a copy    Subjective   12:22 PM   Celestina Tejada is a 89 year old female who presents to clinic today for the following health issues:    Hypertension Follow-up      Do you check your blood pressure regularly outside of the clinic? Yes intermittently     Are you following a low salt diet? Yes    Are your blood pressures ever more than 140 on the top number (systolic) OR more   than 90 on the bottom number (diastolic), for example 140/90? No     Chronic Kidney Disease Follow-up    Do you take any over the counter pain medicine?: Yes  What over the counter medicine are you taking for your pain?:  Aspirin       How often do you take this medicine?:  A few times a month    Chronic/Recurring Back Pain Follow Up      Where is your pain located? (Select all that apply) low back right and radiates down to right " leg    Since your last clinic visit for pain, how has your pain changed? gradually improving    Since your last visit, have you tried any new treatment? No     The pain medicine is working really well for her. She needs refills.     Patient Active Problem List   Diagnosis     Osteopenia     Hypertension goal BP (blood pressure) < 140/90     Rheumatoid arthritis (H)     CKD (chronic kidney disease) stage 3, GFR 30-59 ml/min (H)     Hyperlipidemia with target LDL less than 100     Advanced directives, counseling/discussion     Osteoarthritis     Eczema     Peripheral edema     Hyponatremia with decreased serum osmolality     Chronic pain     Protein-calorie malnutrition (H)     Age-related osteoporosis without current pathological fracture     Past Surgical History:   Procedure Laterality Date     C APPENDECTOMY  1950     SURGICAL HISTORY OF -       2 normal vaginal births       Social History     Tobacco Use     Smoking status: Former Smoker     Packs/day: 0.50     Years: 20.00     Pack years: 10.00     Types: Cigarettes     Last attempt to quit: 2000     Years since quittin.5     Smokeless tobacco: Never Used   Substance Use Topics     Alcohol use: Yes     Comment: very occ,     Family History   Problem Relation Age of Onset     Heart Disease Mother         CHF     Respiratory Mother         lung disease     C.A.D. Sister         heart by-pass     Arthritis Sister      Family History Negative Brother      Family History Negative Daughter      Family History Negative Daughter          Current Outpatient Medications   Medication Sig Dispense Refill     acetaminophen (TYLENOL) 325 MG tablet Take 2 tablets (650 mg) by mouth 3 times daily 100 tablet 0     atenolol (TENORMIN) 25 MG tablet Take 3 tablets (75 mg) by mouth daily 270 tablet 3     atorvastatin (LIPITOR) 20 MG tablet TAKE 1 TABLET(20 MG) BY MOUTH DAILY 90 tablet 0     atorvastatin (LIPITOR) 20 MG tablet TAKE 1 TABLET(20 MG) BY MOUTH DAILY 90 tablet 2      Ferrous Sulfate (IRON) 325 (65 Fe) MG tablet Take 1 tablet by mouth daily (with breakfast) 90 tablet 3     gabapentin (NEURONTIN) 300 MG capsule Take 1 capsule (300 mg) by mouth 3 times daily 270 capsule 3     HYDROcodone-acetaminophen (NORCO) 5-325 MG tablet Take 1-2 tablets by mouth every 6 hours as needed for moderate to severe pain 140 tablet 0     HYDROcodone-acetaminophen (NORCO) 5-325 MG tablet Take 1-2 tablets by mouth every 6 hours as needed for moderate to severe pain 140 tablet 0     HYDROcodone-acetaminophen (NORCO) 5-325 MG tablet Take 1-2 tablets by mouth every 6 hours as needed for moderate to severe pain 140 tablet 0     hydroxychloroquine (PLAQUENIL) 200 MG tablet Take 200 mg by mouth daily   3     NIFEdipine ER OSMOTIC (PROCARDIA XL) 90 MG 24 hr tablet TAKE 1 TABLET(90 MG) BY MOUTH DAILY 90 tablet 3     NIFEdipine ER OSMOTIC (PROCARDIA XL) 90 MG 24 hr tablet Take 1 tablet (90 mg) by mouth daily 90 tablet 3     senna-docusate (SENOKOT-S;PERICOLACE) 8.6-50 MG per tablet Take 2 tablets by mouth daily And 2 tabs daily prn       Allergies   Allergen Reactions     Hydrochlorothiazide      hyponatremia       Reviewed and updated as needed this visit by Provider         Review of Systems   As above        Objective   Reported vitals:  There were no vitals taken for this visit.   alert and no distress  PSYCH: Alert and oriented times 3; coherent speech, normal   rate and volume, able to articulate logical thoughts, able   to abstract reason, no tangential thoughts, no hallucinations   or delusions     RESP: no audible wheezing, able to talk in full sentences  Remainder of exam unable to be completed due to telephone visits    Diagnostic Test Results:  Labs reviewed in Epic        Assessment/Plan:    1. Hypertension goal BP (blood pressure) < 140/90  Previously well controlled. Unable to measure bp today.  Continues on atenolol 75mg daily and nifedipine 90mg daily. Has refills available. No longer on the  lisinopril due to lower blood pressure and history of hyponatremia.          2. CKD (chronic kidney disease) stage 3, GFR 30-59 ml/min  Stable.       3. Other chronic pain  Continues gabapentin to 300 mg 3 times a day.  She also follows with spine specialist and she has had a spinal block     Refilled norco 5-325 1-2 tablets by mouth q6hrs prn pain #140 per month with refills q28 days. Limited to two refills today due to [place] prescribing law. Okay to send 3rd refill when due. Return for follow up in 3 months.      4. Peripheral edema  Improved.      5. Rheumatoid arthritis, involving unspecified site, unspecified rheumatoid factor presence (H)  Followed by rheumatology and doing well      6. Iron deficiency anemia, unspecified iron deficiency anemia type  Rechecked her hemoglobin previous visit and it was improved.  She will continue on iron supplement.     7. Osteoarthritis, unspecified osteoarthritis type, unspecified site   stable  Refilled norco as above        8. Hyperlipidemia with target LDL less than 100  Stable.       9.  Protein-calorie malnutrition, unspecified severity (H)  She has been able to eat better as her pain is been under better control.       Doing well off remeron. She did have some confusion on remeron in the past.     10. Osteoporosis  Followed by rheumatology. Had been on Reclast.  she was going to check into funding for the infusion her visit with rheumatologyr.  She continues on calcium and vitamin D     11. Declining functional status due to age and chronic diseases.   Depending on her daughter for more help over time, which is becoming more difficult for her daughter. I encouraged her to consider transition to assisted living. She is reluctant to move out of her home where she has lived for many years, but does see the benefit in transitioning to assisted living. She believes she will accept the next apartment offered to her.      12. Cough  Chronic cough for many years. Brought up by me  at her last visit. She was a smoker for decades from age 16 to twenty-some years ago. Smoked maybe 1/2 ppd for years. Denies any shortness of breath or wheezing or increase in cough over time. Denies frequent colds or bronchitis. Just has longstanding productive cough. Previous CXRs have shown some hyperinflation. Suspect COPD. She is not interested in further evaluation or medication.      She will follow up with me in 3 months.      No follow-ups on file.    12:29 PM    Phone call duration:  7 minutes    Majo Alfaro MD, MD

## 2020-05-18 ENCOUNTER — TRANSFERRED RECORDS (OUTPATIENT)
Dept: HEALTH INFORMATION MANAGEMENT | Facility: CLINIC | Age: 85
End: 2020-05-18

## 2020-06-02 ENCOUNTER — TELEPHONE (OUTPATIENT)
Dept: FAMILY MEDICINE | Facility: CLINIC | Age: 85
End: 2020-06-02

## 2020-06-02 NOTE — TELEPHONE ENCOUNTER
I talked to daughter, Tawnya.  Tawnya sees her mother daily.  Tawnya makes her a meal everyday.  Yesterday, pt's memory seemed a lot worse. She didn't know where she was.  This lasted for about 3 hours.  Another family member also noticed the changes.  Daughter didn't see any other neuro sx.    Today, things are back to normal.  In the past, this would happen when her electrolytes were off. PT would also be lethargic.    If this happens again, I recommend taking pt to the ER.  Especially, if this is sustained or worsening.    Reviewed this can happen with cosme, dehydration, and UTIs.    Daughter is leaving town for a few days. Tawnya will update the daily caregiver about recent sx.  Daughter will be able to be in contact with this caregiver.  Family is waiting on a Assisted Living site to be available.    I did recommend one daily protein drink can be beneficial when daily intake is poor. Daughter will work on this. Pt has taken them in the past.  DONTE Charles

## 2020-06-03 ENCOUNTER — VIRTUAL VISIT (OUTPATIENT)
Dept: FAMILY MEDICINE | Facility: CLINIC | Age: 85
End: 2020-06-03
Payer: COMMERCIAL

## 2020-06-03 ENCOUNTER — TELEPHONE (OUTPATIENT)
Dept: FAMILY MEDICINE | Facility: CLINIC | Age: 85
End: 2020-06-03

## 2020-06-03 VITALS — BODY MASS INDEX: 20.38 KG/M2 | HEIGHT: 63 IN | WEIGHT: 115 LBS

## 2020-06-03 DIAGNOSIS — I10 HYPERTENSION GOAL BP (BLOOD PRESSURE) < 140/90: ICD-10-CM

## 2020-06-03 DIAGNOSIS — R35.0 URINARY FREQUENCY: ICD-10-CM

## 2020-06-03 DIAGNOSIS — G89.29 OTHER CHRONIC PAIN: ICD-10-CM

## 2020-06-03 DIAGNOSIS — M19.90 OSTEOARTHRITIS, UNSPECIFIED OSTEOARTHRITIS TYPE, UNSPECIFIED SITE: ICD-10-CM

## 2020-06-03 DIAGNOSIS — R53.81 MALAISE: ICD-10-CM

## 2020-06-03 DIAGNOSIS — M06.9 RHEUMATOID ARTHRITIS, INVOLVING UNSPECIFIED SITE, UNSPECIFIED RHEUMATOID FACTOR PRESENCE: ICD-10-CM

## 2020-06-03 DIAGNOSIS — R53.81 MALAISE: Primary | ICD-10-CM

## 2020-06-03 DIAGNOSIS — R41.0 CONFUSION: ICD-10-CM

## 2020-06-03 LAB
ALBUMIN UR-MCNC: NEGATIVE MG/DL
APPEARANCE UR: CLEAR
BASOPHILS # BLD AUTO: 0 10E9/L (ref 0–0.2)
BASOPHILS NFR BLD AUTO: 0.2 %
BILIRUB UR QL STRIP: NEGATIVE
COLOR UR AUTO: YELLOW
DIFFERENTIAL METHOD BLD: ABNORMAL
EOSINOPHIL # BLD AUTO: 0.2 10E9/L (ref 0–0.7)
EOSINOPHIL NFR BLD AUTO: 2.6 %
ERYTHROCYTE [DISTWIDTH] IN BLOOD BY AUTOMATED COUNT: 12.4 % (ref 10–15)
GLUCOSE UR STRIP-MCNC: NEGATIVE MG/DL
HCT VFR BLD AUTO: 37.9 % (ref 35–47)
HGB BLD-MCNC: 12.3 G/DL (ref 11.7–15.7)
HGB UR QL STRIP: NEGATIVE
KETONES UR STRIP-MCNC: NEGATIVE MG/DL
LEUKOCYTE ESTERASE UR QL STRIP: NEGATIVE
LYMPHOCYTES # BLD AUTO: 1 10E9/L (ref 0.8–5.3)
LYMPHOCYTES NFR BLD AUTO: 10.4 %
MCH RBC QN AUTO: 30.8 PG (ref 26.5–33)
MCHC RBC AUTO-ENTMCNC: 32.5 G/DL (ref 31.5–36.5)
MCV RBC AUTO: 95 FL (ref 78–100)
MONOCYTES # BLD AUTO: 1.4 10E9/L (ref 0–1.3)
MONOCYTES NFR BLD AUTO: 14.8 %
NEUTROPHILS # BLD AUTO: 6.7 10E9/L (ref 1.6–8.3)
NEUTROPHILS NFR BLD AUTO: 72 %
NITRATE UR QL: NEGATIVE
PH UR STRIP: 7 PH (ref 5–7)
PLATELET # BLD AUTO: 131 10E9/L (ref 150–450)
RBC # BLD AUTO: 3.99 10E12/L (ref 3.8–5.2)
SOURCE: NORMAL
SP GR UR STRIP: 1.01 (ref 1–1.03)
UROBILINOGEN UR STRIP-ACNC: 0.2 EU/DL (ref 0.2–1)
WBC # BLD AUTO: 9.3 10E9/L (ref 4–11)

## 2020-06-03 PROCEDURE — 99214 OFFICE O/P EST MOD 30 MIN: CPT | Mod: 95 | Performed by: FAMILY MEDICINE

## 2020-06-03 PROCEDURE — 81003 URINALYSIS AUTO W/O SCOPE: CPT | Performed by: FAMILY MEDICINE

## 2020-06-03 PROCEDURE — 82043 UR ALBUMIN QUANTITATIVE: CPT | Performed by: FAMILY MEDICINE

## 2020-06-03 PROCEDURE — 85025 COMPLETE CBC W/AUTO DIFF WBC: CPT | Performed by: FAMILY MEDICINE

## 2020-06-03 PROCEDURE — 80053 COMPREHEN METABOLIC PANEL: CPT | Performed by: FAMILY MEDICINE

## 2020-06-03 PROCEDURE — 36415 COLL VENOUS BLD VENIPUNCTURE: CPT | Performed by: FAMILY MEDICINE

## 2020-06-03 RX ORDER — HYDROCODONE BITARTRATE AND ACETAMINOPHEN 5; 325 MG/1; MG/1
1-2 TABLET ORAL EVERY 6 HOURS PRN
Qty: 140 TABLET | Refills: 0 | Status: CANCELLED | OUTPATIENT
Start: 2020-07-15

## 2020-06-03 RX ORDER — HYDROCODONE BITARTRATE AND ACETAMINOPHEN 5; 325 MG/1; MG/1
1-2 TABLET ORAL EVERY 6 HOURS PRN
Qty: 140 TABLET | Refills: 0 | Status: CANCELLED | COMMUNITY
Start: 2020-08-15

## 2020-06-03 RX ORDER — HYDROCODONE BITARTRATE AND ACETAMINOPHEN 5; 325 MG/1; MG/1
1-2 TABLET ORAL EVERY 6 HOURS PRN
Qty: 140 TABLET | Refills: 0 | Status: CANCELLED | COMMUNITY
Start: 2020-01-01

## 2020-06-03 ASSESSMENT — MIFFLIN-ST. JEOR: SCORE: 915.77

## 2020-06-03 NOTE — TELEPHONE ENCOUNTER
Dr. Alfaro-Please review and may close encounter.    Consent to communicate with patient's daughter, Tawnay, on file.    Two labs are still in process.    Thank you!  ALAN RoseN, RN

## 2020-06-03 NOTE — PROGRESS NOTES
"Celestina Tejada is a 89 year old female who is being evaluated via a billable telephone visit.      The patient has been notified of following:     \"This telephone visit will be conducted via a call between you and your physician/provider. We have found that certain health care needs can be provided without the need for a physical exam.  This service lets us provide the care you need with a short phone conversation.  If a prescription is necessary we can send it directly to your pharmacy.  If lab work is needed we can place an order for that and you can then stop by our lab to have the test done at a later time.    Telephone visits are billed at different rates depending on your insurance coverage. During this emergency period, for some insurers they may be billed the same as an in-person visit.  Please reach out to your insurance provider with any questions.    If during the course of the call the physician/provider feels a telephone visit is not appropriate, you will not be charged for this service.\"    Patient has given verbal consent for Telephone visit?  Yes    What phone number would you like to be contacted at? [phone] daughter jessica phone    How would you like to obtain your AVS? Mail a copy    Subjective     Celestina Tejada is a 89 year old female who presents via phone visit today for the following health issues:    HPI  URINARY TRACT SYMPTOMS  Onset: past couple of days     Description:   Painful urination (Dysuria): no            Frequency: YES  Blood in urine (Hematuria): no   Delay in urine (Hesitency): no     Intensity: NA    Progression of Symptoms:     Accompanying Signs & Symptoms:  Fever/chills: no   Flank pain no, has longstanding back pain unchanged despite recent injection  Nausea and vomiting: no   Any vaginal symptoms: she has not mentioned it to her daughter   Abdominal/Pelvic Pain: no   She had an episode of memory difficulty two days ago, that has since resolved. For about 3 hours she " "did not know where she was. Back to normal yesterday and today. Called nurse line and told to go to the ER if symptoms occur again. Not lethargic, like she had been in the past with electrolyte imbalance.     History:   History of frequent UTI's: no   History of kidney stones: no   Sexually Active: no   Possibility of pregnancy: No    Precipitating factors:   Nothing     Therapies Tried and outcome: nothing          Still awaiting space to open up in an assisted living facility.     She says she has a sore back, nerve pain going down her back and down one leg like she has had for along time. Not worse. She felt funny the other day. She felt disoriented. She was in her own house and could not remember where Tawnya lived, which is just a building away from her. Then the next day (yesterday) she was fine and she is alright now. The day that she was disoriented, her daughter said she was not herself. She was only oriented to self that day. Said she \"felt odd\". Today, she is kind of low energy.Did not take extra or different medication. Just had a spine injection last week. They were going to take her to the ER, but then she was feeling better. Denies increase in cough, new shortness of breath, fever. Denies nausea, vomiting, diarrhea, constipation, abdominal pain. Just has the back pain, which seems unchanged. Does not feel the injection worked. Denies any vision changes, weakness, numbness, tingling, headaches. The spine injection just did not seem to work this time. She says that there is no local erythema, swelling, increased warmth or tenderness. The whole area is a little red because she was leaning on a heating pad. She thinks this area always hurts if she is leaning against it. She says \"I just feel like I've got the flu\", has malaise. Denies any dysuria, hematuria, hesitancy, urgency. No rashes. If it were more severe, Tawnya would have taken her to the ER, but they area worried about exposure to COVID and she " does not want to go to the ER. She is oriented today and up and moving around and does not feel any specific symptoms other than longstanding back pain. She denies taking any extra or different medication. Tawnya helps her with her medications. She does not eat a lot, but that is not different. Tawnya makes she she does eat. Tawnya has taken a leave of absence from work until she can get into assisted living. Had an opening at assisted living, but due to concern about risk of COVID, she did not move in.        Patient Active Problem List   Diagnosis     Osteopenia     Hypertension goal BP (blood pressure) < 140/90     Rheumatoid arthritis (H)     CKD (chronic kidney disease) stage 3, GFR 30-59 ml/min (H)     Hyperlipidemia with target LDL less than 100     Advanced directives, counseling/discussion     Osteoarthritis     Eczema     Peripheral edema     Hyponatremia with decreased serum osmolality     Chronic pain     Protein-calorie malnutrition (H)     Age-related osteoporosis without current pathological fracture     Past Surgical History:   Procedure Laterality Date     C APPENDECTOMY  1950     SURGICAL HISTORY OF -       2 normal vaginal births       Social History     Tobacco Use     Smoking status: Former Smoker     Packs/day: 0.50     Years: 20.00     Pack years: 10.00     Types: Cigarettes     Last attempt to quit: 2000     Years since quittin.7     Smokeless tobacco: Never Used   Substance Use Topics     Alcohol use: Yes     Comment: very occ,     Family History   Problem Relation Age of Onset     Heart Disease Mother         CHF     Respiratory Mother         lung disease     C.A.D. Sister         heart by-pass     Arthritis Sister      Family History Negative Brother      Family History Negative Daughter      Family History Negative Daughter          Current Outpatient Medications   Medication Sig Dispense Refill     acetaminophen (TYLENOL) 325 MG tablet Take 2 tablets (650 mg) by mouth 3 times  daily 100 tablet 0     atenolol (TENORMIN) 25 MG tablet Take 3 tablets (75 mg) by mouth daily 270 tablet 3     atorvastatin (LIPITOR) 20 MG tablet TAKE 1 TABLET(20 MG) BY MOUTH DAILY 90 tablet 0     atorvastatin (LIPITOR) 20 MG tablet TAKE 1 TABLET(20 MG) BY MOUTH DAILY 90 tablet 2     Ferrous Sulfate (IRON) 325 (65 Fe) MG tablet Take 1 tablet by mouth daily (with breakfast) 90 tablet 3     gabapentin (NEURONTIN) 300 MG capsule Take 1 capsule (300 mg) by mouth 3 times daily 270 capsule 3     [START ON 6/15/2020] HYDROcodone-acetaminophen (NORCO) 5-325 MG tablet Take 1-2 tablets by mouth every 6 hours as needed for moderate to severe pain 140 tablet 0     HYDROcodone-acetaminophen (NORCO) 5-325 MG tablet Take 1-2 tablets by mouth every 6 hours as needed for moderate to severe pain 140 tablet 0     HYDROcodone-acetaminophen (NORCO) 5-325 MG tablet Take 1-2 tablets by mouth every 6 hours as needed for moderate to severe pain 140 tablet 0     hydroxychloroquine (PLAQUENIL) 200 MG tablet Take 200 mg by mouth daily   3     NIFEdipine ER OSMOTIC (PROCARDIA XL) 90 MG 24 hr tablet TAKE 1 TABLET(90 MG) BY MOUTH DAILY 90 tablet 3     NIFEdipine ER OSMOTIC (PROCARDIA XL) 90 MG 24 hr tablet Take 1 tablet (90 mg) by mouth daily 90 tablet 3     senna-docusate (SENOKOT-S;PERICOLACE) 8.6-50 MG per tablet Take 2 tablets by mouth daily And 2 tabs daily prn       Allergies   Allergen Reactions     Hydrochlorothiazide      hyponatremia       Reviewed and updated as needed this visit by Provider         Review of Systems   Constitutional, HEENT, cardiovascular, pulmonary, gi and gu systems are negative, except as otherwise noted.       Objective   Reported vitals:  There were no vitals taken for this visit.   alert and no distress  PSYCH: Alert and oriented times 3; coherent speech, normal   rate and volume, able to articulate logical thoughts   RESP: No cough, no audible wheezing, able to talk in full sentences  Remainder of exam unable  to be completed due to telephone visits    Diagnostic Test Results:  Labs reviewed in Epic        Assessment/Plan:  1. Malaise  2. Confusion two days ago  3. Urinary frequency?  unclear etiology with uncertain prognosis, requires further workup   Her confusion has resolved. Continues to have some malaise without other symptoms.   Has history of electrolyte disturbance in the past with similar symptoms, but since she is feeling better did not want to go to the ER due to risk of COVID. No symptoms suggestive of infection. Otherwise feeling better today. Will check CMP and CMP. Will also check UA today, though initially she said some urinary frequency and then said no change in her urination--suspicion for UTI is low. She has been resistant to the idea of ER visit, but discussed if there is ANY worsening in her symptoms she needs to go to the ER right away and she does agree. Tawnya, her daughter, will continue to monitor pt for any changes as well.     - *UA reflex to Microscopic and Culture ([place] [facility] (except [place] and [place])    4. Osteoarthritis, unspecified osteoarthritis type, unspecified site  5. Other chronic pain  Chronic low back pain   Injection last week did not seem to help. Symptoms unchanged. Continues vicodin and gabapentin.   No clear evidence of infection at injection site. Some redness where she was leaning on a heating pad, but not local to the injection site and no increased tenderness or evidence of swelling.     6. Rheumatoid arthritis, involving unspecified site, unspecified rheumatoid factor presence (H)  Followed by rheumatology. No recent changes.       No follow-ups on file.      Phone call duration:  18 minutes    Majo Alfaro MD

## 2020-06-03 NOTE — TELEPHONE ENCOUNTER
jessica alejandro says she would like the calls about pts labs or other problems if they need to contact anyone

## 2020-06-04 ENCOUNTER — TELEPHONE (OUTPATIENT)
Dept: FAMILY MEDICINE | Facility: CLINIC | Age: 85
End: 2020-06-04

## 2020-06-04 DIAGNOSIS — B34.9 VIRAL ILLNESS: ICD-10-CM

## 2020-06-04 DIAGNOSIS — R53.81 MALAISE: Primary | ICD-10-CM

## 2020-06-04 LAB
ALBUMIN SERPL-MCNC: 3.9 G/DL (ref 3.4–5)
ALP SERPL-CCNC: 214 U/L (ref 40–150)
ALT SERPL W P-5'-P-CCNC: 90 U/L (ref 0–50)
ANION GAP SERPL CALCULATED.3IONS-SCNC: 4 MMOL/L (ref 3–14)
AST SERPL W P-5'-P-CCNC: 70 U/L (ref 0–45)
BILIRUB SERPL-MCNC: 0.9 MG/DL (ref 0.2–1.3)
BUN SERPL-MCNC: 19 MG/DL (ref 7–30)
CALCIUM SERPL-MCNC: 8.8 MG/DL (ref 8.5–10.1)
CHLORIDE SERPL-SCNC: 103 MMOL/L (ref 94–109)
CO2 SERPL-SCNC: 26 MMOL/L (ref 20–32)
CREAT SERPL-MCNC: 1.01 MG/DL (ref 0.52–1.04)
CREAT UR-MCNC: 33 MG/DL
GFR SERPL CREATININE-BSD FRML MDRD: 49 ML/MIN/{1.73_M2}
GLUCOSE SERPL-MCNC: 88 MG/DL (ref 70–99)
MICROALBUMIN UR-MCNC: 8 MG/L
MICROALBUMIN/CREAT UR: 22.94 MG/G CR (ref 0–25)
POTASSIUM SERPL-SCNC: 4.7 MMOL/L (ref 3.4–5.3)
PROT SERPL-MCNC: 6.7 G/DL (ref 6.8–8.8)
SODIUM SERPL-SCNC: 133 MMOL/L (ref 133–144)

## 2020-06-04 NOTE — TELEPHONE ENCOUNTER
Dr. Alfaro-  1. Writer spoke with Tawnya who is in agreement with COVID-19 testing for patient.    A.  Order pended with instructions to scheduling team to call Tawnya for scheduling, as patient gets easily confused.  2. Patient scheduled for lab follow up on 6/10/20    Writer called Tawnya and had lengthy conversation reviewing Dr. Alfaro's message and answering Tawnya's questions.    Writer called patient, at Tawnya's request, to inform patient Dr. Alfaro would like some follow up labs completed and Tawyna will contact patient to review the plan.    Thank you!  DM Morgan, ALANN, RN

## 2020-06-04 NOTE — TELEPHONE ENCOUNTER
RN -- please call pt's daughter Tawnya about results. She does not have an electrolyte disturbance and her urine test was normal. There are a few abnormalities of unclear cause. She has mildly low platelets (not dangerously low) which can happen for a number of reasons inculding in the setting of a viral infection, mildly increased monocytes (can happen in infection and also with corticosteroids--could possibly be related to last week's back injection), and mildly elevated liver enzymes. I suspect she may have a viral illness and of course coronavirus is always on our list of possibilities now, though she has not had classic symptoms. I can place an order for a coronavirus test (they will call to schedule at local drive-up sight). I would like to recheck the same labs early next week CBC with platelets and diff, BMP and hepatic panel. I will wait on results from next week before adding hepatitis testing as an acute hepatitis would typically involve much higher elevations in liver enzymes and she has not had fever, jaundice, abdominal pain, nausea/vomiting or diarrea. I also want to make sure she is COVID negative before she is back in clinic for repeat labs. If this is a viral illness, I would expect her symptoms to improve on their own. Again, if she has any worsening symptoms at all I would recommend an ER visit. It should also be noted that Tawnya has then also been exposed if she does have viral illness/COVID and it would be best if they both wear masks when together, use good hand hygeine, do their best to social distance. Majo Alfaro M.D.           CBC RESULTS:   Recent Labs   Lab Test 06/03/20  1508   WBC 9.3   RBC 3.99   HGB 12.3   HCT 37.9   MCV 95   MCH 30.8   MCHC 32.5   RDW 12.4   *     Last Comprehensive Metabolic Panel:  Sodium   Date Value Ref Range Status   06/03/2020 133 133 - 144 mmol/L Final     Potassium   Date Value Ref Range Status   06/03/2020 4.7 3.4 - 5.3 mmol/L Final     Chloride    Date Value Ref Range Status   06/03/2020 103 94 - 109 mmol/L Final     Carbon Dioxide   Date Value Ref Range Status   06/03/2020 26 20 - 32 mmol/L Final     Anion Gap   Date Value Ref Range Status   06/03/2020 4 3 - 14 mmol/L Final     Glucose   Date Value Ref Range Status   06/03/2020 88 70 - 99 mg/dL Final     Urea Nitrogen   Date Value Ref Range Status   06/03/2020 19 7 - 30 mg/dL Final     Creatinine   Date Value Ref Range Status   06/03/2020 1.01 0.52 - 1.04 mg/dL Final     GFR Estimate   Date Value Ref Range Status   06/03/2020 49 (L) >60 mL/min/[1.73_m2] Final     Comment:     Non  GFR Calc  Starting 12/18/2018, serum creatinine based estimated GFR (eGFR) will be   calculated using the Chronic Kidney Disease Epidemiology Collaboration   (CKD-EPI) equation.       Calcium   Date Value Ref Range Status   06/03/2020 8.8 8.5 - 10.1 mg/dL Final     Bilirubin Total   Date Value Ref Range Status   06/03/2020 0.9 0.2 - 1.3 mg/dL Final     Alkaline Phosphatase   Date Value Ref Range Status   06/03/2020 214 (H) 40 - 150 U/L Final     ALT   Date Value Ref Range Status   06/03/2020 90 (H) 0 - 50 U/L Final     AST   Date Value Ref Range Status   06/03/2020 70 (H) 0 - 45 U/L Final

## 2020-06-05 ENCOUNTER — NURSE TRIAGE (OUTPATIENT)
Dept: NURSING | Facility: CLINIC | Age: 85
End: 2020-06-05

## 2020-06-05 NOTE — TELEPHONE ENCOUNTER
Daughter Tawnya calling to see when she would be getting a call back regarding Celestina's covid 19 testing, The order was placed yesterday 6/4 per MD/epic. I advised that someone from scheduling will contact her with a time and place for testing.  Samira Gar RN [facility]    COVID 19 Nurse Triage Plan/Patient Instructions    Please be aware that novel coronavirus (COVID-19) may be circulating in the community. If you develop symptoms such as fever, cough, or SOB or if you have concerns about the presence of another infection including coronavirus (COVID-19), please contact your health care provider or visit [email or web address].     Disposition/Instructions    Patient to stay at home and follow home care protocol based instructions.     Thank you for taking steps to prevent the spread of this virus.  o Limit your contact with others.  o Wear a simple mask to cover your cough.  o Wash your hands well and often.    Resources    [facility]: About COVID-19: [email or web address]    CDC: What to Do If You're Sick: www.cdc.gov/coronavirus/2019-ncov/about/steps-when-sick.html    CDC: Ending Home Isolation: www.cdc.gov/coronavirus/2019-ncov/hcp/disposition-in-home-patients.html     CDC: Caring for Someone: www.cdc.gov/coronavirus/2019-ncov/if-you-are-sick/care-for-someone.html     [facility]: Interim Guidance for Hospital Discharge to Home: www.health.[facility].[place]/diseases/coronavirus/hcp/hospdischarge.pdf    [facility] clinical trials (COVID-19 research studies): [email or web address].[email or web address]-clinical-trials     Below are the COVID-19 hotlines at the [place] Department of [facility] ([facility]). Interpreters are available.   o For health questions: Call 801-302-9642 or 1-419.889.4509 (7 a.m. to 7 p.m.)  o For questions about schools and childcare: Call 898-299-5482 or 1-742.428.1388 (7 a.m. to 7 p.m.)

## 2020-06-08 DIAGNOSIS — B34.9 VIRAL ILLNESS: ICD-10-CM

## 2020-06-08 PROCEDURE — 87635 SARS-COV-2 COVID-19 AMP PRB: CPT | Performed by: FAMILY MEDICINE

## 2020-06-08 PROCEDURE — 99207 ZZC NO BILLABLE SERVICE THIS VISIT: CPT

## 2020-06-09 LAB
SARS-COV-2 RNA SPEC QL NAA+PROBE: NOT DETECTED
SPECIMEN SOURCE: NORMAL

## 2020-06-10 DIAGNOSIS — R53.81 MALAISE: ICD-10-CM

## 2020-06-10 LAB
BASOPHILS # BLD AUTO: 0 10E9/L (ref 0–0.2)
BASOPHILS NFR BLD AUTO: 0.2 %
DIFFERENTIAL METHOD BLD: ABNORMAL
EOSINOPHIL # BLD AUTO: 0.2 10E9/L (ref 0–0.7)
EOSINOPHIL NFR BLD AUTO: 1.8 %
ERYTHROCYTE [DISTWIDTH] IN BLOOD BY AUTOMATED COUNT: 12.6 % (ref 10–15)
HCT VFR BLD AUTO: 36.6 % (ref 35–47)
HGB BLD-MCNC: 11.7 G/DL (ref 11.7–15.7)
LYMPHOCYTES # BLD AUTO: 1.1 10E9/L (ref 0.8–5.3)
LYMPHOCYTES NFR BLD AUTO: 13 %
MCH RBC QN AUTO: 31 PG (ref 26.5–33)
MCHC RBC AUTO-ENTMCNC: 32 G/DL (ref 31.5–36.5)
MCV RBC AUTO: 97 FL (ref 78–100)
MONOCYTES # BLD AUTO: 1.2 10E9/L (ref 0–1.3)
MONOCYTES NFR BLD AUTO: 15.1 %
NEUTROPHILS # BLD AUTO: 5.7 10E9/L (ref 1.6–8.3)
NEUTROPHILS NFR BLD AUTO: 69.9 %
PLATELET # BLD AUTO: 116 10E9/L (ref 150–450)
RBC # BLD AUTO: 3.77 10E12/L (ref 3.8–5.2)
WBC # BLD AUTO: 8.2 10E9/L (ref 4–11)

## 2020-06-10 PROCEDURE — 80076 HEPATIC FUNCTION PANEL: CPT | Performed by: FAMILY MEDICINE

## 2020-06-10 PROCEDURE — 80048 BASIC METABOLIC PNL TOTAL CA: CPT | Performed by: FAMILY MEDICINE

## 2020-06-10 PROCEDURE — 85025 COMPLETE CBC W/AUTO DIFF WBC: CPT | Performed by: FAMILY MEDICINE

## 2020-06-10 PROCEDURE — 36415 COLL VENOUS BLD VENIPUNCTURE: CPT | Performed by: FAMILY MEDICINE

## 2020-06-11 LAB
ALBUMIN SERPL-MCNC: 3.7 G/DL (ref 3.4–5)
ALP SERPL-CCNC: 212 U/L (ref 40–150)
ALT SERPL W P-5'-P-CCNC: 98 U/L (ref 0–50)
ANION GAP SERPL CALCULATED.3IONS-SCNC: 8 MMOL/L (ref 3–14)
AST SERPL W P-5'-P-CCNC: 68 U/L (ref 0–45)
BILIRUB DIRECT SERPL-MCNC: 0.3 MG/DL (ref 0–0.2)
BILIRUB SERPL-MCNC: 0.9 MG/DL (ref 0.2–1.3)
BUN SERPL-MCNC: 15 MG/DL (ref 7–30)
CALCIUM SERPL-MCNC: 8.4 MG/DL (ref 8.5–10.1)
CHLORIDE SERPL-SCNC: 108 MMOL/L (ref 94–109)
CO2 SERPL-SCNC: 23 MMOL/L (ref 20–32)
CREAT SERPL-MCNC: 1.16 MG/DL (ref 0.52–1.04)
GFR SERPL CREATININE-BSD FRML MDRD: 42 ML/MIN/{1.73_M2}
GLUCOSE SERPL-MCNC: 98 MG/DL (ref 70–99)
POTASSIUM SERPL-SCNC: 4.4 MMOL/L (ref 3.4–5.3)
PROT SERPL-MCNC: 6.4 G/DL (ref 6.8–8.8)
SODIUM SERPL-SCNC: 139 MMOL/L (ref 133–144)

## 2020-06-15 ENCOUNTER — TRANSFERRED RECORDS (OUTPATIENT)
Dept: HEALTH INFORMATION MANAGEMENT | Facility: CLINIC | Age: 85
End: 2020-06-15

## 2020-06-15 ENCOUNTER — TELEPHONE (OUTPATIENT)
Dept: FAMILY MEDICINE | Facility: CLINIC | Age: 85
End: 2020-06-15

## 2020-06-15 DIAGNOSIS — Z11.59 ENCOUNTER FOR SCREENING FOR OTHER VIRAL DISEASES: ICD-10-CM

## 2020-06-15 DIAGNOSIS — R94.5 ABNORMAL RESULTS OF LIVER FUNCTION STUDIES: Primary | ICD-10-CM

## 2020-06-15 DIAGNOSIS — D69.6 THROMBOCYTOPENIA (H): ICD-10-CM

## 2020-06-15 LAB
ALT SERPL-CCNC: 145 IU/L (ref 5–35)
AST SERPL-CCNC: 106 U/L (ref 5–34)
CREAT SERPL-MCNC: 1.11 MG/DL (ref 0.5–1.3)

## 2020-06-15 NOTE — TELEPHONE ENCOUNTER
RN -- Please call daughter Tawnya regarding Celestina's results. See if the additional labs orders I have placed can be added on and if not, she will need to return for additional labs. Find out if there has been any change in or new symptoms. If she is declining at all, I do think she should be seen in the ER. Find out if any abdominal pain, nausea, vomiting, jaundice, fever, stool changes, bleeding. I have placed a referral for hematology and GI as well.     Recent Results (from the past 240 hour(s))   Symptomatic COVID-19 Virus (Coronavirus) by PCR    Collection Time: 06/08/20  3:34 PM    Specimen: Nasopharyngeal   Result Value Ref Range    COVID-19 Virus PCR to [facility] - Source Nasopharyngeal     COVID-19 Virus PCR to [facility] - Result Not Detected    CBC with platelets differential    Collection Time: 06/10/20  1:20 PM   Result Value Ref Range    WBC 8.2 4.0 - 11.0 10e9/L    RBC Count 3.77 (L) 3.8 - 5.2 10e12/L    Hemoglobin 11.7 11.7 - 15.7 g/dL    Hematocrit 36.6 35.0 - 47.0 %    MCV 97 78 - 100 fl    MCH 31.0 26.5 - 33.0 pg    MCHC 32.0 31.5 - 36.5 g/dL    RDW 12.6 10.0 - 15.0 %    Platelet Count 116 (L) 150 - 450 10e9/L    Diff Method Automated Method     % Neutrophils 69.9 %    % Lymphocytes 13.0 %    % Monocytes 15.1 %    % Eosinophils 1.8 %    % Basophils 0.2 %    Absolute Neutrophil 5.7 1.6 - 8.3 10e9/L    Absolute Lymphocytes 1.1 0.8 - 5.3 10e9/L    Absolute Monocytes 1.2 0.0 - 1.3 10e9/L    Absolute Eosinophils 0.2 0.0 - 0.7 10e9/L    Absolute Basophils 0.0 0.0 - 0.2 10e9/L   Hepatic panel    Collection Time: 06/10/20  1:20 PM   Result Value Ref Range    Bilirubin Direct 0.3 (H) 0.0 - 0.2 mg/dL    Bilirubin Total 0.9 0.2 - 1.3 mg/dL    Albumin 3.7 3.4 - 5.0 g/dL    Protein Total 6.4 (L) 6.8 - 8.8 g/dL    Alkaline Phosphatase 212 (H) 40 - 150 U/L    ALT 98 (H) 0 - 50 U/L    AST 68 (H) 0 - 45 U/L   Basic metabolic panel    Collection Time: 06/10/20  1:20 PM   Result Value Ref Range    Sodium 139 133 - 144  mmol/L    Potassium 4.4 3.4 - 5.3 mmol/L    Chloride 108 94 - 109 mmol/L    Carbon Dioxide 23 20 - 32 mmol/L    Anion Gap 8 3 - 14 mmol/L    Glucose 98 70 - 99 mg/dL    Urea Nitrogen 15 7 - 30 mg/dL    Creatinine 1.16 (H) 0.52 - 1.04 mg/dL    GFR Estimate 42 (L) >60 mL/min/[1.73_m2]    GFR Estimate If Black 48 (L) >60 mL/min/[1.73_m2]    Calcium 8.4 (L) 8.5 - 10.1 mg/dL

## 2020-06-15 NOTE — TELEPHONE ENCOUNTER
Per lab, patient will need to come back for additional lab draw.  When daughter calls back, please give her the message from Dr Alfaro as below.  Yuliet Banda RN

## 2020-06-15 NOTE — TELEPHONE ENCOUNTER
Gave message to lab asking them to see if labs can be added.  I am re-radha encounter until I hear back from lab so I can follow-up.  I am scheduled here tomorrow as well.    I left a message on daughter's cell phone to call the clinic and ask to speak to any RN.  I was calling to check patient status and let daughter know the lab results thus far.  Yuliet Banda, RN

## 2020-06-16 NOTE — TELEPHONE ENCOUNTER
Spoke to daughter Tawnya and they had gotten the message that additional lab draw is needed but not the rest of Dr Alfaro's message.  As far as Tawnya knows, mother is not having any of the symptoms listed, but she will check with her Mother later today.  Would like copies of the referrals left at the  to be picked up on Thursday when they come in for las.  Daughter is not where she can write down referral information right now.    Per daughter, patient had labs done by Rheumatologist and they also noted that the liver labs are off.  Daughter will bring number to fax labs to Rheumatology and have them fax labs to Dr Alfaro.    Referrals placed at .  Yuliet Banda RN

## 2020-06-18 DIAGNOSIS — D69.6 THROMBOCYTOPENIA (H): ICD-10-CM

## 2020-06-18 DIAGNOSIS — Z11.59 ENCOUNTER FOR SCREENING FOR OTHER VIRAL DISEASES: ICD-10-CM

## 2020-06-18 LAB
BASOPHILS # BLD AUTO: 0 10E9/L (ref 0–0.2)
BASOPHILS NFR BLD AUTO: 0.2 %
DIFFERENTIAL METHOD BLD: ABNORMAL
EOSINOPHIL # BLD AUTO: 0.2 10E9/L (ref 0–0.7)
EOSINOPHIL NFR BLD AUTO: 3 %
ERYTHROCYTE [DISTWIDTH] IN BLOOD BY AUTOMATED COUNT: 12.3 % (ref 10–15)
HCT VFR BLD AUTO: 36.5 % (ref 35–47)
HGB BLD-MCNC: 11.9 G/DL (ref 11.7–15.7)
LYMPHOCYTES # BLD AUTO: 0.8 10E9/L (ref 0.8–5.3)
LYMPHOCYTES NFR BLD AUTO: 13.5 %
MCH RBC QN AUTO: 31.1 PG (ref 26.5–33)
MCHC RBC AUTO-ENTMCNC: 32.6 G/DL (ref 31.5–36.5)
MCV RBC AUTO: 95 FL (ref 78–100)
MONOCYTES # BLD AUTO: 1 10E9/L (ref 0–1.3)
MONOCYTES NFR BLD AUTO: 16 %
NEUTROPHILS # BLD AUTO: 4 10E9/L (ref 1.6–8.3)
NEUTROPHILS NFR BLD AUTO: 67.3 %
PLATELET # BLD AUTO: 128 10E9/L (ref 150–450)
RBC # BLD AUTO: 3.83 10E12/L (ref 3.8–5.2)
RETICS # AUTO: 48.6 10E9/L (ref 25–95)
RETICS/RBC NFR AUTO: 1.2 % (ref 0.5–2)
WBC # BLD AUTO: 5.9 10E9/L (ref 4–11)

## 2020-06-18 PROCEDURE — 86704 HEP B CORE ANTIBODY TOTAL: CPT | Performed by: FAMILY MEDICINE

## 2020-06-18 PROCEDURE — 83550 IRON BINDING TEST: CPT | Performed by: FAMILY MEDICINE

## 2020-06-18 PROCEDURE — 83540 ASSAY OF IRON: CPT | Performed by: FAMILY MEDICINE

## 2020-06-18 PROCEDURE — 85025 COMPLETE CBC W/AUTO DIFF WBC: CPT | Performed by: FAMILY MEDICINE

## 2020-06-18 PROCEDURE — 86803 HEPATITIS C AB TEST: CPT | Performed by: FAMILY MEDICINE

## 2020-06-18 PROCEDURE — 82728 ASSAY OF FERRITIN: CPT | Performed by: FAMILY MEDICINE

## 2020-06-18 PROCEDURE — 86706 HEP B SURFACE ANTIBODY: CPT | Performed by: FAMILY MEDICINE

## 2020-06-18 PROCEDURE — 36415 COLL VENOUS BLD VENIPUNCTURE: CPT | Performed by: FAMILY MEDICINE

## 2020-06-18 PROCEDURE — 85045 AUTOMATED RETICULOCYTE COUNT: CPT | Performed by: FAMILY MEDICINE

## 2020-06-18 PROCEDURE — 82977 ASSAY OF GGT: CPT | Performed by: FAMILY MEDICINE

## 2020-06-18 PROCEDURE — 85060 BLOOD SMEAR INTERPRETATION: CPT | Performed by: FAMILY MEDICINE

## 2020-06-18 PROCEDURE — 87340 HEPATITIS B SURFACE AG IA: CPT | Performed by: FAMILY MEDICINE

## 2020-06-19 ENCOUNTER — TELEPHONE (OUTPATIENT)
Dept: FAMILY MEDICINE | Facility: CLINIC | Age: 85
End: 2020-06-19

## 2020-06-19 ENCOUNTER — PATIENT OUTREACH (OUTPATIENT)
Dept: ONCOLOGY | Facility: CLINIC | Age: 85
End: 2020-06-19

## 2020-06-19 DIAGNOSIS — R74.8 ELEVATED SERUM GAMMA-GLUTAMYL TRANSFERASE LEVEL: Primary | ICD-10-CM

## 2020-06-19 DIAGNOSIS — D69.6 THROMBOCYTOPENIA (H): ICD-10-CM

## 2020-06-19 DIAGNOSIS — R53.81 MALAISE: ICD-10-CM

## 2020-06-19 DIAGNOSIS — R94.5 ABNORMAL RESULTS OF LIVER FUNCTION STUDIES: ICD-10-CM

## 2020-06-19 LAB
COPATH REPORT: NORMAL
FERRITIN SERPL-MCNC: 410 NG/ML (ref 8–252)
GGT SERPL-CCNC: 1315 U/L (ref 0–40)
HBV CORE AB SERPL QL IA: NONREACTIVE
HBV SURFACE AB SERPL IA-ACNC: 0 M[IU]/ML
HBV SURFACE AG SERPL QL IA: NONREACTIVE
HCV AB SERPL QL IA: REACTIVE
IRON SATN MFR SERPL: 51 % (ref 15–46)
IRON SERPL-MCNC: 138 UG/DL (ref 35–180)
TIBC SERPL-MCNC: 273 UG/DL (ref 240–430)

## 2020-06-19 NOTE — TELEPHONE ENCOUNTER
I reviewed this updated result message with Tawnya.  She was very appreciated of this call.    She will make the abd ultrasound appt at [facility].  She is working on the GI appt.  Then will get the hemo/onc appt.    I did scheduled the 2 week f/u with PCP.  Tawnya says in person appt would be better with her mom's memory.  Appt made with Dr. Alfaro for 6/29/20.  DONTE Charles

## 2020-06-19 NOTE — PROGRESS NOTES
Helical Rim Advancement Flap Text: The defect edges were debeveled with a #15 blade scalpel. Given the location of the defect and the proximity to free margins (helical rim) a double helical rim advancement flap was deemed most appropriate. Using a sterile surgical marker, the appropriate advancement flaps were drawn incorporating the defect and placing the expected incisions between the helical rim and antihelix where possible. The area thus outlined was incised through and through with a #15 scalpel blade. With a skin hook and iris scissors, the flaps were gently and sharply undermined and freed up. New Patient Oncology Nurse Navigator Note     Referring provider:  Mjao Alfaro     Referring Clinic/Organization:  [facility] [place]     Referred to: Benign Hematology    Requested provider (if applicable): First available - did not specify     Referral Received: 06/15/20       Evaluation for : Low platelets      Clinical History (per Nurse review of records provided):      Noted low to low normal platelets as far back as 2013 per records in media tab    Abnormal liver labs     Clinical Assessment / Barriers to Care (Per Nurse):    Is process of moving to assisted living. Patient has memory issues   Daughter is involved in care    Records Location: Epic   Faxed - Media tab/Scanned     Records Needed:     none    Additional testing needed prior to consult:     None    PLAN:  Schedule any location, low platelet protocol, non urgent.  Call Daughter Tawnya [phone]  Due to visitor restrictions, will need to be Virtual visit      Melony Veloz LPN          Stage 8: Number Of Blocks?: 0 Consent (Near Eyelid Margin)/Introductory Paragraph: The rationale for Mohs was explained to the patient and consent was obtained. The risks, benefits and alternatives to therapy were discussed in detail. Specifically, the risks of ectropion or eyelid deformity, infection, scarring, bleeding, prolonged wound healing, incomplete removal, allergy to anesthesia, nerve injury and recurrence were addressed. Prior to the procedure, the treatment site was clearly identified and confirmed by the patient. All components of Universal Protocol/PAUSE Rule completed. W Plasty Text: The lesion was extirpated to the level of the fat with a #15 scalpel blade. Given the location of the defect, shape of the defect and the proximity to free margins a W-plasty was deemed most appropriate for repair. Using a sterile surgical marker, the appropriate transposition arms of the W-plasty were drawn incorporating the defect and placing the expected incisions within the relaxed skin tension lines where possible. The area thus outlined was incised deep to adipose tissue with a #15 scalpel blade. The skin margins were undermined to an appropriate distance in all directions utilizing iris scissors. The opposing transposition arms were then transposed into place in opposite direction and anchored with interrupted buried subcutaneous sutures. Quadrant Reporting?: no Surgeon/Pathologist Verbiage (Will Incorporate Name Of Surgeon From Intro If Not Blank): operated in two distinct and integrated capacities as the surgeon and pathologist. Surgical Defect Length In Cm (Optional): 1.1 Bcc Pigmented Histology Text: Functional melanocytes are scattered through the basal cell carcinoma tumor islands and there are numerous melanophages in the stroma Ftsg Text: The defect edges were debeveled with a #15 scalpel blade. Given the location of the defect, shape of the defect and the proximity to free margins a full thickness skin graft was deemed most appropriate. Using a sterile surgical marker, the primary defect shape was transferred to the donor site. The area thus outlined was incised deep to adipose tissue with a #15 scalpel blade. The harvested graft was then trimmed of adipose tissue until only dermis and epidermis was left. The skin margins of the secondary defect were undermined to an appropriate distance in all directions utilizing iris scissors. The secondary defect was closed with interrupted buried subcutaneous sutures. The skin edges were then re-apposed with running  sutures. The skin graft was then placed in the primary defect and oriented appropriately. Cartilage Graft Text: The defect edges were debeveled with a #15 scalpel blade. Given the location of the defect, shape of the defect, the fact the defect involved a full thickness cartilage defect a cartilage graft was deemed most appropriate. An appropriate donor site was identified, cleansed, and anesthetized. The cartilage graft was then harvested and transferred to the recipient site, oriented appropriately and then sutured into place. The secondary defect was then repaired using a primary closure. Initial Size Of Lesion: 1 Consent (Lip)/Introductory Paragraph: The rationale for Mohs was explained to the patient and consent was obtained. The risks, benefits and alternatives to therapy were discussed in detail. Specifically, the risks of lip deformity, changes in the oral aperture, infection, scarring, bleeding, prolonged wound healing, incomplete removal, allergy to anesthesia, nerve injury and recurrence were addressed. Prior to the procedure, the treatment site was clearly identified and confirmed by the patient. All components of Universal Protocol/PAUSE Rule completed. Mohs Rapid Report Verbiage: The area of clinically evident tumor was marked with skin marking ink and appropriately hatched. The initial incision was made following the Mohs approach through the skin. The specimen was taken to the lab, divided into the necessary number of pieces, chromacoded and processed according to the Mohs protocol. This was repeated in successive stages until a tumor free defect was achieved. Purse String (Simple) Text: Given the location of the defect and the characteristics of the surrounding skin a pursestring closure was deemed most appropriate. Undermining was performed circumfirentially around the surgical defect. A purstring suture was then placed and tightened. Melanoma In Situ Histology Text: Histologically, the lesion is asymmetrical, poorly circumscribed with architectural disturbance and marked cytological atypia Depth Of Tumor Invasion (For Histology): dermis Previous Accession (Optional): [id number] Consent 2/Introductory Paragraph: Mohs surgery was explained to the patient and consent was obtained. The risks, benefits and alternatives to therapy were discussed in detail. Specifically, the risks of infection, scarring, bleeding, prolonged wound healing, incomplete removal, allergy to anesthesia, nerve injury and recurrence were addressed. Prior to the procedure, the treatment site was clearly identified and confirmed by the patient. All components of Universal Protocol/PAUSE Rule completed. Island Pedicle Flap Text: The defect edges were debeveled with a #15 scalpel blade. Given the location of the defect, shape of the defect and the proximity to free margins an island pedicle advancement flap was deemed most appropriate. Using a sterile surgical marker, an appropriate advancement flap was drawn incorporating the defect, outlining the appropriate donor tissue and placing the expected incisions within the relaxed skin tension lines where possible. The area thus outlined was incised deep to adipose tissue with a #15 scalpel blade. The skin margins were undermined to an appropriate distance in all directions around the primary defect and laterally outward around the island pedicle utilizing iris scissors. There was minimal undermining beneath the pedicle flap. Area L Indication Text: Tumors in this location are included in Area L (trunk and extremities). Mohs surgery is indicated for larger tumors, or tumors with aggressive histologic features, in these anatomic locations. Advancement-Rotation Flap Text: The defect edges were debeveled with a #15 scalpel blade. Given the location of the defect, shape of the defect and the proximity to free margins an advancement-rotation flap was deemed most appropriate. Using a sterile surgical marker, an appropriate flap was drawn incorporating the defect and placing the expected incisions within the relaxed skin tension lines where possible. The area thus outlined was incised deep to adipose tissue with a #15 scalpel blade. The skin margins were undermined to an appropriate distance in all directions utilizing iris scissors. Display The Individual Mohs Indications As Separate Paragraphs: Yes Lazy S Complex Repair Preamble Text (Leave Blank If You Do Not Want): Extensive wide undermining was performed. Stage 3: Additional Anesthesia Type: 1% lidocaine with epinephrine and a 1:10 solution of 8.4% sodium bicarbonate Home Suture Removal Text: Patient was provided instructions on removing sutures and will remove their sutures at home. If they have any questions or difficulties they will call the office. Transposition Flap Text: After a lengthy discussion with the patient regarding the wound treatment reconstruction options available including but not limited to simple repair, intermediate repair, complex repair, adjacent tissue transfer, tissue graft as well as allowing the lesion to repair by secondary intention. It was determined that due to the defect location, complexity, and given indications; an adjacent tissue transfer (transposition flap) would be the most appropriate means for repair of the surgical defect as the defect extended through the skin into the subcutaneous tissues, and required rearrangement or transfer of adjacent tissue to repair the surgical wound. \\n\\n\\n\\n\\nThe defect edges were debeveled with a #15 scalpel blade. Given the location of the defect and the proximity to free margins a transposition flap was deemed most appropriate. Using a sterile surgical marker, an appropriate transposition flap was drawn incorporating the defect. The area thus outlined was incised deep to adipose tissue with a #15 scalpel blade. The skin margins were undermined to an appropriate distance in all directions utilizing iris scissors. Trilobed Flap Text: The defect edges were debeveled with a #15 scalpel blade. Given the location of the defect and the proximity to free margins a trilobed flap was deemed most appropriate. Using a sterile surgical marker, an appropriate trilobed flap drawn around the defect. The area thus outlined was incised deep to adipose tissue with a #15 scalpel blade. The skin margins were undermined to an appropriate distance in all directions utilizing iris scissors. Stage 5: Additional Anesthesia Type: 1% lidocaine with epinephrine Surgeon: Eugene Alvarenga M.D. S Plasty Text: Given the location and shape of the defect, and the orientation of relaxed skin tension lines, an S-plasty was deemed most appropriate for repair. Using a sterile surgical marker, the appropriate outline of the S-plasty was drawn, incorporating the defect and placing the expected incisions within the relaxed skin tension lines where possible. The area thus outlined was incised deep to adipose tissue with a #15 scalpel blade. The skin margins were undermined to an appropriate distance in all directions utilizing iris scissors. The skin flaps were advanced over the defect. The opposing margins were then approximated with interrupted buried subcutaneous sutures. Mastoid Interpolation Flap Text: A decision was made to reconstruct the defect utilizing an interpolation axial flap and a staged reconstruction. A telfa template was made of the defect. This telfa template was then used to outline the mastoid interpolation flap. The donor area for the pedicle flap was then injected with anesthesia. The flap was excised through the skin and subcutaneous tissue down to the layer of the underlying musculature. The pedicle flap was carefully excised within this deep plane to maintain its blood supply. The edges of the donor site were undermined. The donor site was closed in a primary fashion. The pedicle was then rotated into position and sutured. Once the tube was sutured into place, adequate blood supply was confirmed with blanching and refill. The pedicle was then wrapped with xeroform gauze and dressed appropriately with a telfa and gauze bandage to ensure continued blood supply and protect the attached pedicle. Bcc Infundibulocystic Histology Text: Beneath an irregular epidermis, there are small nodular masses  of basaloid neoplastic cells aggregating in a cystic formation with nuclear pleomorphism attached to the basal layer and surrounded by a loose fibrous stroma and mild inflammation in the dermis. The findings are typical for a basal cell carcinoma. Scc Acantholytic Histology Text: There are nests of squamous epithelial cells arising from the epidermis and extending into the dermis.  The malignant cells are demonstratic acantholysis Cheek-To-Nose Interpolation Flap Text: A decision was made to reconstruct the defect utilizing an interpolation axial flap and a staged reconstruction. A telfa template was made of the defect. This telfa template was then used to outline the Cheek-To-Nose Interpolation flap. The donor area for the pedicle flap was then injected with anesthesia. The flap was excised through the skin and subcutaneous tissue down to the layer of the underlying musculature. The interpolation flap was carefully excised within this deep plane to maintain its blood supply. The edges of the donor site were undermined. The donor site was closed in a primary fashion. The pedicle was then rotated into position and sutured. Once the tube was sutured into place, adequate blood supply was confirmed with blanching and refill. The pedicle was then wrapped with xeroform gauze and dressed appropriately with a telfa and gauze bandage to ensure continued blood supply and protect the attached pedicle. Crescentic Advancement Flap Text: The defect edges were debeveled with a #15 scalpel blade. Given the location of the defect and the proximity to free margins a crescentic advancement flap was deemed most appropriate. Using a sterile surgical marker, the appropriate advancement flap was drawn incorporating the defect and placing the expected incisions within the relaxed skin tension lines where possible. The area thus outlined was incised deep to adipose tissue with a #15 scalpel blade. The skin margins were undermined to an appropriate distance in all directions utilizing iris scissors. Referred To Asc For Closure Text (Leave Blank If You Do Not Want): After obtaining clear surgical margins the patient was sent to an [facility] for surgical repair. The patient understands they will receive post-surgical care and follow-up from the [facility] physician. Closure 4 Information: This tab is for additional flaps and grafts above and beyond our usual structured repairs. Please note if you enter information here it will not currently bill and you will need to add the billing information manually. Tumor Debulked?: scalpel Anesthesia Volume In Cc: 3 O-T Plasty Text: The defect edges were debeveled with a #15 scalpel blade. Given the location of the defect, shape of the defect and the proximity to free margins an O-T plasty was deemed most appropriate. Using a sterile surgical marker, an appropriate O-T plasty was drawn incorporating the defect and placing the expected incisions within the relaxed skin tension lines where possible. The area thus outlined was incised deep to adipose tissue with a #15 scalpel blade. The skin margins were undermined to an appropriate distance in all directions utilizing iris scissors. Eye Protection Verbiage: Before proceeding with the stage, a plastic scleral shield was inserted. The globe was anesthetized with a few drops of 1% lidocaine with 1:100,000 epinephrine. Then, an appropriate sized scleral shield was chosen and coated with lacrilube ointment. The shield was gently inserted and left in place for the duration of each stage. After the stage was completed, the shield was gently removed. Cheiloplasty (Complex) Text: A decision was made to reconstruct the defect with a  cheiloplasty. The defect was undermined extensively. Additional obicularis oris muscle was excised with a 15 blade scalpel. The defect was converted into a full thickness wedge to facilite a better cosmetic result. Small vessels were then tied off with 5-0 monocyrl. The obicularis oris, superficial fascia, adipose and dermis were then reapproximated. After the deeper layers were approximated the epidermis was reapproximated with particular care given to realign the vermillion border. Closure 3 Information: This tab is for additional flaps and grafts above and beyond our usual structured repairs.  Please note if you enter information here it will not currently bill and you will need to add the billing information manually. Flap Type: Advancement Flap (Single) Ear Wedge Repair Text: A wedge excision was completed by carrying down an excision through the full thickness of the ear and cartilage with an inward facing Burow's triangle. The wound was then closed in a layered fashion. Spiral Flap Text: The defect edges were debeveled with a #15 scalpel blade. Given the location of the defect, shape of the defect and the proximity to free margins a spiral flap was deemed most appropriate. Using a sterile surgical marker, an appropriate rotation flap was drawn incorporating the defect and placing the expected incisions within the relaxed skin tension lines where possible. The area thus outlined was incised deep to adipose tissue with a #15 scalpel blade. The skin margins were undermined to an appropriate distance in all directions utilizing iris scissors. Bilobed Flap Text: The defect edges were debeveled with a #15 scalpel blade. Given the location of the defect and the proximity to free margins a bilobe flap was deemed most appropriate. Using a sterile surgical marker, an appropriate bilobe flap drawn around the defect. The area thus outlined was incised deep to adipose tissue with a #15 scalpel blade. The skin margins were undermined to an appropriate distance in all directions utilizing iris scissors. Partial Purse String (Simple) Text: Given the location of the defect and the characteristics of the surrounding skin a simple purse string closure was deemed most appropriate. Undermining was performed circumfirentially around the surgical defect. A purse string suture was then placed and tightened. Wound tension only allowed a partial closure of the circular defect. Bcc  Morpheaform/Sclerosing Histology Text: Beneath an irregular epidermis, there are bizarrely shaped masses of basaloid neoplastic cells with nuclear pleomorphism attached to the basal layer and surrounded by a rapidly forming scar and mild inflammation in the dermis. The findings are typical for a basal cell carcinoma. Composite Graft Text: The defect edges were debeveled with a #15 scalpel blade. Given the location of the defect, shape of the defect, the proximity to free margins and the fact the defect was full thickness a composite graft was deemed most appropriate. The defect was outline and then transferred to the donor site. A full thickness graft was then excised from the donor site. The graft was then placed in the primary defect, oriented appropriately and then sutured into place. The secondary defect was then repaired using a primary closure. Area M Indication Text: Tumors in this location are included in Area M (cheek, forehead, scalp, neck, jawline and pretibial skin). Mohs surgery is indicated for tumors in these anatomic locations. Unna Boot Text: An Unna boot was placed to help immobilize the limb and facilitate more rapid healing. Complex Repair And Flap Additional Text (Will Appearing After The Standard Complex Repair Text): The complex repair was not sufficient to completely close the primary defect. The remaining additional defect was repaired with the flap mentioned below. Rhombic Flap Text: The defect edges were debeveled with a #15 scalpel blade. Given the location of the defect and the proximity to free margins a rhombic flap was deemed most appropriate. Using a sterile surgical marker, an appropriate rhombic flap was drawn incorporating the defect. The area thus outlined was incised deep to adipose tissue with a #15 scalpel blade. The skin margins were undermined to an appropriate distance in all directions utilizing iris scissors. Repair Performed By Another Provider Text (Leave Blank If You Do Not Want): After obtaining clear surgical margins the defect was repaired by another provider. Consent (Scalp)/Introductory Paragraph: The rationale for Mohs was explained to the patient and consent was obtained. The risks, benefits and alternatives to therapy were discussed in detail. Specifically, the risks of changes in hair growth pattern secondary to repair, infection, scarring, bleeding, prolonged wound healing, incomplete removal, allergy to anesthesia, nerve injury and recurrence were addressed. Prior to the procedure, the treatment site was clearly identified and confirmed by the patient. All components of Universal Protocol/PAUSE Rule completed. Double Island Pedicle Flap Text: The defect edges were debeveled with a #15 scalpel blade. Given the location of the defect, shape of the defect and the proximity to free margins a double island pedicle advancement flap was deemed most appropriate. Using a sterile surgical marker, an appropriate advancement flap was drawn incorporating the defect, outlining the appropriate donor tissue and placing the expected incisions within the relaxed skin tension lines where possible. The area thus outlined was incised deep to adipose tissue with a #15 scalpel blade. The skin margins were undermined to an appropriate distance in all directions around the primary defect and laterally outward around the island pedicle utilizing iris scissors. There was minimal undermining beneath the pedicle flap. Z Plasty Text: The lesion was extirpated to the level of the fat with a #15 scalpel blade. Given the location of the defect, shape of the defect and the proximity to free margins a Z-plasty was deemed most appropriate for repair. Using a sterile surgical marker, the appropriate transposition arms of the Z-plasty were drawn incorporating the defect and placing the expected incisions within the relaxed skin tension lines where possible. The area thus outlined was incised deep to adipose tissue with a #15 scalpel blade. The skin margins were undermined to an appropriate distance in all directions utilizing iris scissors. The opposing transposition arms were then transposed into place in opposite direction and anchored with interrupted buried subcutaneous sutures. Bilateral Helical Rim Advancement Flap Text: The defect edges were debeveled with a #15 blade scalpel. Given the location of the defect and the proximity to free margins (helical rim) a bilateral helical rim advancement flap was deemed most appropriate. Using a sterile surgical marker, the appropriate advancement flaps were drawn incorporating the defect and placing the expected incisions between the helical rim and antihelix where possible. The area thus outlined was incised through and through with a #15 scalpel blade. With a skin hook and iris scissors, the flaps were gently and sharply undermined and freed up. Bcc  Nodulocystic Histology Text: Beneath an irregular epidermis, there are small nodular masses of basaloid neoplastic cells aggregating in a cystic formation with nuclear pleomorphism attached to the basal layer and surrounded by a loose fibrous stroma and mild inflammation in the dermis. The findings are typical for a basal cell carcinoma. Graft Donor Site Dermal Sutures (Optional): 5-0 Monocryl Dermal Autograft Text: The defect edges were debeveled with a #15 scalpel blade. Given the location of the defect, shape of the defect and the proximity to free margins a dermal autograft was deemed most appropriate. Using a sterile surgical marker, the primary defect shape was transferred to the donor site. The area thus outlined was incised deep to adipose tissue with a #15 scalpel blade. The harvested graft was then trimmed of adipose and epidermal tissue until only dermis was left. The skin graft was then placed in the primary defect and oriented appropriately. Number Of Stages: 2 Inflammation Suggestive Of Cancer Camouflage Histology Text: There was a dense lymphocytic infiltrate which prevented adequate histologic evaluation of adjacent structures. Same Histology In Subsequent Stages Text: The pattern and morphology of the tumor is as described in the first stage. Scc Moderately Differentiated Histology Text: There are nests of squamous epithelial cells arising from the epidermis and extending into the dermis. The malignant cells are large with abundant eosinophilic cytoplasm and a large nucleus. Keratin pearls are also present. Medical Necessity Statement: Based on my medical judgement; after reviewing the pertinent pathology report, physical exam findings and the various treatment options for skin cancer treatment; standard excision and/or destruction technique methods are not clinically sufficient treatments options. To prevent the risk of compromising surgical cure and reconstruction; prompt microscopic examination of the surgical margins is necessary. Based on the given indication(s), maximum conservation of healthy tissue, and high cure rate, Mohs surgery is the most appropriate treatment for this cancer. Surgical Defect Length In Cm (Optional): 1.5 Epidermal Closure: simple interrupted Closure 2 Information: This tab is for additional flaps and grafts, including complex repair and grafts and complex repair and flaps. You can also specify a different location for the additional defect, if the location is the same you do not need to select a new one. We will insert the automated text for the repair you select below just as we do for solitary flaps and grafts. Please note that at this time if you select a location with a different insurance zone you will need to override the ICD10 and CPT if appropriate. Intermediate Repair Preamble Text (Leave Blank If You Do Not Want): Undermining was performed with blunt dissection. Localized Dermabrasion Text: The patient was draped in routine manner. Localized dermabrasion using 3 x 17 mm wire brush was performed in routine manner to papillary dermis. This spot dermabrasion is being performed to complete skin cancer reconstruction. It also will eliminate the other sun damaged precancerous cells that are known to be part of the regional effect of a lifetime's worth of sun exposure. This localized dermabrasion is therapeutic and should not be considered cosmetic in any regard. Repair Type: Flap Secondary Intention Text (Leave Blank If You Do Not Want): The defect will heal with secondary intention. Advancement Flap (Double) Text: The defect edges were debeveled with a #15 scalpel blade. Given the location of the defect and the proximity to free margins a double advancement flap was deemed most appropriate. Using a sterile surgical marker, the appropriate advancement flaps were drawn incorporating the defect and placing the expected incisions within the relaxed skin tension lines where possible. The area thus outlined was incised deep to adipose tissue with a #15 scalpel blade. The skin margins were undermined to an appropriate distance in all directions utilizing iris scissors. Scc Ka Subtype Histology Text: There is well-differentiated squamous epithelium with pleomorphism and masses of keratin. Mucosal Advancement Flap Text: Given the location of the defect, shape of the defect and the proximity to free margins a mucosal advancement flap was deemed most appropriate. Incisions were made with a 15 blade scalpel in the appropriate fashion along the cutaneous vermillion border and the mucosal lip. The remaining actinically damaged mucosal tissue was excised. The mucosal advancement flap was then elevated to the gingival sulcus with care taken to preserve the neurovascular structures and advanced into the primary defect. Care was taken to ensure that precise realignment of the vermillion border was achieved. O-T Advancement Flap Text: The defect edges were debeveled with a #15 scalpel blade. Given the location of the defect, shape of the defect and the proximity to free margins an O-T advancement flap was deemed most appropriate. Using a sterile surgical marker, an appropriate advancement flap was drawn incorporating the defect and placing the expected incisions within the relaxed skin tension lines where possible. The area thus outlined was incised deep to adipose tissue with a #15 scalpel blade. The skin margins were undermined to an appropriate distance in all directions utilizing iris scissors. Afx Histology Text: Bizarre multinucleated tumor cells in hypercellular, spindly stroma with frequent mitotic figures. There are also smaller fibroblastic cells with pleomorphism and angulated nuclei confined to the dermis. Keystone Flap Text: The defect edges were debeveled with a #15 scalpel blade. Given the location of the defect, shape of the defect a keystone flap was deemed most appropriate. Using a sterile surgical marker, an appropriate keystone flap was drawn incorporating the defect, outlining the appropriate donor tissue and placing the expected incisions within the relaxed skin tension lines where possible. The area thus outlined was incised deep to adipose tissue with a #15 scalpel blade. The skin margins were undermined to an appropriate distance in all directions around the primary defect and laterally outward around the flap utilizing iris scissors. Deep Sutures: 4-0 Monocryl Mohs Histo Method Verbiage: Each section was then chromacoded and processed in the Mohs lab using the Mohs protocol and submitted for frozen section. Island Pedicle Flap With Canthal Suspension Text: The defect edges were debeveled with a #15 scalpel blade. Given the location of the defect, shape of the defect and the proximity to free margins an island pedicle advancement flap was deemed most appropriate. Using a sterile surgical marker, an appropriate advancement flap was drawn incorporating the defect, outlining the appropriate donor tissue and placing the expected incisions within the relaxed skin tension lines where possible. The area thus outlined was incised deep to adipose tissue with a #15 scalpel blade. The skin margins were undermined to an appropriate distance in all directions around the primary defect and laterally outward around the island pedicle utilizing iris scissors. There was minimal undermining beneath the pedicle flap. A suspension suture was placed in the canthal tendon to prevent tension and prevent ectropion. Paramedian Forehead Flap Text: A decision was made to reconstruct the defect utilizing an interpolation axial flap and a staged reconstruction. A telfa template was made of the defect. This telfa template was then used to outline the paramedian forehead pedicle flap. The donor area for the pedicle flap was then injected with anesthesia. The flap was excised through the skin and subcutaneous tissue down to the layer of the underlying musculature. The pedicle flap was carefully excised within this deep plane to maintain its blood supply. The edges of the donor site were undermined. The donor site was closed in a primary fashion. The pedicle was then rotated into position and sutured. Once the tube was sutured into place, adequate blood supply was confirmed with blanching and refill. The pedicle was then wrapped with xeroform gauze and dressed appropriately with a telfa and gauze bandage to ensure continued blood supply and protect the attached pedicle. Bcc Histology Text: Beneath an irregular epidermis, there are small nodular masses of basaloid neoplastic cells with nuclear pleomorphism attached to the basal layer and surrounded by a loose fibrous stroma and mild inflammation in the dermis. The findings are typical for a basal cell carcinoma. Consent (Temporal Branch)/Introductory Paragraph: The rationale for Mohs was explained to the patient and consent was obtained. The risks, benefits and alternatives to therapy were discussed in detail. Specifically, the risks of damage to the temporal branch of the facial nerve, infection, scarring, bleeding, prolonged wound healing, incomplete removal, allergy to anesthesia, and recurrence were addressed. Prior to the procedure, the treatment site was clearly identified and confirmed by the patient. All components of Universal Protocol/PAUSE Rule completed. H Plasty Text: Given the location of the defect, shape of the defect and the proximity to free margins a H-plasty was deemed most appropriate for repair. Using a sterile surgical marker, the appropriate advancement arms of the H-plasty were drawn incorporating the defect and placing the expected incisions within the relaxed skin tension lines where possible. The area thus outlined was incised deep to adipose tissue with a #15 scalpel blade. The skin margins were undermined to an appropriate distance in all directions utilizing iris scissors. The opposing advancement arms were then advanced into place in opposite direction and anchored with interrupted buried subcutaneous sutures. Referred To Oculoplastics For Closure Text (Leave Blank If You Do Not Want): After obtaining clear surgical margins the patient was sent to oculoplastics for surgical repair. The patient understands they will receive post-surgical care and follow-up from the referring physician's office. Location Indication Override (Is Already Calculated Based On Selected Body Location): Area H Consent Type: Consent 1 (Standard) Mauc Instructions: By selecting yes to the question below the [facility] number will be added into the note. This will be calculated automatically based on the diagnosis chosen, the size entered, the body zone selected (H,M,L) and the specific indications you chose. You will also have the option to override the Mohs AUC if you disagree with the automatically calculated number and this option is found in the Case Summary tab. Consent (Spinal Accessory)/Introductory Paragraph: The rationale for Mohs was explained to the patient and consent was obtained. The risks, benefits and alternatives to therapy were discussed in detail. Specifically, the risks of damage to the spinal accessory nerve, infection, scarring, bleeding, prolonged wound healing, incomplete removal, allergy to anesthesia, and recurrence were addressed. Prior to the procedure, the treatment site was clearly identified and confirmed by the patient. All components of Universal Protocol/PAUSE Rule completed. Repair Anesthesia Method: local infiltration Estimated Blood Loss (Cc): minimal Dorsal Nasal Flap Text: The defect edges were debeveled with a #15 scalpel blade. Given the location of the defect and the proximity to free margins a dorsal nasal flap was deemed most appropriate. Using a sterile surgical marker, an appropriate dorsal nasal flap was drawn around the defect. The area thus outlined was incised deep to adipose tissue with a #15 scalpel blade. The skin margins were undermined to an appropriate distance in all directions utilizing iris scissors. Hidradenocarcinoma Histology Text: On histologic exam, there are nodular, epithelioid, basaloid tumor cells with irregular infiltration of the surrounding dermis and foci of duct formation. Wound Care: Vaseline Interpolation Flap Text: A decision was made to reconstruct the defect utilizing an interpolation axial flap and a staged reconstruction. A telfa template was made of the defect. This telfa template was then used to outline the interpolation flap. The donor area for the pedicle flap was then injected with anesthesia. The flap was excised through the skin and subcutaneous tissue down to the layer of the underlying musculature. The interpolation flap was carefully excised within this deep plane to maintain its blood supply. The edges of the donor site were undermined. The donor site was closed in a primary fashion. The pedicle was then rotated into position and sutured. Once the tube was sutured into place, adequate blood supply was confirmed with blanching and refill. The pedicle was then wrapped with xeroform gauze and dressed appropriately with a telfa and gauze bandage to ensure continued blood supply and protect the attached pedicle. Post-Care Instructions: WOUND CARE INSTRUCTIONS\\n\\nI reviewed the post-care instructions with the patient in detail. \\n\\nKeep our initial bandage on and dry for 48-72 hours as directed. After 48-72 hours,\\n\\n1. Cleanse the wound with peroxide gently. If there is a lot of crusting/scabbing, soak the site with peroxide to soften. \\n\\n2. Apply a generous amount of Vaseline to the surgical site. DO NOT use Neosporin. \\n\\n3. Cover the wound with a dressing. You can use a non-stick pad with paper tape or regular bandages. \\n\\n4. Repeat twice daily, once in the morning, once in the evening. \\n\\n\\nPAIN PWSKUKW\\I\\I2. It is common to experience swelling, bruising, and drainage after surgery. To decrease pain, use extra strength Tylenol as directed on the bottle. Please do not use ibuprofen, Aleve, Motrin, or Excedrin as they may worsen bleeding. If Tylenol does not help with your pain, please call our office. Certain pain medications may require you to come to the office to  an actual paper prescription. Other intermediate (non-narcotic) strength pain medications may be called in for you if necessary. \\n\\n2. To decrease pain, patients can also try using an ice pack, a bag of frozen green peas, or a bag of frozen marshmellows. Place the ice pack on top of the bandage for 20-30 minutes, then take a break for 20-30 minutes (place the ice pack back in the freezer to get it cold again). Repeat as many times as necessary. \\n\\n\\nBLEEDING CONTROL\\n\\nIf bleeding should occur, apply firm direct pressure to the site for 30 minutes without easing up. If bleeding continues after 30 minutes, please call the office at any time. In rare instances, it may be necessary to go to the nearest emergency room. \\n\\n\\nMISCELLANEOUS INFORMATION\\n\\nThings to avoid while healing:\\n - NO heavy lifting, exercise, or swimming for the next 14 days. \\n - NO exposure to tap water for the next 48-72 hours. \\n - NO exposure to hot tub, swimming pool, or ocean water for the next 14 days. \\n\\n\\nCONTACT INFORMATION\\n\\nShould you have any questions or concerns, please call:\\n\\n1. [facility] Location = \\n - During business hours, Monday to Friday, 8AM to 5PM = [phone], please ask for Aram Marquez or a member of Dr. Drew Alvarado surgical team\\n - All other times (outside of business hours, weekends, holidays) = [phone] \\n\\n2.  [facility] Location = \\n - During business hours, Monday to Friday, 8AM to 5PM = [phone], please ask for [phone] or a member of Dr. Drew Alvarado surgical team\\n - All other times (outside of business hours, weekends, holidays) = [phone] Skin Substitute Text: The defect edges were debeveled with a #15 scalpel blade. Given the location of the defect, shape of the defect and the proximity to free margins a skin substitute graft was deemed most appropriate. The graft material was trimmed to fit the size of the defect. The graft was then placed in the primary defect and oriented appropriately. Bcc Infiltrative Histology Text: There were numerous aggregates of basaloid cells demonstrating an infiltrative pattern. Consent 3/Introductory Paragraph: I gave the patient a chance to ask questions they had about the procedure. Following this I explained the Mohs procedure and consent was obtained. The risks, benefits and alternatives to therapy were discussed in detail. Specifically, the risks of infection, scarring, bleeding, prolonged wound healing, incomplete removal, allergy to anesthesia, nerve injury and recurrence were addressed. Prior to the procedure, the treatment site was clearly identified and confirmed by the patient. All components of Universal Protocol/PAUSE Rule completed. Burow's Advancement Flap Text: The defect edges were debeveled with a #15 scalpel blade. Given the location of the defect and the proximity to free margins a Burow's advancement flap was deemed most appropriate. Using a sterile surgical marker, the appropriate advancement flap was drawn incorporating the defect and placing the expected incisions within the relaxed skin tension lines where possible. The area thus outlined was incised deep to adipose tissue with a #15 scalpel blade. The skin margins were undermined to an appropriate distance in all directions utilizing iris scissors. Mixed Nodular And Infiltrative Bcc Histology Text: There are narrow strands of spiky, irregular basal cell carcinoma cells infiltrating between collagen bundles with mucin-rich stroma O-Z Plasty Text: The defect edges were debeveled with a #15 scalpel blade. Given the location of the defect, shape of the defect and the proximity to free margins an O-Z plasty (double transposition flap) was deemed most appropriate. Using a sterile surgical marker, the appropriate transposition flaps were drawn incorporating the defect and placing the expected incisions within the relaxed skin tension lines where possible. The area thus outlined was incised deep to adipose tissue with a #15 scalpel blade. The skin margins were undermined to an appropriate distance in all directions utilizing iris scissors. Hemostasis was achieved with electrocautery. The flaps were then transposed into place, one clockwise and the other counterclockwise, and anchored with interrupted buried subcutaneous sutures. Posterior Auricular Interpolation Flap Text: A decision was made to reconstruct the defect utilizing an interpolation axial flap and a staged reconstruction. A telfa template was made of the defect. This telfa template was then used to outline the posterior auricular interpolation flap. The donor area for the pedicle flap was then injected with anesthesia. The flap was excised through the skin and subcutaneous tissue down to the layer of the underlying musculature. The pedicle flap was carefully excised within this deep plane to maintain its blood supply. The edges of the donor site were undermined. The donor site was closed in a primary fashion. The pedicle was then rotated into position and sutured. Once the tube was sutured into place, adequate blood supply was confirmed with blanching and refill. The pedicle was then wrapped with xeroform gauze and dressed appropriately with a telfa and gauze bandage to ensure continued blood supply and protect the attached pedicle. Melolabial Interpolation Flap Text: A decision was made to reconstruct the defect utilizing an interpolation axial flap and a staged reconstruction. A telfa template was made of the defect. This telfa template was then used to outline the melolabial interpolation flap. The donor area for the pedicle flap was then injected with anesthesia. The flap was excised through the skin and subcutaneous tissue down to the layer of the underlying musculature. The pedicle flap was carefully excised within this deep plane to maintain its blood supply. The edges of the donor site were undermined. The donor site was closed in a primary fashion. The pedicle was then rotated into position and sutured. Once the tube was sutured into place, adequate blood supply was confirmed with blanching and refill. The pedicle was then wrapped with xeroform gauze and dressed appropriately with a telfa and gauze bandage to ensure continued blood supply and protect the attached pedicle. No Repair - Repaired With Adjacent Surgical Defect Text (Leave Blank If You Do Not Want): After obtaining clear surgical margins the defect was repaired concurrently with another surgical defect which was in close approximation. Scc In Situ Histology Text: Atypia of the keratinocytes across the full thickness of the epidermis with loss of the granular layer and overlying zones of parakeratosis Split-Thickness Skin Graft Text: The defect edges were debeveled with a #15 scalpel blade. Given the location of the defect, shape of the defect and the proximity to free margins a split thickness skin graft was deemed most appropriate. Using a sterile surgical marker, the primary defect shape was transferred to the donor site. The split thickness graft was then harvested. The skin graft was then placed in the primary defect and oriented appropriately. Purse String (Intermediate) Text: Given the location of the defect and the characteristics of the surrounding skin a pursestring intermediate closure was deemed most appropriate. Undermining was performed circumfirentially around the surgical defect. A purstring suture was then placed and tightened. Repair Hemostasis (Optional): Electrodesiccation Scc Histology Text: There are nests of squamous epithelial cells arising from the epidermis and extending into the dermis. The malignant cells are large with abundant eosinophilic cytoplasm and a large vesicular nucleus. Epidermal Sutures: 4-0 Prolene Mohs Method Verbiage: An incision at a 45 degree angle following the standard Mohs approach was done and the specimen was harvested as a microscopic controlled layer. Bcc Superficial Histology Text: On the basal layer of an irregular epidermis, there are small nodular masses of basaloid neoplastic cells with nuclear pleomorphism attached to the basal layer and surrounded by a loose fibrous stroma and mild inflammation in the dermis. The findings are typical for a basal cell carcinoma. Hatchet Flap Text: The defect edges were debeveled with a #15 scalpel blade. Given the location of the defect, shape of the defect and the proximity to free margins a hatchet flap was deemed most appropriate. Using a sterile surgical marker, an appropriate hatchet flap was drawn incorporating the defect and placing the expected incisions within the relaxed skin tension lines where possible. The area thus outlined was incised deep to adipose tissue with a #15 scalpel blade. The skin margins were undermined to an appropriate distance in all directions utilizing iris scissors. Bcc  Nodular Histology Text: Nodular aggregates of basaloid cells with large, hyperchromatic, oval nuclei and little cytoplasm aligning densely in a palisade pattern at the periphery of the nest Consent (Ear)/Introductory Paragraph: The rationale for Mohs was explained to the patient and consent was obtained. The risks, benefits and alternatives to therapy were discussed in detail. Specifically, the risks of ear deformity, infection, scarring, bleeding, prolonged wound healing, incomplete removal, allergy to anesthesia, nerve injury and recurrence were addressed. Prior to the procedure, the treatment site was clearly identified and confirmed by the patient. All components of Universal Protocol/PAUSE Rule completed. Muscle Hinge Flap Text: The defect edges were debeveled with a #15 scalpel blade. Given the size, depth and location of the defect and the proximity to free margins a muscle hinge flap was deemed most appropriate. Using a sterile surgical marker, an appropriate hinge flap was drawn incorporating the defect. The area thus outlined was incised with a #15 scalpel blade. The skin margins were undermined to an appropriate distance in all directions utilizing iris scissors. Dressing: pressure dressing with telfa Referred To Plastics For Closure Text (Leave Blank If You Do Not Want): After obtaining clear surgical margins the patient was sent to plastics for surgical repair. The patient understands they will receive post-surgical care and follow-up from the referring physician's office. Graft Donor Site Epidermal Sutures (Optional): 6-0 Prolene V-Y Flap Text: The defect edges were debeveled with a #15 scalpel blade. Given the location of the defect, shape of the defect and the proximity to free margins a V-Y flap was deemed most appropriate. Using a sterile surgical marker, an appropriate advancement flap was drawn incorporating the defect and placing the expected incisions within the relaxed skin tension lines where possible. The area thus outlined was incised deep to adipose tissue with a #15 scalpel blade. The skin margins were undermined to an appropriate distance in all directions utilizing iris scissors. Referred To Mid-Level For Closure Text (Leave Blank If You Do Not Want): After obtaining clear surgical margins the patient was sent to a mid-level provider for surgical repair. The patient understands they will receive post-surgical care and follow-up from the mid-level provider. Body Location Override (Optional - Billing Will Still Be Based On Selected Body Map Location If Applicable): RIGHT Radial Dorsal Hand Date Of Previous Biopsy (Optional): 08/09/17 Consent (Nose)/Introductory Paragraph: The rationale for Mohs was explained to the patient and consent was obtained. The risks, benefits and alternatives to therapy were discussed in detail. Specifically, the risks of nasal deformity, changes in the flow of air through the nose, infection, scarring, bleeding, prolonged wound healing, incomplete removal, allergy to anesthesia, nerve injury and recurrence were addressed. Prior to the procedure, the treatment site was clearly identified and confirmed by the patient. All components of Universal Protocol/PAUSE Rule completed. Subsequent Stages Histo Method Verbiage: Using a similar technique to that described above, a thin layer of tissue was removed from all areas where tumor was visible on the previous stage. The tissue was again oriented, mapped, dyed, and processed as above. Alternatives Discussed Intro (Do Not Add Period): I discussed alternative treatments to Mohs surgery and specifically discussed the risks and benefits of Consent 1/Introductory Paragraph: Various treatment options for skin cancer treatment; including but not limited to no treatment, cryosurgery or cryotherapy, excision, radiation therapy, electrodessication and curettage, topical therapeutic agents and light therapy were discussed in depth with the patient. The rationale for Mohs was explained to the patient and consent was obtained. The risks, benefits and alternatives to therapy were discussed in detail. Specifically, the risks of infection, scarring, bleeding, prolonged wound healing, incomplete removal, allergy to anesthesia, nerve injury and recurrence were addressed. Prior to the procedure, the treatment site was clearly identified and confirmed by the patient and the patient agreed that Mohs surgery is the best treatment option for their lesion. No guarantees were made or implied; close follow-up was stressed out to the patient. All components of Universal Protocol/PAUSE Rule completed. Xenograft Text: The defect edges were debeveled with a #15 scalpel blade. Given the location of the defect, shape of the defect and the proximity to free margins a xenograft was deemed most appropriate. The graft was then trimmed to fit the size of the defect. The graft was then placed in the primary defect and oriented appropriately. Surgeon Performing Repair (Optional): Dr Jan Gusman Detail Level: Detailed Undermining Location (Optional): in the deep fat Ear Star Wedge Flap Text: The defect edges were debeveled with a #15 blade scalpel. Given the location of the defect and the proximity to free margins (helical rim) an ear star wedge flap was deemed most appropriate. Using a sterile surgical marker, the appropriate flap was drawn incorporating the defect and placing the expected incisions between the helical rim and antihelix where possible. The area thus outlined was incised through and through with a #15 scalpel blade. Island Pedicle Flap-Requiring Vessel Identification Text: The defect edges were debeveled with a #15 scalpel blade. Given the location of the defect, shape of the defect and the proximity to free margins an island pedicle advancement flap was deemed most appropriate. Using a sterile surgical marker, an appropriate advancement flap was drawn, based on the axial vessel mentioned above, incorporating the defect, outlining the appropriate donor tissue and placing the expected incisions within the relaxed skin tension lines where possible. The area thus outlined was incised deep to adipose tissue with a #15 scalpel blade. The skin margins were undermined to an appropriate distance in all directions around the primary defect and laterally outward around the island pedicle utilizing iris scissors. There was minimal undermining beneath the pedicle flap. Scc Poorly Differentiated Histology Text: There are nests of squamous epithelial cells arising from the epidermis and extending into the dermis. The malignant cells are poorly differentiated. Aggressive Histology Indication Override: Agressive Pathology Complex Repair And Graft Additional Text (Will Appearing After The Standard Complex Repair Text): The complex repair was not sufficient to completely close the primary defect. The remaining additional defect was repaired with the graft mentioned below. Suture Removal: 14 days A-T Advancement Flap Text: The defect edges were debeveled with a #15 scalpel blade. Given the location of the defect, shape of the defect and the proximity to free margins an A-T advancement flap was deemed most appropriate. Using a sterile surgical marker, an appropriate advancement flap was drawn incorporating the defect and placing the expected incisions within the relaxed skin tension lines where possible. The area thus outlined was incised deep to adipose tissue with a #15 scalpel blade. The skin margins were undermined to an appropriate distance in all directions utilizing iris scissors. Star Wedge Flap Text: The defect edges were debeveled with a #15 scalpel blade. Given the location of the defect, shape of the defect and the proximity to free margins a star wedge flap was deemed most appropriate. Using a sterile surgical marker, an appropriate rotation flap was drawn incorporating the defect and placing the expected incisions within the relaxed skin tension lines where possible. The area thus outlined was incised deep to adipose tissue with a #15 scalpel blade. The skin margins were undermined to an appropriate distance in all directions utilizing iris scissors. Mixed Superficial And Nodular Bcc Histology Text: On the basal layer of and beneath an irregular epidermis, there are small nodular masses of basaloid neoplastic cells with nuclear pleomorphism attached to the basal layer and surrounded by a loose fibrous stroma and mild inflammation in the dermis. The findings are typical for a basal cell carcinoma. Cheek Interpolation Flap Text: A decision was made to reconstruct the defect utilizing an interpolation axial flap and a staged reconstruction. A telfa template was made of the defect. This telfa template was then used to outline the Cheek Interpolation flap. The donor area for the pedicle flap was then injected with anesthesia. The flap was excised through the skin and subcutaneous tissue down to the layer of the underlying musculature. The interpolation flap was carefully excised within this deep plane to maintain its blood supply. The edges of the donor site were undermined. The donor site was closed in a primary fashion. The pedicle was then rotated into position and sutured. Once the tube was sutured into place, adequate blood supply was confirmed with blanching and refill. The pedicle was then wrapped with xeroform gauze and dressed appropriately with a telfa and gauze bandage to ensure continued blood supply and protect the attached pedicle. Manual Repair Warning Statement: We plan on removing the manually selected variable below in favor of our much easier automatic structured text blocks found in the previous tab. We decided to do this to help make the flow better and give you the full power of structured data. Manual selection is never going to be ideal in our platform and I would encourage you to avoid using manual selection from this point on, especially since I will be sunsetting this feature. It is important that you do one of two things with the customized text below. First, you can save all of the text in a word file so you can have it for future reference. Second, transfer the text to the appropriate area in the Library tab. Lastly, if there is a flap or graft type which we do not have you need to let us know right away so I can add it in before the variable is hidden. No need to panic, we plan to give you roughly 6 months to make the change. Alar Island Pedicle Flap Text: The defect edges were debeveled with a #15 scalpel blade. Given the location of the defect, shape of the defect and the proximity to the alar rim an island pedicle advancement flap was deemed most appropriate. Using a sterile surgical marker, an appropriate advancement flap was drawn incorporating the defect, outlining the appropriate donor tissue and placing the expected incisions within the nasal ala running parallel to the alar rim. The area thus outlined was incised with a #15 scalpel blade. The skin margins were undermined minimally to an appropriate distance in all directions around the primary defect and laterally outward around the island pedicle utilizing iris scissors. There was minimal undermining beneath the pedicle flap. Mixed Nodular And Micronodular Bcc Histology Text: Beneath an irregular epidermis, there are varying sizes of nodular masses of basaloid neoplastic cells with nuclear pleomorphism attached to the basal layer and surrounded by a loose fibrous stroma and mild inflammation in the dermis. The findings are typical for a basal cell carcinoma. Presence Of Scar Tissue (For Histology): absent Melolabial Transposition Flap Text: The defect edges were debeveled with a #15 scalpel blade. Given the location of the defect and the proximity to free margins a melolabial flap was deemed most appropriate. Using a sterile surgical marker, an appropriate melolabial transposition flap was drawn incorporating the defect. The area thus outlined was incised deep to adipose tissue with a #15 scalpel blade. The skin margins were undermined to an appropriate distance in all directions utilizing iris scissors. Tissue Cultured Epidermal Autograft Text: The defect edges were debeveled with a #15 scalpel blade. Given the location of the defect, shape of the defect and the proximity to free margins a tissue cultured epidermal autograft was deemed most appropriate. The graft was then trimmed to fit the size of the defect. The graft was then placed in the primary defect and oriented appropriately. Referred To Otolaryngology For Closure Text (Leave Blank If You Do Not Want): After obtaining clear surgical margins the patient was sent to otolaryngology for surgical repair. The patient understands they will receive post-surgical care and follow-up from the referring physician's office. Cheiloplasty (Less Than 50%) Text: A decision was made to reconstruct the defect with a  cheiloplasty. The defect was undermined extensively. Additional obicularis oris muscle was excised with a 15 blade scalpel. The defect was converted into a full thickness wedge, of less than 50% of the vertical height of the lip, to facilite a better cosmetic result. Small vessels were then tied off with 5-0 monocyrl. The obicularis oris, superficial fascia, adipose and dermis were then reapproximated. After the deeper layers were approximated the epidermis was reapproximated with particular care given to realign the vermillion border. Graft Basting Suture (Optional): 5-0 Fast Absorbing Gut V-Y Plasty Text: The defect edges were debeveled with a #15 scalpel blade. Given the location of the defect, shape of the defect and the proximity to free margins an V-Y advancement flap was deemed most appropriate. Using a sterile surgical marker, an appropriate advancement flap was drawn incorporating the defect and placing the expected incisions within the relaxed skin tension lines where possible. The area thus outlined was incised deep to adipose tissue with a #15 scalpel blade. The skin margins were undermined to an appropriate distance in all directions utilizing iris scissors. Modified Advancement Flap Text: The defect edges were debeveled with a #15 scalpel blade. Given the location of the defect, shape of the defect and the proximity to free margins a modified advancement flap was deemed most appropriate. Using a sterile surgical marker, an appropriate advancement flap was drawn incorporating the defect and placing the expected incisions within the relaxed skin tension lines where possible. The area thus outlined was incised deep to adipose tissue with a #15 scalpel blade. The skin margins were undermined to an appropriate distance in all directions utilizing iris scissors. O-L Flap Text: The defect edges were debeveled with a #15 scalpel blade. Given the location of the defect, shape of the defect and the proximity to free margins an O-L flap was deemed most appropriate. Using a sterile surgical marker, an appropriate advancement flap was drawn incorporating the defect and placing the expected incisions within the relaxed skin tension lines where possible. The area thus outlined was incised deep to adipose tissue with a #15 scalpel blade. The skin margins were undermined to an appropriate distance in all directions utilizing iris scissors. Rotation Flap Text: After a lengthy discussion with the patient regarding the wound treatment reconstruction options available including but not limited to simple repair, intermediate repair, complex repair, adjacent tissue transfer, tissue graft as well as allowing the lesion to repair by secondary intention. It was determined that due to the defect location, complexity, and given indications; an adjacent tissue transfer (rotation flap) would be the most appropriate means for repair of the surgical defect as the defect extended through the skin into the subcutaneous tissues, and required rearrangement or transfer of adjacent tissue to repair the surgical wound. \\n\\n\\n\\n\\nThe defect edges were debeveled with a #15 scalpel blade. Given the location of the defect, shape of the defect and the proximity to free margins a rotation flap was deemed most appropriate. Using a sterile surgical marker, an appropriate rotation flap was drawn incorporating the defect and placing the expected incisions within the relaxed skin tension lines where possible. The area thus outlined was incised deep to adipose tissue with a #15 scalpel blade. The skin margins were undermined to an appropriate distance in all directions utilizing iris scissors. Bilobed Transposition Flap Text: The defect edges were debeveled with a #15 scalpel blade. Given the location of the defect and the proximity to free margins a bilobed transposition flap was deemed most appropriate. Using a sterile surgical marker, an appropriate bilobe flap drawn around the defect. The area thus outlined was incised deep to adipose tissue with a #15 scalpel blade. The skin margins were undermined to an appropriate distance in all directions utilizing iris scissors. Advancement Flap (Single) Text: After a lengthy discussion with the patient regarding the wound treatment reconstruction options available including but not limited to simple repair, intermediate repair, complex repair, adjacent tissue transfer, tissue graft as well as allowing the lesion to repair by secondary intention. It was determined that due to the defect location, complexity, and given indications; an adjacent tissue transfer (advancement flap) would be the most appropriate means for repair of the surgical defect as the defect extended through the skin into the subcutaneous tissues, and required rearrangement or transfer of adjacent tissue to repair the surgical wound. \\n\\n\\n\\n\\n\\nThe defect edges were debeveled with a #15 scalpel blade. Given the location of the defect and the proximity to free margins a single advancement flap was deemed most appropriate. Using a sterile surgical marker, an appropriate advancement flap was drawn incorporating the defect and placing the expected incisions within the relaxed skin tension lines where possible. The area thus outlined was incised deep to adipose tissue with a #15 scalpel blade. The skin margins were undermined to an appropriate distance in all directions utilizing iris scissors. Postop Diagnosis: same Mohs Case Number: [id number]. 1500 [facility] Hemostasis: Electrocautery Tarsorrhaphy Text: A tarsorrhaphy was performed using Frost sutures. Bcc Micronodular Histology Text: Beneath an irregular epidermis, there are extremely small but infiltrative nodular masses of basaloid neoplastic cells with nuclear pleomorphism attached to the basal layer and surrounded by a loose fibrous stroma and mild inflammation in the dermis. The findings are typical for a basal cell carcinoma. Bi-Rhombic Flap Text: The defect edges were debeveled with a #15 scalpel blade. Given the location of the defect and the proximity to free margins a bi-rhombic flap was deemed most appropriate. Using a sterile surgical marker, an appropriate rhombic flap was drawn incorporating the defect. The area thus outlined was incised deep to adipose tissue with a #15 scalpel blade. The skin margins were undermined to an appropriate distance in all directions utilizing iris scissors. Partial Purse String (Intermediate) Text: Given the location of the defect and the characteristics of the surrounding skin an intermediate purse string closure was deemed most appropriate. Undermining was performed circumfirentially around the surgical defect. A purse string suture was then placed and tightened. Wound tension only allowed a partial closure of the circular defect. No Residual Tumor Seen Histology Text: There were no malignant cells seen in the sections examined. Full Thickness Lip Wedge Repair (Flap) Text: Given the location of the defect and the proximity to free margins a full thickness wedge repair was deemed most appropriate. Using a sterile surgical marker, the appropriate repair was drawn incorporating the defect and placing the expected incisions perpendicular to the vermillion border. The vermillion border was also meticulously outlined to ensure appropriate reapproximation during the repair. The area thus outlined was incised through and through with a #15 scalpel blade. The muscularis and dermis were reaproximated with deep sutures following hemostasis. Care was taken to realign the vermillion border before proceeding with the superficial closure. Once the vermillion was realigned the superfical and mucosal closure was finished. Referring Physician (Optional): Lonny Coreas PA-C Epidermal Autograft Text: The defect edges were debeveled with a #15 scalpel blade. Given the location of the defect, shape of the defect and the proximity to free margins an epidermal autograft was deemed most appropriate. Using a sterile surgical marker, the primary defect shape was transferred to the donor site. The epidermal graft was then harvested. The skin graft was then placed in the primary defect and oriented appropriately. Consent (Marginal Mandibular)/Introductory Paragraph: The rationale for Mohs was explained to the patient and consent was obtained. The risks, benefits and alternatives to therapy were discussed in detail. Specifically, the risks of damage to the marginal mandibular branch of the facial nerve, infection, scarring, bleeding, prolonged wound healing, incomplete removal, allergy to anesthesia, and recurrence were addressed. Prior to the procedure, the treatment site was clearly identified and confirmed by the patient. All components of Universal Protocol/PAUSE Rule completed. Area H Indication Text: Tumors in this location are included in Area H (eyelids, eyebrows, nose, lips, chin, ear, pre-auricular, post-auricular, temple, genitalia, hands, feet, ankles and areola). Tissue conservation is critical in these anatomic locations. Epidermal Closure Graft Donor Site (Optional): running

## 2020-06-19 NOTE — TELEPHONE ENCOUNTER
From result note-    Maria De Jesus Miller MD  P Hw Triage [place] [facility]               Results within acceptable limits.   Cbc stable to 2 weeks ago but new since 201.. has improved from 8 days ago. Could be viral in nature. Could be related to meds. If drops further may increase  Risk of bruising and bleeding.monitor for worsening. recheck cbc with dr Alfaro in 2 weeks      I reviewed this lab result with daughter, Tawnya.    1- Dr. Alfaro, will you order the CBC to be rechecked in 2 weeks. Is there any other f/u from telephone visit dated 6/3/20?  Pended lab.    2- Daughter felt lab did some labs for arthritic specialist. I directed pt to discuss with lab. The arthritic specialist is an outside provider. Told daughter to have arthritic specialty fax number and talk to lab department.    3- reviewed referral questions. Daughter will call both referrals, as she has not heard from them yet.  DONTE Charles

## 2020-06-19 NOTE — RESULT ENCOUNTER NOTE
Ferritin elevated and iron is within normal range.    Ferritin is an iron store but can also be elevated from taking supplemental iron and all from  chronic inflammatory conditions such as rheumatoid arthritis or from a viral infection.  Please discuss with your rheumatologist whether this needs further evaluation & can also discuss with PCP Dr Alfaro at a visit.

## 2020-06-19 NOTE — RESULT ENCOUNTER NOTE
-Hepatitis C antibody screening test was positive. This means you may have had a prior hepatitis C infection that has resolved, you have a current hepatitis C infection still or this may be a false positive result.  Further testing will be done on your blood sample.  I will let you know those results when they are complete.   -Hepatitis B antibody is negative and core, & surface antibody and antigen is negative suggesting he is not immune to hepatitis B.

## 2020-06-19 NOTE — TELEPHONE ENCOUNTER
Sorry more information here, regarding this patient.  Hepatitis B antibody is negative but not immune so could consider vaccination against this in the future but her hepatitis C antibody is positive please make sure the lab does eflex to PCR to confirm if HCVRNA is present in case this is a false positive. If truly positive she would have to see GI for this. Has she ever had hepatitis C before?

## 2020-06-19 NOTE — TELEPHONE ENCOUNTER
Has an extremely elevated, GTP level.  This can go up with liver disease, bile duct obstruction and/or alcohol.  Avoid all alcohol. ( which can also lower platelets too)   Go to the ER if jaundiced or in pain or declining.  Recommend ultrasound abdomen to see what is going on in the bile ducts.  May need an ERCP MRCP but that can be decided by a specialist.  Recommended GI consult  Follow-up with Dr. Alfaro in 2 weeks to discuss further plan & she can order/get additional labs as indicated including cbc as previously mentioned at that time.  Can close encounter when done

## 2020-06-19 NOTE — RESULT ENCOUNTER NOTE
Results within acceptable limits.   Cbc stable to 2 weeks ago but new since 201.. has improved from 8 days ago. Could be viral in nature. Could be related to meds. If drops further may increase  Risk of bruising and bleeding.monitor for worsening. recheck cbc with dr Alfaro in 2 weeks

## 2020-06-22 ENCOUNTER — TELEPHONE (OUTPATIENT)
Dept: FAMILY MEDICINE | Facility: CLINIC | Age: 85
End: 2020-06-22

## 2020-06-22 ENCOUNTER — HOSPITAL ENCOUNTER (OUTPATIENT)
Dept: ULTRASOUND IMAGING | Facility: CLINIC | Age: 85
Discharge: HOME | End: 2020-06-22
Attending: FAMILY MEDICINE | Admitting: FAMILY MEDICINE
Payer: COMMERCIAL

## 2020-06-22 ENCOUNTER — PRE VISIT (OUTPATIENT)
Dept: HEMATOLOGY | Facility: CLINIC | Age: 85
End: 2020-06-22

## 2020-06-22 DIAGNOSIS — R94.5 ABNORMAL RESULTS OF LIVER FUNCTION STUDIES: ICD-10-CM

## 2020-06-22 DIAGNOSIS — R74.8 ELEVATED SERUM GAMMA-GLUTAMYL TRANSFERASE LEVEL: Primary | ICD-10-CM

## 2020-06-22 DIAGNOSIS — R74.8 ELEVATED SERUM GAMMA-GLUTAMYL TRANSFERASE LEVEL: ICD-10-CM

## 2020-06-22 DIAGNOSIS — R76.8 HEPATITIS C ANTIBODY POSITIVE IN BLOOD: ICD-10-CM

## 2020-06-22 DIAGNOSIS — D69.6 THROMBOCYTOPENIA (H): ICD-10-CM

## 2020-06-22 DIAGNOSIS — R53.81 MALAISE: ICD-10-CM

## 2020-06-22 PROCEDURE — 76705 ECHO EXAM OF ABDOMEN: CPT

## 2020-06-22 NOTE — TELEPHONE ENCOUNTER
Dr. Miller- I checked with lab. You would need to order the HCV RNA.    I talked to pt's daughter, Tawnya.  No hepatology/ GI appt made yet. Tawnya left them a message last week.  She will call them again today.    Do you want to order the HIDA scan or should we check with daughter about timing of hepatology appt?    Ok to call daughter on her cell phone.  DONTE Charles

## 2020-06-22 NOTE — TELEPHONE ENCOUNTER
Left this detailed message on jessica's cell phone.  Left scheduling info for HIDA at [facility].  DONTE Charles

## 2020-06-22 NOTE — TELEPHONE ENCOUNTER
ONCOLOGY INTAKE: Records Information      APPT INFORMATION:  Referring provider:  Dr. Ivy   Referring provider s clinic:  [facility]  Reason for visit/diagnosis:  low platelet count  Has patient been notified of appointment date and time?: Yes    RECORDS INFORMATION:  Were the records received with the referral (via [employer])? No    Has patient been seen for any external appt for this diagnosis? No    If yes, where? N/A    Has patient had any imaging or procedures outside of [place] for this condition? No      If Yes, where? N/A    ADDITIONAL INFORMATION:  None.

## 2020-06-22 NOTE — TELEPHONE ENCOUNTER
I will put in hep c rna  I will order HIDA   They can decide to do depending on when can get in with gi soon

## 2020-06-22 NOTE — TELEPHONE ENCOUNTER
Dr. Miller-Please review dates of specialty follow up and may close encounter if no further action needed from triage.    Thank you!  ALAN RoseN, RN

## 2020-06-22 NOTE — TELEPHONE ENCOUNTER
Reason for Call:  Other appointment    Detailed comments: patient's scheduled appointments are:    1.  HIDA Procedure 6-24-20    2.  Heptology  6-25-20    3.  Oncology  7-16-20      [phone] - [place]    Best Time: any    Can we leave a detailed message on this number? YES    Call taken on 6/22/2020 at 2:44 PM by Emily Brown

## 2020-06-22 NOTE — TELEPHONE ENCOUNTER
Abnormal u/s gallbladder, sludge  recommended HIDA scan   Though if seeing hepatology/ GI soon can wait to decide what test to do in case they recommend other things  Was hep c rna ever done by lab as ab was positive?   When will she be seeing GI?

## 2020-06-23 NOTE — TELEPHONE ENCOUNTER
RECORDS RECEIVED FROM: Internal - abnormal liver function studies- appt per pt daughter   Appt Date: 06.25.2020   NOTES STATUS DETAILS   OFFICE NOTE from referring provider Internal 06.19.2020 Consult Maria De Jesus Miller MD [facility]   OFFICE NOTES from other specialists N/A    DISCHARGE SUMMARY from hospital N/A    MEDICATION LIST Internal    LIVER BIOSPY (IF APPLICABLE)      PATHOLOGY REPORTS  N/A    IMAGING     ENDOSCOPY (IF AVAILABLE) N/A    COLONOSCOPY (IF AVAILABLE) N/A    ULTRASOUND LIVER Internal 06.22.2020 US Abd limited   CT OF ABDOMEN N/A    MRI OF LIVER N/A    FIBROSCAN, US ELASTOGRAPHY, FIBROSIS SCAN, MR ELASTOGRAPHY N/A    LABS     HEPATIC PANEL (LIVER PANEL) Internal 06.10.2020   BASIC METABOLIC PANEL Internal 06.10.2020   COMPLETE METABOLIC PANEL Internal 06.18.2020   COMPLETE BLOOD COUNT (CBC) Internal 06.18.2020   INTERNATIONAL NORMALIZED RATIO (INR) Internal 04.30.2018   HEPATITIS C ANTIBODY Internal 06.18.2020   HEPATITIS C VIRAL LOAD/PCR N/A    HEPATITIS C GENOTYPE N/A    HEPATITIS B SURFACE ANTIGEN Internal 06.18.2020   HEPATITIS B SURFACE ANTIBODY Internal 06.18.2020   HEPATITIS B DNA QUANT LEVEL N/A    HEPATITIS B CORE ANTIBODY Internal 06.18.2020     .

## 2020-06-24 ENCOUNTER — HOSPITAL ENCOUNTER (OUTPATIENT)
Dept: NUCLEAR MEDICINE | Facility: CLINIC | Age: 85
Setting detail: NUCLEAR MEDICINE
Discharge: HOME | End: 2020-06-24
Attending: FAMILY MEDICINE | Admitting: FAMILY MEDICINE
Payer: COMMERCIAL

## 2020-06-24 ENCOUNTER — TELEPHONE (OUTPATIENT)
Dept: FAMILY MEDICINE | Facility: CLINIC | Age: 85
End: 2020-06-24

## 2020-06-24 DIAGNOSIS — R74.8 ELEVATED SERUM GAMMA-GLUTAMYL TRANSFERASE LEVEL: ICD-10-CM

## 2020-06-24 PROCEDURE — A9537 TC99M MEBROFENIN: HCPCS | Performed by: FAMILY MEDICINE

## 2020-06-24 PROCEDURE — 25000125 ZZHC RX 250: Performed by: RADIOLOGY

## 2020-06-24 PROCEDURE — 78226 HEPATOBILIARY SYSTEM IMAGING: CPT

## 2020-06-24 PROCEDURE — 34300033 ZZH RX 343: Performed by: FAMILY MEDICINE

## 2020-06-24 RX ORDER — KIT FOR THE PREPARATION OF TECHNETIUM TC 99M MEBROFENIN 45 MG/10ML
5 INJECTION, POWDER, LYOPHILIZED, FOR SOLUTION INTRAVENOUS ONCE
Status: COMPLETED | OUTPATIENT
Start: 2020-06-24 | End: 2020-06-24

## 2020-06-24 RX ORDER — MORPHINE SULFATE 2 MG/ML
2 INJECTION, SOLUTION INTRAMUSCULAR; INTRAVENOUS ONCE
Status: COMPLETED | OUTPATIENT
Start: 2020-06-24 | End: 2020-06-24

## 2020-06-24 RX ADMIN — MORPHINE SULFATE 2 MG: 2 INJECTION, SOLUTION INTRAMUSCULAR; INTRAVENOUS at 13:22

## 2020-06-24 RX ADMIN — MEBROFENIN 5.2 MILLICURIE: 45 INJECTION, POWDER, LYOPHILIZED, FOR SOLUTION INTRAVENOUS at 12:09

## 2020-06-24 NOTE — TELEPHONE ENCOUNTER
Spoke to dtr jessica  Explained results of hida  They have a virtual GI APPOINTMENT tomorrow  Pt denies red flag sx below  They do have follow up appointment with DR herrera on monday- advised to do lav only at that time for the hep c rna confirm test  She verbalized her understanding  Linda Bradford RN

## 2020-06-24 NOTE — TELEPHONE ENCOUNTER
HIDA scan showed an obstructed cystic duct of gall bladder.   If having pain , jaundiced or fever please go to the ER  Other wise keep apt with GI tomorrow to discuss this, the elevated liver tests likely related to this & possibility of HepC ( ab positive, RNA not back as not done) . Can do labs at apt with GI tomorrow & get done   Likely may need to see the surgeon regarding this obstruction  Can close encounter when done

## 2020-06-25 ENCOUNTER — VIRTUAL VISIT (OUTPATIENT)
Dept: GASTROENTEROLOGY | Facility: CLINIC | Age: 85
End: 2020-06-25
Attending: PHYSICIAN ASSISTANT
Payer: COMMERCIAL

## 2020-06-25 ENCOUNTER — PRE VISIT (OUTPATIENT)
Dept: GASTROENTEROLOGY | Facility: CLINIC | Age: 85
End: 2020-06-25

## 2020-06-25 DIAGNOSIS — R79.89 ELEVATED LFTS: Primary | ICD-10-CM

## 2020-06-25 DIAGNOSIS — K82.0 OBSTRUCTION OF CYSTIC DUCT: ICD-10-CM

## 2020-06-25 ASSESSMENT — PAIN SCALES - GENERAL: PAINLEVEL: NO PAIN (0)

## 2020-06-25 NOTE — PROGRESS NOTES
"Hepatology Clinic note  Celestina Tejada   Date of Birth 4/2/1931  Date of Service 6/25/2020    REASON FOR CONSULTATION: Elevated LFT's  REFERRING PROVIDER: Majo Alfaro MD          Assessment/plan:   Celestina Tejada is a 89 year old female with recent imaging showing \"Abnormal appearance to the gallbladder with gallbladder distention\" and HIDA scan showing obstructed cystic duct. Labs showing Alk Phos 212, ALT 98 and AST 68 and normal Tbili. She is asymptomatic with no abdominal pain, normal appetite, noo nausea or changes in stools. Her weight is down a bit, but no significant weight changes. Recommendations for further evaluation of gallbladder abnormalities with discussed. Patient states she does not wish to have any surgery or invasive procedure at her age.     # Gallbladder abnormalities with cystic duct obstruction on HIDA, asymptomatic:   - Will discuss recent imaging with Panc-Bili GI for recommendations for imaging MRI/MRCP vs ERCP.   ADDENDUM: Referral to [facility] to be placed.   - Discussed emergency precautions if she develops, abdominal pain, jaundice, fevers  # Positive HCV antibody. HCV RNA pending at this time. No risk factors.   # Follow-up in clinic based on above results     Cheri Clifton PA-C   [facility]     Case discussed with Dr. Olivarez  -----------------------------------------------------       HPI:   Celestina Tejada is a 89 year old female  presenting for the evaluation of elevated LFT's.     LFT's were first noticed to be abnormal three weeks ago when visiting her PCP for malaise and episode of confusion. Her labs were checked and her Alk Phos was 214, ALT 90 and AST 70. Normal Tbili 0.9. Her platelets have trended down over the past few years and are low.     She then went an US Abdomen Limited that showed \"Abnormal appearance to the gallbladder with gallbladder distention\" . She then had a HIDA scan on 6/24/20 that showed obstructed cystic duct. No other recent " abdominal imaging.     Patient's appetite has been stable. She continues to eat a few times a day, but notes that she has ever had a big appetite. No nausea, vomiting, abdominal pain. Today, she feels a lump in her right lower abdomen, this is new and does not cause pain. Her bowel movements have been normal. She has a formed BM every 1-2 days. She has lost a few lbs over the year, previously 118, now 112 lbs.    Patient denies jaundice, lower extremity edema, abdominal distension or confusion.  Patient also denies melena, hematochezia or hematemesis. Patient denies  fevers, sweats or chills.    PMH: HTN, CKD Stage 3, RA, iron deficiency anemia, osteoarthritis, hyperlipidemia, chronic cough     SMH: appendectomy     Medications:   See below    She quit smoking 30 years ago. No history of significant alcohol use. No previous IV/IN drug use. She previously worked as a . Patient currently lives independently at home with close help of her daughter who lives in a nearby building. No know family history of liver disease or liver cancer.     Previous work-up:  HCV antibody positive  HCV RNA  pending  HBV SAb 0.0 not immune  HBV SAg nonreactive  HBV CAb nonreactive  HIV:   ZAHRA:   F-actin  AMA:   Iron panel:   Ferritin: elevated 410   Iron Sats: 51%  TSH     Medical hx Surgical hx   Past Medical History:   Diagnosis Date     CKD (chronic kidney disease) stage 3, GFR 30-59 ml/min (H)      Hyperlipidemia LDL goal < 100      Hypertension goal BP (blood pressure) < 140/90      Osteoarthritis 2/9/2012     Osteopenia      Rheumatoid arthritis(714.0)     Past Surgical History:   Procedure Laterality Date     C APPENDECTOMY  1950     SURGICAL HISTORY OF -       2 normal vaginal births                 Medications:     Current Outpatient Medications   Medication     acetaminophen (TYLENOL) 325 MG tablet     atenolol (TENORMIN) 25 MG tablet     atorvastatin (LIPITOR) 20 MG tablet     Ferrous Sulfate (IRON) 325 (65 Fe) MG  tablet     gabapentin (NEURONTIN) 300 MG capsule     HYDROcodone-acetaminophen (NORCO) 5-325 MG tablet     HYDROcodone-acetaminophen (NORCO) 5-325 MG tablet     HYDROcodone-acetaminophen (NORCO) 5-325 MG tablet     hydroxychloroquine (PLAQUENIL) 200 MG tablet     NIFEdipine ER OSMOTIC (PROCARDIA XL) 90 MG 24 hr tablet     senna-docusate (SENOKOT-S;PERICOLACE) 8.6-50 MG per tablet     No current facility-administered medications for this visit.             Allergies:     Allergies   Allergen Reactions     Hydrochlorothiazide      hyponatremia            Social History:     Social History     Socioeconomic History     Marital status:      Spouse name: Not on file     Number of children: 2     Years of education: Not on file     Highest education level: Not on file   Occupational History     Not on file   Social Needs     Financial resource strain: Not on file     Food insecurity     Worry: Not on file     Inability: Not on file     Transportation needs     Medical: Not on file     Non-medical: Not on file   Tobacco Use     Smoking status: Former Smoker     Packs/day: 0.50     Years: 20.00     Pack years: 10.00     Types: Cigarettes     Last attempt to quit: 2000     Years since quittin.7     Smokeless tobacco: Never Used   Substance and Sexual Activity     Alcohol use: Yes     Comment: very occ,     Drug use: No     Sexual activity: Yes     Partners: Male   Lifestyle     Physical activity     Days per week: Not on file     Minutes per session: Not on file     Stress: Not on file   Relationships     Social connections     Talks on phone: Not on file     Gets together: Not on file     Attends [employer] service: Not on file     Active member of club or organization: Not on file     Attends meetings of clubs or organizations: Not on file     Relationship status: Not on file     Intimate partner violence     Fear of current or ex partner: Not on file     Emotionally abused: Not on file     Physically  abused: Not on file     Forced sexual activity: Not on file   Other Topics Concern      Service No     Blood Transfusions No     Caffeine Concern Not Asked     Occupational Exposure Not Asked     Hobby Hazards Not Asked     Sleep Concern Not Asked     Stress Concern Not Asked     Weight Concern Not Asked     Special Diet Not Asked     Back Care Not Asked     Exercise No     Bike Helmet Not Asked     Seat Belt Yes     Self-Exams Yes     Parent/sibling w/ CABG, MI or angioplasty before 65F 55M? Yes     Comment: go had cabg   Social History Narrative     Not on file            Family History:     Family History   Problem Relation Age of Onset     Heart Disease Mother         CHF     Respiratory Mother         lung disease     C.A.D. Sister         heart by-pass     Arthritis Sister      Family History Negative Brother      Family History Negative Daughter      Family History Negative Daughter               Review of Systems:   Gen: See HPI     HEENT: No change in vision or hearing, mouth sores, dysphagia, lymph nodes  Resp: No shortness of breath, coughing, hx of asthma  CV: No chest pain, palpitations, syncope   GI: See HPI  : No dysuria, history of stones, urine color    Skin: No rash; no pruritus or psoriasis  MS: No arthralgias, myalgias, joint swelling  Neuro: No memory changes, confusion, numbness    Heme: No difficulty clotting, bruising, bleeding  Psych:  No anxiety, depression, agitation          Physical Exam:     PSYCH: Alert and oriented times 3; coherent speech, normal   rate and volume, able to articulate logical thoughts, able   to abstract reason, no tangential thoughts, no hallucinations   or delusions  Her affect is normal  RESP: No cough, no audible wheezing, able to talk in full sentences  Remainder of exam unable to be completed due to telephone visits           Data:   Reviewed in person and significant for:    Lab Results   Component Value Date     06/10/2020      Lab Results    Component Value Date    POTASSIUM 4.4 06/10/2020     Lab Results   Component Value Date    CHLORIDE 108 06/10/2020     Lab Results   Component Value Date    CO2 23 06/10/2020     Lab Results   Component Value Date    BUN 15 06/10/2020     Lab Results   Component Value Date    CR 1.16 06/10/2020       Lab Results   Component Value Date    WBC 5.9 06/18/2020     Lab Results   Component Value Date    HGB 11.9 06/18/2020     Lab Results   Component Value Date    HCT 36.5 06/18/2020     Lab Results   Component Value Date    MCV 95 06/18/2020     Lab Results   Component Value Date     06/18/2020       Lab Results   Component Value Date    AST 68 06/10/2020     Lab Results   Component Value Date    ALT 98 06/10/2020     Lab Results   Component Value Date    BILICONJ 0.0 11/13/2008      Lab Results   Component Value Date    BILITOTAL 0.9 06/10/2020       Lab Results   Component Value Date    ALBUMIN 3.7 06/10/2020     Lab Results   Component Value Date    PROTTOTAL 6.4 06/10/2020      Lab Results   Component Value Date    ALKPHOS 212 06/10/2020       Lab Results   Component Value Date    INR 0.97 04/30/2018         Imaging:      US ABDOMEN LIMITED:   6/22/2020:   1.  Abnormal appearance to the gallbladder with gallbladder distention with ill-defined material filling the gallbladder lumen possibly representing sludge. Would be difficult to exclude an underlying solid lesion within the gallbladder. Gallbladder wall thickness at 5 mm.  Findings may be associated with chronic sludge and chronic  cholecystitis. Consider nuclear medicine hepatobiliary study for  further evaluation of gallbladder function and surgical consultation.  2.  No biliary dilatation.  3.  No hydronephrosis.  4.  Liver unremarkable.    HIDA   6/24/2020:   IMPRESSION: Obstructed cystic duct.

## 2020-06-25 NOTE — LETTER
"    6/25/2020         RE: Celestina Tejada  [address]  [facility] [address]        Dear Colleague,    Thank you for referring your patient, Celestina Tejada, to the [facility] HEPAT[facility]. Please see a copy of my visit note below.    Hepatology Clinic note  Celestina Tejada   Date of Birth 4/2/1931  Date of Service 6/25/2020    REASON FOR CONSULTATION: Elevated LFT's  REFERRING PROVIDER: Majo Alfaro MD          Assessment/plan:   Celestina Tejada is a 89 year old female with recent imaging showing \"Abnormal appearance to the gallbladder with gallbladder distention\" and HIDA scan showing obstructed cystic duct. Labs showing Alk Phos 212, ALT 98 and AST 68 and normal Tbili. She is asymptomatic with no abdominal pain, normal appetite, noo nausea or changes in stools. Her weight is down a bit, but no significant weight changes. Recommendations for further evaluation of gallbladder abnormalities with discussed. Patient states she does not wish to have any surgery or invasive procedure at her age.     # Gallbladder abnormalities with cystic duct obstruction on HIDA, asymptomatic:   - Will discuss recent imaging with Panc-Bili GI for recommendations for imaging MRI/MRCP vs ERCP  - Check CA-19, CEA   - Discussed emergency precautions if she develops, abdominal pain, jaundice, fevers  # Positive HCV antibody. HCV RNA pending at this time. No risk factors.   # Follow-up in clinic based on above results     Cheri Clifton PA-C   [facility] Hepat[facility]     -----------------------------------------------------       HPI:   Celestina Tejada is a 89 year old female  presenting for the evaluation of elevated LFT's.     LFT's were first noticed to be abnormal three weeks ago when visiting her PCP for malaise and episode of confusion. Her labs were checked and her Alk Phos was 214, ALT 90 and AST 70. Normal Tbili 0.9. Her platelets have trended down over the past few years and are low.     She then went an " "US Abdomen Limited that showed \"Abnormal appearance to the gallbladder with gallbladder distention\" . She then had a HIDA scan on 6/24/20 that showed obstructed cystic duct. No other recent abdominal imaging.     Patient's appetite has been stable. She continues to eat a few times a day, but notes that she has ever had a big appetite. No nausea, vomiting, abdominal pain. Today, she feels a lump in her right lower abdomen, this is new and does not cause pain. Her bowel movements have been normal. She has a formed BM every 1-2 days. She has lost a few lbs over the year, previously 118, now 112 lbs.    Patient denies jaundice, lower extremity edema, abdominal distension or confusion.  Patient also denies melena, hematochezia or hematemesis. Patient denies  fevers, sweats or chills.    PMH: HTN, CKD Stage 3, RA, iron deficiency anemia, osteoarthritis, hyperlipidemia, chronic cough     SMH: appendectomy     Medications:   See below    She quit smoking 30 years ago. No history of significant alcohol use. No previous IV/IN drug use. She previously worked as a . Patient currently lives independently at home with close help of her daughter who lives in a nearby building. No know family history of liver disease or liver cancer.     Previous work-up:  HCV antibody positive  HCV RNA  pending  HBV SAb 0.0 not immune  HBV SAg nonreactive  HBV CAb nonreactive  HIV:   ZAHRA:   F-actin  AMA:   Iron panel:   Ferritin: elevated 410   Iron Sats: 51%  TSH     Medical hx Surgical hx   Past Medical History:   Diagnosis Date     CKD (chronic kidney disease) stage 3, GFR 30-59 ml/min (H)      Hyperlipidemia LDL goal < 100      Hypertension goal BP (blood pressure) < 140/90      Osteoarthritis 2/9/2012     Osteopenia      Rheumatoid arthritis(714.0)     Past Surgical History:   Procedure Laterality Date     C APPENDECTOMY  1950     SURGICAL HISTORY OF -       2 normal vaginal births                 Medications:     Current Outpatient " Medications   Medication     acetaminophen (TYLENOL) 325 MG tablet     atenolol (TENORMIN) 25 MG tablet     atorvastatin (LIPITOR) 20 MG tablet     Ferrous Sulfate (IRON) 325 (65 Fe) MG tablet     gabapentin (NEURONTIN) 300 MG capsule     HYDROcodone-acetaminophen (NORCO) 5-325 MG tablet     HYDROcodone-acetaminophen (NORCO) 5-325 MG tablet     HYDROcodone-acetaminophen (NORCO) 5-325 MG tablet     hydroxychloroquine (PLAQUENIL) 200 MG tablet     NIFEdipine ER OSMOTIC (PROCARDIA XL) 90 MG 24 hr tablet     senna-docusate (SENOKOT-S;PERICOLACE) 8.6-50 MG per tablet     No current facility-administered medications for this visit.             Allergies:     Allergies   Allergen Reactions     Hydrochlorothiazide      hyponatremia            Social History:     Social History     Socioeconomic History     Marital status:      Spouse name: Not on file     Number of children: 2     Years of education: Not on file     Highest education level: Not on file   Occupational History     Not on file   Social Needs     Financial resource strain: Not on file     Food insecurity     Worry: Not on file     Inability: Not on file     Transportation needs     Medical: Not on file     Non-medical: Not on file   Tobacco Use     Smoking status: Former Smoker     Packs/day: 0.50     Years: 20.00     Pack years: 10.00     Types: Cigarettes     Last attempt to quit: 2000     Years since quittin.7     Smokeless tobacco: Never Used   Substance and Sexual Activity     Alcohol use: Yes     Comment: very occ,     Drug use: No     Sexual activity: Yes     Partners: Male   Lifestyle     Physical activity     Days per week: Not on file     Minutes per session: Not on file     Stress: Not on file   Relationships     Social connections     Talks on phone: Not on file     Gets together: Not on file     Attends Scientologist service: Not on file     Active member of club or organization: Not on file     Attends meetings of clubs or  organizations: Not on file     Relationship status: Not on file     Intimate partner violence     Fear of current or ex partner: Not on file     Emotionally abused: Not on file     Physically abused: Not on file     Forced sexual activity: Not on file   Other Topics Concern      Service No     Blood Transfusions No     Caffeine Concern Not Asked     Occupational Exposure Not Asked     Hobby Hazards Not Asked     Sleep Concern Not Asked     Stress Concern Not Asked     Weight Concern Not Asked     Special Diet Not Asked     Back Care Not Asked     Exercise No     Bike Helmet Not Asked     Seat Belt Yes     Self-Exams Yes     Parent/sibling w/ CABG, MI or angioplasty before 65F 55M? Yes     Comment: go had cabg   Social History Narrative     Not on file            Family History:     Family History   Problem Relation Age of Onset     Heart Disease Mother         CHF     Respiratory Mother         lung disease     C.A.D. Sister         heart by-pass     Arthritis Sister      Family History Negative Brother      Family History Negative Daughter      Family History Negative Daughter               Review of Systems:   Gen: See HPI     HEENT: No change in vision or hearing, mouth sores, dysphagia, lymph nodes  Resp: No shortness of breath, coughing, hx of asthma  CV: No chest pain, palpitations, syncope   GI: See HPI  : No dysuria, history of stones, urine color    Skin: No rash; no pruritus or psoriasis  MS: No arthralgias, myalgias, joint swelling  Neuro: No memory changes, confusion, numbness    Heme: No difficulty clotting, bruising, bleeding  Psych:  No anxiety, depression, agitation          Physical Exam:     PSYCH: Alert and oriented times 3; coherent speech, normal   rate and volume, able to articulate logical thoughts, able   to abstract reason, no tangential thoughts, no hallucinations   or delusions  Her affect is normal  RESP: No cough, no audible wheezing, able to talk in full  sentences  Remainder of exam unable to be completed due to telephone visits           Data:   Reviewed in person and significant for:    Lab Results   Component Value Date     06/10/2020      Lab Results   Component Value Date    POTASSIUM 4.4 06/10/2020     Lab Results   Component Value Date    CHLORIDE 108 06/10/2020     Lab Results   Component Value Date    CO2 23 06/10/2020     Lab Results   Component Value Date    BUN 15 06/10/2020     Lab Results   Component Value Date    CR 1.16 06/10/2020       Lab Results   Component Value Date    WBC 5.9 06/18/2020     Lab Results   Component Value Date    HGB 11.9 06/18/2020     Lab Results   Component Value Date    HCT 36.5 06/18/2020     Lab Results   Component Value Date    MCV 95 06/18/2020     Lab Results   Component Value Date     06/18/2020       Lab Results   Component Value Date    AST 68 06/10/2020     Lab Results   Component Value Date    ALT 98 06/10/2020     Lab Results   Component Value Date    BILICONJ 0.0 11/13/2008      Lab Results   Component Value Date    BILITOTAL 0.9 06/10/2020       Lab Results   Component Value Date    ALBUMIN 3.7 06/10/2020     Lab Results   Component Value Date    PROTTOTAL 6.4 06/10/2020      Lab Results   Component Value Date    ALKPHOS 212 06/10/2020       Lab Results   Component Value Date    INR 0.97 04/30/2018         Imaging:      US ABDOMEN LIMITED:   6/22/2020:   1.  Abnormal appearance to the gallbladder with gallbladder distention with ill-defined material filling the gallbladder lumen possibly representing sludge. Would be difficult to exclude an underlying solid lesion within the gallbladder. Gallbladder wall thickness at 5 mm.  Findings may be associated with chronic sludge and chronic  cholecystitis. Consider nuclear medicine hepatobiliary study for  further evaluation of gallbladder function and surgical consultation.  2.  No biliary dilatation.  3.  No hydronephrosis.  4.  Liver unremarkable.    FRANCIS  "  6/24/2020:   IMPRESSION: Obstructed cystic duct.        Celestina Tejada is a 89 year old female who is being evaluated via a billable telephone visit.      The patient has been notified of following:     \"This telephone visit will be conducted via a call between you and your physician/provider. We have found that certain health care needs can be provided without the need for a physical exam.  This service lets us provide the care you need with a short phone conversation.  If a prescription is necessary we can send it directly to your pharmacy.  If lab work is needed we can place an order for that and you can then stop by our lab to have the test done at a later time.    Telephone visits are billed at different rates depending on your insurance coverage. During this emergency period, for some insurers they may be billed the same as an in-person visit.  Please reach out to your insurance provider with any questions.    If during the course of the call the physician/provider feels a telephone visit is not appropriate, you will not be charged for this service.\"    Patient has given verbal consent for Telephone visit?  Yes    What phone number would you like to be contacted at? [phone]    How would you like to obtain your AVS? Mail a copy    Phone call duration: 26 minutes    Cheri Clifton PA-C      "

## 2020-06-25 NOTE — PROGRESS NOTES
"Celestina Tejada is a 89 year old female who is being evaluated via a billable telephone visit.      The patient has been notified of following:     \"This telephone visit will be conducted via a call between you and your physician/provider. We have found that certain health care needs can be provided without the need for a physical exam.  This service lets us provide the care you need with a short phone conversation.  If a prescription is necessary we can send it directly to your pharmacy.  If lab work is needed we can place an order for that and you can then stop by our lab to have the test done at a later time.    Telephone visits are billed at different rates depending on your insurance coverage. During this emergency period, for some insurers they may be billed the same as an in-person visit.  Please reach out to your insurance provider with any questions.    If during the course of the call the physician/provider feels a telephone visit is not appropriate, you will not be charged for this service.\"    Patient has given verbal consent for Telephone visit?  Yes    What phone number would you like to be contacted at? [phone]    How would you like to obtain your AVS? Mail a copy    Phone call duration: 26 minutes    Cheri Clifton PA-C      "

## 2020-06-29 ENCOUNTER — OFFICE VISIT (OUTPATIENT)
Dept: FAMILY MEDICINE | Facility: CLINIC | Age: 85
End: 2020-06-29
Payer: COMMERCIAL

## 2020-06-29 VITALS
SYSTOLIC BLOOD PRESSURE: 144 MMHG | HEART RATE: 67 BPM | TEMPERATURE: 97.9 F | WEIGHT: 113.4 LBS | BODY MASS INDEX: 20.09 KG/M2 | DIASTOLIC BLOOD PRESSURE: 69 MMHG | OXYGEN SATURATION: 96 %

## 2020-06-29 DIAGNOSIS — D69.6 THROMBOCYTOPENIA (H): ICD-10-CM

## 2020-06-29 DIAGNOSIS — R79.89 ELEVATED LFTS: Primary | ICD-10-CM

## 2020-06-29 DIAGNOSIS — G89.29 OTHER CHRONIC PAIN: ICD-10-CM

## 2020-06-29 DIAGNOSIS — K46.9 ABDOMINAL HERNIA WITHOUT OBSTRUCTION AND WITHOUT GANGRENE, RECURRENCE NOT SPECIFIED, UNSPECIFIED HERNIA TYPE: ICD-10-CM

## 2020-06-29 DIAGNOSIS — R76.8 HEPATITIS C ANTIBODY POSITIVE IN BLOOD: ICD-10-CM

## 2020-06-29 LAB
ERYTHROCYTE [DISTWIDTH] IN BLOOD BY AUTOMATED COUNT: 12.7 % (ref 10–15)
HCT VFR BLD AUTO: 40 % (ref 35–47)
HGB BLD-MCNC: 13 G/DL (ref 11.7–15.7)
MCH RBC QN AUTO: 31.3 PG (ref 26.5–33)
MCHC RBC AUTO-ENTMCNC: 32.5 G/DL (ref 31.5–36.5)
MCV RBC AUTO: 96 FL (ref 78–100)
PLATELET # BLD AUTO: 130 10E9/L (ref 150–450)
RBC # BLD AUTO: 4.16 10E12/L (ref 3.8–5.2)
WBC # BLD AUTO: 8.3 10E9/L (ref 4–11)

## 2020-06-29 PROCEDURE — 99214 OFFICE O/P EST MOD 30 MIN: CPT | Performed by: FAMILY MEDICINE

## 2020-06-29 PROCEDURE — 80076 HEPATIC FUNCTION PANEL: CPT | Performed by: FAMILY MEDICINE

## 2020-06-29 PROCEDURE — 87522 HEPATITIS C REVRS TRNSCRPJ: CPT | Performed by: FAMILY MEDICINE

## 2020-06-29 PROCEDURE — 85027 COMPLETE CBC AUTOMATED: CPT | Performed by: FAMILY MEDICINE

## 2020-06-29 PROCEDURE — 36415 COLL VENOUS BLD VENIPUNCTURE: CPT | Performed by: FAMILY MEDICINE

## 2020-06-29 RX ORDER — GABAPENTIN 300 MG/1
300 CAPSULE ORAL 3 TIMES DAILY
Qty: 270 CAPSULE | Refills: 3 | Status: ON HOLD | OUTPATIENT
Start: 2020-06-29 | End: 2021-01-01

## 2020-06-30 DIAGNOSIS — R79.89 ELEVATED LFTS: ICD-10-CM

## 2020-06-30 DIAGNOSIS — K82.0 OBSTRUCTION OF CYSTIC DUCT: Primary | ICD-10-CM

## 2020-06-30 LAB
ALBUMIN SERPL-MCNC: 4.1 G/DL (ref 3.4–5)
ALP SERPL-CCNC: 366 U/L (ref 40–150)
ALT SERPL W P-5'-P-CCNC: 182 U/L (ref 0–50)
AST SERPL W P-5'-P-CCNC: 142 U/L (ref 0–45)
BILIRUB DIRECT SERPL-MCNC: 0.4 MG/DL (ref 0–0.2)
BILIRUB SERPL-MCNC: 1.1 MG/DL (ref 0.2–1.3)
PROT SERPL-MCNC: 7.2 G/DL (ref 6.8–8.8)

## 2020-07-01 ENCOUNTER — TELEPHONE (OUTPATIENT)
Dept: GASTROENTEROLOGY | Facility: CLINIC | Age: 85
End: 2020-07-01

## 2020-07-01 DIAGNOSIS — K82.0 OBSTRUCTION OF CYSTIC DUCT: Primary | ICD-10-CM

## 2020-07-01 NOTE — TELEPHONE ENCOUNTER
"[facility]     Referring provider:     Cheri Clifton PA-C          Referred to: [facility]     Provider Requested:  NA     Referral Received: 7/1/2020     Records received: In EPIC     Images received: In EPIC    Evaluation for: Obstructed cystic duct, MRCP vs ERCP     Clinical History (per RN review):       See Full note in Epic    Celestina Tejada is a 89 year old female with recent imaging showing \"Abnormal appearance to the gallbladder with gallbladder distention\" and HIDA scan showing obstructed cystic duct. Labs showing Alk Phos 212, ALT 98 and AST 68 and normal Tbili. She is asymptomatic with no abdominal pain, normal appetite, noo nausea or changes in stools. Her weight is down a bit, but no significant weight changes. Recommendations for further evaluation of gallbladder abnormalities with discussed. Patient states she does not wish to have any surgery or invasive procedure at her age.      # Gallbladder abnormalities with cystic duct obstruction on HIDA, asymptomatic:   - Will discuss recent imaging with Panc-Bili GI for recommendations for imaging MRI/MRCP vs ERCP.   ADDENDUM: Referral to Panc-Bili to be placed.         MD review date: 7/1/2020  MD Decision for clinic consultation/Orders:            Referral updates/Patient contacted:     "

## 2020-07-02 ENCOUNTER — MYC MEDICAL ADVICE (OUTPATIENT)
Dept: FAMILY MEDICINE | Facility: CLINIC | Age: 85
End: 2020-07-02

## 2020-07-02 LAB
HCV RNA SERPL NAA+PROBE-ACNC: NORMAL [IU]/ML
HCV RNA SERPL NAA+PROBE-LOG IU: NORMAL LOG IU/ML

## 2020-07-02 NOTE — TELEPHONE ENCOUNTER
Dr Alfaro,    Please see comments from daughter  Are the notes from your office visit of 6/29 and the labs of that day suffiencient for her rheumatologist?    Di Hardin RN, BSN       (Consent to communicate on file for daughter)

## 2020-07-02 NOTE — TELEPHONE ENCOUNTER
Per Dr. Jiménez  MRI/MRCP likely followed by clinic visit     Called to discuss with patient. Daughter arranges her care, is in [place] now, needs her here to address her care.  Left our number for her to call our clinic back, patient had difficult time writing number down. Asked that I send letter regarding referral. Will send letter.    Lela Ornelas RN Care Coordinator

## 2020-07-02 NOTE — RESULT ENCOUNTER NOTE
Velma Tejada,  Your results came back for Hep C viral RNA.. If you have any further concerns please do not hesitate to contact us by message, phone or making an appointment.  Have a good day   Dr Paul WHITTINGTON

## 2020-07-06 NOTE — TELEPHONE ENCOUNTER
She likely also wants records from the hepatologist. Can we send those as well? Majo Alfaro M.D.

## 2020-07-10 DIAGNOSIS — K82.0 OBSTRUCTION OF CYSTIC DUCT: ICD-10-CM

## 2020-07-10 DIAGNOSIS — R79.89 ELEVATED LFTS: ICD-10-CM

## 2020-07-10 LAB
ALBUMIN SERPL-MCNC: 3.7 G/DL (ref 3.4–5)
ALP SERPL-CCNC: 334 U/L (ref 40–150)
ALT SERPL W P-5'-P-CCNC: 96 U/L (ref 0–50)
AST SERPL W P-5'-P-CCNC: 72 U/L (ref 0–45)
BILIRUB DIRECT SERPL-MCNC: 0.3 MG/DL (ref 0–0.2)
BILIRUB SERPL-MCNC: 0.9 MG/DL (ref 0.2–1.3)
PROT SERPL-MCNC: 6.6 G/DL (ref 6.8–8.8)

## 2020-07-10 PROCEDURE — 80076 HEPATIC FUNCTION PANEL: CPT | Performed by: INTERNAL MEDICINE

## 2020-07-10 PROCEDURE — 36415 COLL VENOUS BLD VENIPUNCTURE: CPT | Performed by: INTERNAL MEDICINE

## 2020-07-11 ENCOUNTER — HOSPITAL ENCOUNTER (OUTPATIENT)
Dept: MRI IMAGING | Facility: CLINIC | Age: 85
Discharge: HOME | End: 2020-07-11
Attending: INTERNAL MEDICINE | Admitting: INTERNAL MEDICINE
Payer: COMMERCIAL

## 2020-07-11 DIAGNOSIS — K82.0 OBSTRUCTION OF CYSTIC DUCT: ICD-10-CM

## 2020-07-11 PROCEDURE — A9585 GADOBUTROL INJECTION: HCPCS | Performed by: INTERNAL MEDICINE

## 2020-07-11 PROCEDURE — 25500064 ZZH RX 255 OP 636: Performed by: INTERNAL MEDICINE

## 2020-07-11 PROCEDURE — 74183 MRI ABD W/O CNTR FLWD CNTR: CPT

## 2020-07-11 RX ORDER — GADOBUTROL 604.72 MG/ML
10 INJECTION INTRAVENOUS ONCE
Status: COMPLETED | OUTPATIENT
Start: 2020-07-11 | End: 2020-07-11

## 2020-07-11 RX ADMIN — GADOBUTROL 10 ML: 604.72 INJECTION INTRAVENOUS at 13:47

## 2020-07-15 NOTE — TELEPHONE ENCOUNTER
RECORDS STATUS - ALL OTHER DIAGNOSIS      RECORDS RECEIVED FROM: [facility]   DATE RECEIVED: 7/22/2020    NOTES STATUS DETAILS   OFFICE NOTE from referring provider Majo Mcnulty MD    OFFICE NOTE from medical oncologist N/A    DISCHARGE SUMMARY from hospital N/A    DISCHARGE REPORT from the ER     OPERATIVE REPORT N/A    MEDICATION LIST Complete [facility]   CLINICAL TRIAL TREATMENTS TO DATE     LABS     PATHOLOGY REPORTS     ANYTHING RELATED TO DIAGNOSIS Complete Labs last updated on 7/10/2020    GENONOMIC TESTING     TYPE:     IMAGING (NEED IMAGES & REPORT)     CT SCANS     MRI     MAMMO     ULTRASOUND     PET

## 2020-07-16 ENCOUNTER — TELEPHONE (OUTPATIENT)
Dept: GASTROENTEROLOGY | Facility: CLINIC | Age: 85
End: 2020-07-16

## 2020-07-16 NOTE — TELEPHONE ENCOUNTER
Reviewed plan with with daughter- are awaiting feedback from surgery before making decisions  And will then reach out to discuss options in video visit. Daughter understanding of plan.    Lela Ornelas, RN Care Coordinator

## 2020-07-16 NOTE — TELEPHONE ENCOUNTER
Health Call Center    Phone Message    May a detailed message be left on voicemail: yes     Reason for Call: Requesting Results   Name/type of test: MRI  Date of test: 07/11/20  Was test done at a location other than [facility] (Please fill in the location if not [facility])?: No    NOTE: Patient's daughter, Tawnya, did receive letter about results; however, does not understand the letter.  Would like further information on understanding the results.   Thank you@      Action Taken: Message routed to:  [facility] Team     Travel Screening: Not Applicable

## 2020-07-20 ENCOUNTER — PATIENT OUTREACH (OUTPATIENT)
Dept: GASTROENTEROLOGY | Facility: CLINIC | Age: 85
End: 2020-07-20

## 2020-07-20 NOTE — TELEPHONE ENCOUNTER
Per Dr. Jiménez    Plan will be that I see her and we discuss gallbladder stent or perc doug should she become symptomatic     Called daughter Tawnya, clinic scheduled for 8/13.    Lela Ornelas RN Care Coordinator

## 2020-07-22 ENCOUNTER — VIRTUAL VISIT (OUTPATIENT)
Dept: ONCOLOGY | Facility: CLINIC | Age: 85
End: 2020-07-22
Attending: FAMILY MEDICINE
Payer: COMMERCIAL

## 2020-07-22 ENCOUNTER — PRE VISIT (OUTPATIENT)
Dept: ONCOLOGY | Facility: CLINIC | Age: 85
End: 2020-07-22

## 2020-07-22 DIAGNOSIS — D69.6 THROMBOCYTOPENIA (H): Primary | ICD-10-CM

## 2020-07-22 PROCEDURE — 40001009 ZZH VIDEO/TELEPHONE VISIT; NO CHARGE

## 2020-07-22 PROCEDURE — 99202 OFFICE O/P NEW SF 15 MIN: CPT | Mod: 95 | Performed by: INTERNAL MEDICINE

## 2020-07-22 NOTE — PROGRESS NOTES
"Celestina Tejada is a 89 year old female who is being evaluated via a billable video visit.      The patient has been notified of following:     \"This video visit will be conducted via a call between you and your physician/provider. We have found that certain health care needs can be provided without the need for an in-person physical exam.  This service lets us provide the care you need with a video conversation.  If a prescription is necessary we can send it directly to your pharmacy.  If lab work is needed we can place an order for that and you can then stop by our lab to have the test done at a later time.    Video visits are billed at different rates depending on your insurance coverage.  Please reach out to your insurance provider with any questions.    If during the course of the call the physician/provider feels a video visit is not appropriate, you will not be charged for this service.\"    Patient has given verbal consent for Video visit? Yes  How would you like to obtain your AVS? MyChart    Please text link to [phone]          Video-Visit Details    Type of service:  Video Visit    Originating Location (pt. Location): Home    Distant Location (provider location):  [facility]     Platform used for Video Visit: [facility]      "

## 2020-07-22 NOTE — PROGRESS NOTES
Visit Date:   07/22/2020      This consult has been requested by Dr. Majo Alfaro for thrombocytopenia.      Ms. Tejada is an 89-year-old female with mild thrombocytopenia.  Her labs are reviewed and summarized below.   1. On 06/27/2018, normal platelet of 242.   2. Multiple labs done on 06/03/2020.   -Hemoglobin of 12.3, WBC of 9.3 and platelet of 131.  Normal MCV.   -CMP revealed elevated liver function tests.   3. On 06/29/2020, platelet of 130.  Normal WBC and hemoglobin.      For elevated liver function tests, the patient has been seen by GI.  Ultrasound of the abdomen on 06/22/2020 revealed abnormal appearance of the gallbladder. Liver unremarkable.  HIDA scan on 06/24/2020 revealed obstructive cystic duct.  MRCP on 07/11/2020 revealed cholecystitis versus cystic duct obstruction.  No definite stone.      The patient denies any bleeding problem.  No bleeding from ear, nose or throat.  No blood in the stool.  No easy bruising.     The patient denies any previous history of blood disorder or cancer.      REVIEW OF SYSTEMS:  The patient said that overall she is doing well.  She has fatigue which she blames on her age.  She has some arthritic joint pain.      No headache.  No dizziness.  No chest pain.  No shortness of breath.  No abdominal pain, nausea or vomiting.  Appetite has been fairly good.  No urinary or bowel complaints.  No fever, chills or night sweats.      All other review of systems negative.      ALLERGIES:  REVIEWED.      MEDICATIONS:  Reviewed.      PAST MEDICAL HISTORY:   1. Rheumatoid arthritis.   2. Hypertension.   3. Hyperlipidemia.   4. Osteoarthritis.   5. Osteopenia.   6. Chronic kidney disease.   7. Appendectomy.      SOCIAL HISTORY:   -She quit smoking more than 25 years ago.   -No alcohol use.      PHYSICAL EXAMINATION:   GENERAL:  The patient was alert and oriented x 3.  She is not in any distress.  She looked comfortable.   RESPIRATORY:  No cough.  No labored breathing.   The rest of  her comprehensive physical examination is deferred due to [facility] emergency video visit restrictions.      ASSESSMENT:   1. An 89-year-old female with mild isolated thrombocytopenia.   2. Elevated LFT secondary to obstructed cystic duct.   3. Other medical problems including rheumatoid arthritis and osteoarthritis.      RECOMMENDATIONS:   1. I had a long discussion with the patient and her daughter over video visit.  Labs were all reviewed.  The patient has mild isolated thrombocytopenia.  WBC and hemoglobin have been normal.      Discussed regarding thrombocytopenia.  Different causes discussed.  Basic mechanism is decreased production or increased destruction. Thrombocytopenia could be due to primary bone marrow pathology like myelodysplastic syndrome.  The patient is 89.  MDS is common in this age group.  Unlikely to be immune thrombocytopenia.  ITP is not common in older people. The patient does have liver dysfunction.  Thrombocytopenia could be due to that. The patient does have rheumatoid arthritis.  She can have immune destruction of the platelet which can be seen with rheumatoid arthritis.      These were all discussed with her.  We discussed regarding further workup.  The patient does not want any further workup.  The patient mentioned that she is 89 years old.  She is feeling good.  She does not have any bleeding complications.  She does not want any further workup for thrombocytopenia.  As per her wishes, no further workup for thrombocytopenia.      2. Discussed regarding possible complications.  Bleeding and bruising starts when the platelet goes below 20.  The patient's platelet is around 130.  With that, I do not expect her to get any complications. The patient was advised to see a physician if she has bleeding from any site, easy bruising, red skin rash, black stool or worsening weakness.      3. The patient will follow-up with her primary care physician.  Advised her to have CBC checked every  1-2 months initially.  The patient was advised to call us with any questions.      Thanks for the consult.          JEAN CLAUDE RASHEED MD           D: 2020   T: 2020   MT:       Name:     HARDY CORRIGAN   MRN:      [id number]        Account:      [id number]   :      [id number]           Visit Date:   2020      Document: [id number]       cc: Majo Alfaro MD     Video visit for 20 minutes.

## 2020-07-22 NOTE — LETTER
"    7/22/2020         RE: Celestina Tejada  [address]  [facility] [address]        Dear Colleague,    Thank you for referring your patient, Celestina Tejada, to the [facility]. Please see a copy of my visit note below.    Celestina Tejada is a 89 year old female who is being evaluated via a billable video visit.      The patient has been notified of following:     \"This video visit will be conducted via a call between you and your physician/provider. We have found that certain health care needs can be provided without the need for an in-person physical exam.  This service lets us provide the care you need with a video conversation.  If a prescription is necessary we can send it directly to your pharmacy.  If lab work is needed we can place an order for that and you can then stop by our lab to have the test done at a later time.    Video visits are billed at different rates depending on your insurance coverage.  Please reach out to your insurance provider with any questions.    If during the course of the call the physician/provider feels a video visit is not appropriate, you will not be charged for this service.\"    Patient has given verbal consent for Video visit? Yes  How would you like to obtain your AVS? MyChart    Please text link to [phone]          Video-Visit Details    Type of service:  Video Visit    Originating Location (pt. Location): Home    Distant Location (provider location):  [facility]     Platform used for Video Visit: Alex Jenkins CMA        Video visit for 20 minutes.    Visit Date:   07/22/2020      REASON FOR CONSULTATION:  This consult has been requested by Dr. Majo Alfaro for thrombocytopenia.      Ms. Tejada is an 89-year-old female with mild thrombocytopenia.  Her labs are reviewed and summarized below.   1. On 06/27/2019, normal platelet of 242.   2. Multiple labs done on 06/06/2020.   -- Hemoglobin of 12.3, WBC of 9.3 " and platelet of 131.  Normal MCV.   -- CMP revealed elevated liver function tests.   3. On 06/29/2020, platelet of 130.  Normal WBC and hemoglobin.      For elevated liver function tests, the patient has been seen by GI.  Ultrasound of the abdomen on 06/22/2020 revealed abnormal appearance of the gallbladder, liver unremarkable.  HIDA scan on 06/24/2020 revealed obstructive cystic duct.  MRCP on 07/11/2020 revealed cholecystitis versus cystic duct obstruction.  No definite stone.      The patient denies any bleeding problem.  No bleeding from ear, nose, throat.  No blood in the stool.  No easy bruising.      REVIEW OF SYSTEMS:  The patient said that overall she is doing well.  She has fatigue which she blames on her age.  She has some arthritic joint pain.      No headache.  No dizziness.  No chest pain.  No shortness of breath.  No abdominal pain, nausea or vomiting.  Appetite has been fairly good.  No urinary or bowel complaints.  No fever, chills or night sweats.      All other review of systems negative.      The patient denies any previous history of blood disorder or cancer.      ALLERGIES:  REVIEWED.      MEDICATIONS:  Reviewed.      PAST MEDICAL HISTORY:   1. Rheumatoid arthritis.   2. Hypertension.   3. Hyperlipidemia.   4. Osteoarthritis.   5. Osteopenia.   6. Chronic kidney disease.   7. Appendectomy.      SOCIAL HISTORY:   -- She quit smoking more than 25 years ago.   -- No alcohol use.      PHYSICAL EXAMINATION:   GENERAL:  The patient was alert, oriented x 3.  She is not in any distress.  She looked comfortable.   RESPIRATORY:  No cough.  No labored breathing.   The rest of her comprehensive physical examination is deferred due to public health emergency video visit restrictions.      ASSESSMENT:   1. An 89-year-old female with mild isolated thrombocytopenia.   2. Elevated LFTs secondary to obstructed cystic duct.   3. Other medical problems including rheumatoid arthritis and osteoarthritis.       RECOMMENDATIONS:   1. I had a long discussion with the patient and her daughter over video visit.  Labs were all reviewed.  The patient has mild isolated thrombocytopenia.  WBC and hemoglobin have been normal.      Discussed regarding thrombocytopenia.  Different causes discussed.  Basic mechanism is decreased production or increased destruction.      Thrombocytopenia could be due to primary bone marrow pathology like myelodysplastic syndrome.  The patient is 89.  MGUS is common in this age group.  Unlikely to be immune thrombocytopenia.  ITP is not common in older people.      The patient does have liver dysfunction.  Thrombocytopenia could be due to that.      The patient does have rheumatoid arthritis.  She can have immune destruction of the platelet which can be seen with rheumatoid arthritis.      These were all discussed with her.  We discussed regarding further workup.  The patient does not want any further workup.  The patient mentioned that she is 89 years old.  She is feeling good.  She does not have any bleeding complications.  She does not want any further workup for thrombocytopenia.  As per her wishes, no further workup for thrombocytopenia.      2. Discussed regarding possible complications.  Bleeding and bruising starts an uptick when the platelet goes below 20.  The patient's platelet is around 130.  With that, I do not expect her to get any complications.      The patient was advised to see a physician if she has bleeding from any site, easy bruising, red skin rash, black stool or worsening weakness.      3. The patient will follow-up with her primary care physician.  Advised her to have CBC checked every 1-2 months initially.  The patient was advised to return to [facility] as needed.      Thanks for the consult.            JEAN CLAUDE RASHEED MD             D: 2020   T: 2020   MT:       Name:     HARDY CORRIGAN   MRN:      [id number]        Account:      [id number]   :       04/02/1931           Visit Date:   07/22/2020      Document: [id number]       cc: Majo Alfaro MD      Again, thank you for allowing me to participate in the care of your patient.        Sincerely,        Tila Kaur MD

## 2020-07-22 NOTE — LETTER
"    7/22/2020         RE: Celestina Tejada  [address]  [facility] [address]        Dear Colleague,    Thank you for referring your patient, Celestina Tejada, to the [facility]. Please see a copy of my visit note below.    Celestina Tejada is a 89 year old female who is being evaluated via a billable video visit.      The patient has been notified of following:     \"This video visit will be conducted via a call between you and your physician/provider. We have found that certain health care needs can be provided without the need for an in-person physical exam.  This service lets us provide the care you need with a video conversation.  If a prescription is necessary we can send it directly to your pharmacy.  If lab work is needed we can place an order for that and you can then stop by our lab to have the test done at a later time.    Video visits are billed at different rates depending on your insurance coverage.  Please reach out to your insurance provider with any questions.    If during the course of the call the physician/provider feels a video visit is not appropriate, you will not be charged for this service.\"    Patient has given verbal consent for Video visit? Yes  How would you like to obtain your AVS? MyChart    Please text link to [phone]          Video-Visit Details    Type of service:  Video Visit    Originating Location (pt. Location): Home    Distant Location (provider location):  [facility]     Platform used for Video Visit: Alex Jenkins CMA        Video visit for 20 minutes.    Visit Date:   07/22/2020      REASON FOR CONSULTATION:  This consult has been requested by Dr. Majo Alfaro for thrombocytopenia.      Ms. Tejada is an 89-year-old female with mild thrombocytopenia.  Her labs are reviewed and summarized below.   1. On 06/27/2019, normal platelet of 242.   2. Multiple labs done on 06/06/2020.   -- Hemoglobin of 12.3, WBC of 9.3 " and platelet of 131.  Normal MCV.   -- CMP revealed elevated liver function tests.   3. On 06/29/2020, platelet of 130.  Normal WBC and hemoglobin.      For elevated liver function tests, the patient has been seen by GI.  Ultrasound of the abdomen on 06/22/2020 revealed abnormal appearance of the gallbladder, liver unremarkable.  HIDA scan on 06/24/2020 revealed obstructive cystic duct.  MRCP on 07/11/2020 revealed cholecystitis versus cystic duct obstruction.  No definite stone.      The patient denies any bleeding problem.  No bleeding from ear, nose, throat.  No blood in the stool.  No easy bruising.      REVIEW OF SYSTEMS:  The patient said that overall she is doing well.  She has fatigue which she blames on her age.  She has some arthritic joint pain.      No headache.  No dizziness.  No chest pain.  No shortness of breath.  No abdominal pain, nausea or vomiting.  Appetite has been fairly good.  No urinary or bowel complaints.  No fever, chills or night sweats.      All other review of systems negative.      The patient denies any previous history of blood disorder or cancer.      ALLERGIES:  REVIEWED.      MEDICATIONS:  Reviewed.      PAST MEDICAL HISTORY:   1. Rheumatoid arthritis.   2. Hypertension.   3. Hyperlipidemia.   4. Osteoarthritis.   5. Osteopenia.   6. Chronic kidney disease.   7. Appendectomy.      SOCIAL HISTORY:   -- She quit smoking more than 25 years ago.   -- No alcohol use.      PHYSICAL EXAMINATION:   GENERAL:  The patient was alert, oriented x 3.  She is not in any distress.  She looked comfortable.   RESPIRATORY:  No cough.  No labored breathing.   The rest of her comprehensive physical examination is deferred due to public health emergency video visit restrictions.      ASSESSMENT:   1. An 89-year-old female with mild isolated thrombocytopenia.   2. Elevated LFTs secondary to obstructed cystic duct.   3. Other medical problems including rheumatoid arthritis and osteoarthritis.       RECOMMENDATIONS:   1. I had a long discussion with the patient and her daughter over video visit.  Labs were all reviewed.  The patient has mild isolated thrombocytopenia.  WBC and hemoglobin have been normal.      Discussed regarding thrombocytopenia.  Different causes discussed.  Basic mechanism is decreased production or increased destruction.      Thrombocytopenia could be due to primary bone marrow pathology like myelodysplastic syndrome.  The patient is 89.  MGUS is common in this age group.  Unlikely to be immune thrombocytopenia.  ITP is not common in older people.      The patient does have liver dysfunction.  Thrombocytopenia could be due to that.      The patient does have rheumatoid arthritis.  She can have immune destruction of the platelet which can be seen with rheumatoid arthritis.      These were all discussed with her.  We discussed regarding further workup.  The patient does not want any further workup.  The patient mentioned that she is 89 years old.  She is feeling good.  She does not have any bleeding complications.  She does not want any further workup for thrombocytopenia.  As per her wishes, no further workup for thrombocytopenia.      2. Discussed regarding possible complications.  Bleeding and bruising starts an uptick when the platelet goes below 20.  The patient's platelet is around 130.  With that, I do not expect her to get any complications.      The patient was advised to see a physician if she has bleeding from any site, easy bruising, red skin rash, black stool or worsening weakness.      3. The patient will follow-up with her primary care physician.  Advised her to have CBC checked every 1-2 months initially.  The patient was advised to return to [facility] Clinic as needed.      Thanks for the consult.            JEAN CLAUDE RASHEED MD             D: 2020   T: 2020   MT:       Name:     HARDY CORRIGAN   MRN:      [id number]        Account:      [id number]   :       04/02/1931           Visit Date:   07/22/2020      Document: [id number]       cc: Majo Alafro MD      Again, thank you for allowing me to participate in the care of your patient.        Sincerely,        Tila Kaur MD

## 2020-07-23 NOTE — PROGRESS NOTES
Visit Date:   07/22/2020      This consult has been requested by Dr. Majo Alfaro for thrombocytopenia.      Ms. Tejada is an 89-year-old female with mild thrombocytopenia.  Her labs are reviewed and summarized below.   1. On 06/27/2018, normal platelet of 242.   2. Multiple labs done on 06/03/2020.   -Hemoglobin of 12.3, WBC of 9.3 and platelet of 131.  Normal MCV.   -CMP revealed elevated liver function tests.   3. On 06/29/2020, platelet of 130.  Normal WBC and hemoglobin.      For elevated liver function tests, the patient has been seen by GI.  Ultrasound of the abdomen on 06/22/2020 revealed abnormal appearance of the gallbladder. Liver unremarkable.  HIDA scan on 06/24/2020 revealed obstructive cystic duct.  MRCP on 07/11/2020 revealed cholecystitis versus cystic duct obstruction.  No definite stone.      The patient denies any bleeding problem.  No bleeding from ear, nose or throat.  No blood in the stool.  No easy bruising.     The patient denies any previous history of blood disorder or cancer.      REVIEW OF SYSTEMS:  The patient said that overall she is doing well.  She has fatigue which she blames on her age.  She has some arthritic joint pain.      No headache.  No dizziness.  No chest pain.  No shortness of breath.  No abdominal pain, nausea or vomiting.  Appetite has been fairly good.  No urinary or bowel complaints.  No fever, chills or night sweats.      All other review of systems negative.      ALLERGIES:  REVIEWED.      MEDICATIONS:  Reviewed.      PAST MEDICAL HISTORY:   1. Rheumatoid arthritis.   2. Hypertension.   3. Hyperlipidemia.   4. Osteoarthritis.   5. Osteopenia.   6. Chronic kidney disease.   7. Appendectomy.      SOCIAL HISTORY:   -She quit smoking more than 25 years ago.   -No alcohol use.      PHYSICAL EXAMINATION:   GENERAL:  The patient was alert and oriented x 3.  She is not in any distress.  She looked comfortable.   RESPIRATORY:  No cough.  No labored breathing.   The rest of  her comprehensive physical examination is deferred due to [facility] emergency video visit restrictions.      ASSESSMENT:   1. An 89-year-old female with mild isolated thrombocytopenia.   2. Elevated LFT secondary to obstructed cystic duct.   3. Other medical problems including rheumatoid arthritis and osteoarthritis.      RECOMMENDATIONS:   1. I had a long discussion with the patient and her daughter over video visit.  Labs were all reviewed.  The patient has mild isolated thrombocytopenia.  WBC and hemoglobin have been normal.      Discussed regarding thrombocytopenia.  Different causes discussed.  Basic mechanism is decreased production or increased destruction. Thrombocytopenia could be due to primary bone marrow pathology like myelodysplastic syndrome.  The patient is 89.  MDS is common in this age group.  Unlikely to be immune thrombocytopenia.  ITP is not common in older people. The patient does have liver dysfunction.  Thrombocytopenia could be due to that. The patient does have rheumatoid arthritis.  She can have immune destruction of the platelet which can be seen with rheumatoid arthritis.      These were all discussed with her.  We discussed regarding further workup.  The patient does not want any further workup.  The patient mentioned that she is 89 years old.  She is feeling good.  She does not have any bleeding complications.  She does not want any further workup for thrombocytopenia.  As per her wishes, no further workup for thrombocytopenia.      2. Discussed regarding possible complications.  Bleeding and bruising starts when the platelet goes below 20.  The patient's platelet is around 130.  With that, I do not expect her to get any complications. The patient was advised to see a physician if she has bleeding from any site, easy bruising, red skin rash, black stool or worsening weakness.      3. The patient will follow-up with her primary care physician.  Advised her to have CBC checked every  1-2 months initially.  The patient was advised to call us with any questions.      Thanks for the consult.          JEAN CLAUDE RASHEED MD           D: 2020   T: 2020   MT:       Name:     HARDY CORRIGAN   MRN:      [id number]        Account:      [id number]   :      1931           Visit Date:   2020      Document: [id number]       cc: Majo Alfaro MD     Video visit for 20 minutes.

## 2020-08-10 ENCOUNTER — MYC REFILL (OUTPATIENT)
Dept: FAMILY MEDICINE | Facility: CLINIC | Age: 85
End: 2020-08-10

## 2020-08-10 DIAGNOSIS — M06.9 RHEUMATOID ARTHRITIS, INVOLVING UNSPECIFIED SITE, UNSPECIFIED RHEUMATOID FACTOR PRESENCE: ICD-10-CM

## 2020-08-10 DIAGNOSIS — G89.29 OTHER CHRONIC PAIN: ICD-10-CM

## 2020-08-10 DIAGNOSIS — M19.90 OSTEOARTHRITIS, UNSPECIFIED OSTEOARTHRITIS TYPE, UNSPECIFIED SITE: ICD-10-CM

## 2020-08-10 RX ORDER — HYDROCODONE BITARTRATE AND ACETAMINOPHEN 5; 325 MG/1; MG/1
1-2 TABLET ORAL EVERY 6 HOURS PRN
Qty: 140 TABLET | Refills: 0 | Status: SHIPPED | OUTPATIENT
Start: 2020-08-10 | End: 2020-01-01

## 2020-08-10 NOTE — TELEPHONE ENCOUNTER
Routing refill request to provider for review/approval because:  Drug not on the [facility] refill protocol   Last OV 6/29/2020

## 2020-08-12 DIAGNOSIS — K82.0 OBSTRUCTION OF CYSTIC DUCT: ICD-10-CM

## 2020-08-12 DIAGNOSIS — R79.89 ELEVATED LFTS: ICD-10-CM

## 2020-08-12 LAB
ALBUMIN SERPL-MCNC: 3.6 G/DL (ref 3.4–5)
ALP SERPL-CCNC: 474 U/L (ref 40–150)
ALT SERPL W P-5'-P-CCNC: 147 U/L (ref 0–50)
AST SERPL W P-5'-P-CCNC: 126 U/L (ref 0–45)
BILIRUB DIRECT SERPL-MCNC: 0.5 MG/DL (ref 0–0.2)
BILIRUB SERPL-MCNC: 1.2 MG/DL (ref 0.2–1.3)
PROT SERPL-MCNC: 6.9 G/DL (ref 6.8–8.8)

## 2020-08-12 PROCEDURE — 80076 HEPATIC FUNCTION PANEL: CPT | Performed by: INTERNAL MEDICINE

## 2020-08-12 PROCEDURE — 36415 COLL VENOUS BLD VENIPUNCTURE: CPT | Performed by: INTERNAL MEDICINE

## 2020-08-13 ENCOUNTER — VIRTUAL VISIT (OUTPATIENT)
Dept: GASTROENTEROLOGY | Facility: CLINIC | Age: 85
End: 2020-08-13
Payer: COMMERCIAL

## 2020-08-13 DIAGNOSIS — K81.0 ACUTE CHOLECYSTITIS: Primary | ICD-10-CM

## 2020-08-13 NOTE — NURSING NOTE
Chief Complaint   Patient presents with     Consult     asymptomaticobstructed cystic duct/cholangitis       There were no vitals filed for this visit.    There is no height or weight on file to calculate BMI.    Adam Moreno, EMT

## 2020-08-13 NOTE — LETTER
8/13/2020         RE: Celestina Tejaad  [address]  [facility] [address]        Dear Colleague,    Thank you for referring your patient, Celestina Tejada, to the [facility]. Please see a copy of my visit note below.    Celestina Tejada is a 89 year old female who is being evaluated via a billable video visit.      Video-Visit Details    Type of service:  Video Visit    Video Start Time: 0900  Video End Time: 0930    Originating Location (pt. Location): Home    Distant Location (provider location):  [facility]     Platform used for Video Visit: [facility]    Referring provider Majo Alfaro; Cheri Pan  Query Asymptomatic obstructed cystic with cholecystitis    HPI  Ms Tejada is an 89 yo woman with rheumatoid arthtitis who was found to have mild liver study elevation (most recently Tb 1.2, , , )  imaging consistent with cholecystitis with possible obstructive cystic duct stone though is currently asymptomatic. She denies abdominal pain, blood in her stool, jaundice, or fevers.     Imaging:  US: 1.  Abnormal appearance to the gallbladder with gallbladder distention  with ill-defined material filling the gallbladder lumen possibly  representing sludge. Would be difficult to exclude an underlying solid  lesion within the gallbladder. Gallbladder wall thickness at 5 mm.  Findings may be associated with chronic sludge and chronic  cholecystitis. Consider nuclear medicine hepatobiliary study for  further evaluation of gallbladder function and surgical consultation.  HIDA: Obstructed cystic duct.  MRI/MRCP: Findings on HIDA scan may indicate cholecystitis versus  cystic duct obstruction. No definite stone in the cystic duct is  demonstrated, however evaluation is limited by motion artifact.    Medications  Current Outpatient Medications   Medication     acetaminophen (TYLENOL) 325 MG tablet     atenolol (TENORMIN) 25 MG tablet     atorvastatin  (LIPITOR) 20 MG tablet     Ferrous Sulfate (IRON) 325 (65 Fe) MG tablet     gabapentin (NEURONTIN) 300 MG capsule     HYDROcodone-acetaminophen (NORCO) 5-325 MG tablet     HYDROcodone-acetaminophen (NORCO) 5-325 MG tablet     HYDROcodone-acetaminophen (NORCO) 5-325 MG tablet     hydroxychloroquine (PLAQUENIL) 200 MG tablet     NIFEdipine ER OSMOTIC (PROCARDIA XL) 90 MG 24 hr tablet     senna-docusate (SENOKOT-S;PERICOLACE) 8.6-50 MG per tablet     No current facility-administered medications for this visit.      Past Medical  Past Medical History:   Diagnosis Date     CKD (chronic kidney disease) stage 3, GFR 30-59 ml/min (H)      Hyperlipidemia LDL goal < 100      Hypertension goal BP (blood pressure) < 140/90      Osteoarthritis 2012     Osteopenia      Rheumatoid arthritis(714.0)      Past Surgical  Past Surgical History:   Procedure Laterality Date     C APPENDECTOMY  1950     SURGICAL HISTORY OF -       2 normal vaginal births     Social History  Social History     Socioeconomic History     Marital status:      Spouse name: Not on file     Number of children: 2     Years of education: Not on file     Highest education level: Not on file   Occupational History     Not on file   Social Needs     Financial resource strain: Not on file     Food insecurity     Worry: Not on file     Inability: Not on file     Transportation needs     Medical: Not on file     Non-medical: Not on file   Tobacco Use     Smoking status: Former Smoker     Packs/day: 0.50     Years: 20.00     Pack years: 10.00     Types: Cigarettes     Last attempt to quit: 2000     Years since quittin.9     Smokeless tobacco: Never Used   Substance and Sexual Activity     Alcohol use: Yes     Comment: very occ,     Drug use: No     Sexual activity: Yes     Partners: Male   Lifestyle     Physical activity     Days per week: Not on file     Minutes per session: Not on file     Stress: Not on file   Relationships     Social connections      Talks on phone: Not on file     Gets together: Not on file     Attends Zoroastrian service: Not on file     Active member of club or organization: Not on file     Attends meetings of clubs or organizations: Not on file     Relationship status: Not on file     Intimate partner violence     Fear of current or ex partner: Not on file     Emotionally abused: Not on file     Physically abused: Not on file     Forced sexual activity: Not on file   Other Topics Concern      Service No     Blood Transfusions No     Caffeine Concern Not Asked     Occupational Exposure Not Asked     Hobby Hazards Not Asked     Sleep Concern Not Asked     Stress Concern Not Asked     Weight Concern Not Asked     Special Diet Not Asked     Back Care Not Asked     Exercise No     Bike Helmet Not Asked     Seat Belt Yes     Self-Exams Yes     Parent/sibling w/ CABG, MI or angioplasty before 65F 55M? Yes     Comment: [place] had cabg   Social History Narrative     Not on file     Family History  Family History   Problem Relation Age of Onset     Heart Disease Mother         CHF     Respiratory Mother         lung disease     C.A.D. Sister         heart by-pass     Arthritis Sister      Family History Negative Brother      Family History Negative Daughter      Family History Negative Daughter      Objective:  Reported vitals:  There were no vitals taken for this visit.   GEN: Healthy, alert and no distress  PSYCH: Alert and oriented times 3; coherent speech, normal   rate and volume, able to articulate logical thoughts, able   to abstract reason, no tangential thoughts, no hallucinations   or delusions  His affect is normal  RESP: No cough, no audible wheezing, able to talk in full sentences  Remainder of exam unable to be completed due to video visits     Outside Imaging summaries:    Assessment and plan:  Ms Tejada is an 89yo woman with mild liver study abnormality likely explained by otherwise asymptomatic cystic duct obstruction  with associated mild cholecystitis (wall thickening). As she is asymptomatic and does not want to pursue invasive procedures at this moment, I recommend conservative observation. With development of fevers or right upper quadrant pain, I would then recommend antibiotics alone and without improvement move forward with either cholecystectomy or ERCP with transpapillary gallbladder stent placement. Both have their advantages (surgery more definitive, ERCP less invasive) and risks (anesthesia). We would have another discussion regarding this option before organizing either. She was instructed to call my coordinator with any related symptoms.     It was a pleasure to participate in the care of this patient; please contact us with any further questions.  A total of 45 minutes was spent in evaluation with this patient, >50% of which was counseling regarding the above delineated issues.    Josue Jiménez MD PhD FACG MY MENDENHALL  Director of Endoscopy  Associate Professor of Medicine, Surgery and Pediatrics  Interventional and Therapeutic Endoscopy    [facility]  Division of Gastroenterology and Hepatology  [facility] [address] [address]    New Consultations  [phone]  Procedure Scheduling [phone]  Clinical Nurse Coordinator [phone]  Clinical Fax   [phone]  Administrative   [phone]  Administrative Fax  [phone]

## 2020-08-13 NOTE — PATIENT INSTRUCTIONS
Follow up:    Dr. Jiménez has outlined the following steps after your recent clinic visit:     As she is asymptomatic and does not want to pursue invasive procedures at this moment, I recommend conservative observation. With development of fevers or right upper quadrant pain, contact us to discuss antibiotics at [phone].    Please call with any questions or concerns regarding your clinic visit today.    It is a pleasure being involved in your health care.    Contacts post-consultation depending on your need:    Schedule Clinic Appointments            [phone] # 1   M-F 7:30 - 5 pm    Lela Ornelas RN Care Coordinator  [phone]     OR Procedure Scheduling                             [phone]    My Chart is available 24 hours a day and is a secure way to access your records and communicate with your care team.  I strongly recommend signing up if you haven't already done so, if you are comfortable with computers.  If you would like to inquire about this or are having problems with My Chart access, you may call [phone] or go online at [email or web address].  Please allow at least 24 hours for a response and extra time on weekends and Holidays.

## 2020-08-13 NOTE — PROGRESS NOTES
"Celestina Tejada is a 89 year old female who is being evaluated via a billable video visit.      The patient has been notified of following:     \"This video visit will be conducted via a call between you and your physician/provider. We have found that certain health care needs can be provided without the need for an in-person physical exam.  This service lets us provide the care you need with a video conversation.  If a prescription is necessary we can send it directly to your pharmacy.  If lab work is needed we can place an order for that and you can then stop by our lab to have the test done at a later time.    Video visits are billed at different rates depending on your insurance coverage.  Please reach out to your insurance provider with any questions.    If during the course of the call the physician/provider feels a video visit is not appropriate, you will not be charged for this service.\"    Patient has given verbal consent for Video visit? Yes  How would you like to obtain your AVS? MyChart  If you are dropped from the video visit, the video invite should be resent to: Cell  Will anyone else be joining your video visit? No     Video-Visit Details    Type of service:  Video Visit    Video Start Time: 0900  Video End Time: 0930    Originating Location (pt. Location): Home    Distant Location (provider location):  [facility]     Platform used for Video Visit: [facility]    Referring provider Majo Pan  Query Asymptomatic obstructed cystic with cholecystitis    HPI  Ms Tejada is an 89 yo woman with rheumatoid arthtitis who was found to have mild liver study elevation (most recently Tb 1.2, , , )  imaging consistent with cholecystitis with possible obstructive cystic duct stone though is currently asymptomatic. She denies abdominal pain, blood in her stool, jaundice, or fevers.     Imaging:  US: 1.  Abnormal appearance to the gallbladder with gallbladder " distention  with ill-defined material filling the gallbladder lumen possibly  representing sludge. Would be difficult to exclude an underlying solid  lesion within the gallbladder. Gallbladder wall thickness at 5 mm.  Findings may be associated with chronic sludge and chronic  cholecystitis. Consider nuclear medicine hepatobiliary study for  further evaluation of gallbladder function and surgical consultation.  HIDA: Obstructed cystic duct.  MRI/MRCP: Findings on HIDA scan may indicate cholecystitis versus  cystic duct obstruction. No definite stone in the cystic duct is  demonstrated, however evaluation is limited by motion artifact.    Medications  Current Outpatient Medications   Medication     acetaminophen (TYLENOL) 325 MG tablet     atenolol (TENORMIN) 25 MG tablet     atorvastatin (LIPITOR) 20 MG tablet     Ferrous Sulfate (IRON) 325 (65 Fe) MG tablet     gabapentin (NEURONTIN) 300 MG capsule     HYDROcodone-acetaminophen (NORCO) 5-325 MG tablet     HYDROcodone-acetaminophen (NORCO) 5-325 MG tablet     HYDROcodone-acetaminophen (NORCO) 5-325 MG tablet     hydroxychloroquine (PLAQUENIL) 200 MG tablet     NIFEdipine ER OSMOTIC (PROCARDIA XL) 90 MG 24 hr tablet     senna-docusate (SENOKOT-S;PERICOLACE) 8.6-50 MG per tablet     No current facility-administered medications for this visit.      Past Medical  Past Medical History:   Diagnosis Date     CKD (chronic kidney disease) stage 3, GFR 30-59 ml/min (H)      Hyperlipidemia LDL goal < 100      Hypertension goal BP (blood pressure) < 140/90      Osteoarthritis 2/9/2012     Osteopenia      Rheumatoid arthritis(714.0)      Past Surgical  Past Surgical History:   Procedure Laterality Date     C APPENDECTOMY  1950     SURGICAL HISTORY OF -       2 normal vaginal births     Social History  Social History     Socioeconomic History     Marital status:      Spouse name: Not on file     Number of children: 2     Years of education: Not on file     Highest  education level: Not on file   Occupational History     Not on file   Social Needs     Financial resource strain: Not on file     Food insecurity     Worry: Not on file     Inability: Not on file     Transportation needs     Medical: Not on file     Non-medical: Not on file   Tobacco Use     Smoking status: Former Smoker     Packs/day: 0.50     Years: 20.00     Pack years: 10.00     Types: Cigarettes     Last attempt to quit: 2000     Years since quittin.9     Smokeless tobacco: Never Used   Substance and Sexual Activity     Alcohol use: Yes     Comment: very occ,     Drug use: No     Sexual activity: Yes     Partners: Male   Lifestyle     Physical activity     Days per week: Not on file     Minutes per session: Not on file     Stress: Not on file   Relationships     Social connections     Talks on phone: Not on file     Gets together: Not on file     Attends [employer] service: Not on file     Active member of club or organization: Not on file     Attends meetings of clubs or organizations: Not on file     Relationship status: Not on file     Intimate partner violence     Fear of current or ex partner: Not on file     Emotionally abused: Not on file     Physically abused: Not on file     Forced sexual activity: Not on file   Other Topics Concern      Service No     Blood Transfusions No     Caffeine Concern Not Asked     Occupational Exposure Not Asked     Hobby Hazards Not Asked     Sleep Concern Not Asked     Stress Concern Not Asked     Weight Concern Not Asked     Special Diet Not Asked     Back Care Not Asked     Exercise No     Bike Helmet Not Asked     Seat Belt Yes     Self-Exams Yes     Parent/sibling w/ CABG, MI or angioplasty before 65F 55M? Yes     Comment: sisster had cabg   Social History Narrative     Not on file     Family History  Family History   Problem Relation Age of Onset     Heart Disease Mother         CHF     Respiratory Mother         lung disease     C.A.D. Sister          heart by-pass     Arthritis Sister      Family History Negative Brother      Family History Negative Daughter      Family History Negative Daughter      Objective:  Reported vitals:  There were no vitals taken for this visit.   GEN: Healthy, alert and no distress  PSYCH: Alert and oriented times 3; coherent speech, normal   rate and volume, able to articulate logical thoughts, able   to abstract reason, no tangential thoughts, no hallucinations   or delusions  His affect is normal  RESP: No cough, no audible wheezing, able to talk in full sentences  Remainder of exam unable to be completed due to video visits     Outside Imaging summaries:    Assessment and plan:  Ms Tejada is an 89yo woman with mild liver study abnormality likely explained by otherwise asymptomatic cystic duct obstruction with associated mild cholecystitis (wall thickening). As she is asymptomatic and does not want to pursue invasive procedures at this moment, I recommend conservative observation. With development of fevers or right upper quadrant pain, I would then recommend antibiotics alone and without improvement move forward with either cholecystectomy or ERCP with transpapillary gallbladder stent placement. Both have their advantages (surgery more definitive, ERCP less invasive) and risks (anesthesia). We would have another discussion regarding this option before organizing either. She was instructed to call my coordinator with any related symptoms.     It was a pleasure to participate in the care of this patient; please contact us with any further questions.  A total of 45 minutes was spent in evaluation with this patient, >50% of which was counseling regarding the above delineated issues.    Josue Jiménez MD PhD TRINA MENDENHALL  Director of Endoscopy  Associate Professor of Medicine, Surgery and Pediatrics  Interventional and Therapeutic Endoscopy    [facility]  Division of Gastroenterology and  Hepatology  [facility] [address], [place] [address]    New Consultations  [phone]  Procedure Scheduling [phone]  Clinical Nurse Coordinator [phone]  Clinical Fax   [phone]  Administrative   [phone]  Administrative Fax  [phone]

## 2020-08-31 ENCOUNTER — TRANSFERRED RECORDS (OUTPATIENT)
Dept: HEALTH INFORMATION MANAGEMENT | Facility: CLINIC | Age: 85
End: 2020-08-31

## 2020-08-31 LAB
ALT SERPL-CCNC: 135 IU/L (ref 5–35)
AST SERPL-CCNC: 123 U/L (ref 5–34)
CREAT SERPL-MCNC: 1.08 MG/DL (ref 0.5–1.3)
GFR SERPL CREATININE-BSD FRML MDRD: 50.8 ML/MIN/1.73M2
TSH SERPL-ACNC: 2.46 MIU/L (ref 0.4–4.5)

## 2020-09-14 NOTE — PROGRESS NOTES
Reason for call:  Form   Our goal is to have forms completed within 72 hours, however some forms may require a visit or additional information.     Who is the form from? Home care  Where did the form come from? form was faxed in  What clinic location was the form placed at?   Where was the form placed? folder Box/Folder  What number is listed as a contact on the form? [phone]    Phone call message - patient request for a letter, form or note:     Date needed: as soon as possible  Please fax to [phone]  Has the patient signed a consent form for release of information? Not Applicable    Additional comments:     Type of letter, form or note: medical    Phone number to reach patient:      Best Time:      Can we leave a detailed message on this number?      Travel screening:

## 2020-09-16 NOTE — PROGRESS NOTES
RN -- please see paperwork in nurse basket and call to address. Generally, I recommend that a person moving into assisted living facility ask for the geriatrician on site to take over their care as they are able to see her at her place of living and are better able to closely managing her care (I talked with her and her daughter about this at previous visits).  If she is open to transitioning to the onsite geriatrics team, then I am not sure whether they need an admission form from me, but please let me know. Please attach or fill out what you can in that instance and bring to me to finish. I have placed the admission form in the nurse basket for now until this is determined. I am also happy to see her again if she would like to see me or if she wants to come see me for one last time, but I know that is more difficult now during the COVID pandemic. Majo Alfaro M.D.

## 2020-09-17 NOTE — PROGRESS NOTES
([facility] called at [phone] Migdalia Manzo Attempted to reach, unable. Left message  to call back. Would like to ask Migdalia Manzo if they have geriatrician in house that would be available.)    Form filled out by this RN with all diagnoses and med list attached    MD to fill out rest and sign -form now in the wall file at front of  clinic under DR Alfaro's name    Di Hardin RN

## 2020-09-17 NOTE — PROGRESS NOTES
[facility] called and stated they need initial paperwork to be completed by pcp and then once pt is living at the facility they can transition care. Brooke Baca RN

## 2020-09-17 NOTE — TELEPHONE ENCOUNTER
Routing refill request to provider for review/approval because:  Drug not on the [facility] refill protocol     Last filled: 8/10/2020 #140 no refills    Last visit: 6/29/2020

## 2020-09-23 NOTE — PROGRESS NOTES
Still awaiting forms-These were given to Dr Alfaro a week ago. [facility] calling again. Pt moves in next week. Brooke Baca RN

## 2020-09-24 NOTE — TELEPHONE ENCOUNTER
Reason for Call:  Other call back    Detailed comments: blaire from [facility] [[email or web address]  States still waiting on forms , see 9.14.20 TE said it is urgent her fax is [phone]    Phone Number Patient can be reached at: Other phone number:  [phone]    Best Time: asap    Can we leave a detailed message on this number? YES    Call taken on 9/24/2020 at 12:01 PM by Delmi Reynolds

## 2020-09-25 NOTE — TELEPHONE ENCOUNTER
MA Team,    Please look for this form. See encounter dated 9/14. THis indicates the form was signed and faxed and sent to scanning. Is a copy of it still in the clinic ? Can scanning be called to find out if this was received. It is not in media yet      Thank you,  Di Hardin RN

## 2020-09-28 NOTE — TELEPHONE ENCOUNTER
Migdalia called in stated that form was never received andt it is needed ASAP. I am getting another formed faxed over today. [phone]    Bethanie Tejada  [place]   Patient Rep

## 2020-10-09 NOTE — TELEPHONE ENCOUNTER
APAP 500 mg      Last Written Prescription Date:  NOT ON CURRENT MED LIST.  Last Fill Quantity: 0,   # refills: 0  Last Office Visit: 6-29-20  Future Office visit:       Routing refill request to provider for review/approval because:  Drug not active on patient's medication list

## 2020-10-13 NOTE — TELEPHONE ENCOUNTER
"Routing refill request to provider for review/approval because:  --senna-docusate active in chart as historical entry only.    --Ferrous Sulfate active in chart but has not been ordered since 1/7/19.    --Acetaminophen active in chart but at a different dose than being requested.    --Acetaminophen has not been ordered since 5/25/18 and never been ordered by [facility] PCP.    --I attempted to call patient to confirm she requested but her phone number \"is not in service.\"  --I attempted to call pharmacy to confirm patient had requested these meds and to check if someone else has been ordering but they are not taking calls at this time.    --Since these meds are active chart I will route to provider to authorize if ok.    --Last visit:  6/29/2020 [place].  --Future Office Visit:  NONE    "

## 2020-10-14 NOTE — TELEPHONE ENCOUNTER
Pharmacy is calling regarding they still have not heard back on filling prescription for tylenol and the prescription that was sent for [employer]-s direction are wrong

## 2020-10-26 NOTE — PROGRESS NOTES
Subjective     Celestina Tejada is a 89 year old female who presents to clinic today for the following health issues:    History of Present Illness       Mental Health Follow-up:  Patient presents to follow-up on Depression & Anxiety.Patient's depression since last visit has been:  Worse  The patient is not having other symptoms associated with depression.  Patient's anxiety since last visit has been:  Worse  The patient is having other symptoms associated with anxiety.  Any significant life events: housing concerns  Patient is not feeling anxious or having panic attacks.  Patient has no concerns about alcohol or drug use.     Social History  Tobacco Use    Smoking status: Former Smoker      Packs/day: 0.50      Years: 20.00      Pack years: 10      Types: Cigarettes      Quit date: 2000      Years since quittin.1    Smokeless tobacco: Never Used  Alcohol use: Yes    Comment: very occ,  Drug use: No      Today's PHQ-9         PHQ-9 Total Score:     (P) 11   PHQ-9 Q9 Thoughts of better off dead/self-harm past 2 weeks :   (P) Not at all   Thoughts of suicide or self harm:      Self-harm Plan:        Self-harm Action:          Safety concerns for self or others:              Social History     Tobacco Use     Smoking status: Former Smoker     Packs/day: 0.50     Years: 20.00     Pack years: 10.00     Types: Cigarettes     Quit date: 2000     Years since quittin.1     Smokeless tobacco: Never Used   Substance Use Topics     Alcohol use: Not Currently     Comment: very occ,     Drug use: No     PHQ 2018 10/21/2019 10/25/2020   PHQ-9 Total Score 6 2 11   Q9: Thoughts of better off dead/self-harm past 2 weeks Not at all Not at all Not at all     NUNO-7 SCORE 2017 2019 10/25/2020   Total Score - - 8 (mild anxiety)   Total Score 2 0 8     Last PHQ-9 10/25/2020   1.  Little interest or pleasure in doing things 2   2.  Feeling down, depressed, or hopeless 3   3.  Trouble falling or staying  asleep, or sleeping too much 0   4.  Feeling tired or having little energy 1   5.  Poor appetite or overeating 3   6.  Feeling bad about yourself 1   7.  Trouble concentrating 1   8.  Moving slowly or restless 0   Q9: Thoughts of better off dead/self-harm past 2 weeks 0   PHQ-9 Total Score 11   Difficulty at work, home, or with people -     NUNO-7  10/25/2020   1. Feeling nervous, anxious, or on edge 1   2. Not being able to stop or control worrying 1   3. Worrying too much about different things 1   4. Trouble relaxing 1   5. Being so restless that it is hard to sit still 0   6. Becoming easily annoyed or irritable 1   7. Feeling afraid, as if something awful might happen 3   NUNO-7 Total Score 8   If you checked any problems, how difficult have they made it for you to do your work, take care of things at home, or get along with other people? -       Suicide Assessment Five-step Evaluation and Treatment (SAFE-T)      How many servings of fruits and vegetables do you eat daily?  0-1    On average, how many sweetened beverages do you drink each day (Examples: soda, juice, sweet tea, etc.  Do NOT count diet or artificially sweetened beverages)?   0, occ she will have tomato juice or orange juice     How many days per week do you exercise enough to make your heart beat faster? 3 or less    How many minutes a day do you exercise enough to make your heart beat faster? 9 or less    How many days per week do you miss taking your medication? 0    Celestina is here with her daughter Tawnya, who helps care for her. Celestina has moved into an assisted living facility and has had a hard time adjusting. She says it is fine, but Tawnya says that Celestina has been depressed and anxious and has been calling Tawnya frequently. She has had memory loss for some time, but that has become more apparent since the move. She has been calling Tawnya in the mornings wondering where she is. Tawnya reassures her and has written on a large board for Celestina  that she is in her new home so that Celestina can see it daily when she wakes up. Celestina notices her memory has declined as well. No sudden changes except for the noted change when she moved into assisted living. She is isolated. Due to COVID restrictions the assisted living facility is having fewer events. People stay in their rooms. Visitors are limited. She is a very social person normally, but has not been able to really meet people as easily because people are mostly in their apartments. There have been no COVID cases at the facility thus far. She and her daughter are wondering about medication to help her mood.       Review of Systems   Constitutional, HEENT, cardiovascular, pulmonary, gi and gu systems are negative, except as otherwise noted.      Objective    /52 (BP Location: Right arm, Patient Position: Sitting, Cuff Size: Adult Small)   Pulse 59   Temp 96.5  F (35.8  C) (Temporal)   Wt 50.5 kg (111 lb 6.4 oz)   SpO2 98%   BMI 19.73 kg/m    Body mass index is 19.73 kg/m .   Wt Readings from Last 5 Encounters:   10/26/20 50.5 kg (111 lb 6.4 oz)   06/29/20 51.4 kg (113 lb 6.4 oz)   06/03/20 52.2 kg (115 lb)   04/15/20 52.2 kg (115 lb)   01/21/20 53.8 kg (118 lb 8 oz)      Physical Exam   GENERAL: frail elderly female, alert and no distress    Transferred Records on 09/17/2020   Component Date Value Ref Range Status     ALT 09/17/2020 136* 5 - 35 IU/L Final     AST 09/17/2020 119* 5 - 34 U/L Final     TSH 08/31/2020 2.46  0.40 - 4.50 mIU/L Final     Creatinine 08/31/2020 1.080  0.500 - 1.300 mg/dL Final     GFR Estimate 08/31/2020 50.8  ml/min/1.73m2 Final     AST 08/31/2020 123* 5 - 34 U/L Final     ALT 08/31/2020 135* 5 - 35 IU/L Final           Assessment & Plan     1. Adjustment disorder with mixed anxiety and depression  More anxiety and depressed mood since moving into assisted living facility recently. She is more isolated. Also noticing memory loss is more noticeable  Recommended starting  selective serotonin reuptake inhibitor. Will start escitalopram 5mg daily and follow up in 2-4 weeks virtually. Will increase medication at that time if she is tolerating it well.        2.  Memory decline  Recently moved into assisted living facility as she was needing more help from family.   Memory decline more noticeable since she moved into assisted living facility recently.   Depressed mood may also be contributing. See above     3. Other chronic pain  Continues gabapentin to 300 mg 3 times a day.  She also follows with spine specialist.     Continues norco 5-325 1-2 tablets by mouth q6hrs prn pain #140 per month with refills q28 days. Limited to two refills today due to [place] prescribing law. Okay to send 3rd refill when due. Usually take one pill every 4-5 hours.      4. Peripheral edema  stable.      5. Rheumatoid arthritis, involving unspecified site, unspecified rheumatoid factor presence (H)  Followed by rheumatology and stable. Last visit with rheumatology was 9/2020. I am unable to see those notes today online.      6. Iron deficiency anemia, unspecified iron deficiency anemia type  Hemoglobin was normal last check 9/2020 with rheum. She will continue on iron supplement.     7. Osteoarthritis, unspecified osteoarthritis type, unspecified site   stable  Continues norco prn as above  Continues tylenol prn.         8. Hyperlipidemia with target LDL less than 100  Stable.  Continues atorvastatin 20mg daily.      9.  Protein-calorie malnutrition, unspecified severity (H)  She feels she is eating well now. Reports good appetite, though she has lost a little more weight.      Had been on remeron for mood and appetite in the past. She did have some confusion on remeron in the past and it was discontinued.     10. Osteoporosis  Followed by rheumatology. Had been on Reclast.  She continues on calcium and vitamin D     11. Declining functional status due to age and chronic diseases.   Depending on her daughter,  "Tawnya, for more help over time. She did transition to assisted living.      12. Cough  Chronic cough for many years. She was a smoker for decades from age 16 to twenty-some years ago. Smoked maybe 1/2 ppd for years. Denies any shortness of breath or wheezing or increase in cough over time. Denies frequent colds or bronchitis. Just has longstanding productive cough. Previous CXRs have shown some hyperinflation. Suspect COPD. She is not interested in further evaluation or medication.     13. Hypertension goal BP (blood pressure) < 140/90  Previously well controlled. Unable to measure bp today.  Continues on atenolol 75mg daily and nifedipine 90mg daily. Has refills available. No longer on the lisinopril due to hypotension and history of hyponatremia.      14. CKD  Stable.     15. Obstructive cystic duct.   asymptomatic  Visited with GI on 8/13/2020.   She does not want to pursue any procedures or additional evaluation at this point.   Per GI recommendations on 8/13/2020:   \"Ms Tejada is an 89yo woman with mild liver study abnormality likely explained by otherwise asymptomatic cystic duct obstruction with associated mild cholecystitis (wall thickening). As she is asymptomatic and does not want to pursue invasive procedures at this moment, I recommend conservative observation. With development of fevers or right upper quadrant pain, I would then recommend antibiotics alone and without improvement move forward with either cholecystectomy or ERCP with transpapillary gallbladder stent placement. Both have their advantages (surgery more definitive, ERCP less invasive) and risks (anesthesia). We would have another discussion regarding this option before organizing either. She was instructed to call my coordinator with any related symptoms.\"    16. Thrombocycopenia  Visited with hematology on 7/22/2020:  \"Discussed regarding thrombocytopenia.  Different causes discussed.  Basic mechanism is decreased production or increased " "destruction. Thrombocytopenia could be due to primary bone marrow pathology like myelodysplastic syndrome.  The patient is 89.  MDS is common in this age group.  Unlikely to be immune thrombocytopenia.  ITP is not common in older people. The patient does have liver dysfunction.  Thrombocytopenia could be due to that. The patient does have rheumatoid arthritis.  She can have immune destruction of the platelet which can be seen with rheumatoid arthritis.      These were all discussed with her.  We discussed regarding further workup.  The patient does not want any further workup.  The patient mentioned that she is 89 years old.  She is feeling good.  She does not have any bleeding complications.  She does not want any further workup for thrombocytopenia.  As per her wishes, no further workup for thrombocytopenia.      2. Discussed regarding possible complications.  Bleeding and bruising starts when the platelet goes below 20.  The patient's platelet is around 130.  With that, I do not expect her to get any complications. The patient was advised to see a physician if she has bleeding from any site, easy bruising, red skin rash, black stool or worsening weakness.      3. The patient will follow-up with her primary care physician.  Advised her to have CBC checked every 1-2 months initially.  The patient was advised to call us with any questions. \"     She will follow up with me in 2-4 weeks. I recommended transitioning care to on site geriatrician at her assisted living facility. She is open to this and her daughter Tawnya will discuss with her nurse.          Depression Screening Follow Up    PHQ 10/25/2020   PHQ-9 Total Score 11   Q9: Thoughts of better off dead/self-harm past 2 weeks Not at all         Follow Up Actions Taken           Patient Instructions   1) Start escitalopram (Lexapro) for your mood (it helps depression and anxiety).   2) Work on signing up for some activities at your new home.   3) Ask to establish " care with the onsite geriatrician. They will take over your primary care, but you can always visit with me if you would like.       Return in about 2 weeks (around 11/9/2020) for Follow up visit.    Majo Alfaro MD, MD  [facility]

## 2020-10-26 NOTE — PATIENT INSTRUCTIONS
1) Start escitalopram (Lexapro) for your mood (it helps depression and anxiety).   2) Work on signing up for some activities at your new home.   3) Ask to establish care with the onsite geriatrician. They will take over your primary care, but you can always visit with me if you would like.

## 2020-11-16 NOTE — PATIENT INSTRUCTIONS
1) Increase your dose of escitalopram to 10mg daily (I sent a new prescription to the pharmacy)  2) Schedule with Guillermo Dinh  3) Schedule with me in 2-4 weeks.

## 2020-11-16 NOTE — PROGRESS NOTES
"Celestina Tejada is a 89 year old female who is being evaluated via a billable telephone visit.      The patient has been notified of following:     \"This telephone visit will be conducted via a call between you and your physician/provider. We have found that certain health care needs can be provided without the need for a physical exam.  This service lets us provide the care you need with a short phone conversation.  If a prescription is necessary we can send it directly to your pharmacy.  If lab work is needed we can place an order for that and you can then stop by our lab to have the test done at a later time.    Telephone visits are billed at different rates depending on your insurance coverage. During this emergency period, for some insurers they may be billed the same as an in-person visit.  Please reach out to your insurance provider with any questions.    If during the course of the call the physician/provider feels a telephone visit is not appropriate, you will not be charged for this service.\"    Patient has given verbal consent for Telephone visit?  Yes    What phone number would you like to be contacted at? [phone]    How would you like to obtain your AVS? Ross Burt     Celestina Tejada is a 89 year old female who presents via phone visit today for the following health issues:    HPI    Depression and Anxiety Follow-Up    How are you doing with your depression since your last visit? No change    How are you doing with your anxiety since your last visit?  No change    Are you having other symptoms that might be associated with depression or anxiety? No    Have you had a significant life event? Housing Concerns     Do you have any concerns with your use of alcohol or other drugs? No    Social History     Tobacco Use     Smoking status: Former Smoker     Packs/day: 0.50     Years: 20.00     Pack years: 10.00     Types: Cigarettes     Quit date: 2000     Years since quittin.1     " "Smokeless tobacco: Never Used   Substance Use Topics     Alcohol use: Not Currently     Comment: very occ,     Drug use: No     PHQ 10/21/2019 10/25/2020 11/15/2020   PHQ-9 Total Score 2 11 17   Q9: Thoughts of better off dead/self-harm past 2 weeks Not at all Not at all More than half the days   F/U: Thoughts of suicide or self-harm - - No   F/U: Safety concerns - - No     NUNO-7 SCORE 1/7/2019 10/25/2020 11/15/2020   Total Score - 8 (mild anxiety) 13 (moderate anxiety)   Total Score 0 8 13     Last PHQ-9 11/15/2020   1.  Little interest or pleasure in doing things 1   2.  Feeling down, depressed, or hopeless 1   3.  Trouble falling or staying asleep, or sleeping too much 2   4.  Feeling tired or having little energy 2   5.  Poor appetite or overeating 3   6.  Feeling bad about yourself 2   7.  Trouble concentrating 3   8.  Moving slowly or restless 1   Q9: Thoughts of better off dead/self-harm past 2 weeks 2   PHQ-9 Total Score 17   Difficulty at work, home, or with people -   In the past two weeks have you had thoughts of suicide or self harm? No   Do you have concerns about your personal safety or the safety of others? No     NUNO-7  11/15/2020   1. Feeling nervous, anxious, or on edge 2   2. Not being able to stop or control worrying 2   3. Worrying too much about different things 2   4. Trouble relaxing 2   5. Being so restless that it is hard to sit still 0   6. Becoming easily annoyed or irritable 2   7. Feeling afraid, as if something awful might happen 3   NUNO-7 Total Score 13   If you checked any problems, how difficult have they made it for you to do your work, take care of things at home, or get along with other people? -      Celestina and her daughter Tawnya are on the phone today. Celestina says she is not doing well. Feels very \"down in the dumps\". Having a hard time getting used to her move into assisted living. Does not feel like she is herself. Tawnya says she got there today to find that Celestina has not " eaten the last 5 meals and has not changed clothes in days. Staff and the facility is very nice, but busy. Tawnya filled out forms for her to establish care with onsite geriatrician and are waiting to hear back. She is tolerating the escitalopram well. Has not noticed side effects. Has occasional mild headache, but she thinks that might be not eating or drinking enough fluids. No severe headaches.        Suicide Assessment Five-step Evaluation and Treatment (SAFE-T)             Review of Systems   As above    Objective          Vitals:  No vitals were obtained today due to virtual visit.    alert and no acute distress  PSYCH:  Her affect is depressed  RESP: No cough, no audible wheezing, able to talk in full sentences  Remainder of exam unable to be completed due to telephone visits             Assessment/Plan:    Assessment & Plan       1. Adjustment disorder with mixed anxiety and depression  Not doing well. She is tolerating escitalopram. Will increase to 10mg daily. She denies SI. She is willing to schedule with behavioral health, so will help her schedule with Guillermo Maravilla. She will follow up with me in 2-4 weeks. She is also establishing with on site geriatrician.     More anxiety and depressed mood since moving into assisted living facility recently. She is more isolated. Also noticing memory loss is more noticeable  Recommended starting selective serotonin reuptake inhibitor. Will start escitalopram 5mg daily and follow up in 2-4 weeks virtually. Will increase medication at that time if she is tolerating it well.         2.  Memory decline  Recently moved into assisted living facility as she was needing more help from family.   Memory decline more noticeable since she moved into assisted living facility recently.   Depressed mood may also be contributing. See above      The following are chronic concerns not addressed today:     3. Other chronic pain  Continues gabapentin to 300 mg 3 times a day.  She also  follows with spine specialist.      Continues norco 5-325 1-2 tablets by mouth q6hrs prn pain #140 per month with refills q28 days. Limited to two refills today due to [place] prescribing law. Okay to send 3rd refill when due. Usually take one pill every 4-5 hours.      4. Peripheral edema  stable.      5. Rheumatoid arthritis, involving unspecified site, unspecified rheumatoid factor presence (H)  Followed by rheumatology and stable. Last visit with rheumatology was 9/2020. I am unable to see those notes today online.      6. Iron deficiency anemia, unspecified iron deficiency anemia type  Hemoglobin was normal last check 9/2020 with rheum. She will continue on iron supplement.     7. Osteoarthritis, unspecified osteoarthritis type, unspecified site   stable  Continues norco prn as above  Continues tylenol prn.         8. Hyperlipidemia with target LDL less than 100  Stable.  Continues atorvastatin 20mg daily.      9.  Protein-calorie malnutrition, unspecified severity (H)  She feels she is eating well now. Reports good appetite, though she has lost a little more weight.      Had been on remeron for mood and appetite in the past. She did have some confusion on remeron in the past and it was discontinued.     10. Osteoporosis  Followed by rheumatology. Had been on Reclast.  She continues on calcium and vitamin D     11. Declining functional status due to age and chronic diseases.   Depending on her daughter, Tawnya, for more help over time. She did transition to assisted living.      12. Cough  Chronic cough for many years. She was a smoker for decades from age 16 to twenty-some years ago. Smoked maybe 1/2 ppd for years. Denies any shortness of breath or wheezing or increase in cough over time. Denies frequent colds or bronchitis. Just has longstanding productive cough. Previous CXRs have shown some hyperinflation. Suspect COPD. She is not interested in further evaluation or medication.      13. Hypertension goal BP  "(blood pressure) < 140/90  Previously well controlled. Unable to measure bp today.  Continues on atenolol 75mg daily and nifedipine 90mg daily. Has refills available. No longer on the lisinopril due to hypotension and history of hyponatremia.       14. CKD  Stable.      15. Obstructive cystic duct.   asymptomatic  Visited with GI on 8/13/2020.   She does not want to pursue any procedures or additional evaluation at this point.   Per GI recommendations on 8/13/2020:   \"Ms Tejada is an 89yo woman with mild liver study abnormality likely explained by otherwise asymptomatic cystic duct obstruction with associated mild cholecystitis (wall thickening). As she is asymptomatic and does not want to pursue invasive procedures at this moment, I recommend conservative observation. With development of fevers or right upper quadrant pain, I would then recommend antibiotics alone and without improvement move forward with either cholecystectomy or ERCP with transpapillary gallbladder stent placement. Both have their advantages (surgery more definitive, ERCP less invasive) and risks (anesthesia). We would have another discussion regarding this option before organizing either. 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  Different causes discussed.  Basic mechanism is decreased production or increased destruction. Thrombocytopenia could be due to primary bone marrow pathology like myelodysplastic syndrome.  The patient is 89.  MDS is common in this age group.  Unlikely to be immune thrombocytopenia.  ITP is not common in older people. The patient does have liver dysfunction.  Thrombocytopenia could be due to that. The patient does have rheumatoid arthritis.  She can have immune destruction of the platelet which can be seen with rheumatoid arthritis.      These were all discussed with her.  We discussed " "regarding further workup.  The patient does not want any further workup.  The patient mentioned that she is 89 years old.  She is feeling good.  She does not have any bleeding complications.  She does not want any further workup for thrombocytopenia.  As per her wishes, no further workup for thrombocytopenia.      2. Discussed regarding possible complications.  Bleeding and bruising starts when the platelet goes below 20.  The patient's platelet is around 130.  With that, I do not expect her to get any complications. The patient was advised to see a physician if she has bleeding from any site, easy bruising, red skin rash, black stool or worsening weakness.      3. The patient will follow-up with her primary care physician.  Advised her to have CBC checked every 1-2 months initially.  The patient was advised to call us with any questions. \"                       Depression Screening Follow Up    PHQ 11/15/2020   PHQ-9 Total Score 17   Q9: Thoughts of better off dead/self-harm past 2 weeks More than half the days   F/U: Thoughts of suicide or self-harm No   F/U: Safety concerns No                  Follow Up     Patient Instructions   1) Increase your dose of escitalopram to 10mg daily (I sent a new prescription to the pharmacy)  2) Schedule with Guillermo Dinh  3) Schedule with me in 2-4 weeks.       Return in about 2 weeks (around 11/30/2020).    Majo Alfaro MD, MD  [facility]    Phone call duration:  12 minutes              "

## 2020-11-17 NOTE — PROGRESS NOTES
[facility]  PRIMARY CARE PROVIDER AND CLINIC:  Yuli Rangel, STEVE CNP, [address] / [place] [address]  Chief Complaint   Patient presents with     Establish Care     [facility] Record Number:  [id number]  [id number]    Celestina Tejada  is a 89 year old  (4/2/1931), living in above facility since 9/30/20 and now choosing to change PCPs to [facility]. .  Admitted to this facility for  medical management and nursing care.    HPI:    HPI information obtained from: facility chart records, facility staff, patient report, [facility] chart review and Care Everywhere [facility] chart review.   Brief Summary of Hospital Course: No hospitalization noted recently. Has been followed by PCP. Moved to ED setting as things were getting difficult for patient at home. Family opted for patient to move into nursing home setting with services. Recently transitioned to in house services for provider as previously provider had limited experience with geriatrics.   Updates on Status Since Skilled nursing Admission: Met with patient who denies any chest pain, palpitations, shortness of breath, RODRIGUEZ, lightheadedness, or dizziness. Does report some occasional cough at times which is chronic. Denies any abdominal discomfort. Denies N&V. Denies B&B concerns. Denies dysuria or frequency. Reports some constipation at times but does state she has a BM pretty much regularly on a daily basis. Appetite limited and poor. Patient endorses appetite is diminished along with her depression. She reported feeling down and depressed often and doesn't feel wanted. Due to covid restrictions there are limitations within the facility which is also affecting the patient as well. Social gathering and interaction is limited for this patient. All meals are being delivered to her apartment, however she has no appetite to eat. Per daughter, patient was  this way at home as well and her appetite has been diminished for a while but her depression has been worse now living in [facility]. She recently started an antidepressant with previous PCP low dose with no side effects and dose was recently increased on Monday 11/16/20. Needs encouragement and assistance for ongoing needs. She is on a walking program with the facility and sometimes she declines to walk and other times she does walk but distance is limited per mood. Reports sleeping well, but have been sleeping more often on the couch where she states she feels more comfortable and close to the door.     BP Readings from Last 3 Encounters:   11/18/20 98/59   10/26/20 108/52   06/29/20 (!) 144/69     Wt Readings from Last 5 Encounters:   11/17/20 50.3 kg (111 lb)   10/26/20 50.5 kg (111 lb 6.4 oz)   06/29/20 51.4 kg (113 lb 6.4 oz)   06/03/20 52.2 kg (115 lb)   04/15/20 52.2 kg (115 lb)     CODE STATUS/ADVANCE DIRECTIVES DISCUSSION:   DNR / DNI  Patient's living condition: lives in an assisted living facility  ALLERGIES: Hydrochlorothiazide  PAST MEDICAL HISTORY:  has a past medical history of CKD (chronic kidney disease) stage 3, GFR 30-59 ml/min, Hyperlipidemia LDL goal < 100, Hypertension goal BP (blood pressure) < 140/90, Osteoarthritis (2/9/2012), Osteopenia, and Rheumatoid arthritis(714.0). She also has no past medical history of Cancer (H), Cerebral infarction (H), Congestive heart failure (H), COPD (chronic obstructive pulmonary disease) (H), Depressive disorder, Diabetes (H), Heart disease, History of blood transfusion, Thyroid disease, or Uncomplicated asthma.  PAST SURGICAL HISTORY:   has a past surgical history that includes APPENDECTOMY (1950) and surgical history of - .  FAMILY HISTORY: family history includes Arthritis in her sister; C.A.D. in her sister; Family History Negative in her brother, daughter, and daughter; Heart Disease in her mother; Respiratory in her mother.  SOCIAL HISTORY:   reports that she  quit smoking about 20 years ago. Her smoking use included cigarettes. She has a 10.00 pack-year smoking history. She has never used smokeless tobacco. She reports previous alcohol use. She reports that she does not use drugs.    Post Discharge Medication Reconciliation Status: discharge medications reconciled and changed, per note/orders    Current Outpatient Medications   Medication Sig Dispense Refill     acetaminophen (TYLENOL) 325 MG tablet Take 2 tablets (650 mg) by mouth 2 times daily Give 650mg BID and BID  tablet 11     acetaminophen (TYLENOL) 500 MG tablet Take 500-1,000 mg by mouth 4 times daily as needed for mild pain       atenolol (TENORMIN) 25 MG tablet Take 3 tablets (75 mg) by mouth daily HOLD if SBP<100 and/or HR<55. 270 tablet 3     atorvastatin (LIPITOR) 20 MG tablet TAKE 1 TABLET(20 MG) BY MOUTH DAILY 90 tablet 0     escitalopram (LEXAPRO) 5 MG tablet Take 2 tablets (10 mg) by mouth daily 60 tablet 11     Ferrous Sulfate (IRON) 325 (65 Fe) MG tablet Take 1 tablet by mouth daily (with breakfast) 90 tablet 3     gabapentin (NEURONTIN) 300 MG capsule Take 1 capsule (300 mg) by mouth 3 times daily 270 capsule 3     HYDROcodone-acetaminophen (NORCO) 5-325 MG tablet Take 1 tablet by mouth every 6 hours as needed for moderate to severe pain 90 tablet 0     hydroxychloroquine (PLAQUENIL) 200 MG tablet Take 200 mg by mouth daily   3     multivitamin w/minerals (THERA-VIT-M) tablet Take 1 tablet by mouth daily 60 tablet 11     NIFEdipine ER OSMOTIC (PROCARDIA XL) 90 MG 24 hr tablet Take 1 tablet (90 mg) by mouth daily 90 tablet 3     Nutritional Supplements (BOOST COMPACT) LIQD Take 1 Bottle by mouth 2 times daily       senna-docusate (SENOKOT-S/PERICOLACE) 8.6-50 MG tablet Take 1 tablet by mouth 2 times daily as needed for constipation 180 tablet 1     ROS:  10 point ROS of systems including Constitutional, Eyes, Respiratory, Cardiovascular, Gastroenterology, Genitourinary, Integumentary,  Musculoskeletal, Psychiatric were all negative except for pertinent positives noted in my HPI.    Vitals:  BP 98/59   Pulse 66   Resp 16   Wt 50.3 kg (111 lb)   SpO2 92%   BMI 19.66 kg/m    Exam:  GENERAL APPEARANCE:  Alert, oriented, cooperative, sad  ENT:  Mouth and posterior oropharynx normal, moist mucous membranes, Habematolel  EYES:  EOM, conjunctivae, lids, pupils and irises normal  NECK:  No adenopathy,masses or thyromegaly  RESP:  respiratory effort and palpation of chest normal, lungs clear to auscultation , no respiratory distress  CV:  Palpation and auscultation of heart done , regular rate and rhythm, no murmur, rub, or gallop, no edema, +2 pedal pulses  ABDOMEN:  normal bowel sounds, soft, nontender, no hepatosplenomegaly or other masses, no guarding or rebound  M/S:   Gait and station normal  Ambulates with walker. ROM intact  SKIN:  Inspection of skin and subcutaneous tissue baseline, Palpation of skin and subcutaneous tissue baseline  NEURO:   Cranial nerves 2-12 are normal tested and grossly at patient's baseline, no purposeful movement in upper and lower extremities  PSYCH:  oriented X 3, memory impaired , flat affect, sad    PHQ 10/21/2019 10/25/2020 11/15/2020   PHQ-9 Total Score 2 11 17   Q9: Thoughts of better off dead/self-harm past 2 weeks Not at all Not at all More than half the days   F/U: Thoughts of suicide or self-harm - - No   F/U: Safety concerns - - No      NUNO-7 SCORE 1/7/2019 10/25/2020 11/15/2020   Total Score - 8 (mild anxiety) 13 (moderate anxiety)   Total Score 0 8 13      Last PHQ-9 11/15/2020   1.  Little interest or pleasure in doing things 1   2.  Feeling down, depressed, or hopeless 1   3.  Trouble falling or staying asleep, or sleeping too much 2   4.  Feeling tired or having little energy 2   5.  Poor appetite or overeating 3   6.  Feeling bad about yourself 2   7.  Trouble concentrating 3   8.  Moving slowly or restless 1   Q9: Thoughts of better off dead/self-harm past 2  weeks 2   PHQ-9 Total Score 17   Difficulty at work, home, or with people -   In the past two weeks have you had thoughts of suicide or self harm? No   Do you have concerns about your personal safety or the safety of others? No      NUNO-7  11/15/2020   1. Feeling nervous, anxious, or on edge 2   2. Not being able to stop or control worrying 2   3. Worrying too much about different things 2   4. Trouble relaxing 2   5. Being so restless that it is hard to sit still 0   6. Becoming easily annoyed or irritable 2   7. Feeling afraid, as if something awful might happen 3   NUNO-7 Total Score 13   If you checked any problems, how difficult have they made it for you to do your work, take care of things at home, or get along with other people?      Lab/Diagnostic data:    Most Recent 3 CBC's:  Recent Labs   Lab Test 06/29/20  1457 06/18/20  1553 06/10/20  1320   WBC 8.3 5.9 8.2   HGB 13.0 11.9 11.7   MCV 96 95 97   * 128* 116*     Most Recent 3 BMP's:  Recent Labs   Lab Test 08/31/20 06/15/20 06/10/20  1320 06/03/20  1508 07/25/19  1046   NA  --   --  139 133 137   POTASSIUM  --   --  4.4 4.7 4.9   CHLORIDE  --   --  108 103 103   CO2  --   --  23 26 28   BUN  --   --  15 19 32*   CR 1.080 1.110 1.16* 1.01 0.96   ANIONGAP  --   --  8 4 6   JOSÉ  --   --  8.4* 8.8 8.9   GLC  --   --  98 88 87     Most Recent 2 LFT's:  Recent Labs   Lab Test 09/17/20 08/31/20 08/12/20  1219 07/10/20  1433   * 123* 126* 72*   * 135* 147* 96*   ALKPHOS  --   --  474* 334*   BILITOTAL  --   --  1.2 0.9     Most Recent 3 INR's:  Recent Labs   Lab Test 04/30/18  1439   INR 0.97     Most Recent Cholesterol Panel:  Recent Labs   Lab Test 07/25/19  1046   CHOL 174   LDL 55      TRIG 78     Most Recent 6 Bacteria Isolates From Any Culture (See EPIC Reports for Culture Details):  Recent Labs   Lab Test 10/12/17  1556   CULT >100,000 colonies/mL  Escherichia coli  *  10,000 to 50,000 colonies/mL  Klebsiella pneumoniae  *      Most Recent TSH and T4:  Recent Labs   Lab Test 08/31/20 06/18/18  0600   TSH 2.46 1.52   T4  --  0.85     Most Recent 6 glucoses:  Recent Labs   Lab Test 06/10/20  1320 06/03/20  1508 07/25/19  1046 01/17/19  1257 01/07/19  1236 06/28/18  1636   GLC 98 88 87 75 76 103*     Most Recent Urinalysis:  Recent Labs   Lab Test 06/03/20  1506 05/22/18  1806   COLOR Yellow Yellow   APPEARANCE Clear Clear   URINEGLC Negative Negative   URINEBILI Negative Negative   URINEKETONE Negative Negative   SG 1.015 1.012   UBLD Negative Negative   URINEPH 7.0 6.0   PROTEIN Negative 10*   UROBILINOGEN 0.2  --    NITRITE Negative Negative   LEUKEST Negative Negative   RBCU  --  <1   WBCU  --  2     Most Recent Anemia Panel:  Recent Labs   Lab Test 06/29/20  1457 06/18/20  1552 06/18/20  1552 06/18/18  0600 06/18/18  0600   WBC 8.3   < >  --    < >  --    HGB 13.0   < >  --    < >  --    HCT 40.0   < >  --    < >  --    MCV 96   < >  --    < >  --    *   < >  --    < >  --    IRON  --   --  138  --  28*   IRONSAT  --   --  51*  --   --    RETICABSCT  --   --  48.6  --   --    RETP  --   --  1.2  --   --    FEB  --   --  273  --   --    NILAY  --   --  410*  --   --    B12  --   --   --   --  1,055*    < > = values in this interval not displayed.     ASSESSMENT/PLAN:  (F43.23) Adjustment reaction with anxiety and depression  (primary encounter diagnosis)  (R41.89) Cognitive impairment  (E46) Malnutrition, unspecified type (H)  Comment: Acute on chronic. Not at goal. More anxiety and depressed mood since moving into assisted living facility recently. She is more isolated. Also noticing memory loss is more noticeable. Dose was recently increased per previous provider. More anxiety and depressed mood since moving into assisted living facility recently. She is more isolated. Also noticing memory loss is more noticeable.   Plan:   -Continue escitalopram 10mg daily  -Monitor mood and behaviors  -Recommend to start MVI daily due to poor  appetite and low intake  -Encourage Boost BID as directed  -Monitor for changes in mobility, eating and sleeping patterns  -Continue jail setting with all nursing assistance and medication needs.   -CMP, CBC, TSH, vitamin D, vitamin B12 Cholesterol panel due in 1 week    (G89.4) Chronic pain syndrome  (M06.9) Rheumatoid arthritis, involving unspecified site, unspecified whether rheumatoid factor present (H)  (M19.90) Osteoarthritis, unspecified osteoarthritis type, unspecified site  (M81.0) Osteoporosis without current pathological fracture, unspecified osteoporosis type  (R53.81) Physical deconditioning  Comment: Chronic. Stable. Continues gabapentin to 300 mg 3 times a day.  She also follows with spine specialist. Followed by rheumatology Dr. Armstrong and rei. Last visit with rheumatology was 08/2020. Had been on Reclast.  She continues on calcium and vitamin D. She receives frequent infusions per rheumatology as directed. She is overdue for this per daughter.   Plan:   -Recommend to follow up with rheumatology Dr Holt as directed. Due within 1 month  -Continue infusions per rheumatology clinic as directed.   -May benefit from pain clinic referral if warranted if no improvement in future.   -Recommend to change Tylenol to 650mg BID and BID PRN. (limit tylenol to 4gm per day)  -Monitor pain complaints as directed  -Continue plaquenil 200mg daily  -Continue reclast annual infusions as directed. Overdue for this.   -CMP, CBC, TSH, vitamin D, vitamin B12 Cholesterol panel due in 1 week  -Change Norco to 1 tab every 6 hours PRN.   -Continue gabapentin 300mg TID  -May benefit from topical analgesics if warranted            (D50.9) Iron deficiency anemia, unspecified iron deficiency anemia type  Comment: Acute on chronic. Hemoglobin was normal last check 9/2020 with rheum. She will continue on iron supplement.  Plan:   -Continue iron supplement as directed  -Monitor for active s/sx of bleeding concerns  -Monitor for  worsening s/sx of concerns.   -CMP, CBC, TSH, vitamin D, vitamin B12 Cholesterol panel due in 1 week    (E78.5) Hyperlipidemia, unspecified hyperlipidemia type  (I10) Hypertension goal BP (blood pressure) < 140/90  (R60.9) Edema, unspecified type  (N18.30) Stage 3 chronic kidney disease, unspecified whether stage 3a or 3b CKD  Comment: Acute on chronic. Previously well controlled. No longer on the lisinopril due to hypotension and history of hyponatremia. The patient's peripheral edema most likely is multifactorial. Based on JNC-8 goals,  patients age of 89 year old, presence of diabetes or CKD, and goals of care goal BP is  <140/90 mm Hg. Noted patients BP is lower than goal and will adjust plan of care by. Staff has not been obtaining blood pressures frequently..  Plan:   -Follow up with cardiology PRN if indicated.   -Continue atorvastatin 20mg daily  -Continue atenolol 75mg and nifedipine 90mg as directed.  -Monitor BP and HR BID.   -Adjust BP medications accordingly per levels.   -Add HOLD parameters to atenolol if SBP<100 and/or HR<55.   -Monitor BP and HR as directed  -CMP, CBC, TSH, vitamin D, vitamin B12 Cholesterol panel due in 1 week    (R05) Chronic cough  Comment: Chronic. Chronic cough for many years. She was a smoker for decades from age 16 to twenty-some years ago. Smoked maybe 1/2 ppd for years. Denies any shortness of breath or wheezing or increase in cough over time. Denies frequent colds or bronchitis. Just has longstanding productive cough. Previous CXRs have shown some hyperinflation. Suspect COPD. She is not interested in further evaluation or medication.   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 Asymptomatic. Visited with GI on 8/13/2020.   She does not want to pursue any procedures or additional  "evaluation at this point. Per GI recommendations on 8/13/2020: 89yo woman with mild liver study abnormality likely explained by otherwise asymptomatic cystic duct obstruction with associated mild cholecystitis (wall thickening). As she is asymptomatic and does not want to pursue invasive procedures at this moment, I recommend conservative observation. With development of fevers or right upper quadrant pain, I would then recommend antibiotics alone and without improvement move forward with either cholecystectomy or ERCP with transpapillary gallbladder stent placement. Both have their advantages (surgery more definitive, ERCP less invasive) and risks (anesthesia). We would have another discussion regarding this option before organizing either. She was instructed to call my coordinator with any related symptoms.\"  Plan:   -Monitor urinary status  -Monitor for worsening s/sx of concerns  -Monitor conservative at this time per GI recommendations  -It patient develops fever or RUQ pain, GI would recommend antibiotics alone and without improvement then would move forward to cholecystectomy or ERCP  -Follow up with GI PRN if warranted in the future.   -CMP, CBC, TSH, vitamin D, vitamin B12 Cholesterol panel due in 1 week    (D69.6) Thrombocytopenia (H)  Comment: Acute on chronic. Visited with hematology on 7/22/2020:  Thrombocytopenia could be due to primary bone marrow pathology like myelodysplastic syndrome.  The patient is 89.  MDS is common in this age group.  Unlikely to be immune thrombocytopenia.  ITP is not common in older people. The patient does have liver dysfunction.  Thrombocytopenia could be due to that. The patient does have rheumatoid arthritis.  She can have immune destruction of the platelet which can be seen with rheumatoid arthritis  Plan:   -Follow up with hematology PRN if indicated in the future  -Monitor for worsening s/sx of concerns.   -CMP, CBC, TSH, vitamin D, vitamin B12 Cholesterol panel due in " 1 week    Orders written by provider at facility    Total time spent with patient visit at the skilled nursing facility was 45 min including patient visit, review of past records and phone call to patient contact. Greater than 50% of total time spent with counseling and coordinating care due to complexities of complex medical needs and co morbidities.     Electronically signed by:  Yuli Rangel DNP, APRN

## 2020-11-18 NOTE — LETTER
11/18/2020        RE: Celestina Tejada  [facility]  [address]  [address]  [facility] [address]        [facility]  PRIMARY CARE PROVIDER AND CLINIC:  STEVE Salazar CNP, [address] [facility] [address] / [facility] [address]  Chief Complaint   Patient presents with     Establish Care     [facility] Medical Record Number:  [id number]  Place of Service where encounter took place:  [facility] [[id number]]    Celestina Tejada  is a 89 year old  (4/2/1931), living in above facility since 9/30/20 and now choosing to change PCPs to [facility]. .  Admitted to this facility for  medical management and nursing care.    HPI:    HPI information obtained from: facility chart records, facility staff, patient report, [facility] chart review and Care Everywhere [facility] chart review.   Brief Summary of Hospital Course: No hospitalization noted recently. Has been followed by PCP. Moved to nursing home setting as things were getting difficult for patient at home. Family opted for patient to move into ED setting with services. Recently transitioned to in house services for provider as previously provider had limited experience with geriatrics.   Updates on Status Since Skilled nursing Admission: Met with patient who denies any chest pain, palpitations, shortness of breath, RODRIGUEZ, lightheadedness, or dizziness. Does report some occasional cough at times which is chronic. Denies any abdominal discomfort. Denies N&V. Denies B&B concerns. Denies dysuria or frequency. Reports some constipation at times but does state she has a BM pretty much regularly on a daily basis. Appetite limited and poor. Patient endorses appetite is diminished along with her depression. She reported feeling down and depressed often and doesn't feel wanted. Due to covid restrictions there are limitations within the facility which is also affecting the patient as well. Social gathering and interaction is limited for this  patient. All meals are being delivered to her apartment, however she has no appetite to eat. Per daughter, patient was this way at home as well and her appetite has been diminished for a while but her depression has been worse now living in [facility]. She recently started an antidepressant with previous PCP low dose with no side effects and dose was recently increased on Monday 11/16/20. Needs encouragement and assistance for ongoing needs. She is on a walking program with the facility and sometimes she declines to walk and other times she does walk but distance is limited per mood. Reports sleeping well, but have been sleeping more often on the couch where she states she feels more comfortable and close to the door.     BP Readings from Last 3 Encounters:   11/18/20 98/59   10/26/20 108/52   06/29/20 (!) 144/69     Wt Readings from Last 5 Encounters:   11/17/20 50.3 kg (111 lb)   10/26/20 50.5 kg (111 lb 6.4 oz)   06/29/20 51.4 kg (113 lb 6.4 oz)   06/03/20 52.2 kg (115 lb)   04/15/20 52.2 kg (115 lb)     CODE STATUS/ADVANCE DIRECTIVES DISCUSSION:   DNR / DNI  Patient's living condition: lives in an assisted living facility  ALLERGIES: Hydrochlorothiazide  PAST MEDICAL HISTORY:  has a past medical history of CKD (chronic kidney disease) stage 3, GFR 30-59 ml/min, Hyperlipidemia LDL goal < 100, Hypertension goal BP (blood pressure) < 140/90, Osteoarthritis (2/9/2012), Osteopenia, and Rheumatoid arthritis(714.0). She also has no past medical history of Cancer (H), Cerebral infarction (H), Congestive heart failure (H), COPD (chronic obstructive pulmonary disease) (H), Depressive disorder, Diabetes (H), Heart disease, History of blood transfusion, Thyroid disease, or Uncomplicated asthma.  PAST SURGICAL HISTORY:   has a past surgical history that includes APPENDECTOMY (1950) and surgical history of - .  FAMILY HISTORY: family history includes Arthritis in her sister; C.A.D. in her sister; Family History Negative in her  brother, daughter, and daughter; Heart Disease in her mother; Respiratory in her mother.  SOCIAL HISTORY:   reports that she quit smoking about 20 years ago. Her smoking use included cigarettes. She has a 10.00 pack-year smoking history. She has never used smokeless tobacco. She reports previous alcohol use. She reports that she does not use drugs.    Post Discharge Medication Reconciliation Status: discharge medications reconciled and changed, per note/orders    Current Outpatient Medications   Medication Sig Dispense Refill     acetaminophen (TYLENOL) 325 MG tablet Take 2 tablets (650 mg) by mouth 2 times daily Give 650mg BID and BID  tablet 11     acetaminophen (TYLENOL) 500 MG tablet Take 500-1,000 mg by mouth 4 times daily as needed for mild pain       atenolol (TENORMIN) 25 MG tablet Take 3 tablets (75 mg) by mouth daily HOLD if SBP<100 and/or HR<55. 270 tablet 3     atorvastatin (LIPITOR) 20 MG tablet TAKE 1 TABLET(20 MG) BY MOUTH DAILY 90 tablet 0     escitalopram (LEXAPRO) 5 MG tablet Take 2 tablets (10 mg) by mouth daily 60 tablet 11     Ferrous Sulfate (IRON) 325 (65 Fe) MG tablet Take 1 tablet by mouth daily (with breakfast) 90 tablet 3     gabapentin (NEURONTIN) 300 MG capsule Take 1 capsule (300 mg) by mouth 3 times daily 270 capsule 3     HYDROcodone-acetaminophen (NORCO) 5-325 MG tablet Take 1 tablet by mouth every 6 hours as needed for moderate to severe pain 90 tablet 0     hydroxychloroquine (PLAQUENIL) 200 MG tablet Take 200 mg by mouth daily   3     multivitamin w/minerals (THERA-VIT-M) tablet Take 1 tablet by mouth daily 60 tablet 11     NIFEdipine ER OSMOTIC (PROCARDIA XL) 90 MG 24 hr tablet Take 1 tablet (90 mg) by mouth daily 90 tablet 3     Nutritional Supplements (BOOST COMPACT) LIQD Take 1 Bottle by mouth 2 times daily       senna-docusate (SENOKOT-S/PERICOLACE) 8.6-50 MG tablet Take 1 tablet by mouth 2 times daily as needed for constipation 180 tablet 1     ROS:  10 point ROS of  systems including Constitutional, Eyes, Respiratory, Cardiovascular, Gastroenterology, Genitourinary, Integumentary, Musculoskeletal, Psychiatric were all negative except for pertinent positives noted in my HPI.    Vitals:  BP 98/59   Pulse 66   Resp 16   Wt 50.3 kg (111 lb)   SpO2 92%   BMI 19.66 kg/m    Exam:  GENERAL APPEARANCE:  Alert, oriented, cooperative, sad  ENT:  Mouth and posterior oropharynx normal, moist mucous membranes, [place]  EYES:  EOM, conjunctivae, lids, pupils and irises normal  NECK:  No adenopathy,masses or thyromegaly  RESP:  respiratory effort and palpation of chest normal, lungs clear to auscultation , no respiratory distress  CV:  Palpation and auscultation of heart done , regular rate and rhythm, no murmur, rub, or gallop, no edema, +2 pedal pulses  ABDOMEN:  normal bowel sounds, soft, nontender, no hepatosplenomegaly or other masses, no guarding or rebound  M/S:   Gait and station normal  Ambulates with walker. ROM intact  SKIN:  Inspection of skin and subcutaneous tissue baseline, Palpation of skin and subcutaneous tissue baseline  NEURO:   Cranial nerves 2-12 are normal tested and grossly at patient's baseline, no purposeful movement in upper and lower extremities  PSYCH:  oriented X 3, memory impaired , flat affect, sad    PHQ 10/21/2019 10/25/2020 11/15/2020   PHQ-9 Total Score 2 11 17   Q9: Thoughts of better off dead/self-harm past 2 weeks Not at all Not at all More than half the days   F/U: Thoughts of suicide or self-harm - - No   F/U: Safety concerns - - No      NUNO-7 SCORE 1/7/2019 10/25/2020 11/15/2020   Total Score - 8 (mild anxiety) 13 (moderate anxiety)   Total Score 0 8 13      Last PHQ-9 11/15/2020   1.  Little interest or pleasure in doing things 1   2.  Feeling down, depressed, or hopeless 1   3.  Trouble falling or staying asleep, or sleeping too much 2   4.  Feeling tired or having little energy 2   5.  Poor appetite or overeating 3   6.  Feeling bad about yourself  2   7.  Trouble concentrating 3   8.  Moving slowly or restless 1   Q9: Thoughts of better off dead/self-harm past 2 weeks 2   PHQ-9 Total Score 17   Difficulty at work, home, or with people -   In the past two weeks have you had thoughts of suicide or self harm? No   Do you have concerns about your personal safety or the safety of others? No      NUNO-7  11/15/2020   1. Feeling nervous, anxious, or on edge 2   2. Not being able to stop or control worrying 2   3. Worrying too much about different things 2   4. Trouble relaxing 2   5. Being so restless that it is hard to sit still 0   6. Becoming easily annoyed or irritable 2   7. Feeling afraid, as if something awful might happen 3   NUNO-7 Total Score 13   If you checked any problems, how difficult have they made it for you to do your work, take care of things at home, or get along with other people?      Lab/Diagnostic data:    Most Recent 3 CBC's:  Recent Labs   Lab Test 06/29/20  1457 06/18/20  1553 06/10/20  1320   WBC 8.3 5.9 8.2   HGB 13.0 11.9 11.7   MCV 96 95 97   * 128* 116*     Most Recent 3 BMP's:  Recent Labs   Lab Test 08/31/20 06/15/20 06/10/20  1320 06/03/20  1508 07/25/19  1046   NA  --   --  139 133 137   POTASSIUM  --   --  4.4 4.7 4.9   CHLORIDE  --   --  108 103 103   CO2  --   --  23 26 28   BUN  --   --  15 19 32*   CR 1.080 1.110 1.16* 1.01 0.96   ANIONGAP  --   --  8 4 6   JOSÉ  --   --  8.4* 8.8 8.9   GLC  --   --  98 88 87     Most Recent 2 LFT's:  Recent Labs   Lab Test 09/17/20 08/31/20 08/12/20  1219 07/10/20  1433   * 123* 126* 72*   * 135* 147* 96*   ALKPHOS  --   --  474* 334*   BILITOTAL  --   --  1.2 0.9     Most Recent 3 INR's:  Recent Labs   Lab Test 04/30/18  1439   INR 0.97     Most Recent Cholesterol Panel:  Recent Labs   Lab Test 07/25/19  1046   CHOL 174   LDL 55      TRIG 78     Most Recent 6 Bacteria Isolates From Any Culture (See EPIC Reports for Culture Details):  Recent Labs   Lab Test  10/12/17  1556   CULT >100,000 colonies/mL  Escherichia coli  *  10,000 to 50,000 colonies/mL  Klebsiella pneumoniae  *     Most Recent TSH and T4:  Recent Labs   Lab Test 08/31/20 06/18/18  0600   TSH 2.46 1.52   T4  --  0.85     Most Recent 6 glucoses:  Recent Labs   Lab Test 06/10/20  1320 06/03/20  1508 07/25/19  1046 01/17/19  1257 01/07/19  1236 06/28/18  1636   GLC 98 88 87 75 76 103*     Most Recent Urinalysis:  Recent Labs   Lab Test 06/03/20  1506 05/22/18  1806   COLOR Yellow Yellow   APPEARANCE Clear Clear   URINEGLC Negative Negative   URINEBILI Negative Negative   URINEKETONE Negative Negative   SG 1.015 1.012   UBLD Negative Negative   URINEPH 7.0 6.0   PROTEIN Negative 10*   UROBILINOGEN 0.2  --    NITRITE Negative Negative   LEUKEST Negative Negative   RBCU  --  <1   WBCU  --  2     Most Recent Anemia Panel:  Recent Labs   Lab Test 06/29/20  1457 06/18/20  1552 06/18/20  1552 06/18/18  0600 06/18/18  0600   WBC 8.3   < >  --    < >  --    HGB 13.0   < >  --    < >  --    HCT 40.0   < >  --    < >  --    MCV 96   < >  --    < >  --    *   < >  --    < >  --    IRON  --   --  138  --  28*   IRONSAT  --   --  51*  --   --    RETICABSCT  --   --  48.6  --   --    RETP  --   --  1.2  --   --    FEB  --   --  273  --   --    NILAY  --   --  410*  --   --    B12  --   --   --   --  1,055*    < > = values in this interval not displayed.     ASSESSMENT/PLAN:  (F43.23) Adjustment reaction with anxiety and depression  (primary encounter diagnosis)  (R41.89) Cognitive impairment  (E46) Malnutrition, unspecified type (H)  Comment: Acute on chronic. Not at goal. More anxiety and depressed mood since moving into assisted living facility recently. She is more isolated. Also noticing memory loss is more noticeable. Dose was recently increased per previous provider. More anxiety and depressed mood since moving into assisted living facility recently. She is more isolated. Also noticing memory loss is more  noticeable.   Plan:   -Continue escitalopram 10mg daily  -Monitor mood and behaviors  -Recommend to start MVI daily due to poor appetite and low intake  -Encourage Boost BID as directed  -Monitor for changes in mobility, eating and sleeping patterns  -Continue nursing home setting with all nursing assistance and medication needs.   -CMP, CBC, TSH, vitamin D, vitamin B12 Cholesterol panel due in 1 week    (G89.4) Chronic pain syndrome  (M06.9) Rheumatoid arthritis, involving unspecified site, unspecified whether rheumatoid factor present (H)  (M19.90) Osteoarthritis, unspecified osteoarthritis type, unspecified site  (M81.0) Osteoporosis without current pathological fracture, unspecified osteoporosis type  (R53.81) Physical deconditioning  Comment: Chronic. Stable. Continues gabapentin to 300 mg 3 times a day.  She also follows with spine specialist. Followed by rheumatology Dr. Armstrong and rei. Last visit with rheumatology was 08/2020. Had been on Reclast.  She continues on calcium and vitamin D. She receives frequent infusions per rheumatology as directed. She is overdue for this per daughter.   Plan:   -Recommend to follow up with rheumatology Dr Holt as directed. Due within 1 month  -Continue infusions per rheumatology clinic as directed.   -May benefit from pain clinic referral if warranted if no improvement in future.   -Recommend to change Tylenol to 650mg BID and BID PRN. (limit tylenol to 4gm per day)  -Monitor pain complaints as directed  -Continue plaquenil 200mg daily  -Continue reclast annual infusions as directed. Overdue for this.   -CMP, CBC, TSH, vitamin D, vitamin B12 Cholesterol panel due in 1 week  -Change Norco to 1 tab every 6 hours PRN.   -Continue gabapentin 300mg TID  -May benefit from topical analgesics if warranted            (D50.9) Iron deficiency anemia, unspecified iron deficiency anemia type  Comment: Acute on chronic. Hemoglobin was normal last check 9/2020 with rheum. She will continue on  iron supplement.  Plan:   -Continue iron supplement as directed  -Monitor for active s/sx of bleeding concerns  -Monitor for worsening s/sx of concerns.   -CMP, CBC, TSH, vitamin D, vitamin B12 Cholesterol panel due in 1 week    (E78.5) Hyperlipidemia, unspecified hyperlipidemia type  (I10) Hypertension goal BP (blood pressure) < 140/90  (R60.9) Edema, unspecified type  (N18.30) Stage 3 chronic kidney disease, unspecified whether stage 3a or 3b CKD  Comment: Acute on chronic. Previously well controlled. No longer on the lisinopril due to hypotension and history of hyponatremia. The patient's peripheral edema most likely is multifactorial. Based on JNC-8 goals,  patients age of 89 year old, presence of diabetes or CKD, and goals of care goal BP is  <140/90 mm Hg. Noted patients BP is lower than goal and will adjust plan of care by. Staff has not been obtaining blood pressures frequently..  Plan:   -Follow up with cardiology PRN if indicated.   -Continue atorvastatin 20mg daily  -Continue atenolol 75mg and nifedipine 90mg as directed.  -Monitor BP and HR BID.   -Adjust BP medications accordingly per levels.   -Add HOLD parameters to atenolol if SBP<100 and/or HR<55.   -Monitor BP and HR as directed  -CMP, CBC, TSH, vitamin D, vitamin B12 Cholesterol panel due in 1 week    (R05) Chronic cough  Comment: Chronic. Chronic cough for many years. She was a smoker for decades from age 16 to twenty-some years ago. Smoked maybe 1/2 ppd for years. Denies any shortness of breath or wheezing or increase in cough over time. Denies frequent colds or bronchitis. Just has longstanding productive cough. Previous CXRs have shown some hyperinflation. Suspect COPD. She is not interested in further evaluation or medication.   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 Asymptomatic. Visited with GI on 8/13/2020.   She does not want to pursue any procedures or additional evaluation at this point. Per GI recommendations on 8/13/2020: 88yo woman with mild liver study abnormality likely explained by otherwise asymptomatic cystic duct obstruction with associated mild cholecystitis (wall thickening). As she is asymptomatic and does not want to pursue invasive procedures at this moment, I recommend conservative observation. With development of fevers or right upper quadrant pain, I would then recommend antibiotics alone and without improvement move forward with either cholecystectomy or ERCP with transpapillary gallbladder stent placement. Both have their advantages (surgery more definitive, ERCP less invasive) and risks (anesthesia). We would have another discussion regarding this option before organizing either. She was instructed to call my coordinator with any related symptoms.\"  Plan:   -Monitor urinary status  -Monitor for worsening s/sx of concerns  -Monitor conservative at this time per GI recommendations  -It patient develops fever or RUQ pain, GI would recommend antibiotics alone and without improvement then would move forward to cholecystectomy or ERCP  -Follow up with GI PRN if warranted in the future.   -CMP, CBC, TSH, vitamin D, vitamin B12 Cholesterol panel due in 1 week    (D69.6) Thrombocytopenia (H)  Comment: Acute on chronic. Visited with hematology on 7/22/2020:  Thrombocytopenia could be due to primary bone marrow pathology like myelodysplastic syndrome.  The patient is 89.  MDS is common in this age group.  Unlikely to be immune thrombocytopenia.  ITP is not common in older people. The patient does have liver dysfunction.  Thrombocytopenia could be due to that. The patient does have rheumatoid arthritis.  She can have immune destruction of the platelet which can be seen with rheumatoid arthritis  Plan:   -Follow up with hematology PRN if indicated " in the future  -Monitor for worsening s/sx of concerns.   -CMP, CBC, TSH, vitamin D, vitamin B12 Cholesterol panel due in 1 week    Orders written by provider at facility    Total time spent with patient visit at the skilled nursing facility was 45 min including patient visit, review of past records and phone call to patient contact. Greater than 50% of total time spent with counseling and coordinating care due to complexities of complex medical needs and co morbidities.     Electronically signed by:  Yuli Rangel DNP, APRN                      Sincerely,        STEVE Salazar CNP

## 2020-11-23 ENCOUNTER — RECORDS - HEALTHEAST (OUTPATIENT)
Dept: LAB | Facility: CLINIC | Age: 85
End: 2020-11-23

## 2020-11-24 LAB
ALBUMIN SERPL-MCNC: 2.9 G/DL (ref 3.5–5)
ALP SERPL-CCNC: 427 U/L (ref 45–120)
ALT SERPL W P-5'-P-CCNC: 76 U/L (ref 0–45)
ANION GAP SERPL CALCULATED.3IONS-SCNC: 6 MMOL/L (ref 5–18)
AST SERPL W P-5'-P-CCNC: 83 U/L (ref 0–40)
BASOPHILS # BLD AUTO: 0 THOU/UL (ref 0–0.2)
BASOPHILS NFR BLD AUTO: 1 % (ref 0–2)
BILIRUB SERPL-MCNC: 0.7 MG/DL (ref 0–1)
BUN SERPL-MCNC: 16 MG/DL (ref 8–28)
CALCIUM SERPL-MCNC: 8.4 MG/DL (ref 8.5–10.5)
CHLORIDE BLD-SCNC: 99 MMOL/L (ref 98–107)
CHOLEST SERPL-MCNC: 99 MG/DL
CO2 SERPL-SCNC: 27 MMOL/L (ref 22–31)
CREAT SERPL-MCNC: 0.84 MG/DL (ref 0.6–1.1)
EOSINOPHIL # BLD AUTO: 0.1 THOU/UL (ref 0–0.4)
EOSINOPHIL NFR BLD AUTO: 2 % (ref 0–6)
ERYTHROCYTE [DISTWIDTH] IN BLOOD BY AUTOMATED COUNT: 13 % (ref 11–14.5)
FASTING STATUS PATIENT QL REPORTED: ABNORMAL
GFR SERPL CREATININE-BSD FRML MDRD: >60 ML/MIN/1.73M2
GLUCOSE BLD-MCNC: 71 MG/DL (ref 70–125)
HCT VFR BLD AUTO: 33.5 % (ref 35–47)
HDLC SERPL-MCNC: 45 MG/DL
HGB BLD-MCNC: 10.8 G/DL (ref 12–16)
IMM GRANULOCYTES # BLD: 0.1 THOU/UL
IMM GRANULOCYTES NFR BLD: 1 %
LDLC SERPL CALC-MCNC: 40 MG/DL
LYMPHOCYTES # BLD AUTO: 0.5 THOU/UL (ref 0.8–4.4)
LYMPHOCYTES NFR BLD AUTO: 7 % (ref 20–40)
MCH RBC QN AUTO: 29.9 PG (ref 27–34)
MCHC RBC AUTO-ENTMCNC: 32.2 G/DL (ref 32–36)
MCV RBC AUTO: 93 FL (ref 80–100)
MONOCYTES # BLD AUTO: 0.8 THOU/UL (ref 0–0.9)
MONOCYTES NFR BLD AUTO: 11 % (ref 2–10)
NEUTROPHILS # BLD AUTO: 5.7 THOU/UL (ref 2–7.7)
NEUTROPHILS NFR BLD AUTO: 79 % (ref 50–70)
PLATELET # BLD AUTO: 163 THOU/UL (ref 140–440)
PMV BLD AUTO: 11.7 FL (ref 8.5–12.5)
POTASSIUM BLD-SCNC: 4.4 MMOL/L (ref 3.5–5)
PROT SERPL-MCNC: 5.8 G/DL (ref 6–8)
RBC # BLD AUTO: 3.61 MILL/UL (ref 3.8–5.4)
SODIUM SERPL-SCNC: 132 MMOL/L (ref 136–145)
TRIGL SERPL-MCNC: 71 MG/DL
TSH SERPL DL<=0.005 MIU/L-ACNC: 1.55 UIU/ML (ref 0.3–5)
VIT B12 SERPL-MCNC: 943 PG/ML (ref 213–816)
WBC: 7.2 THOU/UL (ref 4–11)

## 2020-11-25 LAB — 25(OH)D3 SERPL-MCNC: 24.1 NG/ML (ref 30–80)

## 2020-11-25 NOTE — TELEPHONE ENCOUNTER
Comment: Labs results back and reviewed. Sodium low at 132. Suspect due to polypharmacy and antidepressant medication. Albumin low at 2.9. Suspect due to poor oral intake. Alkaline phopshate and AST and ALT levels noted improvement since last labs in June 2020. Cholesterol panel stable. Continue statin for now, but would recommend discontinue medication in near future if able. Hgb stable at 10.8. Remains on iron supplement. Platelets stable at 163. TSH stable at 1.55. B12 levels elevated at 943. Vitamin D levels low at 24.1  Plan:  -will start vitamin D supplementation  -Monitor BMP and CBC in 3 weeks on 12/15/20

## 2020-12-09 NOTE — LETTER
"    12/9/2020        RE: Celestina Tejada  [facility]  [address]  [address]  [facility] [address]        Celestina Tejada is a 89 year old female seen December 9, 2020 at [facility] where she has resided for 3 months (admit 9/2020)   Pt has had progressive cognitive decline, with some [place].   She will not sleep in her bedroom, feels \"too closed in.\"  Sleeps on the sofa where she can see the door  Daughter had reported a phone call every morning asking where she is, needing frequent reorientation.    Transitioning complicated by quarantine restrictions during the global pandemic.  She has been depressed, not eating.   Mostly living on Boost and leaving meals uneaten.   Escitalopram was started in October; she had more confusion when on mirtazapine previously.     Today pt states she is feeling okay, denies any pain.  Had a \"backache once\" but not today.    She has been on a walking program with nursing staff, but doesn't always agree to go.     Ambulates with 4WW.      Pt has erosive seropositive rheumatoid arthritis, dx'd in 2008.   She has been on different medications, currently on Actemra and hydroxychloroquine, followed routinely by Rheumatology Dr Holt.     She also receives yearly Reclast infusions for osteoporosis.     Pt has seen Hem/Onc Dr Kaur in July for low platelets, and she declined further workup or tx.   Also seen by GI for elevated LFTs () found on screening for Actemra tx.  She was imaged and found to have cholecystitis and partially obstructing ductal stone.  She is asx, and has not wanted to undergo ERCP or surgery, currently monitored only.       Past Medical History:   Diagnosis Date     CKD (chronic kidney disease) stage 3, GFR 30-59 ml/min      Hyperlipidemia LDL goal < 100      Hypertension goal BP (blood pressure) < 140/90      Osteoarthritis 2/9/2012     Osteopenia      Rheumatoid arthritis(714.0)  "   Late onset dementia, Alzheimer's type     Cholecystitis, poss obstructing ductal stone, 2020  Thrombocytopenia    Past Surgical History:   Procedure Laterality Date     C APPENDECTOMY  1950     SURGICAL HISTORY OF -       2 normal vaginal births     SH:     She has 2 daughters: Tawnya lives locally, Florina lives in [place], [place]    Pt previously lived independently in her condo.     Past smoker.        ROS: usual weight is 130 lb  Wt Readings from Last 5 Encounters:   12/09/20 50.3 kg (111 lb)   11/17/20 50.3 kg (111 lb)   10/26/20 50.5 kg (111 lb 6.4 oz)   06/29/20 51.4 kg (113 lb 6.4 oz)   06/03/20 52.2 kg (115 lb)      EXAM:  NAD, clothes are too big   BP 98/59   Pulse 66   Resp 16   Wt 50.3 kg (111 lb)   BMI 19.66 kg/m     VS during visit: /74  HR 62  O2sat 93% on room air   Neck supple without adenopathy or thyromegaly  Lungs clear bilaterally with fair air movement  Heart RRR s1s2 without murmur  Abd soft, NT, no distention, +BS  Ext without edema, +ulnar deviation, hypertrophy of MCPs              Neuro: some confusion, wordfinding difficulty  Psych: affect okay, pleasant     Lab Results   Component Value Date    AST 83 11/24/2020      BILITOTAL 0.7 11/24/2020     Lab Results   Component Value Date    ALBUMIN 2.9 11/24/2020     Lab Results   Component Value Date    PROTTOTAL 5.8 11/24/2020      Lab Results   Component Value Date    ALKPHOS 427 11/24/2020      WBC 4.0 - 11.0 thou/uL 7.2     RBC Count 3.80 - 5.40 mill/uL 3.61Low      Hemoglobin 12.0 - 16.0 g/dL 10.8Low      Hematocrit 35.0 - 47.0 % 33.5Low      MCV 80 - 100 fl 93     MCH 27.0 - 34.0 pg 29.9     MCHC 32.0 - 36.0 g/dL 32.2     RDW 11.0 - 14.5 % 13.0     Platelet Count 140 - 440 thou/uL 163       Sodium 136 - 145 mmol/L 132Low      Potassium 3.5 - 5.0 mmol/L 4.4     Chloride 98 - 107 mmol/L 99     Carbon Dioxide 22 - 31 mmol/L 27     Anion Gap 5 - 18 mmol/L 6     Glucose 70 - 125 mg/dL 71     Urea Nitrogen 8 - 28 mg/dL 16      Creatinine 0.60 - 1.10 mg/dL 0.84      Lab Results   Component Value Date    CHOL 99 11/24/2020     Lab Results   Component Value Date    HDL 45 11/24/2020     Lab Results   Component Value Date    LDL 40 11/24/2020     Lab Results   Component Value Date    TRIG 71 11/24/2020     MAGNETIC RESONANCE CHOLANGIOPANCREATOGRAPHY  7/11/2020 1:47 PM   HISTORY: Follow up from ultrasound, concern for obstructed cystic duct.   COMPARISON: HIDA scan from June 24, 2020  FINDINGS: Motion artifact limits detail. A contracted gallbladder is  presumably in the gallbladder fossa. Markedly irregular gallbladder  wall noted which may indicate cholecystitis rather than cystic duct  obstruction. No definite stone demonstrated in the cystic duct  although evaluation is limited by motion. No definite  choledocholithiasis demonstrated. Extrahepatic biliary dilatation up  to 9 mm. No enhancing liver lesions. Diffuse intrahepatic biliary  dilatation as well. Question some mild pancreatic ductal dilatation at  4 mm. Multiple renal cysts. Adrenal glands and spleen grossly unremarkable. No dilated bowel.  IMPRESSION: Findings on HIDA scan may indicate cholecystitis versus  cystic duct obstruction. No definite stone in the cystic duct is  demonstrated, however evaluation is limited by motion artifact.      IMP/PLAN:   (I10) Hypertension goal BP (blood pressure) < 140/90   Comment:  H/o LE edema, bps lower since she's lost weight   BP Readings from Last 3 Encounters:   12/09/20 98/59   11/18/20 98/59   10/26/20 108/52      Pulse Readings from Last 4 Encounters:   12/09/20 66   11/18/20 66   10/26/20 59   06/29/20 67      Plan: decrease nifedipine to 30 mg/day  Continue atenolol 75 mg/day   Follow bps and HRs, consider decreasing dose of atenolol as next step       (M05.741,  M05.742) Rheumatoid arthritis involving both hands with positive rheumatoid factor (H)  Comment: followed by Rheumatology   Plan: hydroxychloroquine 200 mg/day, infusions of  Actemra per Rheum Dr Holt    Monitor labs quarterly.      She is on gabapentin 300 mg tid, acetaminophen 650 mg bid and prn Norco for pain management    Encourage mobility, walking program       (F03.90) Dementia without behavioral disturbance, unspecified dementia type (H)  Comment: cognitive decline, disorientation, loss of coping skills and decreased functional status  Plan: AL support for meds, meals, activity    (F43.22) Adjustment disorder with anxious mood  Comment: as above   Plan: escitalopram 10 mg/day       (E55.9) Vitamin D deficiency  (M81.0) Age-related osteoporosis without current pathological fracture  Comment: remains ambulatory, and a fall risk   Plan: vit D3 2000 units/day, dietary calcium     Yearly Reclast infusion per Rheumatology     (K82.0) Obstruction of cystic duct  Comment: no current symptoms   Plan: monitor.  Per GI, use abx if she has GB symptoms     (E78.5) Hyperlipidemia, unspecified hyperlipidemia type  Comment:   Lab Results   Component Value Date    CHOL 99 11/24/2020     Lab Results   Component Value Date    HDL 45 11/24/2020     Lab Results   Component Value Date    LDL 40 11/24/2020     Lab Results   Component Value Date    TRIG 71 11/24/2020     Plan: on atorvastatin 20 mg/day.  Given elevated transaminases and no documented CV event, would decrease dose to 10 mg/day and consider discontinuation       (D50.9) Iron deficiency anemia, unspecified iron deficiency anemia type  Comment: NCNC   Plan: remains on daily Fe replacement.  Can decrease to every other day if GI side effects         Jess Oneill MD         Sincerely,        Jess Oneill MD

## 2020-12-14 ENCOUNTER — RECORDS - HEALTHEAST (OUTPATIENT)
Dept: LAB | Facility: CLINIC | Age: 85
End: 2020-12-14

## 2020-12-15 LAB
ANION GAP SERPL CALCULATED.3IONS-SCNC: 12 MMOL/L (ref 5–18)
BUN SERPL-MCNC: 15 MG/DL (ref 8–28)
CALCIUM SERPL-MCNC: 9 MG/DL (ref 8.5–10.5)
CHLORIDE BLD-SCNC: 101 MMOL/L (ref 98–107)
CO2 SERPL-SCNC: 24 MMOL/L (ref 22–31)
CREAT SERPL-MCNC: 0.99 MG/DL (ref 0.6–1.1)
ERYTHROCYTE [DISTWIDTH] IN BLOOD BY AUTOMATED COUNT: 14.3 % (ref 11–14.5)
GFR SERPL CREATININE-BSD FRML MDRD: 53 ML/MIN/1.73M2
GLUCOSE BLD-MCNC: 65 MG/DL (ref 70–125)
HCT VFR BLD AUTO: 39.4 % (ref 35–47)
HGB BLD-MCNC: 12.2 G/DL (ref 12–16)
MCH RBC QN AUTO: 30.1 PG (ref 27–34)
MCHC RBC AUTO-ENTMCNC: 31 G/DL (ref 32–36)
MCV RBC AUTO: 97 FL (ref 80–100)
PLATELET # BLD AUTO: 236 THOU/UL (ref 140–440)
PMV BLD AUTO: 11.3 FL (ref 8.5–12.5)
POTASSIUM BLD-SCNC: 3.7 MMOL/L (ref 3.5–5)
RBC # BLD AUTO: 4.05 MILL/UL (ref 3.8–5.4)
SODIUM SERPL-SCNC: 137 MMOL/L (ref 136–145)
WBC: 8.3 THOU/UL (ref 4–11)

## 2020-12-20 NOTE — PROGRESS NOTES
"Celestina Tejada is a 89 year old female seen December 9, 2020 at [facility] where she has resided for 3 months (admit 9/2020)   Pt has had progressive cognitive decline, with some difficulty [place].   She will not sleep in her bedroom, feels \"too closed in.\"  Sleeps on the sofa where she can see the door  Daughter had reported a phone call every morning asking where she is, needing frequent reorientation.    Transitioning complicated by quarantine restrictions during the global pandemic.  She has been depressed, not eating.   Mostly living on Boost and leaving meals uneaten.   Escitalopram was started in October; she had more confusion when on mirtazapine previously.     Today pt states she is feeling okay, denies any pain.  Had a \"backache once\" but not today.    She has been on a walking program with nursing staff, but doesn't always agree to go.     Ambulates with 4WW.      Pt has erosive seropositive rheumatoid arthritis, dx'd in 2008.   She has been on different medications, currently on Actemra and hydroxychloroquine, followed routinely by Rheumatology Dr Holt.     She also receives yearly Reclast infusions for osteoporosis.     Pt has seen Hem/Onc Dr Kaur in July for low platelets, and she declined further workup or tx.   Also seen by GI for elevated LFTs () found on screening for Actemra tx.  She was imaged and found to have cholecystitis and partially obstructing ductal stone.  She is asx, and has not wanted to undergo ERCP or surgery, currently monitored only.       Past Medical History:   Diagnosis Date     CKD (chronic kidney disease) stage 3, GFR 30-59 ml/min      Hyperlipidemia LDL goal < 100      Hypertension goal BP (blood pressure) < 140/90      Osteoarthritis 2/9/2012     Osteopenia      Rheumatoid arthritis(714.0)    Late onset dementia, Alzheimer's type     Cholecystitis, poss obstructing ductal stone, 2020  Thrombocytopenia    Past " Surgical History:   Procedure Laterality Date     C APPENDECTOMY  1950     SURGICAL HISTORY OF -       2 normal vaginal births     SH:     She has 2 daughters: Tawnya lives locally, Florina lives in [place], [place]    Pt previously lived independently in her condo.     Past smoker.        ROS: usual weight is 130 lb  Wt Readings from Last 5 Encounters:   12/09/20 50.3 kg (111 lb)   11/17/20 50.3 kg (111 lb)   10/26/20 50.5 kg (111 lb 6.4 oz)   06/29/20 51.4 kg (113 lb 6.4 oz)   06/03/20 52.2 kg (115 lb)      EXAM:  NAD, clothes are too big   BP 98/59   Pulse 66   Resp 16   Wt 50.3 kg (111 lb)   BMI 19.66 kg/m     VS during visit: /74  HR 62  O2sat 93% on room air   Neck supple without adenopathy or thyromegaly  Lungs clear bilaterally with fair air movement  Heart RRR s1s2 without murmur  Abd soft, NT, no distention, +BS  Ext without edema, +ulnar deviation, hypertrophy of MCPs              Neuro: some confusion, wordfinding difficulty  Psych: affect okay, pleasant     Lab Results   Component Value Date    AST 83 11/24/2020      BILITOTAL 0.7 11/24/2020     Lab Results   Component Value Date    ALBUMIN 2.9 11/24/2020     Lab Results   Component Value Date    PROTTOTAL 5.8 11/24/2020      Lab Results   Component Value Date    ALKPHOS 427 11/24/2020      WBC 4.0 - 11.0 thou/uL 7.2     RBC Count 3.80 - 5.40 mill/uL 3.61Low      Hemoglobin 12.0 - 16.0 g/dL 10.8Low      Hematocrit 35.0 - 47.0 % 33.5Low      MCV 80 - 100 fl 93     MCH 27.0 - 34.0 pg 29.9     MCHC 32.0 - 36.0 g/dL 32.2     RDW 11.0 - 14.5 % 13.0     Platelet Count 140 - 440 thou/uL 163       Sodium 136 - 145 mmol/L 132Low      Potassium 3.5 - 5.0 mmol/L 4.4     Chloride 98 - 107 mmol/L 99     Carbon Dioxide 22 - 31 mmol/L 27     Anion Gap 5 - 18 mmol/L 6     Glucose 70 - 125 mg/dL 71     Urea Nitrogen 8 - 28 mg/dL 16     Creatinine 0.60 - 1.10 mg/dL 0.84      Lab Results   Component Value Date    CHOL 99 11/24/2020     Lab Results   Component  Value Date    HDL 45 11/24/2020     Lab Results   Component Value Date    LDL 40 11/24/2020     Lab Results   Component Value Date    TRIG 71 11/24/2020     MAGNETIC RESONANCE CHOLANGIOPANCREATOGRAPHY  7/11/2020 1:47 PM   HISTORY: Follow up from ultrasound, concern for obstructed cystic duct.   COMPARISON: HIDA scan from June 24, 2020  FINDINGS: Motion artifact limits detail. A contracted gallbladder is  presumably in the gallbladder fossa. Markedly irregular gallbladder  wall noted which may indicate cholecystitis rather than cystic duct  obstruction. No definite stone demonstrated in the cystic duct  although evaluation is limited by motion. No definite  choledocholithiasis demonstrated. Extrahepatic biliary dilatation up  to 9 mm. No enhancing liver lesions. Diffuse intrahepatic biliary  dilatation as well. Question some mild pancreatic ductal dilatation at  4 mm. Multiple renal cysts. Adrenal glands and spleen grossly unremarkable. No dilated bowel.  IMPRESSION: Findings on HIDA scan may indicate cholecystitis versus  cystic duct obstruction. No definite stone in the cystic duct is  demonstrated, however evaluation is limited by motion artifact.      IMP/PLAN:   (I10) Hypertension goal BP (blood pressure) < 140/90   Comment:  H/o LE edema, bps lower since she's lost weight   BP Readings from Last 3 Encounters:   12/09/20 98/59   11/18/20 98/59   10/26/20 108/52      Pulse Readings from Last 4 Encounters:   12/09/20 66   11/18/20 66   10/26/20 59   06/29/20 67      Plan: decrease nifedipine to 30 mg/day  Continue atenolol 75 mg/day   Follow bps and HRs, consider decreasing dose of atenolol as next step       (M05.741,  M05.742) Rheumatoid arthritis involving both hands with positive rheumatoid factor (H)  Comment: followed by Rheumatology   Plan: hydroxychloroquine 200 mg/day, infusions of Actemra per Rheum Dr Holt    Monitor labs quarterly.      She is on gabapentin 300 mg tid, acetaminophen 650 mg bid and prn  Norco for pain management    Encourage mobility, walking program       (F03.90) Dementia without behavioral disturbance, unspecified dementia type (H)  Comment: cognitive decline, disorientation, loss of coping skills and decreased functional status  Plan: [place] support for meds, meals, activity    (F43.22) Adjustment disorder with anxious mood  Comment: as above   Plan: escitalopram 10 mg/day       (E55.9) Vitamin D deficiency  (M81.0) Age-related osteoporosis without current pathological fracture  Comment: remains ambulatory, and a fall risk   Plan: vit D3 2000 units/day, dietary calcium     Yearly Reclast infusion per Rheumatology     (K82.0) Obstruction of cystic duct  Comment: no current symptoms   Plan: monitor.  Per GI, use abx if she has GB symptoms     (E78.5) Hyperlipidemia, unspecified hyperlipidemia type  Comment:   Lab Results   Component Value Date    CHOL 99 11/24/2020     Lab Results   Component Value Date    HDL 45 11/24/2020     Lab Results   Component Value Date    LDL 40 11/24/2020     Lab Results   Component Value Date    TRIG 71 11/24/2020     Plan: on atorvastatin 20 mg/day.  Given elevated transaminases and no documented CV event, would decrease dose to 10 mg/day and consider discontinuation       (D50.9) Iron deficiency anemia, unspecified iron deficiency anemia type  Comment: NCNC   Plan: remains on daily Fe replacement.  Can decrease to every other day if GI side effects         Jess Oneill MD

## 2021-01-01 ENCOUNTER — DISCHARGE SUMMARY NURSING HOME (OUTPATIENT)
Dept: GERIATRICS | Facility: CLINIC | Age: 86
End: 2021-01-01
Payer: COMMERCIAL

## 2021-01-01 ENCOUNTER — APPOINTMENT (OUTPATIENT)
Dept: GENERAL RADIOLOGY | Facility: CLINIC | Age: 86
End: 2021-01-01
Attending: EMERGENCY MEDICINE
Payer: COMMERCIAL

## 2021-01-01 ENCOUNTER — TRANSFERRED RECORDS (OUTPATIENT)
Dept: HEALTH INFORMATION MANAGEMENT | Facility: CLINIC | Age: 86
End: 2021-01-01

## 2021-01-01 ENCOUNTER — MEDICAL CORRESPONDENCE (OUTPATIENT)
Dept: HEALTH INFORMATION MANAGEMENT | Facility: CLINIC | Age: 86
End: 2021-01-01

## 2021-01-01 ENCOUNTER — TELEPHONE (OUTPATIENT)
Dept: GERIATRICS | Facility: CLINIC | Age: 86
End: 2021-01-01

## 2021-01-01 ENCOUNTER — NURSING HOME VISIT (OUTPATIENT)
Dept: GERIATRICS | Facility: CLINIC | Age: 86
End: 2021-01-01
Payer: COMMERCIAL

## 2021-01-01 ENCOUNTER — HOSPITAL ENCOUNTER (OUTPATIENT)
Facility: CLINIC | Age: 86
Setting detail: OBSERVATION
Discharge: NURSING, REHAB OR LONG-TERM CARE | End: 2021-02-22
Attending: EMERGENCY MEDICINE | Admitting: INTERNAL MEDICINE
Payer: COMMERCIAL

## 2021-01-01 ENCOUNTER — TELEPHONE (OUTPATIENT)
Dept: FAMILY MEDICINE | Facility: CLINIC | Age: 86
End: 2021-01-01

## 2021-01-01 ENCOUNTER — ASSISTED LIVING VISIT (OUTPATIENT)
Dept: GERIATRICS | Facility: CLINIC | Age: 86
End: 2021-01-01
Payer: COMMERCIAL

## 2021-01-01 ENCOUNTER — MEDICAL CORRESPONDENCE (OUTPATIENT)
Dept: HEALTH INFORMATION MANAGEMENT | Facility: CLINIC | Age: 86
End: 2021-01-01
Payer: COMMERCIAL

## 2021-01-01 ENCOUNTER — APPOINTMENT (OUTPATIENT)
Dept: PHYSICAL THERAPY | Facility: CLINIC | Age: 86
End: 2021-01-01
Attending: INTERNAL MEDICINE
Payer: COMMERCIAL

## 2021-01-01 ENCOUNTER — HEALTH MAINTENANCE LETTER (OUTPATIENT)
Age: 86
End: 2021-01-01

## 2021-01-01 ENCOUNTER — DOCUMENTATION ONLY (OUTPATIENT)
Dept: OTHER | Facility: CLINIC | Age: 86
End: 2021-01-01

## 2021-01-01 VITALS
DIASTOLIC BLOOD PRESSURE: 77 MMHG | RESPIRATION RATE: 18 BRPM | OXYGEN SATURATION: 97 % | SYSTOLIC BLOOD PRESSURE: 169 MMHG | HEART RATE: 67 BPM | TEMPERATURE: 97.2 F | BODY MASS INDEX: 21.83 KG/M2 | HEIGHT: 60 IN | WEIGHT: 111.2 LBS

## 2021-01-01 VITALS
DIASTOLIC BLOOD PRESSURE: 60 MMHG | OXYGEN SATURATION: 95 % | HEIGHT: 60 IN | BODY MASS INDEX: 21.52 KG/M2 | RESPIRATION RATE: 18 BRPM | WEIGHT: 109.6 LBS | TEMPERATURE: 97.9 F | SYSTOLIC BLOOD PRESSURE: 115 MMHG | HEART RATE: 62 BPM

## 2021-01-01 VITALS
HEART RATE: 68 BPM | OXYGEN SATURATION: 96 % | WEIGHT: 109.6 LBS | HEIGHT: 60 IN | DIASTOLIC BLOOD PRESSURE: 42 MMHG | RESPIRATION RATE: 18 BRPM | BODY MASS INDEX: 21.52 KG/M2 | TEMPERATURE: 97.3 F | SYSTOLIC BLOOD PRESSURE: 117 MMHG

## 2021-01-01 VITALS
HEIGHT: 60 IN | WEIGHT: 108.2 LBS | DIASTOLIC BLOOD PRESSURE: 58 MMHG | TEMPERATURE: 98 F | OXYGEN SATURATION: 95 % | SYSTOLIC BLOOD PRESSURE: 142 MMHG | RESPIRATION RATE: 20 BRPM | BODY MASS INDEX: 21.24 KG/M2 | HEART RATE: 69 BPM

## 2021-01-01 VITALS
SYSTOLIC BLOOD PRESSURE: 100 MMHG | TEMPERATURE: 96.1 F | RESPIRATION RATE: 18 BRPM | DIASTOLIC BLOOD PRESSURE: 48 MMHG | OXYGEN SATURATION: 95 % | HEART RATE: 66 BPM

## 2021-01-01 VITALS
HEIGHT: 60 IN | OXYGEN SATURATION: 96 % | RESPIRATION RATE: 20 BRPM | TEMPERATURE: 97.2 F | DIASTOLIC BLOOD PRESSURE: 58 MMHG | SYSTOLIC BLOOD PRESSURE: 135 MMHG | HEART RATE: 76 BPM | WEIGHT: 113.2 LBS | BODY MASS INDEX: 22.23 KG/M2

## 2021-01-01 VITALS
OXYGEN SATURATION: 93 % | HEART RATE: 68 BPM | RESPIRATION RATE: 16 BRPM | SYSTOLIC BLOOD PRESSURE: 113 MMHG | DIASTOLIC BLOOD PRESSURE: 67 MMHG | BODY MASS INDEX: 17.48 KG/M2 | WEIGHT: 98.7 LBS

## 2021-01-01 VITALS
TEMPERATURE: 97.3 F | RESPIRATION RATE: 18 BRPM | BODY MASS INDEX: 20.26 KG/M2 | HEART RATE: 62 BPM | WEIGHT: 103.2 LBS | SYSTOLIC BLOOD PRESSURE: 112 MMHG | DIASTOLIC BLOOD PRESSURE: 61 MMHG | HEIGHT: 60 IN | OXYGEN SATURATION: 94 %

## 2021-01-01 VITALS
RESPIRATION RATE: 20 BRPM | HEART RATE: 76 BPM | DIASTOLIC BLOOD PRESSURE: 58 MMHG | HEIGHT: 60 IN | WEIGHT: 113.2 LBS | SYSTOLIC BLOOD PRESSURE: 135 MMHG | OXYGEN SATURATION: 95 % | BODY MASS INDEX: 22.23 KG/M2 | TEMPERATURE: 96.9 F

## 2021-01-01 VITALS
RESPIRATION RATE: 16 BRPM | TEMPERATURE: 97.1 F | HEIGHT: 60 IN | SYSTOLIC BLOOD PRESSURE: 110 MMHG | HEART RATE: 65 BPM | OXYGEN SATURATION: 95 % | DIASTOLIC BLOOD PRESSURE: 52 MMHG | BODY MASS INDEX: 21.85 KG/M2 | WEIGHT: 111.3 LBS

## 2021-01-01 VITALS — TEMPERATURE: 97.2 F | HEIGHT: 60 IN | WEIGHT: 113.2 LBS | OXYGEN SATURATION: 96 % | BODY MASS INDEX: 22.23 KG/M2

## 2021-01-01 VITALS
HEART RATE: 76 BPM | TEMPERATURE: 97.2 F | OXYGEN SATURATION: 96 % | RESPIRATION RATE: 20 BRPM | SYSTOLIC BLOOD PRESSURE: 135 MMHG | WEIGHT: 113.2 LBS | HEIGHT: 60 IN | BODY MASS INDEX: 22.23 KG/M2 | DIASTOLIC BLOOD PRESSURE: 58 MMHG

## 2021-01-01 VITALS
HEIGHT: 60 IN | WEIGHT: 111.3 LBS | TEMPERATURE: 97.6 F | DIASTOLIC BLOOD PRESSURE: 62 MMHG | OXYGEN SATURATION: 97 % | SYSTOLIC BLOOD PRESSURE: 121 MMHG | BODY MASS INDEX: 21.85 KG/M2 | RESPIRATION RATE: 18 BRPM | HEART RATE: 66 BPM

## 2021-01-01 VITALS
DIASTOLIC BLOOD PRESSURE: 67 MMHG | RESPIRATION RATE: 16 BRPM | HEART RATE: 68 BPM | OXYGEN SATURATION: 93 % | SYSTOLIC BLOOD PRESSURE: 113 MMHG | TEMPERATURE: 96.5 F

## 2021-01-01 VITALS
BODY MASS INDEX: 21.52 KG/M2 | HEART RATE: 62 BPM | HEIGHT: 60 IN | RESPIRATION RATE: 18 BRPM | OXYGEN SATURATION: 96 % | WEIGHT: 109.6 LBS | TEMPERATURE: 97.6 F | SYSTOLIC BLOOD PRESSURE: 111 MMHG | DIASTOLIC BLOOD PRESSURE: 67 MMHG

## 2021-01-01 VITALS
OXYGEN SATURATION: 94 % | BODY MASS INDEX: 21.26 KG/M2 | HEIGHT: 60 IN | HEART RATE: 72 BPM | DIASTOLIC BLOOD PRESSURE: 56 MMHG | RESPIRATION RATE: 18 BRPM | WEIGHT: 108.3 LBS | TEMPERATURE: 97.2 F | SYSTOLIC BLOOD PRESSURE: 134 MMHG

## 2021-01-01 VITALS
RESPIRATION RATE: 18 BRPM | TEMPERATURE: 97.9 F | DIASTOLIC BLOOD PRESSURE: 58 MMHG | SYSTOLIC BLOOD PRESSURE: 126 MMHG | DIASTOLIC BLOOD PRESSURE: 55 MMHG | RESPIRATION RATE: 20 BRPM | OXYGEN SATURATION: 90 % | HEART RATE: 62 BPM | HEART RATE: 76 BPM | OXYGEN SATURATION: 96 % | TEMPERATURE: 97.2 F | SYSTOLIC BLOOD PRESSURE: 135 MMHG

## 2021-01-01 VITALS
HEART RATE: 66 BPM | BODY MASS INDEX: 21.85 KG/M2 | DIASTOLIC BLOOD PRESSURE: 62 MMHG | SYSTOLIC BLOOD PRESSURE: 121 MMHG | TEMPERATURE: 97.3 F | OXYGEN SATURATION: 94 % | WEIGHT: 111.3 LBS | HEIGHT: 60 IN | RESPIRATION RATE: 18 BRPM

## 2021-01-01 DIAGNOSIS — R41.89 COGNITIVE IMPAIRMENT: ICD-10-CM

## 2021-01-01 DIAGNOSIS — M81.0 OSTEOPOROSIS WITHOUT CURRENT PATHOLOGICAL FRACTURE, UNSPECIFIED OSTEOPOROSIS TYPE: ICD-10-CM

## 2021-01-01 DIAGNOSIS — R19.7 DIARRHEA, UNSPECIFIED TYPE: ICD-10-CM

## 2021-01-01 DIAGNOSIS — G89.4 CHRONIC PAIN SYNDROME: ICD-10-CM

## 2021-01-01 DIAGNOSIS — T14.8XXA OPEN WOUND: ICD-10-CM

## 2021-01-01 DIAGNOSIS — M81.0 AGE-RELATED OSTEOPOROSIS WITHOUT CURRENT PATHOLOGICAL FRACTURE: ICD-10-CM

## 2021-01-01 DIAGNOSIS — R05.3 CHRONIC COUGH: ICD-10-CM

## 2021-01-01 DIAGNOSIS — N18.30 BENIGN HYPERTENSION WITH CHRONIC KIDNEY DISEASE, STAGE III (H): ICD-10-CM

## 2021-01-01 DIAGNOSIS — Z00.00 ROUTINE GENERAL MEDICAL EXAMINATION AT A HEALTH CARE FACILITY: ICD-10-CM

## 2021-01-01 DIAGNOSIS — F03.90 DEMENTIA WITHOUT BEHAVIORAL DISTURBANCE, UNSPECIFIED DEMENTIA TYPE: ICD-10-CM

## 2021-01-01 DIAGNOSIS — F43.22 ADJUSTMENT DISORDER WITH ANXIOUS MOOD: ICD-10-CM

## 2021-01-01 DIAGNOSIS — M05.741 RHEUMATOID ARTHRITIS INVOLVING BOTH HANDS WITH POSITIVE RHEUMATOID FACTOR (H): ICD-10-CM

## 2021-01-01 DIAGNOSIS — M54.50 ACUTE BILATERAL LOW BACK PAIN, UNSPECIFIED WHETHER SCIATICA PRESENT: Primary | ICD-10-CM

## 2021-01-01 DIAGNOSIS — S32.000D COMPRESSION FRACTURE OF LUMBAR VERTEBRA WITH ROUTINE HEALING, UNSPECIFIED LUMBAR VERTEBRAL LEVEL, SUBSEQUENT ENCOUNTER: ICD-10-CM

## 2021-01-01 DIAGNOSIS — R29.6 FALLS FREQUENTLY: ICD-10-CM

## 2021-01-01 DIAGNOSIS — J44.9 CHRONIC OBSTRUCTIVE PULMONARY DISEASE, UNSPECIFIED COPD TYPE (H): ICD-10-CM

## 2021-01-01 DIAGNOSIS — Z51.5 HOSPICE CARE PATIENT: ICD-10-CM

## 2021-01-01 DIAGNOSIS — M19.90 OSTEOARTHRITIS, UNSPECIFIED OSTEOARTHRITIS TYPE, UNSPECIFIED SITE: ICD-10-CM

## 2021-01-01 DIAGNOSIS — I10 HYPERTENSION GOAL BP (BLOOD PRESSURE) < 140/90: ICD-10-CM

## 2021-01-01 DIAGNOSIS — E78.5 DYSLIPIDEMIA: ICD-10-CM

## 2021-01-01 DIAGNOSIS — S39.92XA INJURY OF LOW BACK, INITIAL ENCOUNTER: ICD-10-CM

## 2021-01-01 DIAGNOSIS — M05.742 RHEUMATOID ARTHRITIS INVOLVING BOTH HANDS WITH POSITIVE RHEUMATOID FACTOR (H): ICD-10-CM

## 2021-01-01 DIAGNOSIS — N18.31 STAGE 3A CHRONIC KIDNEY DISEASE (H): ICD-10-CM
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
## 2021-01-01 DIAGNOSIS — E46 MALNUTRITION, UNSPECIFIED TYPE (H): ICD-10-CM

## 2021-01-01 DIAGNOSIS — I12.9 BENIGN HYPERTENSION WITH CHRONIC KIDNEY DISEASE, STAGE III (H): ICD-10-CM

## 2021-01-01 DIAGNOSIS — R53.81 PHYSICAL DECONDITIONING: ICD-10-CM

## 2021-01-01 DIAGNOSIS — D69.6 THROMBOCYTOPENIA (H): ICD-10-CM

## 2021-01-01 DIAGNOSIS — R29.6 RECURRENT FALLS: ICD-10-CM

## 2021-01-01 DIAGNOSIS — B02.9 HERPES ZOSTER WITHOUT COMPLICATION: Primary | ICD-10-CM

## 2021-01-01 DIAGNOSIS — I10 ESSENTIAL HYPERTENSION: Primary | ICD-10-CM

## 2021-01-01 DIAGNOSIS — N18.30 ANEMIA OF CHRONIC RENAL FAILURE, STAGE 3 (MODERATE), UNSPECIFIED WHETHER STAGE 3A OR 3B CKD (H): ICD-10-CM

## 2021-01-01 DIAGNOSIS — T14.8XXA HEMATOMA OF SKIN: ICD-10-CM

## 2021-01-01 DIAGNOSIS — K59.00 CONSTIPATION, UNSPECIFIED CONSTIPATION TYPE: ICD-10-CM

## 2021-01-01 DIAGNOSIS — R60.9 EDEMA, UNSPECIFIED TYPE: ICD-10-CM

## 2021-01-01 DIAGNOSIS — I12.9 BENIGN HYPERTENSION WITH CHRONIC KIDNEY DISEASE, STAGE III (H): Primary | ICD-10-CM
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
## 2021-01-01 DIAGNOSIS — M54.50 ACUTE LOW BACK PAIN WITHOUT SCIATICA, UNSPECIFIED BACK PAIN LATERALITY: ICD-10-CM

## 2021-01-01 DIAGNOSIS — F43.22 ADJUSTMENT DISORDER WITH ANXIOUS MOOD: Primary | ICD-10-CM

## 2021-01-01 DIAGNOSIS — F03.90 DEMENTIA WITHOUT BEHAVIORAL DISTURBANCE, UNSPECIFIED DEMENTIA TYPE: Primary | ICD-10-CM

## 2021-01-01 DIAGNOSIS — E78.5 HYPERLIPIDEMIA, UNSPECIFIED HYPERLIPIDEMIA TYPE: ICD-10-CM

## 2021-01-01 DIAGNOSIS — B02.9 HERPES ZOSTER WITHOUT COMPLICATION: ICD-10-CM

## 2021-01-01 DIAGNOSIS — N18.30 BENIGN HYPERTENSION WITH CHRONIC KIDNEY DISEASE, STAGE III (H): Primary | ICD-10-CM

## 2021-01-01 DIAGNOSIS — I10 BENIGN ESSENTIAL HYPERTENSION: ICD-10-CM

## 2021-01-01 DIAGNOSIS — M51.369 DDD (DEGENERATIVE DISC DISEASE), LUMBAR: ICD-10-CM

## 2021-01-01 DIAGNOSIS — R41.89 COGNITIVE IMPAIRMENT: Primary | ICD-10-CM

## 2021-01-01 DIAGNOSIS — N18.30 STAGE 3 CHRONIC KIDNEY DISEASE, UNSPECIFIED WHETHER STAGE 3A OR 3B CKD (H): ICD-10-CM

## 2021-01-01 DIAGNOSIS — G89.4 CHRONIC PAIN SYNDROME: Primary | ICD-10-CM

## 2021-01-01 DIAGNOSIS — R53.1 LEFT-SIDED WEAKNESS: ICD-10-CM

## 2021-01-01 DIAGNOSIS — N18.9 ANEMIA OF CHRONIC RENAL FAILURE, UNSPECIFIED CKD STAGE: ICD-10-CM

## 2021-01-01 DIAGNOSIS — D50.9 IRON DEFICIENCY ANEMIA, UNSPECIFIED IRON DEFICIENCY ANEMIA TYPE: ICD-10-CM

## 2021-01-01 DIAGNOSIS — M06.9 RHEUMATOID ARTHRITIS, INVOLVING UNSPECIFIED SITE, UNSPECIFIED WHETHER RHEUMATOID FACTOR PRESENT (H): ICD-10-CM

## 2021-01-01 DIAGNOSIS — E78.5 HYPERLIPIDEMIA, UNSPECIFIED HYPERLIPIDEMIA TYPE: Primary | ICD-10-CM

## 2021-01-01 DIAGNOSIS — B36.9 FUNGAL DERMATITIS: ICD-10-CM

## 2021-01-01 DIAGNOSIS — S39.93XA INJURY OF PELVIS, INITIAL ENCOUNTER: ICD-10-CM

## 2021-01-01 DIAGNOSIS — S32.040D COMPRESSION FRACTURE OF L4 VERTEBRA WITH ROUTINE HEALING, SUBSEQUENT ENCOUNTER: ICD-10-CM

## 2021-01-01 DIAGNOSIS — K82.0 OBSTRUCTION OF CYSTIC DUCT: ICD-10-CM

## 2021-01-01 DIAGNOSIS — I10 ESSENTIAL HYPERTENSION: ICD-10-CM
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
## 2021-01-01 DIAGNOSIS — D64.9 CHRONIC ANEMIA: ICD-10-CM

## 2021-01-01 DIAGNOSIS — D63.1 ANEMIA OF CHRONIC RENAL FAILURE, STAGE 3 (MODERATE), UNSPECIFIED WHETHER STAGE 3A OR 3B CKD (H): ICD-10-CM

## 2021-01-01 DIAGNOSIS — R60.0 PERIPHERAL EDEMA: ICD-10-CM

## 2021-01-01 DIAGNOSIS — L29.9 PRURITUS: ICD-10-CM

## 2021-01-01 DIAGNOSIS — D63.1 ANEMIA OF CHRONIC RENAL FAILURE, UNSPECIFIED CKD STAGE: ICD-10-CM

## 2021-01-01 DIAGNOSIS — E87.1 HYPONATREMIA: ICD-10-CM

## 2021-01-01 DIAGNOSIS — Z86.19 HISTORY OF SHINGLES: ICD-10-CM

## 2021-01-01 DIAGNOSIS — B02.29 PHN (POSTHERPETIC NEURALGIA): ICD-10-CM

## 2021-01-01 DIAGNOSIS — M54.50 ACUTE LOW BACK PAIN WITHOUT SCIATICA, UNSPECIFIED BACK PAIN LATERALITY: Primary | ICD-10-CM

## 2021-01-01 DIAGNOSIS — S39.92XA INJURY OF LOW BACK, INITIAL ENCOUNTER: Primary | ICD-10-CM

## 2021-01-01 DIAGNOSIS — M51.369 DDD (DEGENERATIVE DISC DISEASE), LUMBAR: Primary | ICD-10-CM

## 2021-01-01 DIAGNOSIS — M19.91 PRIMARY OSTEOARTHRITIS, UNSPECIFIED SITE: ICD-10-CM

## 2021-01-01 DIAGNOSIS — M06.9 RHEUMATOID ARTHRITIS, INVOLVING UNSPECIFIED SITE, UNSPECIFIED WHETHER RHEUMATOID FACTOR PRESENT (H): Primary | ICD-10-CM

## 2021-01-01 LAB
ALBUMIN (EXTERNAL): 3 G/DL (ref 3.5–5.3)
ALBUMIN UR-MCNC: 30 MG/DL
ALT SERPL-CCNC: 60 LU/L (ref 5–35)
ANION GAP SERPL CALCULATED.3IONS-SCNC: 11 MMOL/L (ref 5–18)
ANION GAP SERPL CALCULATED.3IONS-SCNC: 6 MMOL/L (ref 3–14)
ANION GAP SERPL CALCULATED.3IONS-SCNC: 8 MMOL/L (ref 5–18)
ANION GAP SERPL CALCULATED.3IONS-SCNC: 9 MMOL/L (ref 5–18)
APPEARANCE UR: CLEAR
AST SERPL-CCNC: 104 U/L (ref 5–34)
BACTERIA SPEC CULT: NORMAL
BASOPHILS # BLD AUTO: 0 10E9/L (ref 0–0.2)
BASOPHILS NFR BLD AUTO: 0.3 %
BILIRUB UR QL STRIP: NEGATIVE
BUN SERPL-MCNC: 13 MG/DL (ref 8–28)
BUN SERPL-MCNC: 24 MG/DL (ref 7–30)
BUN SERPL-MCNC: 24 MG/DL (ref 8–28)
BUN SERPL-MCNC: 24 MG/DL (ref 8–28)
CALCIUM SERPL-MCNC: 8.1 MG/DL (ref 8.5–10.5)
CALCIUM SERPL-MCNC: 8.5 MG/DL (ref 8.5–10.5)
CALCIUM SERPL-MCNC: 8.8 MG/DL (ref 8.5–10.5)
CALCIUM SERPL-MCNC: 9.2 MG/DL (ref 8.5–10.1)
CHLORIDE SERPL-SCNC: 102 MMOL/L (ref 94–109)
CHLORIDE SERPLBLD-SCNC: 102 MMOL/L (ref 98–107)
CO2 SERPL-SCNC: 23 MMOL/L (ref 22–31)
CO2 SERPL-SCNC: 23 MMOL/L (ref 22–31)
CO2 SERPL-SCNC: 25 MMOL/L (ref 22–31)
CO2 SERPL-SCNC: 27 MMOL/L (ref 20–32)
COLOR UR AUTO: YELLOW
CREAT SERPL-MCNC: 0.83 MG/DL (ref 0.6–1.1)
CREAT SERPL-MCNC: 0.85 MG/DL (ref 0.6–1.1)
CREAT SERPL-MCNC: 0.88 MG/DL (ref 0.6–1.1)
CREAT SERPL-MCNC: 1.08 MG/DL (ref 0.52–1.04)
CREATININE (EXTERNAL): 0.72 MG/DL (ref 0.5–1.3)
DIFFERENTIAL METHOD BLD: ABNORMAL
EOSINOPHIL # BLD AUTO: 0.2 10E9/L (ref 0–0.7)
EOSINOPHIL NFR BLD AUTO: 1.9 %
ERYTHROCYTE [DISTWIDTH] IN BLOOD BY AUTOMATED COUNT: 12.4 % (ref 11–16)
ERYTHROCYTE [DISTWIDTH] IN BLOOD BY AUTOMATED COUNT: 13.4 % (ref 10–15)
ERYTHROCYTE [DISTWIDTH] IN BLOOD BY AUTOMATED COUNT: 13.8 % (ref 11–14.5)
GFR SERPL CREATININE-BSD FRML MDRD: 45 ML/MIN/{1.73_M2}
GFR SERPL CREATININE-BSD FRML MDRD: >60 ML/MIN/1.73M2
GLUCOSE SERPL-MCNC: 102 MG/DL (ref 70–125)
GLUCOSE SERPL-MCNC: 79 MG/DL (ref 70–125)
GLUCOSE SERPL-MCNC: 80 MG/DL (ref 70–99)
GLUCOSE SERPL-MCNC: 88 MG/DL (ref 70–125)
GLUCOSE UR STRIP-MCNC: NEGATIVE MG/DL
HCT VFR BLD AUTO: 31.9 % (ref 35–47)
HCT VFR BLD AUTO: 32.4 % (ref 35–47)
HEMATOCRIT (EXTERNAL): 32.1 % (ref 37–47)
HEMOGLOBIN (EXTERNAL): 11.1 G/DL (ref 11.5–16)
HEMOGLOBIN: 10.3 G/DL (ref 12–16)
HEMOGLOBIN: 9.9 G/DL (ref 12–16)
HGB BLD-MCNC: 10.3 G/DL (ref 11.7–15.7)
HGB UR QL STRIP: NEGATIVE
IMM GRANULOCYTES # BLD: 0.1 10E9/L (ref 0–0.4)
IMM GRANULOCYTES NFR BLD: 1 %
KETONES UR STRIP-MCNC: NEGATIVE MG/DL
LABORATORY COMMENT REPORT: NORMAL
LEUKOCYTE ESTERASE UR QL STRIP: ABNORMAL
LYMPHOCYTES # BLD AUTO: 0.9 10E9/L (ref 0.8–5.3)
LYMPHOCYTES NFR BLD AUTO: 9.6 %
Lab: NORMAL
MCH RBC QN AUTO: 30.2 PG (ref 26.5–33)
MCH RBC QN AUTO: 30.7 PG (ref 27–34)
MCH RBC QN AUTO: 34 PG (ref 27–32)
MCHC RBC AUTO-ENTMCNC: 31.8 G/DL (ref 31.5–36.5)
MCHC RBC AUTO-ENTMCNC: 32.3 G/DL (ref 32–36)
MCHC RBC AUTO-ENTMCNC: 34.5 G/DL (ref 32–36)
MCV RBC AUTO: 95 FL (ref 78–100)
MCV RBC AUTO: 95 FL (ref 80–100)
MCV RBC AUTO: 99 FL (ref 80–100)
MONOCYTES # BLD AUTO: 1 10E9/L (ref 0–1.3)
MONOCYTES NFR BLD AUTO: 10.4 %
MUCOUS THREADS #/AREA URNS LPF: PRESENT /LPF
NEUTROPHILS # BLD AUTO: 7.4 10E9/L (ref 1.6–8.3)
NEUTROPHILS NFR BLD AUTO: 76.8 %
NITRATE UR QL: NEGATIVE
NRBC # BLD AUTO: 0 10*3/UL
NRBC BLD AUTO-RTO: 0 /100
PH UR STRIP: 7 PH (ref 5–7)
PLATELET # BLD AUTO: 220 10E9/L (ref 150–450)
PLATELET # BLD AUTO: 233 THOU/UL (ref 140–440)
PLATELET COUNT (EXTERNAL): 267 K/UL (ref 150–500)
POTASSIUM SERPL-SCNC: 3.8 MMOL/L (ref 3.5–5)
POTASSIUM SERPL-SCNC: 3.9 MMOL/L (ref 3.5–5)
POTASSIUM SERPL-SCNC: 4 MMOL/L (ref 3.5–5)
POTASSIUM SERPL-SCNC: 4.1 MMOL/L (ref 3.4–5.3)
RBC # BLD AUTO: 3.26 M/UL (ref 3.8–5.8)
RBC # BLD AUTO: 3.35 MILL/UL (ref 3.8–5.4)
RBC # BLD AUTO: 3.41 10E12/L (ref 3.8–5.2)
RBC #/AREA URNS AUTO: 1 /HPF (ref 0–2)
SARS-COV-2 RNA RESP QL NAA+PROBE: NEGATIVE
SODIUM SERPL-SCNC: 134 MMOL/L (ref 136–145)
SODIUM SERPL-SCNC: 135 MMOL/L (ref 133–144)
SODIUM SERPL-SCNC: 135 MMOL/L (ref 136–145)
SODIUM SERPL-SCNC: 136 MMOL/L (ref 136–145)
SOURCE: ABNORMAL
SP GR UR STRIP: 1.02 (ref 1–1.03)
SPECIMEN SOURCE: NORMAL
SPECIMEN SOURCE: NORMAL
SQUAMOUS #/AREA URNS AUTO: 7 /HPF (ref 0–1)
TSH SERPL-ACNC: 1.95 ULU/ML (ref 0.3–5)
UROBILINOGEN UR STRIP-MCNC: 0 MG/DL (ref 0–2)
WBC # BLD AUTO: 9.2 THOU/UL (ref 4–11)
WBC # BLD AUTO: 9.7 10E9/L (ref 4–11)
WBC #/AREA URNS AUTO: 15 /HPF (ref 0–5)
WBC COUNT (EXTERNAL): 12.8 K/UL (ref 4–10)

## 2021-01-01 PROCEDURE — 87086 URINE CULTURE/COLONY COUNT: CPT | Performed by: INTERNAL MEDICINE

## 2021-01-01 PROCEDURE — 99308 SBSQ NF CARE LOW MDM 20: CPT | Performed by: NURSE PRACTITIONER

## 2021-01-01 PROCEDURE — 250N000013 HC RX MED GY IP 250 OP 250 PS 637: Performed by: EMERGENCY MEDICINE

## 2021-01-01 PROCEDURE — 97530 THERAPEUTIC ACTIVITIES: CPT | Mod: GP

## 2021-01-01 PROCEDURE — 99309 SBSQ NF CARE MODERATE MDM 30: CPT | Performed by: INTERNAL MEDICINE

## 2021-01-01 PROCEDURE — 97116 GAIT TRAINING THERAPY: CPT | Mod: GP

## 2021-01-01 PROCEDURE — 99207 PR CDG-MDM COMPONENT: MEETS HIGH - UP CODED: CPT | Performed by: INTERNAL MEDICINE

## 2021-01-01 PROCEDURE — 99207 PR CDG-MDM COMPONENT: MEETS MODERATE - DOWN CODED: CPT | Performed by: NURSE PRACTITIONER

## 2021-01-01 PROCEDURE — 85025 COMPLETE CBC W/AUTO DIFF WBC: CPT | Performed by: EMERGENCY MEDICINE

## 2021-01-01 PROCEDURE — 99309 SBSQ NF CARE MODERATE MDM 30: CPT | Mod: GW | Performed by: NURSE PRACTITIONER

## 2021-01-01 PROCEDURE — 99309 SBSQ NF CARE MODERATE MDM 30: CPT | Performed by: NURSE PRACTITIONER

## 2021-01-01 PROCEDURE — 81001 URINALYSIS AUTO W/SCOPE: CPT | Performed by: INTERNAL MEDICINE

## 2021-01-01 PROCEDURE — 72100 X-RAY EXAM L-S SPINE 2/3 VWS: CPT

## 2021-01-01 PROCEDURE — 99306 1ST NF CARE HIGH MDM 50: CPT | Performed by: INTERNAL MEDICINE

## 2021-01-01 PROCEDURE — G0378 HOSPITAL OBSERVATION PER HR: HCPCS

## 2021-01-01 PROCEDURE — C9803 HOPD COVID-19 SPEC COLLECT: HCPCS

## 2021-01-01 PROCEDURE — 97161 PT EVAL LOW COMPLEX 20 MIN: CPT | Mod: GP

## 2021-01-01 PROCEDURE — 99316 NF DSCHRG MGMT 30 MIN+: CPT | Performed by: NURSE PRACTITIONER

## 2021-01-01 PROCEDURE — 99310 SBSQ NF CARE HIGH MDM 45: CPT | Performed by: NURSE PRACTITIONER

## 2021-01-01 PROCEDURE — 99220 PR INITIAL OBSERVATION CARE,LEVEL III: CPT | Performed by: INTERNAL MEDICINE

## 2021-01-01 PROCEDURE — 73522 X-RAY EXAM HIPS BI 3-4 VIEWS: CPT

## 2021-01-01 PROCEDURE — 250N000013 HC RX MED GY IP 250 OP 250 PS 637: Performed by: PHYSICIAN ASSISTANT

## 2021-01-01 PROCEDURE — 87635 SARS-COV-2 COVID-19 AMP PRB: CPT | Performed by: EMERGENCY MEDICINE

## 2021-01-01 PROCEDURE — 80048 BASIC METABOLIC PNL TOTAL CA: CPT | Performed by: EMERGENCY MEDICINE

## 2021-01-01 PROCEDURE — 99308 SBSQ NF CARE LOW MDM 20: CPT | Mod: GV | Performed by: NURSE PRACTITIONER

## 2021-01-01 PROCEDURE — 250N000013 HC RX MED GY IP 250 OP 250 PS 637: Performed by: INTERNAL MEDICINE

## 2021-01-01 PROCEDURE — 99217 PR OBSERVATION CARE DISCHARGE: CPT | Performed by: HOSPITALIST

## 2021-01-01 PROCEDURE — 99225 PR SUBSEQUENT OBSERVATION CARE,LEVEL II: CPT | Performed by: PHYSICIAN ASSISTANT

## 2021-01-01 PROCEDURE — 99285 EMERGENCY DEPT VISIT HI MDM: CPT | Mod: 25

## 2021-01-01 PROCEDURE — 99207 PR APP CREDIT; MD BILLING SHARED VISIT: CPT | Performed by: PHYSICIAN ASSISTANT

## 2021-01-01 RX ORDER — HYDROCODONE BITARTRATE AND ACETAMINOPHEN 5; 325 MG/1; MG/1
1 TABLET ORAL EVERY 6 HOURS PRN
Status: DISCONTINUED | OUTPATIENT
Start: 2021-01-01 | End: 2021-01-01

## 2021-01-01 RX ORDER — GABAPENTIN 300 MG/1
300 CAPSULE ORAL 3 TIMES DAILY
Status: DISCONTINUED | OUTPATIENT
Start: 2021-01-01 | End: 2021-01-01

## 2021-01-01 RX ORDER — ACETAMINOPHEN 500 MG
1000 TABLET ORAL EVERY 8 HOURS
DISCHARGE
Start: 2021-01-01 | End: 2021-01-01

## 2021-01-01 RX ORDER — HYDROCODONE BITARTRATE AND ACETAMINOPHEN 5; 325 MG/1; MG/1
1 TABLET ORAL ONCE
Status: COMPLETED | OUTPATIENT
Start: 2021-01-01 | End: 2021-01-01

## 2021-01-01 RX ORDER — ACETAMINOPHEN 650 MG/1
650 SUPPOSITORY RECTAL EVERY 4 HOURS PRN
Status: DISCONTINUED | OUTPATIENT
Start: 2021-01-01 | End: 2021-01-01 | Stop reason: HOSPADM

## 2021-01-01 RX ORDER — ONDANSETRON 2 MG/ML
4 INJECTION INTRAMUSCULAR; INTRAVENOUS EVERY 6 HOURS PRN
Status: DISCONTINUED | OUTPATIENT
Start: 2021-01-01 | End: 2021-01-01 | Stop reason: HOSPADM

## 2021-01-01 RX ORDER — VALACYCLOVIR HYDROCHLORIDE 1 G/1
1000 TABLET, FILM COATED ORAL 3 TIMES DAILY
COMMUNITY
Start: 2021-01-01 | End: 2021-01-01

## 2021-01-01 RX ORDER — AMOXICILLIN 250 MG
1 CAPSULE ORAL 2 TIMES DAILY
DISCHARGE
Start: 2021-01-01 | End: 2021-01-01

## 2021-01-01 RX ORDER — LOPERAMIDE HYDROCHLORIDE 2 MG/1
2 TABLET ORAL 2 TIMES DAILY PRN
Qty: 30 TABLET | Refills: 1 | Status: SHIPPED | OUTPATIENT
Start: 2021-01-01

## 2021-01-01 RX ORDER — BISACODYL 10 MG
10 SUPPOSITORY, RECTAL RECTAL DAILY PRN
Status: DISCONTINUED | OUTPATIENT
Start: 2021-01-01 | End: 2021-01-01 | Stop reason: HOSPADM

## 2021-01-01 RX ORDER — OXYCODONE HYDROCHLORIDE 5 MG/1
2.5 TABLET ORAL EVERY 6 HOURS PRN
Qty: 90 TABLET | Refills: 0 | Status: SHIPPED | OUTPATIENT
Start: 2021-01-01

## 2021-01-01 RX ORDER — ATENOLOL 25 MG/1
50 TABLET ORAL DAILY
Qty: 60 TABLET | Refills: 11 | Status: SHIPPED | OUTPATIENT
Start: 2021-01-01 | End: 2021-01-01 | Stop reason: DRUGHIGH

## 2021-01-01 RX ORDER — AMOXICILLIN 250 MG
2 CAPSULE ORAL 2 TIMES DAILY
Status: DISCONTINUED | OUTPATIENT
Start: 2021-01-01 | End: 2021-01-01 | Stop reason: HOSPADM

## 2021-01-01 RX ORDER — ATENOLOL 25 MG/1
25 TABLET ORAL DAILY
Status: DISCONTINUED | OUTPATIENT
Start: 2021-01-01 | End: 2021-01-01 | Stop reason: HOSPADM

## 2021-01-01 RX ORDER — HYDROXYCHLOROQUINE SULFATE 200 MG/1
200 TABLET, FILM COATED ORAL DAILY
Status: DISCONTINUED | OUTPATIENT
Start: 2021-01-01 | End: 2021-01-01 | Stop reason: HOSPADM

## 2021-01-01 RX ORDER — GABAPENTIN 300 MG/1
300 CAPSULE ORAL 2 TIMES DAILY
COMMUNITY

## 2021-01-01 RX ORDER — ATENOLOL 25 MG/1
25 TABLET ORAL DAILY
DISCHARGE
Start: 2021-01-01

## 2021-01-01 RX ORDER — GABAPENTIN 300 MG/1
300 CAPSULE ORAL 2 TIMES DAILY
Status: DISCONTINUED | OUTPATIENT
Start: 2021-01-01 | End: 2021-01-01 | Stop reason: HOSPADM

## 2021-01-01 RX ORDER — OXYCODONE HYDROCHLORIDE 5 MG/1
2.5 TABLET ORAL EVERY 6 HOURS PRN
Qty: 30 TABLET | Refills: 0 | Status: SHIPPED | OUTPATIENT
Start: 2021-01-01 | End: 2021-01-01

## 2021-01-01 RX ORDER — OXYCODONE HYDROCHLORIDE 5 MG/1
2.5 TABLET ORAL EVERY 4 HOURS PRN
Qty: 5 TABLET | Refills: 0 | Status: SHIPPED | OUTPATIENT
Start: 2021-01-01 | End: 2021-01-01

## 2021-01-01 RX ORDER — LOPERAMIDE HCL 2 MG
2 CAPSULE ORAL 2 TIMES DAILY PRN
COMMUNITY
Start: 2021-01-01 | End: 2021-01-01

## 2021-01-01 RX ORDER — NALOXONE HYDROCHLORIDE 0.4 MG/ML
0.2 INJECTION, SOLUTION INTRAMUSCULAR; INTRAVENOUS; SUBCUTANEOUS
Status: DISCONTINUED | OUTPATIENT
Start: 2021-01-01 | End: 2021-01-01 | Stop reason: HOSPADM

## 2021-01-01 RX ORDER — ATENOLOL 25 MG/1
25 TABLET ORAL DAILY
Status: ON HOLD | COMMUNITY
End: 2021-01-01

## 2021-01-01 RX ORDER — NIFEDIPINE 30 MG/1
30 TABLET, EXTENDED RELEASE ORAL DAILY
Status: DISCONTINUED | OUTPATIENT
Start: 2021-01-01 | End: 2021-01-01 | Stop reason: HOSPADM

## 2021-01-01 RX ORDER — ONDANSETRON 4 MG/1
4 TABLET, ORALLY DISINTEGRATING ORAL EVERY 6 HOURS PRN
Status: DISCONTINUED | OUTPATIENT
Start: 2021-01-01 | End: 2021-01-01 | Stop reason: HOSPADM

## 2021-01-01 RX ORDER — SENNA AND DOCUSATE SODIUM 50; 8.6 MG/1; MG/1
1 TABLET, FILM COATED ORAL 2 TIMES DAILY PRN
Qty: 30 TABLET | Refills: 3 | Status: SHIPPED | OUTPATIENT
Start: 2021-01-01

## 2021-01-01 RX ORDER — ACETAMINOPHEN 500 MG
1000 TABLET ORAL EVERY 8 HOURS
Status: DISCONTINUED | OUTPATIENT
Start: 2021-01-01 | End: 2021-01-01 | Stop reason: HOSPADM

## 2021-01-01 RX ORDER — OXYCODONE HYDROCHLORIDE 5 MG/1
2.5 TABLET ORAL EVERY 6 HOURS PRN
COMMUNITY
Start: 2021-01-01 | End: 2021-01-01

## 2021-01-01 RX ORDER — OXYCODONE HYDROCHLORIDE 5 MG/1
2.5 TABLET ORAL EVERY 4 HOURS PRN
Qty: 10 TABLET | Refills: 0 | Status: SHIPPED | OUTPATIENT
Start: 2021-01-01 | End: 2021-01-01

## 2021-01-01 RX ORDER — NALOXONE HYDROCHLORIDE 0.4 MG/ML
0.4 INJECTION, SOLUTION INTRAMUSCULAR; INTRAVENOUS; SUBCUTANEOUS
Status: DISCONTINUED | OUTPATIENT
Start: 2021-01-01 | End: 2021-01-01 | Stop reason: HOSPADM

## 2021-01-01 RX ORDER — BENZOCAINE/MENTHOL 6 MG-10 MG
LOZENGE MUCOUS MEMBRANE 2 TIMES DAILY
COMMUNITY
Start: 2021-01-01 | End: 2021-01-01

## 2021-01-01 RX ORDER — ACETAMINOPHEN 325 MG/1
650 TABLET ORAL EVERY 4 HOURS PRN
Status: DISCONTINUED | OUTPATIENT
Start: 2021-01-01 | End: 2021-01-01

## 2021-01-01 RX ORDER — LIDOCAINE HYDROCHLORIDE 20 MG/ML
1 JELLY TOPICAL 3 TIMES DAILY
COMMUNITY
Start: 2021-01-01

## 2021-01-01 RX ORDER — AMOXICILLIN 250 MG
1 CAPSULE ORAL 2 TIMES DAILY PRN
COMMUNITY
Start: 2021-01-01 | End: 2021-01-01

## 2021-01-01 RX ORDER — AMOXICILLIN 250 MG
1 CAPSULE ORAL 2 TIMES DAILY
Status: DISCONTINUED | OUTPATIENT
Start: 2021-01-01 | End: 2021-01-01 | Stop reason: HOSPADM

## 2021-01-01 RX ORDER — ACETAMINOPHEN 500 MG
1000 TABLET ORAL 3 TIMES DAILY
Qty: 90 TABLET | Refills: 4 | Status: SHIPPED | OUTPATIENT
Start: 2021-01-01

## 2021-01-01 RX ORDER — POLYETHYLENE GLYCOL 3350 17 G/17G
17 POWDER, FOR SOLUTION ORAL DAILY PRN
Status: DISCONTINUED | OUTPATIENT
Start: 2021-01-01 | End: 2021-01-01 | Stop reason: HOSPADM

## 2021-01-01 RX ORDER — ATORVASTATIN CALCIUM 10 MG/1
10 TABLET, FILM COATED ORAL DAILY
Qty: 30 TABLET | Refills: 3 | Status: SHIPPED | OUTPATIENT
Start: 2021-01-01 | End: 2021-01-01

## 2021-01-01 RX ADMIN — ACETAMINOPHEN 1000 MG: 500 TABLET, FILM COATED ORAL at 18:45

## 2021-01-01 RX ADMIN — ACETAMINOPHEN 650 MG: 325 TABLET, FILM COATED ORAL at 02:44

## 2021-01-01 RX ADMIN — GABAPENTIN 300 MG: 300 CAPSULE ORAL at 19:49

## 2021-01-01 RX ADMIN — HYDROXYCHLOROQUINE SULFATE 200 MG: 200 TABLET, FILM COATED ORAL at 09:36

## 2021-01-01 RX ADMIN — DOCUSATE SODIUM 50 MG AND SENNOSIDES 8.6 MG 1 TABLET: 8.6; 5 TABLET, FILM COATED ORAL at 19:49

## 2021-01-01 RX ADMIN — ACETAMINOPHEN 1000 MG: 500 TABLET, FILM COATED ORAL at 08:20

## 2021-01-01 RX ADMIN — ACETAMINOPHEN 1000 MG: 500 TABLET, FILM COATED ORAL at 09:36

## 2021-01-01 RX ADMIN — OXYCODONE HYDROCHLORIDE 2.5 MG: 5 TABLET ORAL at 16:05

## 2021-01-01 RX ADMIN — GABAPENTIN 300 MG: 300 CAPSULE ORAL at 08:20

## 2021-01-01 RX ADMIN — HYDROCODONE BITARTRATE AND ACETAMINOPHEN 1 TABLET: 5; 325 TABLET ORAL at 18:32

## 2021-01-01 RX ADMIN — GABAPENTIN 300 MG: 300 CAPSULE ORAL at 09:37

## 2021-01-01 RX ADMIN — HYDROXYCHLOROQUINE SULFATE 200 MG: 200 TABLET, FILM COATED ORAL at 08:20

## 2021-01-01 RX ADMIN — OXYCODONE HYDROCHLORIDE 2.5 MG: 5 TABLET ORAL at 10:41

## 2021-01-01 RX ADMIN — DOCUSATE SODIUM 50 MG AND SENNOSIDES 8.6 MG 1 TABLET: 8.6; 5 TABLET, FILM COATED ORAL at 08:21

## 2021-01-01 RX ADMIN — DOCUSATE SODIUM 50 MG AND SENNOSIDES 8.6 MG 1 TABLET: 8.6; 5 TABLET, FILM COATED ORAL at 09:37

## 2021-01-01 ASSESSMENT — ENCOUNTER SYMPTOMS
NUMBNESS: 0
DIARRHEA: 0
WEAKNESS: 0
SHORTNESS OF BREATH: 0
VOMITING: 0

## 2021-01-01 ASSESSMENT — MIFFLIN-ST. JEOR
SCORE: 846.35
SCORE: 854.97
SCORE: 846.35
SCORE: 854.97
SCORE: 843.64
SCORE: 843.64
SCORE: 791.03
SCORE: 837.75
SCORE: 851.35
SCORE: 854.97
SCORE: 843.64
SCORE: 837.29
SCORE: 854.97
SCORE: 809.61
SCORE: 850.9

## 2021-01-01 ASSESSMENT — ACTIVITIES OF DAILY LIVING (ADL)
DEPENDENT_IADLS:: CLEANING;COOKING;LAUNDRY;SHOPPING;MEAL PREPARATION;MEDICATION MANAGEMENT;MONEY MANAGEMENT;TRANSPORTATION

## 2021-01-06 NOTE — PROGRESS NOTES
Order(s) created erroneously. Erroneous order ID: [id number]   Order canceled by: ETHAN PERSAUD   Order cancel date/time: 01/06/2021 9:10 AM

## 2021-01-06 NOTE — PROGRESS NOTES
Order(s) created erroneously. Erroneous order ID: [id number]   Order canceled by: ETHAN PERSAUD   Order cancel date/time: 01/06/2021 9:09 AM

## 2021-01-27 NOTE — LETTER
1/27/2021        RE: Celestina Tejada  [facility]  [address]  [address]  [facility] [address]        [facility]  [facility] Record Number:  [id number]  Place of Service where encounter took place:  [facility] [[id number]]  Chief Complaint   Patient presents with     RECHECK       HPI:    Celestina Tejada  is a 89 year old (4/2/1931), who is being seen today for an episodic care visit.  HPI information obtained from: facility chart records, facility staff, patient report, [facility] chart review and Care Everywhere [facility] chart review. Today's concern is:     Adjustment disorder with anxious mood  Cognitive impairment  Malnutrition, unspecified type (H)  Chronic pain syndrome  Rheumatoid arthritis, involving unspecified site, unspecified whether rheumatoid factor present (H)  Osteoarthritis, unspecified osteoarthritis type, unspecified site  Osteoporosis without current pathological fracture, unspecified osteoporosis type  Physical deconditioning  Iron deficiency anemia, unspecified iron deficiency anemia type  Hyperlipidemia, unspecified hyperlipidemia type  Hypertension goal BP (blood pressure) < 140/90  Edema, unspecified type  Stage 3 chronic kidney disease, unspecified whether stage 3a or 3b CKD  Chronic cough  Obstruction of cystic duct  Thrombocytopenia (H)  Fall, initial encounter  Open wound  Fungal dermatitis     Met with patient who denies any chest pain, palpitations, shortness of breath, RODRIGUEZ, lightheadedness, dizziness, or cough. Denies any abdominal discomfort. Denies N&V. Denies B&B concerns. Denies dysuria or frequency. Denies loose or constipation. Appetite good. Sleeping well.  Nursing reported fall within this last week which she got herself up off the floor. Per on-call Registered Nurse report, patient did complaint of tailbone pain and had bruise to back of left leg just above knee. She also stated her right hand  hurt a little. ROM was per baseline. Vitals stable at 143/76, pulse 71. No fevers. Follow up 3 days later by Registered Nurse reports pain overall better but vitals were low <100 along with pitting edema noted to bilateral lower extremities. Pending obtainment of supply of compression stockings. Nursing reported yesterday that bruise to left lateral thigh above knee turned into an open wound. Area was cleansed but they are needing wound care treatment along with home care support for ongoing needs. Upon review of her SBP from 1/8/21-1/24/21 have ranged: 96//59 with pulse 60-68. Nursing also noted redness fungal under bilateral breast.     BP Readings from Last 3 Encounters:   01/27/21 113/67   12/09/20 98/59   11/18/20 98/59     Wt Readings from Last 5 Encounters:   01/27/21 44.8 kg (98 lb 11.2 oz)   12/09/20 50.3 kg (111 lb)   11/17/20 50.3 kg (111 lb)   10/26/20 50.5 kg (111 lb 6.4 oz)   06/29/20 51.4 kg (113 lb 6.4 oz)         Past Medical and Surgical History reviewed in Epic today.    MEDICATIONS:    Current Outpatient Medications   Medication Sig Dispense Refill     atenolol (TENORMIN) 25 MG tablet Take 2 tablets (50 mg) by mouth daily HOLD if SBP<100 and/or HR <55 60 tablet 11     miconazole (MICATIN) 2 % external powder Apply topically 2 times daily Apply to under breast and groin redness BID and BID PRN 90 g 11     acetaminophen (TYLENOL) 325 MG tablet Take 2 tablets (650 mg) by mouth 2 times daily Give 650mg BID and BID  tablet 11     acetaminophen (TYLENOL) 500 MG tablet Take 500-1,000 mg by mouth 4 times daily as needed for mild pain       atorvastatin (LIPITOR) 20 MG tablet TAKE ONE TABLET BY MOUTH EVERY DAY 90 tablet 1     escitalopram (LEXAPRO) 5 MG tablet Take 2 tablets (10 mg) by mouth daily 60 tablet 11     Ferrous Sulfate (IRON) 325 (65 Fe) MG tablet Take 1 tablet by mouth daily (with breakfast) 90 tablet 3     gabapentin (NEURONTIN) 300 MG capsule Take 1 capsule (300 mg) by mouth 3  times daily 270 capsule 3     HYDROcodone-acetaminophen (NORCO) 5-325 MG tablet Take 1 tablet by mouth every 6 hours as needed for moderate to severe pain 90 tablet 0     hydroxychloroquine (PLAQUENIL) 200 MG tablet Take 200 mg by mouth daily   3     multivitamin w/minerals (THERA-VIT-M) tablet Take 1 tablet by mouth daily 60 tablet 11     NIFEdipine ER OSMOTIC (ADALAT CC) 30 MG 24 hr tablet Take 1 tablet (30 mg) by mouth daily 30 tablet 11     Nutritional Supplements (BOOST COMPACT) LIQD Take 1 Bottle by mouth 2 times daily       senna-docusate (SENOKOT-S/PERICOLACE) 8.6-50 MG tablet Take 1 tablet by mouth 2 times daily as needed for constipation 180 tablet 1     vitamin D3 (CHOLECALCIFEROL) 50 mcg (2000 units) tablet Take 1 tablet (50 mcg) by mouth daily 30 tablet 11     REVIEW OF SYSTEMS:  10 point ROS of systems including Constitutional, Eyes, Respiratory, Cardiovascular, Gastroenterology, Genitourinary, Integumentary, Musculoskeletal, Psychiatric were all negative except for pertinent positives noted in my HPI.    Objective:  /67   Pulse 68   Resp 16   Wt 44.8 kg (98 lb 11.2 oz)   SpO2 93%   BMI 17.48 kg/m    Exam:  GENERAL APPEARANCE:  Alert, thin, cooperative  ENT:  Mouth and posterior oropharynx normal, moist mucous membranes, [place]  EYES:  EOM, conjunctivae, lids, pupils and irises normal  NECK:  No adenopathy,masses or thyromegaly  RESP:  respiratory effort and palpation of chest normal, lungs clear to auscultation , no respiratory distress  CV:  Palpation and auscultation of heart done , regular rate and rhythm, no murmur, rub, or gallop, peripheral edema 2+ in BLE, especailly around ankles primarily  ABDOMEN:  normal bowel sounds, soft, nontender, no hepatosplenomegaly or other masses, no guarding or rebound  M/S:   Gait and station normal  Ambulates with walker. ROM intact. can be unsteady on feet at times  SKIN:  Inspection of skin and subcutaneous tissue baseline, Palpation of skin and  subcutaneous tissue baseline, see picture below  NEURO:   Cranial nerves 2-12 are normal tested and grossly at patient's baseline, no purposeful movement in upper and lower extremities  PSYCH:  oriented to self and person, insight and judgement impaired, memory impaired , affect and mood normal                      Labs:     Most Recent 3 CBC's:  Recent Labs   Lab Test 12/15/20 11/24/20 06/29/20  1457   WBC 8.3 7.2 8.3   HGB 12.2 10.8* 13.0   MCV 97 93 96    163 130*     Most Recent 3 BMP's:  Recent Labs   Lab Test 12/15/20 11/24/20 08/31/20 06/10/20  1320 06/10/20  1320    132*  --   --  139   POTASSIUM 3.7 4.4  --   --  4.4   CHLORIDE 101 99  --   --  108   CO2 24 27  --   --  23   BUN 15 16  --   --  15   CR 0.99 0.84 1.080   < > 1.16*   ANIONGAP 12 6  --   --  8   JOSÉ 9.0 8.4*  --   --  8.4*   GLC 65* 71  --   --  98    < > = values in this interval not displayed.     Most Recent 2 LFT's:  Recent Labs   Lab Test 11/24/20 09/17/20 08/12/20  1219 08/12/20  1219   AST 83* 119*   < > 126*   ALT 76* 136*   < > 147*   ALKPHOS 427*  --   --  474*   BILITOTAL 0.7  --   --  1.2    < > = values in this interval not displayed.     Most Recent Cholesterol Panel:  Recent Labs   Lab Test 11/24/20   CHOL 99   LDL 40   HDL 45*   TRIG 71     Most Recent TSH and T4:  Recent Labs   Lab Test 11/24/20 06/18/18  0600 06/18/18  0600   TSH 1.55   < > 1.52   T4  --   --  0.85    < > = values in this interval not displayed.     Most Recent Anemia Panel:  Recent Labs   Lab Test 12/15/20 06/18/20  1552 06/18/20  1552 06/18/18  0600 06/18/18  0600   WBC 8.3   < >  --    < >  --    HGB 12.2   < >  --    < >  --    HCT 39.4   < >  --    < >  --    MCV 97   < >  --    < >  --       < >  --    < >  --    IRON  --   --  138  --  28*   IRONSAT  --   --  51*  --   --    RETICABSCT  --   --  48.6  --   --    RETP  --   --  1.2  --   --    FEB  --   --  273  --   --    NILAY  --   --  410*  --   --    B12  --   --   --   --  1,055*     < > = values in this interval not displayed.       ASSESSMENT/PLAN:  (F43.23) Adjustment reaction with anxiety and depression  (primary encounter diagnosis)  (R41.89) Cognitive impairment  (E46) Malnutrition, unspecified type (H)  Comment: Acute on chronic. Not at goal. More anxiety and depressed mood since moving into assisted living facility recently. She is more isolated. Also noticing memory loss is more noticeable. Dose was recently increased per previous provider prior to signing onto inhouse services. More anxiety and depressed mood since moving into assisted living facility recently. She is more isolated. Also noticing memory loss is more noticeable. weight loss noted.   Plan:   -Continue escitalopram 10mg daily  -UA/UC to rule out infection concerns. If positive will treat accordingly.   -Monitor mood and behaviors  -Continue MVI daily due to poor appetite and low intake  -Encourage Boost BID as directed  -Monitor for changes in mobility, eating and sleeping patterns  -Continue ED setting with all nursing assistance and medication needs.   -Trend labs in 3-6 months or sooner if warranted     (G89.4) Chronic pain syndrome  (M06.9) Rheumatoid arthritis, involving unspecified site, unspecified whether rheumatoid factor present (H)  (M19.90) Osteoarthritis, unspecified osteoarthritis type, unspecified site  (M81.0) Osteoporosis without current pathological fracture, unspecified osteoporosis type  (R53.81) Physical deconditioning  Comment: Chronic. Stable. Continues gabapentin to 300 mg 3 times a day.  She also follows with spine specialist. Followed by rheumatology Dr. Armstrong and rei. Last visit with rheumatology was 08/2020. Had been on Reclast.  She continues on calcium and vitamin D. She receives frequent infusions per rheumatology as directed. She is overdue for this per daughter. Has not used any PRN narcotics per observation for over 2 weeks.   Plan:   -Recommend to follow up with rheumatology   Yanet as directed.   -Continue infusions per rheumatology clinic as directed.   -May benefit from pain clinic referral if warranted if no improvement in future.   -Continue Tylenol 650mg BID and BID PRN. (limit tylenol to 4gm per day)  -Monitor pain complaints as directed  -Continue plaquenil 200mg daily  -Continue reclast annual infusions as directed. Overdue for this.   -Trend labs in 3-6 months or sooner if warranted  -Continue Norco to 1 tab every 6 hours PRN. Recommend to discontinue in future if non use.   -Continue gabapentin 300mg TID. May benefit from reduction of dose in future if warranted.   -May benefit from topical analgesics if warranted              (D50.9) Iron deficiency anemia, unspecified iron deficiency anemia type  Comment: Acute on chronic. Hemoglobin was normal last check 9/2020 with rheum. She will continue on iron supplement.  Plan:   -Continue iron supplement as directed  -Monitor for active s/sx of bleeding concerns  -Monitor for worsening s/sx of concerns.   -Trend labs in 3-6 months or sooner if warranted     (E78.5) Hyperlipidemia, unspecified hyperlipidemia type  (I10) Hypertension goal BP (blood pressure) < 140/90  (R60.9) Edema, unspecified type  (N18.30) Stage 3 chronic kidney disease, unspecified whether stage 3a or 3b CKD  Comment: Acute on chronic. Previously well controlled. No longer on the lisinopril due to hypotension and history of hyponatremia. The patient's peripheral edema most likely is multifactorial per history documentation. Based on JNC-8 goals,  patients age of 89 year old, presence of diabetes or CKD, and goals of care goal BP is  <140/90 mm Hg. Noted patients BP is lower than goal and will adjust plan of care by. Nifedipine was reduced at last MD visit  Plan:   -Follow up with cardiology PRN if indicated.   -Continue atorvastatin 20mg daily  -Will start optage for lymphedema needs  -Compression stockings daily for now.   -Continue nifedipine 30mg daily as  "directed  -Will reduce atenolol down to 50mg daily. HOLD parameters in place.   -Monitor BP and HR BID.   -Adjust BP medications accordingly per levels.   -Monitor BP and HR as directed  -Trend labs in 3-6 months or sooner if warranted  -Monitor fall risks.      (R05) Chronic cough  Comment: Chronic. Chronic cough for many years. She was a smoker for decades from age 16 to twenty-some years ago. Smoked maybe 1/2 ppd for years. Denies any shortness of breath or wheezing or increase in cough over time. Denies frequent colds or bronchitis. Just has longstanding productive cough. Previous CXRs have shown some hyperinflation. Suspect COPD. She is not interested in further evaluation or medication.   Plan:   -Monitor cough  -Monitor respiratory status  -Monitor for worsening s/sx of concerns   -Trend labs in 3-6 months or sooner if warranted     (K82.0) Obstruction of cystic duct  Comment: Acute on chronic. Asymptomatic. Visited with GI on 8/13/2020. She does not want to pursue any procedures or additional evaluation at this point. Per GI recommendations on 8/13/2020: 89yo woman with mild liver study abnormality likely explained by otherwise asymptomatic cystic duct obstruction with associated mild cholecystitis (wall thickening). As she is asymptomatic and does not want to pursue invasive procedures at this moment, I recommend conservative observation. With development of fevers or right upper quadrant pain, I would then recommend antibiotics alone and without improvement move forward with either cholecystectomy or ERCP with transpapillary gallbladder stent placement. Both have their advantages (surgery more definitive, ERCP less invasive) and risks (anesthesia). We would have another discussion regarding this option before organizing either. She was instructed to call my coordinator with any related symptoms.\"  Plan:   -Monitor urinary status  -Monitor for worsening s/sx of concerns  -Monitor conservative at this time " per GI recommendations  -It patient develops fever or RUQ pain, GI would recommend antibiotics alone and without improvement then would move forward to cholecystectomy or ERCP  -Follow up with GI PRN if warranted in the future.   -Trend labs in 3-6 months or sooner if warranted     (D69.6) Thrombocytopenia (H)  Comment: Acute on chronic. Visited with hematology on 7/22/2020: Thrombocytopenia could be due to primary bone marrow pathology like myelodysplastic syndrome.  The patient is 89.  MDS is common in this age group.  Unlikely to be immune thrombocytopenia.  ITP is not common in older people. The patient does have liver dysfunction.  Thrombocytopenia could be due to that. The patient does have rheumatoid arthritis.  She can have immune destruction of the platelet which can be seen with rheumatoid arthritis  Plan:   -Follow up with hematology PRN if indicated in the future  -Monitor for worsening s/sx of concerns.   -Trend labs in 3-6 months or sooner if warranted     (W19.XXXA) Fall  (T14.8XXA) Wound  Comment: Acute. S/T recent fall trauma.   Plan:  -Start Physical therapy and Occupational therapy with optage  -Start Registered Nurse with optage as well for wound management needs  -Start wound cares as directed until optage follows:  *Cleanse sitre with NS. Apple bacitracin to site. Cover with gauze dressing. Tape in place. Change MWF and PRN  -Monitor for falls  -Monitor for worsening skin concerns    (B36.9) Fungal dermatitis  Comment: Acute on chronic.   Plan:  -Start miconazole powder BID and BID PRN  -Monitor skin for worsening concerns    Orders written by provider at facility     Electronically signed by:  Yuli Rangel DNP, APRN    Documentation of Face to Face and Certification for Home Health Services    I certify that patient: Celestina Tejada is under my care and that I, or a nurse practitioner or physician's assistant working with me, had a face-to-face encounter that meets the physician  face-to-face encounter requirements with this patient on: 1/27/2021.    This encounter with the patient was in whole, or in part, for the following medical condition, which is the primary reason for home health care: deconditioning S/T recent fall with wound and edema control.    I certify that, based on my findings, the following services are medically necessary home health services: Nursing, Occupational Therapy and Physical Therapy.    My clinical findings support the need for the above services because: Nurse is needed: To assess wound management, and edema control after changes in medications or other medical regimen.., Occupational Therapy Services are needed to assess and treat cognitive ability and address ADL safety due to impairment in deconditioning . and Physical Therapy Services are needed to assess and treat the following functional impairments: deconditioning and edema control.    Further, I certify that my clinical findings support that this patient is homebound (i.e. absences from home require considerable and taxing effort and are for medical reasons or [facility] services or infrequently or of short duration when for other reasons) because: Requires assistance of another person or specialized equipment to access medical services because patient: Is unable to walk greater than 100 feet without rest...    Based on the above findings. I certify that this patient is confined to the home and needs intermittent skilled nursing care, physical therapy and/or speech therapy.  The patient is under my care, and I have initiated the establishment of the plan of care.  This patient will be followed by a physician who will periodically review the plan of care.  Physician/Provider to provide follow up care: Yuli Rangel    Attending hospital physician (the Medicare certified [facility] provider): Dr.Gretchen Nino MD, signing F2F and only signing for initial order. Please send all follow up questions and concerns or  needed follow up signatures to the PCP, who [facility] has on file as:  Yuli Rangel.    Physician Signature: See electronic signature associated with these discharge orders.  Date: 1/27/2021                      Sincerely,        STEVE Salazar CNP

## 2021-01-27 NOTE — PROGRESS NOTES
[facility]  [place] [facility] Record Number:  [id number]  [id number]  Chief Complaint   Patient presents with     RECHECK       HPI:    Celestina Tejada  is a 89 year old (4/2/1931), who is being seen today for an episodic care visit.  HPI information obtained from: facility chart records, facility staff, patient report, [facility] chart review and Care Everywhere [facility] chart review. Today's concern is:     Adjustment disorder with anxious mood  Cognitive impairment  Malnutrition, unspecified type (H)  Chronic pain syndrome  Rheumatoid arthritis, involving unspecified site, unspecified whether rheumatoid factor present (H)  Osteoarthritis, unspecified osteoarthritis type, unspecified site  Osteoporosis without current pathological fracture, unspecified osteoporosis type  Physical deconditioning  Iron deficiency anemia, unspecified iron deficiency anemia type  Hyperlipidemia, unspecified hyperlipidemia type  Hypertension goal BP (blood pressure) < 140/90  Edema, unspecified type  Stage 3 chronic kidney disease, unspecified whether stage 3a or 3b CKD  Chronic cough  Obstruction of cystic duct  Thrombocytopenia (H)  Fall, initial encounter  Open wound  Fungal dermatitis     Met with patient who denies any chest pain, palpitations, shortness of breath, RODRIGUEZ, lightheadedness, dizziness, or cough. Denies any abdominal discomfort. Denies N&V. Denies B&B concerns. Denies dysuria or frequency. Denies loose or constipation. Appetite good. Sleeping well.  Nursing reported fall within this last week which she got herself up off the floor. Per on-call Registered Nurse report, patient did complaint of tailbone pain and had bruise to back of left leg just above knee. She also stated her right hand hurt a little. ROM was per baseline. Vitals stable at 143/76, pulse 71. No fevers. Follow up 3 days later by Registered  Nurse reports pain overall better but vitals were low <100 along with pitting edema noted to bilateral lower extremities. Pending obtainment of supply of compression stockings. Nursing reported yesterday that bruise to left lateral thigh above knee turned into an open wound. Area was cleansed but they are needing wound care treatment along with home care support for ongoing needs. Upon review of her SBP from 1/8/21-1/24/21 have ranged: 96//59 with pulse 60-68. Nursing also noted redness fungal under bilateral breast.     BP Readings from Last 3 Encounters:   01/27/21 113/67   12/09/20 98/59   11/18/20 98/59     Wt Readings from Last 5 Encounters:   01/27/21 44.8 kg (98 lb 11.2 oz)   12/09/20 50.3 kg (111 lb)   11/17/20 50.3 kg (111 lb)   10/26/20 50.5 kg (111 lb 6.4 oz)   06/29/20 51.4 kg (113 lb 6.4 oz)         Past Medical and Surgical History reviewed in Epic today.    MEDICATIONS:    Current Outpatient Medications   Medication Sig Dispense Refill     atenolol (TENORMIN) 25 MG tablet Take 2 tablets (50 mg) by mouth daily HOLD if SBP<100 and/or HR <55 60 tablet 11     miconazole (MICATIN) 2 % external powder Apply topically 2 times daily Apply to under breast and groin redness BID and BID PRN 90 g 11     acetaminophen (TYLENOL) 325 MG tablet Take 2 tablets (650 mg) by mouth 2 times daily Give 650mg BID and BID  tablet 11     acetaminophen (TYLENOL) 500 MG tablet Take 500-1,000 mg by mouth 4 times daily as needed for mild pain       atorvastatin (LIPITOR) 20 MG tablet TAKE ONE TABLET BY MOUTH EVERY DAY 90 tablet 1     escitalopram (LEXAPRO) 5 MG tablet Take 2 tablets (10 mg) by mouth daily 60 tablet 11     Ferrous Sulfate (IRON) 325 (65 Fe) MG tablet Take 1 tablet by mouth daily (with breakfast) 90 tablet 3     gabapentin (NEURONTIN) 300 MG capsule Take 1 capsule (300 mg) by mouth 3 times daily 270 capsule 3     HYDROcodone-acetaminophen (NORCO) 5-325 MG tablet Take 1 tablet by mouth every 6 hours as  needed for moderate to severe pain 90 tablet 0     hydroxychloroquine (PLAQUENIL) 200 MG tablet Take 200 mg by mouth daily   3     multivitamin w/minerals (THERA-VIT-M) tablet Take 1 tablet by mouth daily 60 tablet 11     NIFEdipine ER OSMOTIC (ADALAT CC) 30 MG 24 hr tablet Take 1 tablet (30 mg) by mouth daily 30 tablet 11     Nutritional Supplements (BOOST COMPACT) LIQD Take 1 Bottle by mouth 2 times daily       senna-docusate (SENOKOT-S/PERICOLACE) 8.6-50 MG tablet Take 1 tablet by mouth 2 times daily as needed for constipation 180 tablet 1     vitamin D3 (CHOLECALCIFEROL) 50 mcg (2000 units) tablet Take 1 tablet (50 mcg) by mouth daily 30 tablet 11     REVIEW OF SYSTEMS:  10 point ROS of systems including Constitutional, Eyes, Respiratory, Cardiovascular, Gastroenterology, Genitourinary, Integumentary, Musculoskeletal, Psychiatric were all negative except for pertinent positives noted in my HPI.    Objective:  /67   Pulse 68   Resp 16   Wt 44.8 kg (98 lb 11.2 oz)   SpO2 93%   BMI 17.48 kg/m    Exam:  GENERAL APPEARANCE:  Alert, thin, cooperative  ENT:  Mouth and posterior oropharynx normal, moist mucous membranes, Resighini  EYES:  EOM, conjunctivae, lids, pupils and irises normal  NECK:  No adenopathy,masses or thyromegaly  RESP:  respiratory effort and palpation of chest normal, lungs clear to auscultation , no respiratory distress  CV:  Palpation and auscultation of heart done , regular rate and rhythm, no murmur, rub, or gallop, peripheral edema 2+ in BLE, especailly around ankles primarily  ABDOMEN:  normal bowel sounds, soft, nontender, no hepatosplenomegaly or other masses, no guarding or rebound  M/S:   Gait and station normal  Ambulates with walker. ROM intact. can be unsteady on feet at times  SKIN:  Inspection of skin and subcutaneous tissue baseline, Palpation of skin and subcutaneous tissue baseline, see picture below  NEURO:   Cranial nerves 2-12 are normal tested and grossly at patient's  baseline, no purposeful movement in upper and lower extremities  PSYCH:  oriented to self and person, insight and judgement impaired, memory impaired , affect and mood normal                      Labs:     Most Recent 3 CBC's:  Recent Labs   Lab Test 12/15/20 11/24/20 06/29/20  1457   WBC 8.3 7.2 8.3   HGB 12.2 10.8* 13.0   MCV 97 93 96    163 130*     Most Recent 3 BMP's:  Recent Labs   Lab Test 12/15/20 11/24/20 08/31/20 06/10/20  1320 06/10/20  1320    132*  --   --  139   POTASSIUM 3.7 4.4  --   --  4.4   CHLORIDE 101 99  --   --  108   CO2 24 27  --   --  23   BUN 15 16  --   --  15   CR 0.99 0.84 1.080   < > 1.16*   ANIONGAP 12 6  --   --  8   JOSÉ 9.0 8.4*  --   --  8.4*   GLC 65* 71  --   --  98    < > = values in this interval not displayed.     Most Recent 2 LFT's:  Recent Labs   Lab Test 11/24/20 09/17/20 08/12/20  1219 08/12/20  1219   AST 83* 119*   < > 126*   ALT 76* 136*   < > 147*   ALKPHOS 427*  --   --  474*   BILITOTAL 0.7  --   --  1.2    < > = values in this interval not displayed.     Most Recent Cholesterol Panel:  Recent Labs   Lab Test 11/24/20   CHOL 99   LDL 40   HDL 45*   TRIG 71     Most Recent TSH and T4:  Recent Labs   Lab Test 11/24/20 06/18/18 0600 06/18/18  0600   TSH 1.55   < > 1.52   T4  --   --  0.85    < > = values in this interval not displayed.     Most Recent Anemia Panel:  Recent Labs   Lab Test 12/15/20 06/18/20  1552 06/18/20  1552 06/18/18  0600 06/18/18  0600   WBC 8.3   < >  --    < >  --    HGB 12.2   < >  --    < >  --    HCT 39.4   < >  --    < >  --    MCV 97   < >  --    < >  --       < >  --    < >  --    IRON  --   --  138  --  28*   IRONSAT  --   --  51*  --   --    RETICABSCT  --   --  48.6  --   --    RETP  --   --  1.2  --   --    FEB  --   --  273  --   --    NILAY  --   --  410*  --   --    B12  --   --   --   --  1,055*    < > = values in this interval not displayed.       ASSESSMENT/PLAN:  (F43.23) Adjustment reaction with anxiety and  depression  (primary encounter diagnosis)  (R41.89) Cognitive impairment  (E46) Malnutrition, unspecified type (H)  Comment: Acute on chronic. Not at goal. More anxiety and depressed mood since moving into assisted living facility recently. She is more isolated. Also noticing memory loss is more noticeable. Dose was recently increased per previous provider prior to signing onto inhouse services. More anxiety and depressed mood since moving into assisted living facility recently. She is more isolated. Also noticing memory loss is more noticeable. weight loss noted.   Plan:   -Continue escitalopram 10mg daily  -UA/UC to rule out infection concerns. If positive will treat accordingly.   -Monitor mood and behaviors  -Continue MVI daily due to poor appetite and low intake  -Encourage Boost BID as directed  -Monitor for changes in mobility, eating and sleeping patterns  -Continue ED setting with all nursing assistance and medication needs.   -Trend labs in 3-6 months or sooner if warranted     (G89.4) Chronic pain syndrome  (M06.9) Rheumatoid arthritis, involving unspecified site, unspecified whether rheumatoid factor present (H)  (M19.90) Osteoarthritis, unspecified osteoarthritis type, unspecified site  (M81.0) Osteoporosis without current pathological fracture, unspecified osteoporosis type  (R53.81) Physical deconditioning  Comment: Chronic. Stable. Continues gabapentin to 300 mg 3 times a day.  She also follows with spine specialist. Followed by rheumatology Dr. Armstrong and rei. Last visit with rheumatology was 08/2020. Had been on Reclast.  She continues on calcium and vitamin D. She receives frequent infusions per rheumatology as directed. She is overdue for this per daughter. Has not used any PRN narcotics per observation for over 2 weeks.   Plan:   -Recommend to follow up with rheumatology Dr Holt as directed.   -Continue infusions per rheumatology clinic as directed.   -May benefit from pain clinic referral  if warranted if no improvement in future.   -Continue Tylenol 650mg BID and BID PRN. (limit tylenol to 4gm per day)  -Monitor pain complaints as directed  -Continue plaquenil 200mg daily  -Continue reclast annual infusions as directed. Overdue for this.   -Trend labs in 3-6 months or sooner if warranted  -Continue Norco to 1 tab every 6 hours PRN. Recommend to discontinue in future if non use.   -Continue gabapentin 300mg TID. May benefit from reduction of dose in future if warranted.   -May benefit from topical analgesics if warranted              (D50.9) Iron deficiency anemia, unspecified iron deficiency anemia type  Comment: Acute on chronic. Hemoglobin was normal last check 9/2020 with rheum. She will continue on iron supplement.  Plan:   -Continue iron supplement as directed  -Monitor for active s/sx of bleeding concerns  -Monitor for worsening s/sx of concerns.   -Trend labs in 3-6 months or sooner if warranted     (E78.5) Hyperlipidemia, unspecified hyperlipidemia type  (I10) Hypertension goal BP (blood pressure) < 140/90  (R60.9) Edema, unspecified type  (N18.30) Stage 3 chronic kidney disease, unspecified whether stage 3a or 3b CKD  Comment: Acute on chronic. Previously well controlled. No longer on the lisinopril due to hypotension and history of hyponatremia. The patient's peripheral edema most likely is multifactorial per history documentation. Based on JNC-8 goals,  patients age of 89 year old, presence of diabetes or CKD, and goals of care goal BP is  <140/90 mm Hg. Noted patients BP is lower than goal and will adjust plan of care by. Nifedipine was reduced at last MD visit  Plan:   -Follow up with cardiology PRN if indicated.   -Continue atorvastatin 20mg daily  -Will start optage for lymphedema needs  -Compression stockings daily for now.   -Continue nifedipine 30mg daily as directed  -Will reduce atenolol down to 50mg daily. HOLD parameters in place.   -Monitor BP and HR BID.   -Adjust BP  "medications accordingly per levels.   -Monitor BP and HR as directed  -Trend labs in 3-6 months or sooner if warranted  -Monitor fall risks.      (R05) Chronic cough  Comment: Chronic. Chronic cough for many years. She was a smoker for decades from age 16 to twenty-some years ago. Smoked maybe 1/2 ppd for years. Denies any shortness of breath or wheezing or increase in cough over time. Denies frequent colds or bronchitis. Just has longstanding productive cough. Previous CXRs have shown some hyperinflation. Suspect COPD. She is not interested in further evaluation or medication.   Plan:   -Monitor cough  -Monitor respiratory status  -Monitor for worsening s/sx of concerns   -Trend labs in 3-6 months or sooner if warranted     (K82.0) Obstruction of cystic duct  Comment: Acute on chronic. Asymptomatic. Visited with GI on 8/13/2020. She does not want to pursue any procedures or additional evaluation at this point. Per GI recommendations on 8/13/2020: 89yo woman with mild liver study abnormality likely explained by otherwise asymptomatic cystic duct obstruction with associated mild cholecystitis (wall thickening). As she is asymptomatic and does not want to pursue invasive procedures at this moment, I recommend conservative observation. With development of fevers or right upper quadrant pain, I would then recommend antibiotics alone and without improvement move forward with either cholecystectomy or ERCP with transpapillary gallbladder stent placement. Both have their advantages (surgery more definitive, ERCP less invasive) and risks (anesthesia). We would have another discussion regarding this option before organizing either. She was instructed to call my coordinator with any related symptoms.\"  Plan:   -Monitor urinary status  -Monitor for worsening s/sx of concerns  -Monitor conservative at this time per GI recommendations  -It patient develops fever or RUQ pain, GI would recommend antibiotics alone and without " improvement then would move forward to cholecystectomy or ERCP  -Follow up with GI PRN if warranted in the future.   -Trend labs in 3-6 months or sooner if warranted     (D69.6) Thrombocytopenia (H)  Comment: Acute on chronic. Visited with hematology on 7/22/2020: Thrombocytopenia could be due to primary bone marrow pathology like myelodysplastic syndrome.  The patient is 89.  MDS is common in this age group.  Unlikely to be immune thrombocytopenia.  ITP is not common in older people. The patient does have liver dysfunction.  Thrombocytopenia could be due to that. The patient does have rheumatoid arthritis.  She can have immune destruction of the platelet which can be seen with rheumatoid arthritis  Plan:   -Follow up with hematology PRN if indicated in the future  -Monitor for worsening s/sx of concerns.   -Trend labs in 3-6 months or sooner if warranted     (W19.XXXA) Fall  (T14.8XXA) Wound  Comment: Acute. S/T recent fall trauma.   Plan:  -Start Physical therapy and Occupational therapy with optage  -Start Registered Nurse with optage as well for wound management needs  -Start wound cares as directed until optage follows:  *Cleanse sitre with NS. Apple bacitracin to site. Cover with gauze dressing. Tape in place. Change MWF and PRN  -Monitor for falls  -Monitor for worsening skin concerns    (B36.9) Fungal dermatitis  Comment: Acute on chronic.   Plan:  -Start miconazole powder BID and BID PRN  -Monitor skin for worsening concerns    Orders written by provider at facility     Electronically signed by:  Yuli Rangel DNP, APRN    Documentation of Face to Face and Certification for Home Health Services    I certify that patient: Celestina Tejada is under my care and that I, or a nurse practitioner or physician's assistant working with me, had a face-to-face encounter that meets the physician face-to-face encounter requirements with this patient on: 1/27/2021.    This encounter with the patient was in whole, or  in part, for the following medical condition, which is the primary reason for home health care: deconditioning S/T recent fall with wound and edema control.    I certify that, based on my findings, the following services are medically necessary home health services: Nursing, Occupational Therapy and Physical Therapy.    My clinical findings support the need for the above services because: Nurse is needed: To assess wound management, and edema control after changes in medications or other medical regimen.., Occupational Therapy Services are needed to assess and treat cognitive ability and address ADL safety due to impairment in deconditioning . and Physical Therapy Services are needed to assess and treat the following functional impairments: deconditioning and edema control.    Further, I certify that my clinical findings support that this patient is homebound (i.e. absences from home require considerable and taxing effort and are for medical reasons or [facility] services or infrequently or of short duration when for other reasons) because: Requires assistance of another person or specialized equipment to access medical services because patient: Is unable to walk greater than 100 feet without rest...    Based on the above findings. I certify that this patient is confined to the home and needs intermittent skilled nursing care, physical therapy and/or speech therapy.  The patient is under my care, and I have initiated the establishment of the plan of care.  This patient will be followed by a physician who will periodically review the plan of care.  Physician/Provider to provide follow up care: Yuli Rangel    Attending hospital physician (the Medicare certified [facility] provider): Dr.Gretchen Nino MD, signing F2F and only signing for initial order. Please send all follow up questions and concerns or needed follow up signatures to the PCP, who [facility] has on file as:  Yuli Rangel.    Physician Signature: See  electronic signature associated with these discharge orders.  Date: 1/27/2021

## 2021-01-28 ENCOUNTER — RECORDS - HEALTHEAST (OUTPATIENT)
Dept: LAB | Facility: CLINIC | Age: 86
End: 2021-01-28

## 2021-01-28 LAB
ALBUMIN UR-MCNC: ABNORMAL MG/DL
APPEARANCE UR: CLEAR
BACTERIA #/AREA URNS HPF: ABNORMAL HPF
BILIRUB UR QL STRIP: NEGATIVE
COLOR UR AUTO: YELLOW
GLUCOSE UR STRIP-MCNC: NEGATIVE MG/DL
HGB UR QL STRIP: NEGATIVE
KETONES UR STRIP-MCNC: NEGATIVE MG/DL
LEUKOCYTE ESTERASE UR QL STRIP: NEGATIVE
MUCOUS THREADS #/AREA URNS LPF: ABNORMAL LPF
NITRATE UR QL: NEGATIVE
PH UR STRIP: 7 [PH] (ref 4.5–8)
RBC #/AREA URNS AUTO: ABNORMAL HPF
SP GR UR STRIP: 1.02 (ref 1–1.03)
SQUAMOUS #/AREA URNS AUTO: ABNORMAL LPF
UROBILINOGEN UR STRIP-ACNC: ABNORMAL
WBC #/AREA URNS AUTO: ABNORMAL HPF

## 2021-01-29 LAB — BACTERIA SPEC CULT: NORMAL

## 2021-02-08 NOTE — PROGRESS NOTES
[facility]  [place] Medical Record Number:  [id number]  Place of Service where encounter took place:  [facility] ([facility] [[id number]]  Chief Complaint   Patient presents with     RECHECK       HPI:    Celestina Tejada  is a 89 year old (4/2/1931), who is being seen today for an episodic care visit.  HPI information obtained from: facility chart records, facility staff, patient report and [facility] chart review. Today's concern is: seen for first visit as I assume pt's care in [place]     Cognitive impairment  Adjustment disorder with anxious mood  Malnutrition, unspecified type (H)  Rheumatoid arthritis, involving unspecified site, unspecified whether rheumatoid factor present (H)  Osteoarthritis, unspecified osteoarthritis type, unspecified site  Osteoporosis without current pathological fracture, unspecified osteoporosis type  Chronic pain syndrome  Iron deficiency anemia, unspecified iron deficiency anemia type  Hyperlipidemia, unspecified hyperlipidemia type  Benign hypertension with chronic kidney disease, stage III  Fall, sequela  Open wound      Past Medical and Surgical History reviewed in Epic today.    MEDICATIONS:     Current Outpatient Medications   Medication Sig Dispense Refill     atenolol (TENORMIN) 25 MG tablet Take 25 mg by mouth daily HOLD if SBP <110 OR HR < 60, and call NP if consistently held.       acetaminophen (TYLENOL) 325 MG tablet Take 2 tablets (650 mg) by mouth 2 times daily Give 650mg BID and BID  tablet 11     acetaminophen (TYLENOL) 500 MG tablet Take 500-1,000 mg by mouth 4 times daily as needed for mild pain       atorvastatin (LIPITOR) 10 MG tablet Take 1 tablet (10 mg) by mouth daily (Patient taking differently: Take 10 mg by mouth daily Decreased dose starting Feb 11, 2021) 30 tablet 3     escitalopram (LEXAPRO) 5 MG tablet Take 2 tablets (10 mg) by mouth daily 60 tablet 11     gabapentin (NEURONTIN) 300 MG capsule Take 1  capsule (300 mg) by mouth 3 times daily 270 capsule 3     HYDROcodone-acetaminophen (NORCO) 5-325 MG tablet Take 1 tablet by mouth every 6 hours as needed for moderate to severe pain 90 tablet 0     hydroxychloroquine (PLAQUENIL) 200 MG tablet Take 200 mg by mouth daily   3     miconazole (MICATIN) 2 % external powder Apply topically 2 times daily Apply to under breast and groin redness BID and BID PRN 90 g 11     multivitamin w/minerals (THERA-VIT-M) tablet Take 1 tablet by mouth daily 60 tablet 11     NIFEdipine ER OSMOTIC (ADALAT CC) 30 MG 24 hr tablet Take 1 tablet (30 mg) by mouth daily 30 tablet 11     Nutritional Supplements (BOOST COMPACT) LIQD Take 1 Bottle by mouth 2 times daily       senna-docusate (SENOKOT-S/PERICOLACE) 8.6-50 MG tablet Take 1 tablet by mouth 2 times daily as needed for constipation 180 tablet 1     vitamin D3 (CHOLECALCIFEROL) 50 mcg (2000 units) tablet Take 1 tablet (50 mcg) by mouth daily 30 tablet 11         TODAY DURING EXAM/ROS:  Forgetful and many answers are vague.  No CP, SOB, Cough, dizziness, fevers, chills, HA, N/V, dysuria or Bowel Abnormalities. Appetite is fair--better with people there per staff.  No pain today     Objective:  /67   Pulse 68   Temp 96.5  F (35.8  C)   Resp 16   SpO2 93%   Exam:  GENERAL APPEARANCE:  Alert, in no distress, oriented, cooperative, forgetful  ENT:  Mouth and posterior oropharynx normal, moist mucous membranes, normal hearing acuity  EYES:  Conjunctiva and lids normal  NECK:  No adenopathy,masses or thyromegaly  RESP:  respiratory effort and palpation of chest normal, lungs clear to auscultation , no respiratory distress  CV:  Palpation and auscultation of heart done , regular rate and rhythm, no murmur, rub, or gallop, trace bilat edema, +2 pedal pulses  ABDOMEN:  normal bowel sounds, soft, nontender, no hepatosplenomegaly or other masses  M/S:   up with assist and Walker  SKIN:  Inspection of skin and subcutaneous tissue baseline,  except left lateral thigh wound is scabbed, healing well--mild bruising present still.  NEURO:   Cranial nerves 2-12 are normal tested and grossly at patient's baseline  PSYCH:  oriented X 3, insight and judgement impaired, memory impaired     Labs:   Recent Labs   Lab Test 12/15/20 11/24/20    132*   POTASSIUM 3.7 4.4   CHLORIDE 101 99   CO2 24 27   ANIONGAP 12 6   GLC 65* 71   BUN 15 16   CR 0.99 0.84   JOSÉ 9.0 8.4*     CBC RESULTS:   Recent Labs   Lab Test 12/15/20   WBC 8.3   RBC 4.05   HGB 12.2   HCT 39.4   MCV 97   MCH 30.1   MCHC 31.0*   RDW 14.3        ASSESSMENT / PLAN:  (R41.89) Cognitive impairment (primary encounter diagnosis)  (F43.22) Adjustment disorder with anxious mood   (E46) Malnutrition, unspecified type (H)  Comment/Plan: calm, good sense of humor, cont Celexa, per Darling, eats better when someone is with pt and they have people come to make and eat lunch with her. monitor.    (M06.9) Rheumatoid arthritis, involving unspecified site, unspecified whether rheumatoid factor present (H)   (M19.90) Osteoarthritis, unspecified osteoarthritis type, unspecified site   (M81.0) Osteoporosis without current pathological fracture, unspecified osteoporosis type   (G89.4) Chronic pain syndrome  Comment/Plan: no acute pain issues today, no changes to POC,  Monitor. F/U Rheum per their recs--last saw in August 2020.    (D50.9) Iron deficiency anemia, unspecified iron deficiency anemia type  Comment/Plan: good Hgb--discontinue Fe, check in 6-12 mos    (E78.5) Hyperlipidemia, unspecified hyperlipidemia type  Comment/Plan: decrease statin--see d/w Darling below    (I12.9,  N18.30) Benign hypertension with chronic kidney disease, stage III  Comment/Plan: SBP running often <120, will decrease atenolol to once daily. Hx falls and see d/w Darling below    (W19.XXXS) Fall, sequela   (T14.8XXA) Open wound: Left lateral thigh  Comment/Plan: improving wound, cont monitor.    I called and d/w family Darling, Tawnya an update,  the POC and care coordination.  This included discussion of her appetite, falls, overall weakness and the risk and benefits of current  Treatments she would like us to minimize meds if possible, lynn those that may affect her strength or BP. All questions answered and there is agreement decrease BB< discontinue Fe and decrease statin and  on rest of the Care Plan.       Electronically signed by:  STEVE Bradley CNP

## 2021-02-09 NOTE — LETTER
2/9/2021        RE: Celestina Tejada  [facility]  [address]  [address]  [facility] [address]        [facility]  [place] [facility] Record Number:  [id number]  Place of Service where encounter took place:  [facility] ([facility]) [[id number]]  Chief Complaint   Patient presents with     RECHECK       HPI:    Celestina Tejada  is a 89 year old (4/2/1931), who is being seen today for an episodic care visit.  HPI information obtained from: facility chart records, facility staff, patient report and [facility] chart review. Today's concern is: seen for first visit as I assume pt's care in AL     Cognitive impairment  Adjustment disorder with anxious mood  Malnutrition, unspecified type (H)  Rheumatoid arthritis, involving unspecified site, unspecified whether rheumatoid factor present (H)  Osteoarthritis, unspecified osteoarthritis type, unspecified site  Osteoporosis without current pathological fracture, unspecified osteoporosis type  Chronic pain syndrome  Iron deficiency anemia, unspecified iron deficiency anemia type  Hyperlipidemia, unspecified hyperlipidemia type  Benign hypertension with chronic kidney disease, stage III  Fall, sequela  Open wound      Past Medical and Surgical History reviewed in Epic today.    MEDICATIONS:     Current Outpatient Medications   Medication Sig Dispense Refill     atenolol (TENORMIN) 25 MG tablet Take 25 mg by mouth daily HOLD if SBP <110 OR HR < 60, and call NP if consistently held.       acetaminophen (TYLENOL) 325 MG tablet Take 2 tablets (650 mg) by mouth 2 times daily Give 650mg BID and BID  tablet 11     acetaminophen (TYLENOL) 500 MG tablet Take 500-1,000 mg by mouth 4 times daily as needed for mild pain       atorvastatin (LIPITOR) 10 MG tablet Take 1 tablet (10 mg) by mouth daily (Patient taking differently: Take 10 mg by mouth daily Decreased dose starting Feb 11, 2021) 30 tablet 3     escitalopram  (LEXAPRO) 5 MG tablet Take 2 tablets (10 mg) by mouth daily 60 tablet 11     gabapentin (NEURONTIN) 300 MG capsule Take 1 capsule (300 mg) by mouth 3 times daily 270 capsule 3     HYDROcodone-acetaminophen (NORCO) 5-325 MG tablet Take 1 tablet by mouth every 6 hours as needed for moderate to severe pain 90 tablet 0     hydroxychloroquine (PLAQUENIL) 200 MG tablet Take 200 mg by mouth daily   3     miconazole (MICATIN) 2 % external powder Apply topically 2 times daily Apply to under breast and groin redness BID and BID PRN 90 g 11     multivitamin w/minerals (THERA-VIT-M) tablet Take 1 tablet by mouth daily 60 tablet 11     NIFEdipine ER OSMOTIC (ADALAT CC) 30 MG 24 hr tablet Take 1 tablet (30 mg) by mouth daily 30 tablet 11     Nutritional Supplements (BOOST COMPACT) LIQD Take 1 Bottle by mouth 2 times daily       senna-docusate (SENOKOT-S/PERICOLACE) 8.6-50 MG tablet Take 1 tablet by mouth 2 times daily as needed for constipation 180 tablet 1     vitamin D3 (CHOLECALCIFEROL) 50 mcg (2000 units) tablet Take 1 tablet (50 mcg) by mouth daily 30 tablet 11         TODAY DURING EXAM/ROS:  Forgetful and many answers are vague.  No CP, SOB, Cough, dizziness, fevers, chills, HA, N/V, dysuria or Bowel Abnormalities. Appetite is fair--better with people there per staff.  No pain today     Objective:  /67   Pulse 68   Temp 96.5  F (35.8  C)   Resp 16   SpO2 93%   Exam:  GENERAL APPEARANCE:  Alert, in no distress, oriented, cooperative, forgetful  ENT:  Mouth and posterior oropharynx normal, moist mucous membranes, normal hearing acuity  EYES:  Conjunctiva and lids normal  NECK:  No adenopathy,masses or thyromegaly  RESP:  respiratory effort and palpation of chest normal, lungs clear to auscultation , no respiratory distress  CV:  Palpation and auscultation of heart done , regular rate and rhythm, no murmur, rub, or gallop, trace bilat edema, +2 pedal pulses  ABDOMEN:  normal bowel sounds, soft, nontender, no  hepatosplenomegaly or other masses  M/S:   up with assist and Walker  SKIN:  Inspection of skin and subcutaneous tissue baseline, except left lateral thigh wound is scabbed, healing well--mild bruising present still.  NEURO:   Cranial nerves 2-12 are normal tested and grossly at patient's baseline  PSYCH:  oriented X 3, insight and judgement impaired, memory impaired     Labs:   Recent Labs   Lab Test 12/15/20 11/24/20    132*   POTASSIUM 3.7 4.4   CHLORIDE 101 99   CO2 24 27   ANIONGAP 12 6   GLC 65* 71   BUN 15 16   CR 0.99 0.84   JOSÉ 9.0 8.4*     CBC RESULTS:   Recent Labs   Lab Test 12/15/20   WBC 8.3   RBC 4.05   HGB 12.2   HCT 39.4   MCV 97   MCH 30.1   MCHC 31.0*   RDW 14.3        ASSESSMENT / PLAN:  (R41.89) Cognitive impairment (primary encounter diagnosis)  (F43.22) Adjustment disorder with anxious mood   (E46) Malnutrition, unspecified type (H)  Comment/Plan: calm, good sense of humor, cont Celexa, per Darling, eats better when someone is with pt and they have people come to make and eat lunch with her. monitor.    (M06.9) Rheumatoid arthritis, involving unspecified site, unspecified whether rheumatoid factor present (H)   (M19.90) Osteoarthritis, unspecified osteoarthritis type, unspecified site   (M81.0) Osteoporosis without current pathological fracture, unspecified osteoporosis type   (G89.4) Chronic pain syndrome  Comment/Plan: no acute pain issues today, no changes to POC,  Monitor. F/U Rheum per their recs--last saw in August 2020.    (D50.9) Iron deficiency anemia, unspecified iron deficiency anemia type  Comment/Plan: good Hgb--discontinue Fe, check in 6-12 mos    (E78.5) Hyperlipidemia, unspecified hyperlipidemia type  Comment/Plan: decrease statin--see d/w Darling below    (I12.9,  N18.30) Benign hypertension with chronic kidney disease, stage III  Comment/Plan: SBP running often <120, will decrease atenolol to once daily. Hx falls and see d/w Darling below    (W19.XXXS) Fall, sequela    (T14.8XXA) Open wound: Left lateral thigh  Comment/Plan: improving wound, cont monitor.    I called and d/w family Tawnya Hastings an update, the POC and care coordination.  This included discussion of her appetite, falls, overall weakness and the risk and benefits of current  Treatments she would like us to minimize meds if possible, lynn those that may affect her strength or BP. All questions answered and there is agreement decrease BB< discontinue Fe and decrease statin and  on rest of the Care Plan.       Electronically signed by:  STEVE Bradley CNP                 Sincerely,        STEVE Braldey CNP

## 2021-02-10 PROBLEM — T14.8XXA OPEN WOUND: Status: ACTIVE | Noted: 2021-01-01

## 2021-02-10 PROBLEM — E78.5 HYPERLIPIDEMIA, UNSPECIFIED HYPERLIPIDEMIA TYPE: Status: ACTIVE | Noted: 2021-01-01

## 2021-02-10 PROBLEM — R60.9 EDEMA, UNSPECIFIED TYPE: Status: ACTIVE | Noted: 2021-01-01

## 2021-02-10 PROBLEM — E46 MALNUTRITION, UNSPECIFIED TYPE (H): Status: ACTIVE | Noted: 2021-01-01

## 2021-02-10 PROBLEM — M81.0 OSTEOPOROSIS WITHOUT CURRENT PATHOLOGICAL FRACTURE, UNSPECIFIED OSTEOPOROSIS TYPE: Status: ACTIVE | Noted: 2021-01-01

## 2021-02-10 PROBLEM — N18.30 BENIGN HYPERTENSION WITH CHRONIC KIDNEY DISEASE, STAGE III (H): Status: ACTIVE | Noted: 2021-01-01

## 2021-02-10 PROBLEM — D50.9 IRON DEFICIENCY ANEMIA, UNSPECIFIED IRON DEFICIENCY ANEMIA TYPE: Status: ACTIVE | Noted: 2021-01-01

## 2021-02-10 PROBLEM — I12.9 BENIGN HYPERTENSION WITH CHRONIC KIDNEY DISEASE, STAGE III (H): Status: ACTIVE | Noted: 2021-01-01

## 2021-02-10 PROBLEM — F43.22 ADJUSTMENT DISORDER WITH ANXIOUS MOOD: Status: ACTIVE | Noted: 2021-01-01
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
## 2021-02-10 PROBLEM — R41.89 COGNITIVE IMPAIRMENT: Status: ACTIVE | Noted: 2021-01-01

## 2021-02-20 PROBLEM — S39.92XA INJURY OF LOW BACK, INITIAL ENCOUNTER: Status: ACTIVE | Noted: 2021-01-01

## 2021-02-20 PROBLEM — S39.93XA: Status: ACTIVE | Noted: 2021-01-01

## 2021-02-20 NOTE — ED NOTES
Bed: ED14  Expected date:   Expected time:   Means of arrival:   Comments:  [place] 541 Fall buttock pain 89 f

## 2021-02-20 NOTE — ED PROVIDER NOTES
History   Chief Complaint:  Fall     The history is provided by the patient, a relative and the EMS personnel.      Celestina Tejada is a 89 year old female with history of macular degeneration who presents after a fall. Per report, a week ago the patient had an unwitnessed fall backwards onto her tail bone at assisted living. She did not hit her head. After the fall she was able to get up on her own and ambulate, but since then she has been in pain with increasing weakness. Today her daughter went to visit her and decided that she should come to the ER. She was able to walk to the stretcher on her own, and with EMS she was oriented x2. Here she notes pain radiating down her right leg but denies any vomiting, diarrhea, chest pain, shortness of breath, or numbness or weakness in her legs. Of note, she takes gabapentin daily and hydrocodone on request. She did not request any hydrocodone today. She is not on any blood thinners.    Review of Systems   Respiratory: Negative for shortness of breath.    Cardiovascular: Negative for chest pain.   Gastrointestinal: Negative for diarrhea and vomiting.   Musculoskeletal:        Left leg pain   Neurological: Negative for weakness and numbness.   All other systems reviewed and are negative.    Allergies:  Hydrochlorothiazide    Medications:  Tenormin  Lipitor  Lexapro  Neurontin  [place]  Plaquenil  Micatin  Adalat  Senna-docusate  Vitamin D3    Past Medical History:    CKD  Hypertension  Osteoarthritis  Osteopenia  Rheumatoid arthritis  Eczema  Peripheral edema  Hyponatremia with decreased serum osmolality  Hepatitis C  Thrombocytopenia  Abdominal hernia without obstruction and without gangrene  Edema  Osteoporosis  Hyperlipidemia  Iron deficiency anemia  Malnutrition  Adjustment disorder with anxious mood    Past Surgical History:    Appendectomy    Family History:    Mother: CHF, Lung disease  Sister: CAD, Arthritis    Social History:  The patient was accompanied to the ED  by EMS and her daugher.    Physical Exam     Patient Vitals for the past 24 hrs:   BP Temp Temp src Pulse Resp SpO2 Height Weight   02/20/21 2107 113/52 96.1  F (35.6  C) Axillary 61 16 94 % 1.524 m (5') 49.1 kg (108 lb 4.8 oz)   02/20/21 1830 -- -- -- -- -- 95 % -- --   02/20/21 1800 -- -- -- -- -- 97 % -- --   02/20/21 1748 132/59 98.5  F (36.9  C) Oral 66 16 96 % 1.524 m (5') 44.5 kg (98 lb)       Physical Exam  Eyes:  The pupils are equal and round    Conjunctivae and sclerae are normal  ENT:    The nose is normal    Pinnae are normal  Neck:  Normal range of motion    There is no rigidity noted    There is no midline cervical spine tenderness    Trachea is in the midline  CV:  Regular rate and rhythm     No edema  Resp:  Lungs are clear    Non-labored    No rales    No wheezing   GI:  Abdomen is soft and non-tender, there is no rigidity    No distension    No rebound tenderness   MS:  Normal muscular tone    No asymmetric leg swelling    Tenderness in the sacral area bilaterally    Tenderness of the lumbar spine    No tenderness of the hips or upper back  Skin:  No rash or acute skin lesions noted  Neuro:   Awake, alert.      Speech is normal and fluent.    Face is symmetric.     Moves all extremities    Ambulates with walker    Emergency Department Course     Imaging:  Lumbar spine XR, 2-3 views  Chronic severe anterior wedge compression deformity of L4  again noted. Vertebral body heights and contours of the lumbar spine  are otherwise within normal limits. No definite recent fractures  noted. Moderate left convex curvature of the lumbar spine centered at  L3 again noted. Moderate degenerative left lateral listhesis of L3  upon L4 again noted. Alignment is otherwise normal. There is severe  degenerative endplate spurring at L2-L3, L3-L4 and L4-L5.  Reading per radiology    XR Pelvis w 1 View Each Hip  No evidence of left hip or pelvic fracture. Bilateral femoral artery calcification. Advanced degenerative  changes in the mid and lower lumbar spine.  Reading per radiology    Laboratory:  CBC: WBC 9.7, HGB 10.3 (L),   BMP: GFR 45 (L) o/w WNL (Creatinine 1.08 (H))    Emergency Department Course:  Reviewed:  nursing notes, vitals and past medical history    Assessments:  1750: I performed an exam of the patient and obtained history, as documented above.     1914: I rechecked and updated the patient regarding the plan for care.    1938: I rechecked and updated the patient and her daughter.    Consults:   1956: I spoke with the hospitalist, Dr. Clement, regarding the patient.    Interventions:  1832 Norco 1 tablet PO    Disposition:  The patient was admitted to the hospital under the care of Dr. Clement.     Impression & Plan     Medical Decision Making:  Celestina Tejada is a 89 year old female who presents to the emergency department approximately 1 week after a fall onto her bottom.  She denies striking her head or having other injuries.  She has been having increasing pain and difficulty ambulating over the past week and family is concerned about her safety in her assisted living facility as she does not have anyone to help her to the bathroom.  Work-up here including x-ray and labs was unrevealing for acute findings.  Given the increasing difficulty ambulating and safety concerns at home patient was admitted to observation for placement in TCU.  Family was aware of observation stay and potential cause.  Patient was discussed with the hospitalist who agreed accept the patient as an admission to observation.    Diagnosis:    ICD-10-CM    1. Injury of low back, initial encounter  S39.92XA Asymptomatic SARS-CoV-2 COVID-19 Virus (Coronavirus) by PCR   2. Injury of pelvis, initial encounter  S39.93XA        Scribe Disclosure:  I, Neeraj Spain, am serving as a scribe at 5:50 PM on 2/20/2021 to document services personally performed by Sunil Siddiqi MD based on my observations and the provider's  statements to me.      Sunil Siddiqi MD  02/20/21 8444

## 2021-02-20 NOTE — ED TRIAGE NOTES
Pt presents to ED via EMS for evaluation following fall. Per EMS, pt resides in Assisted living. Per daughter, pt had an unwitnessed fall a week ago. Per EMS, pt will reports fall happened today and pt appears oriented x2. Per EMS, pt reporting lower back pain radiating into both legs. Daughter concerned for decreased mobility since pt is in assisted living side of NH and can usually walk to restroom. Pt was able to walk to EMS stretcher with minimal assistance.

## 2021-02-21 NOTE — CONSULTS
Care Management Initial Consult    General Information  Assessment completed with: Patient, Children, EMMA Gutierrez       Primary Care Provider verified and updated as needed:     Readmission within the last 30 days:           Advance Care Planning: Advance Care Planning Reviewed: no concerns identified          Communication Assessment  Patient's communication style: spoken language (English or Bilingual)    Hearing Difficulty or Deaf: no   Wear Glasses or Blind: yes    Cognitive  Cognitive/Neuro/Behavioral: .WDL except  Level of Consciousness: alert  Arousal Level: opens eyes spontaneously  Orientation: disoriented to, situation  Mood/Behavior: calm, cooperative, behavior appropriate to situation  Best Language: 0 - No aphasia  Speech: clear, spontaneous    Living Environment:   People in home: alone     Current living Arrangements: assisted living  Name of Facility: [facility]   Able to return to prior arrangements:         Family/Social Support:  Care provided by: self, other (see comments), child(kojo)(Staff)  Provides care for: no one, no one, unable/limited ability to care for self     Children          Description of Support System: Supportive, Involved    Support Assessment: Adequate family and caregiver support, Adequate social supports    Current Resources:   Patient receiving home care services: No     Community Resources: None  Equipment currently used at home: shower chair, walker, rolling, grab bar, toilet, grab bar, tub/shower  Supplies currently used at home:      Employment/Financial:  Employment Status: retired        Financial Concerns:             Lifestyle & Psychosocial Needs:        Socioeconomic History     Marital status:      Spouse name: Not on file     Number of children: 2     Years of education: Not on file     Highest education level: Not on file     Tobacco Use     Smoking status: Former Smoker     Packs/day: 0.50     Years: 20.00     Pack years: 10.00     Types:  Cigarettes     Quit date: 2000     Years since quittin.4     Smokeless tobacco: Never Used   Substance and Sexual Activity     Alcohol use: Not Currently     Comment: very occ,     Drug use: No     Sexual activity: Not Currently     Partners: Male       Functional Status:  Prior to admission patient needed assistance:   Dependent ADLs:: Ambulation-walker  Dependent IADLs:: Cleaning, Cooking, Laundry, Shopping, Meal Preparation, Medication Management, Money Management, Transportation       Mental Health Status:          Chemical Dependency Status:                Values/Beliefs:  Spiritual, Cultural Beliefs, Religion Practices, Values that affect care: no               Additional Information:  Writer spoke with pt and dtr  Tawnya. Pt lives at [facility]. She receoves assistance for meals and meds. Pts dtr assists with bathing, laundry, groceries, and transportation. Pt uses a walker at baseline.    Writer spoke about TCU. Pt and dtr would agreeable to referrals to [facility]. Referrals sent. Please speak with dtr Dtacy regarding discharge plans.     OTTO Mckeon

## 2021-02-21 NOTE — PROGRESS NOTES
RECEIVING UNIT ED HANDOFF REVIEW    ED Nurse Handoff Report was reviewed by: Carmelita Plaza RN on February 20, 2021 at 8:24 PM

## 2021-02-21 NOTE — PROGRESS NOTES
[facility]    Medicine History and Physical - Hospitalist Service       Date of Admission:  2/20/2021    Assessment & Plan   Celestina Tejada is a 89 year old female admitted on 2/20/2021. Presented with progressive generalized weakness and persistent pain after suspected mechanical fall approx 1 week pta.    1. Acute on chronic low back pain 2/2 contusion.  2. Chronic severe anterior wedge compression fracture L4.  3. Chronic severe lumbar degenerative endplate spurring L2-L3, L3-L4, L4-L5.  4. Chronic moderate left convex curvature of L spine centered at L3.  Recent fall, 1 week ago, mechanism unclear but suspected mechanical in conversation with daughter.  XR L-spine showed no acute pathology, chronic and degenerative findings as noted above.  XR left hip and pelvis negative.  - Increased dose and schedule APAP.  - Discontinue PTA Norco.  Patient recently moved into [facility] and has to (but forgets to) ask for the medication, as such has not been taking.  Will switch to PRN low-dose oxycodone to enable scheduling higher dose APAP.  - Add scheduled and PRN bowel regimen.  - PT eval rec TCU.  Social work following.    Generalized weakness.  Mild cognitive impairment.  In context senile fragility with recent fall and ongoing pain as above compounded by poor sight from mac degeneration and mild cognitive impairment. Possible low BP has been contributed as note her SBP well controlled despite holding PTA meds. No overt metabolic or hematologic abnl to suggest single pathology. UA appears contaminated. No sxs infection. No symptoms to suspect acute cardiopulm process.  - Hold anti-HTNs.  - Urine ctx pending.  - Answer questions appropriately and oriented to situation; but forgetful not oriented to month / year / president.    Essential hypertension.  SBP well controlled despite holding PTA atenolol and nifedipine.  - Continue to hold atenolol and nifedipine.  - Follow blood pressure  trend.     CKD, stage IIIa.  Likely at baseline.     Rheumatoid arthritis.  - Continue prior to admission hydroxychloroquine.     Hyperlipidemia.  - Resume statin at obs discharge.    Diet: Regular Diet Adult    [place] Catheter: not present    DVT Prophylaxis: Low Risk/Ambulatory with no VTE prophylaxis indicated  Code Status: Full Code    Expected discharge: Tomorrow, recommended to transitional care unit once safe disposition plan/ TCU bed available.    The patient's care was discussed with the Attending Physician, Dr. Baxter, Bedside Nurse, Care Coordinator/ and Patient's Family - daughter Tawnya.    JoAnna K. Barthell, PA-C  Hospitalist Service  [facility]    ______________________________________________________________________    Interval History   Pain OK at rest 3-4/10, worse with movement. Uncertain of last bm.     Data reviewed today: I reviewed all medications, new labs and imaging results over the last 24 hours. I personally reviewed no images or EKG's today.    Physical Exam   Vital Signs: Temp: 95.7  F (35.4  C) Temp src: Oral BP: 115/60 Pulse: 57   Resp: 16 SpO2: 96 % O2 Device: None (Room air)    Weight: 108 lbs 4.8 oz   Constitutional: Appears stated age, no acute distress. Answers questions appropriately, but forgetful and does not recall name of [facility], president, and thinks it is Dec 2001.  Respiratory: Breath sounds CTA. No increased work of breathing.  Cardiovascular: RRR, no rub or murmur. No peripheral edema.  GI: Soft, thin, non-tender, non-distended.  Skin: Warm, dry, no rashes or lesions.  MSK: Low back with no focal bony TTP. No ecchymosis. Mild-mod TTP right lumbar region. Diffuse muscle atrophy.    Medications       acetaminophen  1,000 mg Oral Q8H     [Held by provider] atenolol  25 mg Oral Daily     gabapentin  300 mg Oral BID     hydroxychloroquine  200 mg Oral Daily     [Held by provider] NIFEdipine ER  30 mg Oral Daily     senna-docusate  1 tablet  Oral BID    Or     senna-docusate  2 tablet Oral BID       Data   Recent Labs   Lab 02/20/21  1838   WBC 9.7   HGB 10.3*   MCV 95         POTASSIUM 4.1   CHLORIDE 102   CO2 27   BUN 24   CR 1.08*   ANIONGAP 6   JOSÉ 9.2   GLC 80       Imaging:  Recent Results (from the past 24 hour(s))   XR Pelvis w 1 View Each Hip    Narrative    EXAM: XR PELVIS AND HIP BILATERAL 1 VIEW  LOCATION: [facility]  DATE/TIME: 2/20/2021 6:06 PM    INDICATION: Fall, pain  COMPARISON: None.      Impression    IMPRESSION: No evidence of left hip or pelvic fracture. Bilateral femoral artery calcification. Advanced degenerative changes in the mid and lower lumbar spine.   Lumbar spine XR, 2-3 views    Narrative    LUMBAR SPINE TWO - THREE VIEWS 2/20/2021 6:34 PM     COMPARISON: Abdomen MRI 7/11/2020.    HISTORY: Fall, pain.      Impression    IMPRESSION: Chronic severe anterior wedge compression deformity of L4  again noted. Vertebral body heights and contours of the lumbar spine  are otherwise within normal limits. No definite recent fractures  noted. Moderate left convex curvature of the lumbar spine centered at  L3 again noted. Moderate degenerative left lateral listhesis of L3  upon L4 again noted. Alignment is otherwise normal. There is severe  degenerative endplate spurring at L2-L3, L3-L4 and L4-L5.    BELKYS MASCORRO MD

## 2021-02-21 NOTE — PHARMACY-ADMISSION MEDICATION HISTORY
Pharmacy Medication History  Admission medication history interview status for the 2/20/2021  admission is complete. See EPIC admission navigator for prior to admission medications     Medication history sources: Interview with aid at [facility]     Significant changes made to the medication list:   Changed gabapentin 300 mg TID --> BID    Medication reconciliation completed by provider prior to medication history? No    Time spent in this activity: 15 minutes     Prior to Admission medications    Medication Sig Last Dose Taking? Auth Provider   acetaminophen (TYLENOL) 325 MG tablet Take 2 tablets (650 mg) by mouth 2 times daily Give 650mg BID and BID PRN  Yes Yuli Rangel APRN CNP   atenolol (TENORMIN) 25 MG tablet Take 25 mg by mouth daily HOLD if SBP <110 OR HR < 60, and call NP if consistently held.  at am Yes Reported, Patient   atorvastatin (LIPITOR) 10 MG tablet Take 1 tablet (10 mg) by mouth daily  at pm Yes Caprice Caba APRN CNP   escitalopram (LEXAPRO) 5 MG tablet Take 2 tablets (10 mg) by mouth daily  at am Yes Yuli Rangel APRN CNP   gabapentin (NEURONTIN) 300 MG capsule Take 300 mg by mouth 2 times daily  Yes Unknown, Entered By History   HYDROcodone-acetaminophen (NORCO) 5-325 MG tablet Take 1 tablet by mouth every 6 hours as needed for moderate to severe pain  Yes Yuli Rangel APRN CNP   hydroxychloroquine (PLAQUENIL) 200 MG tablet Take 200 mg by mouth daily   at am Yes Reported, Patient   miconazole (MICATIN) 2 % external powder Apply topically 2 times daily Apply to under breast and groin redness BID and BID PRN  Yes Yuli Rangel APRN CNP   multivitamin w/minerals (THERA-VIT-M) tablet Take 1 tablet by mouth daily  at am Yes Yuli Rangel APRN CNP   NIFEdipine ER OSMOTIC (ADALAT CC) 30 MG 24 hr tablet Take 1 tablet (30 mg) by mouth daily  at am Yes Yuli Rangel APRN CNP   vitamin D3 (CHOLECALCIFEROL) 50 mcg (2000 units) tablet Take 1 tablet (50 mcg) by mouth  daily  at am Yes Yuli Rangel APRN CNP       The information provided in this note is only as accurate as the sources available at the time of update(s)     Svetlana Lu, PharmD, BCCCP

## 2021-02-21 NOTE — PROGRESS NOTES
02/21/21 1054   Quick Adds   Type of Visit Initial PT Evaluation   Living Environment   People in home alone   Current Living Arrangements assisted living  (Pt reports condo, per chart she lives in ED)   Living Environment Comments Pt is unreliable historian & incorrectly reports she lives in [facility]; living environment info per daughter via phone. Daughter reports pt lives in [facility] and services include safety check every 2 hrs & 2 meals per day. Pt also has outside caregiver 12-3pm daily who eats lunch with her. Daughter Tawnya provided assist with laundry & bathing. Pt has been independent with dressing and toileting. Daughter reports she and the pt's caregivers have noticed a decline in her mobility recently and is concerned about her returning home. Pt's daughter reports pt uses 4WW for ambulation but is forgetfull about using brakes.   Self-Care   Usual Activity Tolerance moderate   Current Activity Tolerance fair   Equipment Currently Used at Home shower chair;walker, rolling;grab bar, toilet;grab bar, tub/shower   Activity/Exercise/Self-Care Comment Pt uses walker for mobility. Pt initially states she rarely, then states she never leaves [employer] building by herself. Pt reports her daughter comes to visit often when she is home but she also travels a lot. Pt reports her daughter helps with groceries/ errands/ transportation to appts/etc.    Disability/Function   Wear Glasses or Blind yes   Vision Management glasses   Walking or Climbing Stairs Difficulty yes   Walking or Climbing Stairs ambulation difficulty, requires equipment   Fall history within last six months yes   Number of times patient has fallen within last six months 1  (Pt doesn't recall fall; at least 1 ~1 wk ago per chart )   Change in Functional Status Since Onset of Current Illness/Injury yes   General Information   Onset of Illness/Injury or Date of Surgery 02/20/21   Referring Physician Jose E Clement MD   Patient/Family Therapy Goals  "Statement (PT) \"I want to be able to walk by myself\"   Pertinent History of Current Problem (include personal factors and/or comorbidities that impact the POC) Pt was admitted to observation 2/20 with worsening weakness and back pain after fall ~1 week ago. Pt was able to get up and ambulate after fall on her own but had increasing pain and weakness per chart. -X-ray of the lumbar spine shows chronic severe anterior wedge compression deformity of L4, no definite recent fractures noted.  X-ray of the pelvis without evidence of fracture. PMH includes HTN, CKD stage 3a, rheumatoid arthritis, hyperlipidemia   Existing Precautions/Restrictions fall   Cognition   Orientation Status (Cognition) oriented to;person  (states end of Mar 2021; states place is \"rehab place\")   Safety Deficit (Cognition) safety precautions awareness;minimal deficit   Cognitive Status Comments Pt very forgetful. Fixated on her daughter, Tawnya & frequently asking if people in the hearn were Tawnya and if Tawnya knows where she is, even after calling Tawnya with PT during session & speaking with her.   Pain Assessment   Patient Currently in Pain Yes, see Vital Sign flowsheet  (\"my rear end hurts a little bit\" )   Posture    Posture Forward head position;Protracted shoulders   Range of Motion (ROM)   ROM Comment ROM appears WFL with bed mobility and transfer   Strength   Strength Comments Pt demos functional weakness with bed mobility and transfers   Bed Mobility   Comment (Bed Mobility) Rolled R & sidelying>sit with extra time & effort from flat bed without rail   Transfers   Transfer Safety Comments Sit>stand from bed & chair with FWW and SBA, Sit<>stand at toilet with Jory & grab bar   Gait/Stairs (Locomotion)   Comment (Gait/Stairs) Ambulated in room & hearn with FWW and SBA with tendency to push walker ahead of self, unsteady turning at times, tends to step outside walker support when turning   Balance   Balance Comments Needs B UE support on walker " and SBA for safe dynamic mobility   Clinical Impression   Criteria for Skilled Therapeutic Intervention yes, treatment indicated   PT Diagnosis (PT) difficulty ambulating   Influenced by the following impairments decreased activity tolerance, decreased strength, pain   Functional limitations due to impairments difficulty with bed mobility, transfers, ambulation   Clinical Presentation Stable/Uncomplicated   Clinical Presentation Rationale clinical judgement   Clinical Decision Making (Complexity) low complexity   Therapy Frequency (PT) 3x/week  (while OBS)   Predicted Duration of Therapy Intervention (days/wks) 3 days   Planned Therapy Interventions (PT) balance training;bed mobility training;gait training;home exercise program;patient/family education;strengthening;transfer training;neuromuscular re-education   Risk & Benefits of therapy have been explained evaluation/treatment results reviewed;care plan/treatment goals reviewed;current/potential barriers reviewed;risks/benefits reviewed;participants included;patient;daughter   Clinical Impression Comments Patient will benefit from continued therapies in hospital and TCU to progress strength and optimize safety with mobility   PT Discharge Planning    PT Discharge Recommendation (DC Rec) Transitional Care Facility   PT Rationale for DC Rec Patient is below baseline mobility, needing SBA-CGA for ambulation and transfers from chair, Jory for toilet transfer with grab bars. Pt needs safety cues when ambulating with walker & is falls risk. Patient will benefit from TCU to strengthen & reduce risk of falls. Patient is very forgetful and will likely need higher level of care after TCU; discussed with pt's daughter- daughter in agreement with this.   Total Evaluation Time   Total Evaluation Time (Minutes) 20

## 2021-02-21 NOTE — PLAN OF CARE
Alert to self and place. VSS.Pain controled with tylenol, and 1x oxycodone for therapy. Up with one and walker. Tolerating diet. Plan to monitor tonight TCU recommended.

## 2021-02-21 NOTE — PROGRESS NOTES
Observation goals PRIOR TO DISCHARGE    Comments:     -diagnostic tests and consults completed and resulted - Not met    -vital signs normal or at patient baseline - Met    Nurse to notify provider when observation goals have been met and patient is ready for discharge.

## 2021-02-21 NOTE — H&P
[facility]    History and Physical  Hospitalist       Date of Admission:  2/20/2021    Assessment & Plan   Celestina Tejada is a 89 year old female who presents with worsening weakness following back pain after a mechanical fall approximately 1 week ago.    Likely mechanical fall  Low back pain  History of degenerative disc disease  Generalized weakness  -Patient reportedly sustained a fall approximately a week ago, she was able to get up and ambulate on her own but has since been having increasing pain and weakness  -X-ray of the lumbar spine shows chronic severe anterior wedge compression deformity of L4, no definite recent fractures noted.  X-ray of the pelvis without evidence of fracture  -Most likely cause of her overall weakness and decline is due to the fall and ongoing pain.  Will check UA for completeness.  -Pain control with Tylenol and Norco  -PT consult  -Social work/care transition consult    Essential hypertension  -Continue prior to admission atenolol and nifedipine extended release once verified by the pharmacy    CKD stage IIIa  -Likely at baseline    Rheumatoid arthritis  -Continue prior to admission hydroxychloroquine    Hyperlipidemia   -Can hold statin while hospitalized      DVT Prophylaxis: Pneumatic Compression Devices  Code Status: Full Code    Disposition: Expected discharge in <2 days    Jose E Clement MD    Primary Care Physician   Caprice Caba    Chief Complaint   Fall    History is obtained from the patient and daughter at the bedside.    History of Present Illness      Celestina Tejada is a 89 year old female with history of macular degeneration who presents with worsening weakness, ongoing pain after a fall approximately a week ago.   Patient reportedly fell about a week ago at her assisted living facility.  The exact circumstances of her fall is unclear however she thinks she lost her balance and fell on her bottom.  She apparently did not hit her  health.  She mentions clearly that she did not pass out.  No preceding chest pain or shortness of breath or lightheadedness or dizziness.  After the fall she was able to get up on her own and ambulate however she has ongoing pain right over her tailbone and her mobility has been affected with ongoing weakness.  The daughter took her to her ophthalmology appointment on Thursday and had noticed that her mobility had declined and she was weaker already at that point.  Apparently this has progressed to the point where today the daughter visited her and felt that she was requiring significant assistance to get around and was also having ongoing pain therefore brought her to the emergency room.  She denies headache.  No focal tingling or numbness or weakness.  No fever no chills or cough.  No nausea vomiting or diarrhea.  No known exposure with COVID-19.  No urinary urgency, frequency or dysuria.      Past Medical History    I have reviewed this patient's medical history and updated it with pertinent information if needed.   Past Medical History:   Diagnosis Date     CKD (chronic kidney disease) stage 3, GFR 30-59 ml/min      Hyperlipidemia LDL goal < 100      Hypertension goal BP (blood pressure) < 140/90      Osteoarthritis 2/9/2012     Osteopenia      Rheumatoid arthritis(714.0)        Past Surgical History   I have reviewed this patient's surgical history and updated it with pertinent information if needed.  Past Surgical History:   Procedure Laterality Date     C APPENDECTOMY  1950     SURGICAL HISTORY OF -       2 normal vaginal births       Prior to Admission Medications   Prior to Admission Medications   Prescriptions Last Dose Informant Patient Reported? Taking?   HYDROcodone-acetaminophen (NORCO) 5-325 MG tablet   No No   Sig: Take 1 tablet by mouth every 6 hours as needed for moderate to severe pain   NIFEdipine ER OSMOTIC (ADALAT CC) 30 MG 24 hr tablet   No No   Sig: Take 1 tablet (30 mg) by mouth daily    Nutritional Supplements (BOOST COMPACT) LIQD   No No   Sig: Take 1 Bottle by mouth 2 times daily   acetaminophen (TYLENOL) 325 MG tablet   No No   Sig: Take 2 tablets (650 mg) by mouth 2 times daily Give 650mg BID and BID PRN   acetaminophen (TYLENOL) 500 MG tablet   Yes No   Sig: Take 500-1,000 mg by mouth 4 times daily as needed for mild pain   atenolol (TENORMIN) 25 MG tablet   Yes No   Sig: Take 25 mg by mouth daily HOLD if SBP <110 OR HR < 60, and call NP if consistently held.   atorvastatin (LIPITOR) 10 MG tablet   No No   Sig: Take 1 tablet (10 mg) by mouth daily   Patient taking differently: Take 10 mg by mouth daily Decreased dose starting Feb 11, 2021   escitalopram (LEXAPRO) 5 MG tablet   No No   Sig: Take 2 tablets (10 mg) by mouth daily   gabapentin (NEURONTIN) 300 MG capsule   No No   Sig: Take 1 capsule (300 mg) by mouth 3 times daily   hydroxychloroquine (PLAQUENIL) 200 MG tablet   Yes No   Sig: Take 200 mg by mouth daily    miconazole (MICATIN) 2 % external powder   No No   Sig: Apply topically 2 times daily Apply to under breast and groin redness BID and BID PRN   multivitamin w/minerals (THERA-VIT-M) tablet   No No   Sig: Take 1 tablet by mouth daily   senna-docusate (SENOKOT-S/PERICOLACE) 8.6-50 MG tablet   No No   Sig: Take 1 tablet by mouth 2 times daily as needed for constipation   vitamin D3 (CHOLECALCIFEROL) 50 mcg (2000 units) tablet   No No   Sig: Take 1 tablet (50 mcg) by mouth daily      Facility-Administered Medications: None     Allergies   Allergies   Allergen Reactions     Hydrochlorothiazide      hyponatremia       Social History   I have reviewed this patient's social history and updated it with pertinent information if needed. Celestina Tejada  reports that she quit smoking about 20 years ago. Her smoking use included cigarettes. She has a 10.00 pack-year smoking history. She has never used smokeless tobacco. She reports previous alcohol use. She reports that she does not use  drugs.    Family History   I have reviewed this patient's family history and updated it with pertinent information if needed.   Family History   Problem Relation Age of Onset     Heart Disease Mother         CHF     Respiratory Mother         lung disease     C.A.D. Sister         heart by-pass     Arthritis Sister      Family History Negative Brother      Family History Negative Daughter      Family History Negative Daughter        Review of Systems   The 10 point Review of Systems is negative other than noted in the HPI or here.     Physical Exam   Temp: 98.5  F (36.9  C) Temp src: Oral BP: 132/59 Pulse: 66   Resp: 16 SpO2: 96 %      Vital Signs with Ranges  Temp:  [98.5  F (36.9  C)] 98.5  F (36.9  C)  Pulse:  [66] 66  Resp:  [16] 16  BP: (132)/(59) 132/59  SpO2:  [96 %] 96 %  98 lbs 0 oz    Gen: AAOX3, NAD  HEENT: Moist oral mucosa, no pallor or icterus  Neck: Supple neck, good ROM, no stridor  Resp: CTA B/L, normal WOB; no crackles; no wheezes  CVS- RRR, no murmur, no edema  Abd/GI: Soft, non-tender. BS- normoactive  Skin-multiple bruising especially over bilateral lower extremity, hand deformities-chronic  MSK: Mild tenderness over lower back and the coccyx area  Neuro- CN- intact. Moving X 4     Data   Data reviewed today:  I personally reviewed no images or EKG's today.  Recent Labs   Lab 02/20/21  1838   WBC 9.7   HGB 10.3*   MCV 95         POTASSIUM 4.1   CHLORIDE 102   CO2 27   BUN 24   CR 1.08*   ANIONGAP 6   JOSÉ 9.2   GLC 80       Recent Results (from the past 24 hour(s))   XR Pelvis w 1 View Each Hip    Narrative    EXAM: XR PELVIS AND HIP BILATERAL 1 VIEW  LOCATION: [facility]  DATE/TIME: 2/20/2021 6:06 PM    INDICATION: Fall, pain  COMPARISON: None.      Impression    IMPRESSION: No evidence of left hip or pelvic fracture. Bilateral femoral artery calcification. Advanced degenerative changes in the mid and lower lumbar spine.   Lumbar spine XR, 2-3 views    Narrative     LUMBAR SPINE TWO - THREE VIEWS 2/20/2021 6:34 PM     COMPARISON: Abdomen MRI 7/11/2020.    HISTORY: Fall, pain.      Impression    IMPRESSION: Chronic severe anterior wedge compression deformity of L4  again noted. Vertebral body heights and contours of the lumbar spine  are otherwise within normal limits. No definite recent fractures  noted. Moderate left convex curvature of the lumbar spine centered at  L3 again noted. Moderate degenerative left lateral listhesis of L3  upon L4 again noted. Alignment is otherwise normal. There is severe  degenerative endplate spurring at L2-L3, L3-L4 and L4-L5.    BELKYS MASCORRO MD

## 2021-02-21 NOTE — PROGRESS NOTES
Observation goals PRIOR TO DISCHARGE    Comments:     -diagnostic tests and consults completed and resulted - met    -vital signs normal or at patient baseline - Met    -Safe disposition plan has been identified- not met    Nurse to notify provider when observation goals have been met and patient is ready for discharge.

## 2021-02-22 NOTE — DISCHARGE SUMMARY
[facility]  Hospitalist Discharge Summary      Date of Admission:  2/20/2021  Date of Discharge:  2/22/2021  Discharging Provider: Myrna Pryor PA-C      Discharge Diagnoses   Acute on chronic low back pain 2/2 contusion  Chronic severe anterior wedge compression fracture L4  Chronic severe lumbar degenerative endplate spurring L2-L3, L3-L4, L4-L5  Chronic moderate left convex curvature of L spine centered at L3  Generalized weakness  Mild cognitive impairment  Essential hypertension  CKD, stage IIIa  Rheumatoid arthritis  Hyperlipidemia    Follow-ups Needed After Discharge   Follow-up Appointments     Follow Up and recommended labs and tests      Follow up with [facility] physician.  The following labs/tests are   recommended: BMP 1 week.  Consider CT imaging low back and/or pelvis if   pain fails to improve. Follow up blood pressure. Consider ongoing   reduction pending readings             Unresulted Labs Ordered in the Past 30 Days of this Admission     No orders found from 1/21/2021 to 2/21/2021.          Discharge Disposition   Discharged to home  Condition at discharge: Stable      Hospital Course    HPI from H&P per Dr. José MD    Celestina Tejada is a 89 year old female with history of macular degeneration who presents with worsening weakness, ongoing pain after a fall approximately a week ago.   Patient reportedly fell about a week ago at her assisted living facility.  The exact circumstances of her fall is unclear however she thinks she lost her balance and fell on her bottom.  She apparently did not hit her health.  She mentions clearly that she did not pass out.  No preceding chest pain or shortness of breath or lightheadedness or dizziness.  After the fall she was able to get up on her own and ambulate however she has ongoing pain right over her tailbone and her mobility has been affected with ongoing weakness.  The daughter took her to her ophthalmology  appointment on Thursday and had noticed that her mobility had declined and she was weaker already at that point.  Apparently this has progressed to the point where today the daughter visited her and felt that she was requiring significant assistance to get around and was also having ongoing pain therefore brought her to the emergency room.  She denies headache.  No focal tingling or numbness or weakness.  No fever no chills or cough.  No nausea vomiting or diarrhea.  No known exposure with COVID-19.  No urinary urgency, frequency or dysuria.     Acute on chronic low back pain 2/2 contusion  Chronic severe anterior wedge compression fracture L4  Chronic severe lumbar degenerative endplate spurring L2-L3, L3-L4, L4-L5  Chronic moderate left convex curvature of L spine centered at L3  Recent fall, 1 week ago, mechanism unclear but suspected mechanical in conversation with daughter.  XR L-spine showed no acute pathology, chronic and degenerative findings as noted above.  XR left hip and pelvis negative.  - Increased dose and schedule APAP.  - Discontinue PTA Norco.  Patient recently moved into [facility] and has to (but forgets to) ask for the medication, as such has not been taking.  Will switch to PRN low-dose oxycodone to enable scheduling higher dose APAP.  - Add scheduled and PRN bowel regimen.  - PT eval rec TCU.       Generalized weakness  Mild cognitive impairment  In context senile fragility with recent fall and ongoing pain as above compounded by poor sight from mac degeneration and mild cognitive impairment. Possible low BP has been contributed as note her SBP well controlled despite holding PTA meds. No overt metabolic or hematologic abnl to suggest single pathology. UA appears contaminated. UC with normal symone. No sxs infection. No symptoms to suspect acute cardiopulm process.  - TCU as above    Essential hypertension:[PTA regimen includes atenolol 25 mg bid and nifedipine 30 mg/d] In review of previous progress  notes her provider at [facility] had been reducing her medications due to tightly controlled BPs  - PTA atenolol and nifedipine held on admission and over well controlled though up trending prior to discharge  - D/c Nifedipine   - Continue atenolol. Monitor BP at TCU, consider further reduction/discontinuation pending BP     CKD, stage IIIa.  -At baseline     Rheumatoid arthritis.  - Continue prior to admission hydroxychloroquine.     Hyperlipidemia.  - Resume statin at obs discharge.    Consultations This Hospital Stay   PHYSICAL THERAPY ADULT IP CONSULT  CARE MANAGEMENT / SOCIAL WORK IP CONSULT  PHYSICAL THERAPY ADULT IP CONSULT  OCCUPATIONAL THERAPY ADULT IP CONSULT    Code Status   Full Code    Time Spent on this Encounter   I, Myrna Pryor PA-C, personally saw the patient today and spent greater than 30 minutes discharging this patient.       Myrna Pryor PA-C  [facility]  [address] [address]  Phone: [phone]  ______________________________________________________________________    Physical Exam   Vital Signs: Temp: 97.2  F (36.2  C) Temp src: Oral BP: 134/56 Pulse: 72   Resp: 18 SpO2: 94 % O2 Device: None (Room air)    Weight: 108 lbs 4.8 oz  Physical Exam  Vitals signs and nursing note reviewed.   Constitutional:       Appearance: Normal appearance. She is not toxic-appearing.   HENT:      Head: Normocephalic and atraumatic.   Eyes:      General: No scleral icterus.  Cardiovascular:      Rate and Rhythm: Normal rate and regular rhythm.      Heart sounds: No murmur.   Pulmonary:      Effort: Pulmonary effort is normal.      Breath sounds: No wheezing.   Abdominal:      General: Abdomen is flat.      Tenderness: There is no abdominal tenderness.   Musculoskeletal:      Right lower leg: No edema.      Left lower leg: No edema.   Skin:     General: Skin is warm and dry.      Findings: No bruising.   Neurological:      General: No focal deficit  present.      Mental Status: She is alert.      Comments: Confused to situation               Primary Care Physician   Caprice Caba    Discharge Orders      General info for SNF    Length of Stay Estimate: Short Term Care: Estimated # of Days <30  Condition at Discharge: Stable  Level of care:skilled   Rehabilitation Potential: Fair  Admission H&P remains valid and up-to-date: Yes  Recent Chemotherapy: N/A  Use Nursing Home Standing Orders: Yes     Mantoux instructions    Give two-step Mantoux (PPD) Per Facility Policy Yes     Reason for your hospital stay    Further evaluation and management of generalized weakness and low back pain.     Activity - Up with nursing assistance     Follow Up and recommended labs and tests    Follow up with shelter physician.  The following labs/tests are recommended: BMP 1 week.  Consider CT imaging low back and/or pelvis if pain fails to improve. Follow up blood pressure. Consider ongoing reduction pending readings     Full Code     Physical Therapy Adult Consult    Evaluate and treat as clinically indicated.    Reason: Low back pain and physical deconditioning.     Occupational Therapy Adult Consult    Evaluate and treat as clinically indicated.    Reason: Low back pain and mild cognitive impairment.     Fall precautions     Advance Diet as Tolerated    Follow this diet upon discharge:    Regular Diet Adult       Significant Results and Procedures   Most Recent 3 CBC's:  Recent Labs   Lab Test 02/20/21  1838 12/15/20 11/24/20   WBC 9.7 8.3 7.2   HGB 10.3* 12.2 10.8*   MCV 95 97 93    236 163     Most Recent 3 BMP's:  Recent Labs   Lab Test 02/20/21  1838 12/15/20 11/24/20    137 132*   POTASSIUM 4.1 3.7 4.4   CHLORIDE 102 101 99   CO2 27 24 27   BUN 24 15 16   CR 1.08* 0.99 0.84   ANIONGAP 6 12 6   JOSÉ 9.2 9.0 8.4*   GLC 80 65* 71     Most Recent 2 LFT's:  Recent Labs   Lab Test 11/24/20 09/17/20 08/12/20  1219 08/12/20  1219   AST 83* 119*   < > 126*   ALT 76*  136*   < > 147*   ALKPHOS 427*  --   --  474*   BILITOTAL 0.7  --   --  1.2    < > = values in this interval not displayed.     Most Recent TSH and T4:  Recent Labs   Lab Test 11/24/20 06/18/18  0600 06/18/18  0600   TSH 1.55   < > 1.52   T4  --   --  0.85    < > = values in this interval not displayed.   ,   Results for orders placed or performed during the hospital encounter of 02/20/21   Lumbar spine XR, 2-3 views    Narrative    LUMBAR SPINE TWO - THREE VIEWS 2/20/2021 6:34 PM     COMPARISON: Abdomen MRI 7/11/2020.    HISTORY: Fall, pain.      Impression    IMPRESSION: Chronic severe anterior wedge compression deformity of L4  again noted. Vertebral body heights and contours of the lumbar spine  are otherwise within normal limits. No definite recent fractures  noted. Moderate left convex curvature of the lumbar spine centered at  L3 again noted. Moderate degenerative left lateral listhesis of L3  upon L4 again noted. Alignment is otherwise normal. There is severe  degenerative endplate spurring at L2-L3, L3-L4 and L4-L5.    BELKYS MASCORRO MD   XR Pelvis w 1 View Each Hip    Narrative    EXAM: XR PELVIS AND HIP BILATERAL 1 VIEW  LOCATION: [facility]  DATE/TIME: 2/20/2021 6:06 PM    INDICATION: Fall, pain  COMPARISON: None.      Impression    IMPRESSION: No evidence of left hip or pelvic fracture. Bilateral femoral artery calcification. Advanced degenerative changes in the mid and lower lumbar spine.       Discharge Medications   Current Discharge Medication List      START taking these medications    Details   oxyCODONE (ROXICODONE) 5 MG tablet Take 0.5 tablets (2.5 mg) by mouth every 4 hours as needed for moderate to severe pain (Hold for somnolence and/or RR <12).  Qty: 5 tablet, Refills: 0    Associated Diagnoses: Injury of low back, initial encounter      senna-docusate (SENOKOT-S/PERICOLACE) 8.6-50 MG tablet Take 1 tablet by mouth 2 times daily  Qty:      Associated Diagnoses: Injury of low  back, initial encounter         CONTINUE these medications which have CHANGED    Details   acetaminophen (TYLENOL) 500 MG tablet Take 2 tablets (1,000 mg) by mouth every 8 hours  Qty:      Associated Diagnoses: Injury of low back, initial encounter      atenolol (TENORMIN) 25 MG tablet Take 1 tablet (25 mg) by mouth daily  Qty:      Associated Diagnoses: Benign essential hypertension         CONTINUE these medications which have NOT CHANGED    Details   atorvastatin (LIPITOR) 10 MG tablet Take 1 tablet (10 mg) by mouth daily  Qty: 30 tablet, Refills: 3    Comments: Can start at next cycle if easier  Associated Diagnoses: Hyperlipidemia, unspecified hyperlipidemia type      escitalopram (LEXAPRO) 5 MG tablet Take 2 tablets (10 mg) by mouth daily  Qty: 60 tablet, Refills: 11    Associated Diagnoses: Adjustment reaction with anxiety and depression      gabapentin (NEURONTIN) 300 MG capsule Take 300 mg by mouth 2 times daily      hydroxychloroquine (PLAQUENIL) 200 MG tablet Take 200 mg by mouth daily   Refills: 3      miconazole (MICATIN) 2 % external powder Apply topically 2 times daily Apply to under breast and groin redness BID and BID PRN  Qty: 90 g, Refills: 11    Associated Diagnoses: Fungal dermatitis      multivitamin w/minerals (THERA-VIT-M) tablet Take 1 tablet by mouth daily  Qty: 60 tablet, Refills: 11    Associated Diagnoses: Adjustment reaction with anxiety and depression; Malnutrition, unspecified type (H)      vitamin D3 (CHOLECALCIFEROL) 50 mcg (2000 units) tablet Take 1 tablet (50 mcg) by mouth daily  Qty: 30 tablet, Refills: 11    Associated Diagnoses: Vitamin D deficiency         STOP taking these medications       HYDROcodone-acetaminophen (NORCO) 5-325 MG tablet Comments:   Reason for Stopping:         NIFEdipine ER OSMOTIC (ADALAT CC) 30 MG 24 hr tablet Comments:   Reason for Stopping:             Allergies   Allergies   Allergen Reactions     Hydrochlorothiazide      hyponatremia

## 2021-02-22 NOTE — PROGRESS NOTES
Observation goals      -diagnostic tests and consults completed and resulted - met     -vital signs normal or at patient baseline - met     -Safe disposition plan has been identified- not met

## 2021-02-22 NOTE — PROGRESS NOTES
Care Management Discharge Note    Discharge Date: 02/22/21       Discharge Disposition: Transitional Care, Skilled Nursing Facilty- [place]    Discharge Services: None    Discharge DME: None    Discharge Transportation: [facility] transporter    Private pay costs discussed: insurance costs co-pays    PAS Confirmation Code:    Patient/family educated on Medicare website which has current facility and service quality ratings: yes    Education Provided on the Discharge Plan:  yes  Persons Notified of Discharge Plans: pt, dtr, med team, TCU  Patient/Family in Agreement with the Plan: yes    Handoff Referral Completed: Yes    Additional Information:  DC orders: sent via DOD at 1141  Scripts:  PAS: completed, faxed to TCU and placed on chart  Trans: 1300    PAS-RR    D: Per DHS regulation, SW completed and submitted PAS-RR to [place] [employer] Direct Connect via the Senior LinkAge Line.  PAS-RR confirmation # is : [id number]    I: SW spoke with dtr and they are aware a PAS-RR has been submitted.  SW reviewed with dtr that they may be contacted for a follow up appointment within 10 days of hospital discharge if their SNF stay is < 30 days.  Contact information for Senior LinkAge Line was also provided.    A: Dtr verbalized understanding.    P: Further questions may be directed to [facility] LinkAge Line at #[phone], option #4 for PAS-RR staff.      EMMA has identified no other social service needs at this time. EMMA will remain available should other needs arise.      OTTO Prasad  Daytime (8:00am-4:30pm): [phone]  After-Hours SW Pager (4:30pm-11:30pm): [phone]               OTTO Gold

## 2021-02-22 NOTE — PLAN OF CARE
Physical Therapy Discharge Summary    Reason for therapy discharge:    Discharged to transitional care facility.    Progress towards therapy goal(s). See goals on Care Plan in [facility] electronic health record for goal details.  Goals not met.  Barriers to achieving goals:   discharge from facility.    Therapy recommendation(s):    Continued therapy is recommended.  Rationale/Recommendations:  eval and treat at TCU.

## 2021-02-22 NOTE — PLAN OF CARE
Pt A&OX3, very forgetful. VSS on RA. Chronic back pain managed with prn oxy and scheduled tylenol. Tolerating reg diet. Up with 1 assist to bathroom GB/walker. Discharge-  TCU placement in progress. Continue to monitor.

## 2021-02-22 NOTE — PLAN OF CARE
Observation goals      -diagnostic tests and consults completed and resulted - met     -vital signs normal or at patient baseline - met     -Safe disposition plan has been identified- met     Alert. Disoriented to situation. Lime and poor vision. Regular diet well tolerated. Up with A1, walker, and gait belt. VSS on RA. Mild back pain and headache managed with tylenol. Continent of bowel and bladder. Discharge to [facility] today at 1300.

## 2021-02-22 NOTE — PROGRESS NOTES
Observation goals PRIOR TO DISCHARGE    Comments:     -diagnostic tests and consults completed and resulted - met    -vital signs normal or at patient baseline - Met    -Safe disposition plan has been identified- not met    Nurse to notify provider when observation goals have been met and patient is ready for discharge.         Doxepin Pregnancy And Lactation Text: This medication is Pregnancy Category C and it isn't known if it is safe during pregnancy. It is also excreted in breast milk and breast feeding isn't recommended.

## 2021-02-22 NOTE — PLAN OF CARE
Pt disoriented mostly to situation and time, and forgetful. VSS, afebrile on RA. Refused the last BP, wanted to sleep. Denies pain, refused scheduled tylenol. Up x1 to restroom. Continent of B&B. Needs help ordering meals d/t macular degeneration. Ambulates with assist x1, walker, and gait belt. TCU placement in progress. Will continue to monitor.

## 2021-02-23 NOTE — PROGRESS NOTES
[place] [facility]  INITIAL VISIT NOTE  February 25, 2021    PRIMARY CARE PROVIDER AND CLINIC:  Caprice Caba [address] [place] [address] [address] / [place] [address]    CHIEF COMPLAINT:  Hospital follow-up/Initial visit    HPI:    Celestina Tejada is a 89 year old  (4/2/1931) female who was seen at [facility] on February 25, 2021 for an initial visit.     Medical history is notable for cognitive impairment, hypertension, dyslipidemia, CKD stage IIIa, chronic anemia, rheumatoid arthritis, osteoarthritis, macular degeneration, and degenerative disc disease.    Summary of hospital course:  Patient was hospitalized at [facility] from February 20 through February 22, 2021 for evaluation of acute on chronic low back pain subsequent to an unwitnessed fall.  X-ray of pelvis/hip revealed no evidence of hip or pelvic fracture.  X-ray of lumbar spine showed chronic severe anterior wedge compression deformity of L4, moderate degenerative left lateral listhesis of L3 upon L4, and severe degenerative endplate spurring at L2-L3, L3/L4, and L4-L5.  UA was slightly abnormal but urine culture grew less than 10,000 colonies per mL mixed urogenital symone.  BMP was significant for creatinine of 1.08.  Hematology profile showed white count of 9.7 and hemoglobin 10.3.  Test for COVID-19 was negative.  PTA nifedipine was discontinued and atenolol dose was reduced to avoid tight control in view of her age and risk of fall.  Pain management was achieved with schedule acetaminophen and as needed oxycodone.  TCU was recommended by therapies.  She was initially admitted to [facility], then transferred to this facility on February 23 at family's request, for ongoing medical management, nursing care, and rehab (note that she lives at [facility] living [facility]).    Of note, history obtained from patient, facility RN, and extensive review of the chart necessitated  by complex hospitalization.    Today's visit:  Patient was seen in her room, while lying in bed.  She appears frail but comfortable.  She clearly has memory deficits.  She is somewhat sarcastic.  She reports no significant back pain at this juncture.  She denies fever, chills, chest pain, palpitation, dyspnea, nausea, vomiting, pain, or urinary symptoms.  She cannot recall her last bowel movement.    CODE STATUS:   DNR    PAST MEDICAL HISTORY:   Hypertension  Dyslipidemia  CKD stage IIIa, baseline creatinine 0.9-1.1  Chronic anemia, baseline Hgb around 10-12  Rheumatoid arthritis  Cystic duct obstruction, patient has declined further evaluation  Degenerative disc disease of lumbar spine  Osteoarthritis  Osteoporosis  L4 compression fracture  Macular degeneration  Adjustment reaction with anxiety and depression  Cognitive impairment    Past Medical History:   Diagnosis Date     CKD (chronic kidney disease) stage 3, GFR 30-59 ml/min      Hyperlipidemia LDL goal < 100      Hypertension goal BP (blood pressure) < 140/90      Osteoarthritis 2/9/2012     Osteopenia      Rheumatoid arthritis(714.0)        PAST SURGICAL HISTORY:   Past Surgical History:   Procedure Laterality Date     C APPENDECTOMY  1950     SURGICAL HISTORY OF -       2 normal vaginal births       FAMILY HISTORY:   Family History   Problem Relation Age of Onset     Heart Disease Mother         CHF     Respiratory Mother         lung disease     C.A.D. Sister         heart by-pass     Arthritis Sister      Family History Negative Brother      Family History Negative Daughter      Family History Negative Daughter        SOCIAL HISTORY:  Patient is  and lives at [facility] living [facility].  She uses a walker for ambulation.    Social History     Tobacco Use     Smoking status: Former Smoker     Packs/day: 0.50     Years: 20.00     Pack years: 10.00     Types: Cigarettes     Quit date: 9/20/2000     Years since quitting:  20.4     Smokeless tobacco: Never Used   Substance Use Topics     Alcohol use: Not Currently     Comment: very occ,       MEDICATIONS:  Current Outpatient Medications   Medication Sig Dispense Refill     acetaminophen (TYLENOL) 500 MG tablet Take 2 tablets (1,000 mg) by mouth every 8 hours       atenolol (TENORMIN) 25 MG tablet Take 1 tablet (25 mg) by mouth daily       atorvastatin (LIPITOR) 10 MG tablet Take 1 tablet (10 mg) by mouth daily 30 tablet 3     escitalopram (LEXAPRO) 5 MG tablet Take 2 tablets (10 mg) by mouth daily 60 tablet 11     gabapentin (NEURONTIN) 300 MG capsule Take 300 mg by mouth 2 times daily       hydroxychloroquine (PLAQUENIL) 200 MG tablet Take 200 mg by mouth daily   3     miconazole (MICATIN) 2 % external powder Apply topically 2 times daily Apply to under breast and groin redness BID and BID PRN 90 g 11     multivitamin w/minerals (THERA-VIT-M) tablet Take 1 tablet by mouth daily (Patient not taking: Reported on 2/23/2021) 60 tablet 11     oxyCODONE (ROXICODONE) 5 MG tablet Take 0.5 tablets (2.5 mg) by mouth every 4 hours as needed for moderate to severe pain (Hold for somnolence and/or RR <12). 5 tablet 0     senna-docusate (SENOKOT-S/PERICOLACE) 8.6-50 MG tablet Take 1 tablet by mouth 2 times daily (Patient taking differently: Take 2 tablets by mouth 2 times daily )       vitamin D3 (CHOLECALCIFEROL) 50 mcg (2000 units) tablet Take 1 tablet (50 mcg) by mouth daily (Patient not taking: Reported on 2/23/2021) 30 tablet 11       ALLERGIES:  Allergies   Allergen Reactions     Hydrochlorothiazide      hyponatremia       ROS:  10 point ROS was attempted but was limited, given patient's underlying cognitive impairment. It was reviewed as much as possible as outlined in HPI.      PHYSICAL EXAM:  Vital signs were reviewed in the chart.  Vital Signs: Blood pressure 144/68, heart rate 66, respiratory rate 18, temperature 97.8  F, oxygen saturation 95%, weight 109.6 LBS  General: Frail appearing  but in no acute distress  HEENT: Normocephalic; oropharynx clear; mild conjunctival pallor  Cardiovascular: Normal S1, S2, RRR  Respiratory: Lungs clear to auscultation bilaterally  GI: Abdomen soft, non-tender, non-distended, +BS  Extremities: No LE edema  Neuro: CX II-XII grossly intact; ROM in all four extremities grossly intact  Psych: Alert and oriented almost x2; normal affect  Skin: No acute rash    LABORATORY/IMAGING DATA:    Most Recent 3 CBC's:  Recent Labs   Lab Test 02/20/21  1838 12/15/20 11/24/20   WBC 9.7 8.3 7.2   HGB 10.3* 12.2 10.8*   MCV 95 97 93    236 163     Most Recent 3 BMP's:  Recent Labs   Lab Test 02/20/21  1838 12/15/20 11/24/20    137 132*   POTASSIUM 4.1 3.7 4.4   CHLORIDE 102 101 99   CO2 27 24 27   BUN 24 15 16   CR 1.08* 0.99 0.84   ANIONGAP 6 12 6   JOSÉ 9.2 9.0 8.4*   GLC 80 65* 71     Most Recent Cholesterol Panel:  Recent Labs   Lab Test 11/24/20   CHOL 99   LDL 40   HDL 45*   TRIG 71     Most Recent 6 Bacteria Isolates From Any Culture (See EPIC Reports for Culture Details):  Recent Labs   Lab Test 02/21/21  0704 10/12/17  1556   CULT <10,000 colonies/mL  mixed urogenital symone  Susceptibility testing not routinely done   >100,000 colonies/mL  Escherichia coli  *  10,000 to 50,000 colonies/mL  Klebsiella pneumoniae  *     Most Recent TSH and T4:  Recent Labs   Lab Test 11/24/20 06/18/18  0600 06/18/18  0600   TSH 1.55   < > 1.52   T4  --   --  0.85    < > = values in this interval not displayed.     Most Recent Hemoglobin A1c:No lab results found.    ASSESSMENT/PLAN:  Unwitnessed fall, subsequent encounter,  Acute on chronic low back pain,  Severe degenerative disc disease of lumbar spine,  Physical deconditioning.  No evidence of acute fracture or malalignment on imaging.  Back pain is controlled at this juncture.  Plan:  Fall precaution  Continue pain management with scheduled acetaminophen as needed low dose oxycodone  Continue PTA gabapentin 300 mg p.o. twice  daily  Continue bowel regimen to prevent constipation  Continue PT/OT evaluation and therapy    CKD stage IIIa.  Baseline creatinine 0.9-1.1.  Last creatinine was 1.08 on February 20.  Plan:  Avoid NSAIDs and nephrotoxins  Repeat BMP on March 1    Chronic anemia.  Baseline hemoglobin in the range of 10-12.  Last hemoglobin was 10.3 on February 20.  Plan:  Monitor hemoglobin periodically  Repeat CBC on March 1    Essential hypertension.    PTA nifedipine was discontinued and atenolol dose was reduced from 25 mg p.o. twice daily to 25 mg p.o. daily in the hospital to avoid tight control in view of her age and risk of fall.  Blood pressure is currently controlled.  Goal SBP <150.  Plan:  Continue atenolol 25 mg p.o. daily  Monitor blood pressure    Dyslipidemia.  Plan:  Continue PTA atorvastatin 10 mg p.o. daily    Rheumatoid arthritis.  Plan:  Continue PTA hydroxychloroquine 200 mg p.o. daily    Adjustment reaction with anxiety and depression.  Plan:  Continue Lexapro 10 mg p.o. daily    Cognitive impairment.  Patient has memory deficits.  Plan:  Formal cognitive evaluation by OT      Orders written by provider at facility:  Repeat BMP on March 1 for CKD  Repeat CBC on March 1 for anemia        Electronically signed by:  Zakiya Soni MD

## 2021-02-23 NOTE — PROGRESS NOTES
[place] [facility]  PRIMARY CARE PROVIDER AND CLINIC:  Caprice Caba, STEVE CNP, [address] / [place] [address]  Chief Complaint   Patient presents with     Hospital F/U     [place] Medical Record Number:  [id number]  Place of Service where encounter took place:  [facility] [[id number]]    Celestina Tejada  is a 89 year old  (4/2/1931), admitted to the above facility from  [facility]. Hospital stay 2/20/21 through 2/22/21..  Admitted to this facility for  rehab, medical management and nursing care.    HPI:    HPI information obtained from: facility chart records, facility staff, patient report and [facility] chart review.   Brief Summary of Hospital Course:   89 year old female PMHx macular degeneration hospitalized with weakness and pain after a fall previous week. Noted to have chronic compression fxs L4, lumbar degeneration L2-L3, L3-L4, L4-L5. Tylenol scheduled and added PRN oxycodone, to TCU for therapies. PTA lives in [facility]. HTN: ?low bps so stopped nifedipine, continue atenolol and monitor VS. CKD3 stable. RA managed with hydroxychloroquine. HLD on statin.      Updates on Status Since Skilled nursing Admission:   Patient seen for initial TCU visit. Reports doing OK. Forgets where she is, what happened in the hospital. Does recall month and year. Reviewed ACP document on file - DNR/DNI desired per POLST. Denies headaches, dizziness, chest pain, dyspnea, bladder issues. Reports feels constipation. Per EMR note [id number] and sats 93% on room air.     CODE STATUS/ADVANCE DIRECTIVES DISCUSSION:   DNR/DNI  Patient's living condition: lives in an assisted living facility  ALLERGIES: Hydrochlorothiazide  PAST MEDICAL HISTORY:  has a past medical history of CKD (chronic kidney disease) stage 3, GFR 30-59 ml/min, Hyperlipidemia LDL goal < 100, Hypertension goal BP (blood pressure) < 140/90, Osteoarthritis (2/9/2012), Osteopenia, and Rheumatoid  arthritis(714.0). She also has no past medical history of Cancer (H), Cerebral infarction (H), Congestive heart failure (H), COPD (chronic obstructive pulmonary disease) (H), Depressive disorder, Diabetes (H), Heart disease, History of blood transfusion, Thyroid disease, or Uncomplicated asthma.  PAST SURGICAL HISTORY:   has a past surgical history that includes APPENDECTOMY (1950) and surgical history of - .  FAMILY HISTORY: family history includes Arthritis in her sister; C.A.D. in her sister; Family History Negative in her brother, daughter, and daughter; Heart Disease in her mother; Respiratory in her mother.  SOCIAL HISTORY:   reports that she quit smoking about 20 years ago. Her smoking use included cigarettes. She has a 10.00 pack-year smoking history. She has never used smokeless tobacco. She reports previous alcohol use. She reports that she does not use drugs.    Post Discharge Medication Reconciliation Status: discharge medications reconciled and changed, per note/orders  Current Outpatient Medications   Medication Sig Dispense Refill     acetaminophen (TYLENOL) 500 MG tablet Take 2 tablets (1,000 mg) by mouth every 8 hours       atenolol (TENORMIN) 25 MG tablet Take 1 tablet (25 mg) by mouth daily       atorvastatin (LIPITOR) 10 MG tablet Take 1 tablet (10 mg) by mouth daily 30 tablet 3     escitalopram (LEXAPRO) 5 MG tablet Take 2 tablets (10 mg) by mouth daily 60 tablet 11     gabapentin (NEURONTIN) 300 MG capsule Take 300 mg by mouth 2 times daily       hydroxychloroquine (PLAQUENIL) 200 MG tablet Take 200 mg by mouth daily   3     miconazole (MICATIN) 2 % external powder Apply topically 2 times daily Apply to under breast and groin redness BID and BID PRN 90 g 11     senna-docusate (SENOKOT-S/PERICOLACE) 8.6-50 MG tablet Take 1 tablet by mouth 2 times daily (Patient taking differently: Take 2 tablets by mouth 2 times daily )       multivitamin w/minerals (THERA-VIT-M) tablet Take 1 tablet by mouth  daily (Patient not taking: Reported on 2/23/2021) 60 tablet 11     oxyCODONE (ROXICODONE) 5 MG tablet Take 0.5 tablets (2.5 mg) by mouth every 4 hours as needed for moderate to severe pain (Hold for somnolence and/or RR <12). 10 tablet 0     vitamin D3 (CHOLECALCIFEROL) 50 mcg (2000 units) tablet Take 1 tablet (50 mcg) by mouth daily (Patient not taking: Reported on 2/23/2021) 30 tablet 11       ROS:  4 point ROS including Respiratory, CV, GI and , other than that noted in the HPI,  is negative    Vitals:  BP (!) 142/58   Pulse 69   Temp 98  F (36.7  C)   Resp 20   Ht 1.524 m (5')   Wt 49.1 kg (108 lb 3.2 oz)   SpO2 95%   BMI 21.13 kg/m    Exam:  Exam is limited due to COVID-19 precautions  GENERAL APPEARANCE:  Alert, in no distress, cooperative  ENT:  Mouth normal, moist mucous membranes, normal hearing acuity  EYES:  Conjunctiva and lids normal  RESP:  no respiratory distress, LSC on room air  CV: HRR, no LE edema  MS: moves all extremities  NEURO:   No facial asymmetry, speech clear  PSYCH:  oriented X self and month/year, affect and mood normal     Lab/Diagnostic data:  Most Recent 3 CBC's:  Recent Labs   Lab Test 02/20/21 1838 12/15/20 11/24/20   WBC 9.7 8.3 7.2   HGB 10.3* 12.2 10.8*   MCV 95 97 93    236 163     Most Recent 3 BMP's:  Recent Labs   Lab Test 02/20/21 1838 12/15/20 11/24/20    137 132*   POTASSIUM 4.1 3.7 4.4   CHLORIDE 102 101 99   CO2 27 24 27   BUN 24 15 16   CR 1.08* 0.99 0.84   ANIONGAP 6 12 6   JOSÉ 9.2 9.0 8.4*   GLC 80 65* 71       ASSESSMENT/PLAN:  Acute bilateral low back pain, unspecified whether sciatica present  Compression fracture of lumbar vertebra with routine healing, unspecified lumbar vertebral level, subsequent encounter  DDD (degenerative disc disease), lumbar  Acute on chronic. Continue tylenol, low dose oxycodone. Therapies - eval progress next visit. Due to constipation increase senna to BID frequency.     Cognitive impairment  Physical  deconditioning  Acute on chronic. Forgetful. Monitor for safety. Therapies and f/u with progress.     Benign hypertension with chronic kidney disease, stage III  Stage 3 chronic kidney disease, unspecified whether stage 3a or 3b CKD  Chronic. On atenolol - monitor vs, f/u with ranges next visit. Avoid nephrotoxins - recommend Hgb and BMP check this week.     Rheumatoid arthritis involving both hands with positive rheumatoid factor (H)  Chronic. Continue prior to admission hydroxychloroquine.    Hyperlipidemia, unspecified hyperlipidemia type  Chronic, continue statin as PTA.      At 1200 notified patient will be moving to alternate TCU and discharge orders/scripts needed.     Orders written by provider at facility  1. DNR/DNI per PTA POLST  2. Increase senna s to 2 tabs PO BID  3. HGB and BMP on 3/1 diagnosis ANEMIA    Total unit/floor time of >36 minutes consisted of the following: Examination of patient, reviewing the record including pertinent labs and imaging, placing orders, and completing documentation.  More than 50% of this time (>19 minutes) (>50%) was spent in coordination of care with the patient and IDT, nursing staff and other healthcare providers.   This time was spent on discussing the care plan including hospital course, hospital discharge recommendations, recent TCU course and concerns, medications, cognitive impairment, discharge/safe placement, specialty follow up need.       Time with patient and IDT included: Discussion of diagnostic and imaging test results, diagnosis and prognosis, overall goal and disposition plan - per family request to move to alternate TCU today and completion of necessary orders and scripts.     -Medical decision making and discussions included:  PT/OT restrictions to include up with assist  HTN management plan to include monitor VS, f/u with ranges next visit  CKD management plan to include avoid nephrotoxins, labs this week    -Rx management plan discussion  included:  Follow up needed with labs this week  Disposition and discharge plan to include likely need for home care after discharge  Medication changes to include increase dose of senna    -Time on communication of care included:  Updated RN, RN manager, [facility] on above orders and POC    Electronically signed by:  STEVE Ham CNP

## 2021-02-25 NOTE — LETTER
2/25/2021        RE: Celestina Tejada  [facility]  [address]  [address]  [facility] [address]        [place] [facility]  INITIAL VISIT NOTE  February 25, 2021    PRIMARY CARE PROVIDER AND CLINIC:  Caprice Caba[address] [place] [address] 290 / [facility] [id number]    CHIEF COMPLAINT:  Hospital follow-up/Initial visit    HPI:    Celestina Tejada is a 89 year old  (4/2/1931) female who was seen at [facility] TCU in [facility] on February 25, 2021 for an initial visit.     Medical history is notable for cognitive impairment, hypertension, dyslipidemia, CKD stage IIIa, chronic anemia, rheumatoid arthritis, osteoarthritis, macular degeneration, and degenerative disc disease.    Summary of hospital course:  Patient was hospitalized at [facility] from February 20 through February 22, 2021 for evaluation of acute on chronic low back pain subsequent to an unwitnessed fall.  X-ray of pelvis/hip revealed no evidence of hip or pelvic fracture.  X-ray of lumbar spine showed chronic severe anterior wedge compression deformity of L4, moderate degenerative left lateral listhesis of L3 upon L4, and severe degenerative endplate spurring at L2-L3, L3/L4, and L4-L5.  UA was slightly abnormal but urine culture grew less than 10,000 colonies per mL mixed urogenital symone.  BMP was significant for creatinine of 1.08.  Hematology profile showed white count of 9.7 and hemoglobin 10.3.  Test for COVID-19 was negative.  PTA nifedipine was discontinued and atenolol dose was reduced to avoid tight control in view of her age and risk of fall.  Pain management was achieved with schedule acetaminophen and as needed oxycodone.  TCU was recommended by therapies.  She was initially admitted to [facility], then transferred to this facility on February 23 at family's request, for ongoing medical management, nursing care, and rehab (note that she lives at [facility] assisted  living facility).    Of note, history obtained from patient, facility RN, and extensive review of the chart necessitated by complex hospitalization.    Today's visit:  Patient was seen in her room, while lying in bed.  She appears frail but comfortable.  She clearly has memory deficits.  She is somewhat sarcastic.  She reports no significant back pain at this juncture.  She denies fever, chills, chest pain, palpitation, dyspnea, nausea, vomiting, pain, or urinary symptoms.  She cannot recall her last bowel movement.    CODE STATUS:   DNR    PAST MEDICAL HISTORY:   Hypertension  Dyslipidemia  CKD stage IIIa, baseline creatinine 0.9-1.1  Chronic anemia, baseline Hgb around 10-12  Rheumatoid arthritis  Cystic duct obstruction, patient has declined further evaluation  Degenerative disc disease of lumbar spine  Osteoarthritis  Osteoporosis  L4 compression fracture  Macular degeneration  Adjustment reaction with anxiety and depression  Cognitive impairment    Past Medical History:   Diagnosis Date     CKD (chronic kidney disease) stage 3, GFR 30-59 ml/min      Hyperlipidemia LDL goal < 100      Hypertension goal BP (blood pressure) < 140/90      Osteoarthritis 2/9/2012     Osteopenia      Rheumatoid arthritis(714.0)        PAST SURGICAL HISTORY:   Past Surgical History:   Procedure Laterality Date     C APPENDECTOMY  1950     SURGICAL HISTORY OF -       2 normal vaginal births       FAMILY HISTORY:   Family History   Problem Relation Age of Onset     Heart Disease Mother         CHF     Respiratory Mother         lung disease     C.A.D. Sister         heart by-pass     Arthritis Sister      Family History Negative Brother      Family History Negative Daughter      Family History Negative Daughter        SOCIAL HISTORY:  Patient is  and lives at [facility] living [facility].  She uses a walker for ambulation.    Social History     Tobacco Use     Smoking status: Former Smoker      Packs/day: 0.50     Years: 20.00     Pack years: 10.00     Types: Cigarettes     Quit date: 2000     Years since quittin.4     Smokeless tobacco: Never Used   Substance Use Topics     Alcohol use: Not Currently     Comment: very occ,       MEDICATIONS:  Current Outpatient Medications   Medication Sig Dispense Refill     acetaminophen (TYLENOL) 500 MG tablet Take 2 tablets (1,000 mg) by mouth every 8 hours       atenolol (TENORMIN) 25 MG tablet Take 1 tablet (25 mg) by mouth daily       atorvastatin (LIPITOR) 10 MG tablet Take 1 tablet (10 mg) by mouth daily 30 tablet 3     escitalopram (LEXAPRO) 5 MG tablet Take 2 tablets (10 mg) by mouth daily 60 tablet 11     gabapentin (NEURONTIN) 300 MG capsule Take 300 mg by mouth 2 times daily       hydroxychloroquine (PLAQUENIL) 200 MG tablet Take 200 mg by mouth daily   3     miconazole (MICATIN) 2 % external powder Apply topically 2 times daily Apply to under breast and groin redness BID and BID PRN 90 g 11     multivitamin w/minerals (THERA-VIT-M) tablet Take 1 tablet by mouth daily (Patient not taking: Reported on 2021) 60 tablet 11     oxyCODONE (ROXICODONE) 5 MG tablet Take 0.5 tablets (2.5 mg) by mouth every 4 hours as needed for moderate to severe pain (Hold for somnolence and/or RR <12). 5 tablet 0     senna-docusate (SENOKOT-S/PERICOLACE) 8.6-50 MG tablet Take 1 tablet by mouth 2 times daily (Patient taking differently: Take 2 tablets by mouth 2 times daily )       vitamin D3 (CHOLECALCIFEROL) 50 mcg (2000 units) tablet Take 1 tablet (50 mcg) by mouth daily (Patient not taking: Reported on 2021) 30 tablet 11       ALLERGIES:  Allergies   Allergen Reactions     Hydrochlorothiazide      hyponatremia       ROS:  10 point ROS was attempted but was limited, given patient's underlying cognitive impairment. It was reviewed as much as possible as outlined in HPI.      PHYSICAL EXAM:  Vital signs were reviewed in the chart.  Vital Signs: Blood pressure  144/68, heart rate 66, respiratory rate 18, temperature 97.8  F, oxygen saturation 95%, weight 109.6 LBS  General: Frail appearing but in no acute distress  HEENT: Normocephalic; oropharynx clear; mild conjunctival pallor  Cardiovascular: Normal S1, S2, RRR  Respiratory: Lungs clear to auscultation bilaterally  GI: Abdomen soft, non-tender, non-distended, +BS  Extremities: No LE edema  Neuro: CX II-XII grossly intact; ROM in all four extremities grossly intact  Psych: Alert and oriented almost x2; normal affect  Skin: No acute rash    LABORATORY/IMAGING DATA:    Most Recent 3 CBC's:  Recent Labs   Lab Test 02/20/21  1838 12/15/20 11/24/20   WBC 9.7 8.3 7.2   HGB 10.3* 12.2 10.8*   MCV 95 97 93    236 163     Most Recent 3 BMP's:  Recent Labs   Lab Test 02/20/21  1838 12/15/20 11/24/20    137 132*   POTASSIUM 4.1 3.7 4.4   CHLORIDE 102 101 99   CO2 27 24 27   BUN 24 15 16   CR 1.08* 0.99 0.84   ANIONGAP 6 12 6   JOSÉ 9.2 9.0 8.4*   GLC 80 65* 71     Most Recent Cholesterol Panel:  Recent Labs   Lab Test 11/24/20   CHOL 99   LDL 40   HDL 45*   TRIG 71     Most Recent 6 Bacteria Isolates From Any Culture (See EPIC Reports for Culture Details):  Recent Labs   Lab Test 02/21/21  0704 10/12/17  1556   CULT <10,000 colonies/mL  mixed urogenital symone  Susceptibility testing not routinely done   >100,000 colonies/mL  Escherichia coli  *  10,000 to 50,000 colonies/mL  Klebsiella pneumoniae  *     Most Recent TSH and T4:  Recent Labs   Lab Test 11/24/20 06/18/18  0600 06/18/18  0600   TSH 1.55   < > 1.52   T4  --   --  0.85    < > = values in this interval not displayed.     Most Recent Hemoglobin A1c:No lab results found.    ASSESSMENT/PLAN:  Unwitnessed fall, subsequent encounter,  Acute on chronic low back pain,  Severe degenerative disc disease of lumbar spine,  Physical deconditioning.  No evidence of acute fracture or malalignment on imaging.  Back pain is controlled at this juncture.  Plan:  Fall  precaution  Continue pain management with scheduled acetaminophen as needed low dose oxycodone  Continue PTA gabapentin 300 mg p.o. twice daily  Continue bowel regimen to prevent constipation  Continue PT/OT evaluation and therapy    CKD stage IIIa.  Baseline creatinine 0.9-1.1.  Last creatinine was 1.08 on February 20.  Plan:  Avoid NSAIDs and nephrotoxins  Repeat BMP on March 1    Chronic anemia.  Baseline hemoglobin in the range of 10-12.  Last hemoglobin was 10.3 on February 20.  Plan:  Monitor hemoglobin periodically  Repeat CBC on March 1    Essential hypertension.    PTA nifedipine was discontinued and atenolol dose was reduced from 25 mg p.o. twice daily to 25 mg p.o. daily in the hospital to avoid tight control in view of her age and risk of fall.  Blood pressure is currently controlled.  Goal SBP <150.  Plan:  Continue atenolol 25 mg p.o. daily  Monitor blood pressure    Dyslipidemia.  Plan:  Continue PTA atorvastatin 10 mg p.o. daily    Rheumatoid arthritis.  Plan:  Continue PTA hydroxychloroquine 200 mg p.o. daily    Adjustment reaction with anxiety and depression.  Plan:  Continue Lexapro 10 mg p.o. daily    Cognitive impairment.  Patient has memory deficits.  Plan:  Formal cognitive evaluation by OT      Orders written by provider at facility:  Repeat BMP on March 1 for CKD  Repeat CBC on March 1 for anemia        Electronically signed by:  Zakiya Soni MD                          Sincerely,        Zakiya Soni MD

## 2021-02-28 NOTE — PROGRESS NOTES
"[place] [facility]  [place] Medical Record Number:  [id number]  Place of Service where encounter took place:  [facility] ([facility]) [[id number]]  Chief Complaint   Patient presents with     Nursing Home Acute       HPI:    Celestina Tejada  is a 89 year old (4/2/1931), who is being seen today for an episodic care visit.  HPI information obtained from: facility chart records, facility staff, patient report and [facility] chart review. Today's concern is: per staff , pt is feeling better, less pain     Acute low back pain without sciatica, unspecified back pain laterality  Compression fracture of lumbar vertebra with routine healing, unspecified lumbar vertebral level, subsequent encounter  DDD (degenerative disc disease), lumbar  Fall, sequela  Physical deconditioning  Cognitive impairment  Malnutrition, unspecified type (H)  Benign hypertension with chronic kidney disease, stage III  Peripheral edema  Rheumatoid arthritis, involving unspecified site, unspecified whether rheumatoid factor present (H)  Primary osteoarthritis, unspecified site      Past Medical and Surgical History reviewed in Epic today.    TODAY DURING EXAM/ROS: forgetful and limited answers to questions due to dementia.   No CP, SOB, Cough, dizziness,   HA, N/V, dysuria or Bowel Abnormalities. Appetite is \"ok\".  No pain except mid back but \"not bad\"    MEDICATIONS:     Current Outpatient Medications   Medication Sig Dispense Refill     acetaminophen (TYLENOL) 500 MG tablet Take 2 tablets (1,000 mg) by mouth every 8 hours       atenolol (TENORMIN) 25 MG tablet Take 1 tablet (25 mg) by mouth daily       atorvastatin (LIPITOR) 10 MG tablet Take 1 tablet (10 mg) by mouth daily 30 tablet 3     escitalopram (LEXAPRO) 5 MG tablet Take 2 tablets (10 mg) by mouth daily 60 tablet 11     gabapentin (NEURONTIN) 300 MG capsule Take 300 mg by mouth 2 times daily       hydroxychloroquine (PLAQUENIL) 200 MG tablet Take 200 mg " by mouth daily   3     miconazole (MICATIN) 2 % external powder Apply topically 2 times daily Apply to under breast and groin redness BID and BID PRN 90 g 11     multivitamin w/minerals (THERA-VIT-M) tablet Take 1 tablet by mouth daily (Patient not taking: Reported on 2/23/2021) 60 tablet 11     oxyCODONE (ROXICODONE) 5 MG tablet Take 0.5 tablets (2.5 mg) by mouth every 4 hours as needed for moderate to severe pain (Hold for somnolence and/or RR <12). 10 tablet 0     senna-docusate (SENOKOT-S/PERICOLACE) 8.6-50 MG tablet Take 1 tablet by mouth 2 times daily (Patient taking differently: Take 2 tablets by mouth 2 times daily )       vitamin D3 (CHOLECALCIFEROL) 50 mcg (2000 units) tablet Take 1 tablet (50 mcg) by mouth daily (Patient not taking: Reported on 2/23/2021) 30 tablet 11   .   Objective:  /42   Pulse 68   Temp 97.3  F (36.3  C)   Resp 18   Ht 1.524 m (5')   Wt 49.7 kg (109 lb 9.6 oz)   SpO2 96%   BMI 21.40 kg/m    Exam:  GENERAL APPEARANCE:  Alert, in no distress, oriented, cooperative, forgetful  ENT:  Mouth with moist mucous membranes, normal hearing acuity  EYES:  Conjunctiva and lids normal  RESP:  respiratory effort  of chest normal, lungs clear to auscultation , no respiratory distress  CV:  Auscultation of heart done , regular rate and rhythm, no murmur, rub, or gallop, trace bilat pedal edema, +2 pedal pulses  ABDOMEN:  normal bowel sounds, soft, nontender   M/S:   up with assist and walker but seen in bed  SKIN:  Inspection of skin at baseline, the previous left alteral thigh wound not seen.  On 2/9/21 it was scabbed and healing.  Scattered bruises on legs.  NEURO:   Cranial nerves  are  grossly at patient's baseline  PSYCH:  oriented X 3, insight and judgement impaired, memory impaired       Labs:   Recent Labs   Lab Test 02/20/21  1838 12/15/20    137   POTASSIUM 4.1 3.7   CHLORIDE 102 101   CO2 27 24   ANIONGAP 6 12   GLC 80 65*   BUN 24 15   CR 1.08* 0.99   JOSÉ 9.2 9.0     CBC  RESULTS:   Recent Labs   Lab Test 02/20/21  1838   WBC 9.7   RBC 3.41*   HGB 10.3*   HCT 32.4*   MCV 95   MCH 30.2   MCHC 31.8   RDW 13.4          ASSESSMENT / PLAN:  (M54.5) Acute low back pain without sciatica, unspecified back pain laterality  (primary encounter diagnosis)   (S32.000D) Compression fracture of lumbar vertebra with routine healing, unspecified lumbar vertebral level, subsequent encounter   (M51.36) DDD (degenerative disc disease), lumbar   (W19.XXXS) Fall, sequela   (R53.81) Physical deconditioning  Comment:/armando: no acute issues currently, will cont with prn oxy, vitamin D and Tyelnol. PT OT, goal is to get back to apt with increased services    (R41.89) Cognitive impairment   (E46) Malnutrition, unspecified type (H)  Comment/Plan: needs encouragement to eat--had a lady come in to sit and eat lunch with her to ensure she eats, when in her apt    (I12.9,  N18.30) Benign hypertension with chronic kidney disease, stage III   (R60.9) Peripheral edema  Comment/Plan: 110-120's SBP, wt 109.6#, HR 60-70's,  checking labs on 3/4, reasonable BP control--if drops lower, may wish to adjust atenolol lower due to falls, monitor.    (M06.9) Rheumatoid arthritis, involving unspecified site, unspecified whether rheumatoid factor present (H)   (M19.91) Primary osteoarthritis, unspecified site  Comment/Plan: plaquenil, Tylenol, monitor.    **msg left on Tawnya Hastings's personal cell with update**    Electronically signed by:  STEVE Bradley CNP

## 2021-03-01 PROBLEM — M51.369 DDD (DEGENERATIVE DISC DISEASE), LUMBAR: Status: ACTIVE | Noted: 2021-01-01

## 2021-03-01 PROBLEM — R53.81 PHYSICAL DECONDITIONING: Status: ACTIVE | Noted: 2021-01-01

## 2021-03-01 PROBLEM — M54.50 ACUTE LOW BACK PAIN WITHOUT SCIATICA, UNSPECIFIED BACK PAIN LATERALITY: Status: ACTIVE | Noted: 2021-01-01

## 2021-03-01 PROBLEM — J44.9 COPD (CHRONIC OBSTRUCTIVE PULMONARY DISEASE) (H): Status: ACTIVE | Noted: 2021-01-01

## 2021-03-01 PROBLEM — S32.000D COMPRESSION FRACTURE OF LUMBAR VERTEBRA WITH ROUTINE HEALING, UNSPECIFIED LUMBAR VERTEBRAL LEVEL, SUBSEQUENT ENCOUNTER: Status: ACTIVE | Noted: 2021-01-01

## 2021-03-02 ENCOUNTER — RECORDS - HEALTHEAST (OUTPATIENT)
Dept: LAB | Facility: CLINIC | Age: 86
End: 2021-03-02

## 2021-03-04 LAB
ANION GAP SERPL CALCULATED.3IONS-SCNC: 8 MMOL/L (ref 5–18)
BUN SERPL-MCNC: 13 MG/DL (ref 8–28)
CALCIUM SERPL-MCNC: 8.8 MG/DL (ref 8.5–10.5)
CHLORIDE BLD-SCNC: 102 MMOL/L (ref 98–107)
CO2 SERPL-SCNC: 25 MMOL/L (ref 22–31)
CREAT SERPL-MCNC: 0.83 MG/DL (ref 0.6–1.1)
ERYTHROCYTE [DISTWIDTH] IN BLOOD BY AUTOMATED COUNT: 13.8 % (ref 11–14.5)
GFR SERPL CREATININE-BSD FRML MDRD: >60 ML/MIN/1.73M2
GLUCOSE BLD-MCNC: 102 MG/DL (ref 70–125)
HCT VFR BLD AUTO: 31.9 % (ref 35–47)
HGB BLD-MCNC: 10.3 G/DL (ref 12–16)
MCH RBC QN AUTO: 30.7 PG (ref 27–34)
MCHC RBC AUTO-ENTMCNC: 32.3 G/DL (ref 32–36)
MCV RBC AUTO: 95 FL (ref 80–100)
PLATELET # BLD AUTO: 233 THOU/UL (ref 140–440)
PMV BLD AUTO: 10.5 FL (ref 8.5–12.5)
POTASSIUM BLD-SCNC: 4 MMOL/L (ref 3.5–5)
RBC # BLD AUTO: 3.35 MILL/UL (ref 3.8–5.4)
SODIUM SERPL-SCNC: 135 MMOL/L (ref 136–145)
WBC: 9.2 THOU/UL (ref 4–11)

## 2021-03-04 NOTE — PROGRESS NOTES
"[place] [facility] DISCHARGE SUMMARY    PATIENT'S NAME: Celestina Tejada  YOB: 1931    PRIMARY CARE PROVIDER AND CLINIC RESPONSIBLE AFTER TRANSFER: Caprice Caba     CODE STATUS: Full Code    TRANSFERRING PROVIDERS: STEVE Bradley CNP, Dr. Zakiya Soni MD      DATE OF SNF ADMISSION:  February / 22 / 2021    DATE OF SNF DISCHARGE (including anticipating DC): March / 05 / 2021    DISCHARGE DISPOSITION: [facility] Provider    Nursing Facility: [facility] stay 2/20/21 to 2/22/21.     Condition on Discharge:  Stable.    Function:  Ambulating:  Transfers:   Cognitive Scores: SLUMS 15/30    Physical Function: 60-90 feet with walker and SBA  Equipment: walker  DME: Walker    DISCHARGE DIAGNOSIS:      Acute low back pain without sciatica, unspecified back pain laterality  Chronic pain syndrome  Age-related osteoporosis without current pathological fracture  Chronic obstructive pulmonary disease, unspecified COPD type (H)  Malnutrition, unspecified type (H)  Benign hypertension with chronic kidney disease, stage III  DDD (degenerative disc disease), lumbar  Rheumatoid arthritis involving both hands with positive rheumatoid factor (H)  Physical deconditioning  Cognitive impairment  Adjustment disorder with anxious mood  Open wound        HOSPITAL COURSE: pt lives at [facility] here at [facility].  She had an unwitnessed fall with acute pain in low back so was sent to hospital on 2/20--entire w/u negative except:  As noted \" X-ray of pelvis/hip revealed no evidence of hip or pelvic fracture.  X-ray of lumbar spine showed chronic severe anterior wedge compression deformity of L4, moderate degenerative left lateral listhesis of L3 upon L4, and severe degenerative endplate spurring at L2-L3, L3/L4, and L4-L5\".  Had -UC. Covid -, nifedipine  Discontinue and atenolol reduced.  She was on Norco at home but changed to oxycodone and [facility]  [facility]     TCU/SNF COURSE: she has progressed well, no pain except occas chronic pain in low back. Labs done today were fine.  She has had no falls.  SBP running mostly 120-140's occas<. Ready for discontinue back to her apt with increased assistance and also [facility] therapies--see below      PAST MEDICAL HISTORY:  Past Medical History:   Diagnosis Date     CKD (chronic kidney disease) stage 3, GFR 30-59 ml/min      COPD (chronic obstructive pulmonary disease) (H) 3/1/2021     Hyperlipidemia LDL goal < 100      Hypertension goal BP (blood pressure) < 140/90      Osteoarthritis 2/9/2012     Osteopenia      Rheumatoid arthritis(714.0)        DISCHARGE MEDICATIONS:  Current Outpatient Medications   Medication Sig Dispense Refill     acetaminophen (TYLENOL) 500 MG tablet Take 2 tablets (1,000 mg) by mouth every 8 hours       atenolol (TENORMIN) 25 MG tablet Take 1 tablet (25 mg) by mouth daily       atorvastatin (LIPITOR) 10 MG tablet Take 1 tablet (10 mg) by mouth daily 30 tablet 3     escitalopram (LEXAPRO) 5 MG tablet Take 2 tablets (10 mg) by mouth daily 60 tablet 11     gabapentin (NEURONTIN) 300 MG capsule Take 300 mg by mouth 2 times daily       hydroxychloroquine (PLAQUENIL) 200 MG tablet Take 200 mg by mouth daily   3     multivitamin w/minerals (THERA-VIT-M) tablet Take 1 tablet by mouth daily 60 tablet 11     oxyCODONE (ROXICODONE) 5 MG tablet Take 2.5 mg by mouth every 6 hours as needed for severe pain       senna-docusate (SENOKOT-S/PERICOLACE) 8.6-50 MG tablet Take 1 tablet by mouth 2 times daily (Patient taking differently: Take 2 tablets by mouth 2 times daily )       vitamin D3 (CHOLECALCIFEROL) 50 mcg (2000 units) tablet Take 1 tablet (50 mcg) by mouth daily 30 tablet 11     miconazole (MICATIN) 2 % external powder Apply topically 2 times daily Apply to under breast and groin redness BID and BID PRN 90 g 11  "      MEDICATION CHANGES/RATIONALE:   Off  CCB and on atenolol at current dose.  /55   Pulse 62   Temp 97.9  F (36.6  C)   Resp 18   SpO2 90%     TODAY DURING EXAM/ROS: forgetful,  Says CP, SOB, Cough, dizziness,  HA, N/V, dysuria or Bowel Abnormalities. Appetite is \"Oh I eat fine\"..  No pain except low back but ok right now      PHYSICAL EXAM Today:  A & O x 3, NAD. Lungs CTA, non labored. RRR, S1/S2 w/o murmur,gallop or rub.  Trace edema.  Abdomen soft, nontender, +BT'S. No focal neurological deficits. FUENTES and up with walker.   Previous lac left outer thigh healed, scar present..       SNF LABS  Recent Labs   Lab Test 03/04/21 02/20/21  1838   * 135   POTASSIUM 4.0 4.1   CHLORIDE 102 102   CO2 25 27   ANIONGAP 8 6    80   BUN 13 24   CR 0.83 1.08*   JOSÉ 8.8 9.2     CBC RESULTS:   Recent Labs   Lab Test 03/04/21   WBC 9.2   RBC 3.35*   HGB 10.3*   HCT 31.9*   MCV 95   MCH 30.7   MCHC 32.3   RDW 13.8        Hemoglobin   Date Value Ref Range Status   03/04/2021 10.3 (A) 12.0 - 16.0 g/dL Final   02/20/2021 10.3 (L) 11.7 - 15.7 g/dL Final             DISCHARGE PLAN:  Occupational Therapy, Physical Therapy, Registered Nurse, Home Health Aide and From:  [facility]. Follow-up with PCP in 7 days: 7 days.    Current [place] or other scheduled appointments:  Future Appointments   Date Time Provider Department Center   No appts     MTM referral needed and placed by this provider: none    Pending labs: none     Discharge Treatments:none       TOTAL DISCHARGE TIME:   Greater than 30 minutes    STEVE Bradley CNP    [facility]          Documentation of Face to Face and Certification for Home Health Services    I certify that patient: Celestina Tejada is under my care and that I, or a nurse practitioner or physician's assistant working with me, had a face-to-face encounter that meets the physician face-to-face encounter requirements with this patient on: 3/4/2021.    This " encounter with the patient was in whole, or in part, for the following medical condition, which is the primary reason for home health care:      Acute low back pain without sciatica, unspecified back pain laterality  Chronic pain syndrome  Age-related osteoporosis without current pathological fracture  Chronic obstructive pulmonary disease, unspecified COPD type (H)  Malnutrition, unspecified type (H)  Benign hypertension with chronic kidney disease, stage III  DDD (degenerative disc disease), lumbar  Rheumatoid arthritis involving both hands with positive rheumatoid factor (H)  Physical deconditioning  Cognitive impairment  Adjustment disorder with anxious mood  Open wound  .    I certify that, based on my findings, the following services are medically necessary home health services: Nursing, Occupational Therapy, Physical Therapy and HHA.    My clinical findings support the need for the above services because: Nurse is needed: To assess falls, lower BPs, back pain after changes in medications or other medical regimen.., Occupational Therapy Services are needed to assess and treat cognitive ability and address ADL safety due to impairment in cognition. and Physical Therapy Services are needed to assess and treat the following functional impairments: deconditioning.    Further, I certify that my clinical findings support that this patient is homebound (i.e. absences from home require considerable and taxing effort and are for medical reasons or Holiness services or infrequently or of short duration when for other reasons) because: Requires assistance of another person or specialized equipment to access medical services because patient: Is unable to walk greater than  60-90 feet without rest...    Based on the above findings. I certify that this patient is confined to the home and needs intermittent skilled nursing care, physical therapy and/or speech therapy.  The patient is under my care, and I have initiated the  establishment of the plan of care.  This patient will be followed by a physician who will periodically review the plan of care.  Physician/Provider to provide follow up care: Caprice Caba    Attending hospital physician (the Medicare certified [place] provider): STEVE Bradley CNP  Physician Signature: See electronic signature associated with these discharge orders.  Date: 3/4/2021

## 2021-03-04 NOTE — PROGRESS NOTES
"[place] [facility]  [place] Medical Record Number:  [id number]  Place of Service where encounter took place:  [facility] ([facility]) [[id number]]  Chief Complaint   Patient presents with     Nursing Home Acute       HPI:    Celestina Tejada  is a 89 year old (4/2/1931), who is being seen today for an episodic care visit.  HPI information obtained from: facility chart records, facility staff, patient report and [facility] chart review. Today's concern is: per staff , pt is feeling better, less pain  Data Unavailable      Past Medical and Surgical History reviewed in Epic today.    TODAY DURING EXAM/ROS: forgetful and limited answers to questions due to dementia.   No CP, SOB, Cough, dizziness,   HA, N/V, dysuria or Bowel Abnormalities. Appetite is \"ok\".  No pain except mid back but \"not bad\"    MEDICATIONS:     Current Outpatient Medications   Medication Sig Dispense Refill     acetaminophen (TYLENOL) 500 MG tablet Take 2 tablets (1,000 mg) by mouth every 8 hours       atenolol (TENORMIN) 25 MG tablet Take 1 tablet (25 mg) by mouth daily       atorvastatin (LIPITOR) 10 MG tablet Take 1 tablet (10 mg) by mouth daily 30 tablet 3     escitalopram (LEXAPRO) 5 MG tablet Take 2 tablets (10 mg) by mouth daily 60 tablet 11     gabapentin (NEURONTIN) 300 MG capsule Take 300 mg by mouth 2 times daily       hydroxychloroquine (PLAQUENIL) 200 MG tablet Take 200 mg by mouth daily   3     miconazole (MICATIN) 2 % external powder Apply topically 2 times daily Apply to under breast and groin redness BID and BID PRN 90 g 11     multivitamin w/minerals (THERA-VIT-M) tablet Take 1 tablet by mouth daily (Patient not taking: Reported on 2/23/2021) 60 tablet 11     oxyCODONE (ROXICODONE) 5 MG tablet Take 0.5 tablets (2.5 mg) by mouth every 4 hours as needed for moderate to severe pain (Hold for somnolence and/or RR <12). 10 tablet 0     senna-docusate (SENOKOT-S/PERICOLACE) 8.6-50 MG tablet Take 1 " tablet by mouth 2 times daily (Patient taking differently: Take 2 tablets by mouth 2 times daily )       vitamin D3 (CHOLECALCIFEROL) 50 mcg (2000 units) tablet Take 1 tablet (50 mcg) by mouth daily (Patient not taking: Reported on 2/23/2021) 30 tablet 11   .   Objective:  /55   Pulse 62   Temp 97.9  F (36.6  C)   Resp 18   Ht 1.524 m (5')   Wt 49.7 kg (109 lb 9.6 oz)   SpO2 90%   BMI 21.40 kg/m    Exam:  GENERAL APPEARANCE:  Alert, in no distress, oriented, cooperative, forgetful  ENT:  Mouth with moist mucous membranes, normal hearing acuity  EYES:  Conjunctiva and lids normal  RESP:  respiratory effort  of chest normal, lungs clear to auscultation , no respiratory distress  CV:  Auscultation of heart done , regular rate and rhythm, no murmur, rub, or gallop, trace bilat pedal edema, +2 pedal pulses  ABDOMEN:  normal bowel sounds, soft, nontender   M/S:   up with assist and walker but seen in bed  SKIN:  Inspection of skin at baseline, the previous left alteral thigh wound not seen.  On 2/9/21 it was scabbed and healing.  Scattered bruises on legs.  NEURO:   Cranial nerves  are  grossly at patient's baseline  PSYCH:  oriented X 3, insight and judgement impaired, memory impaired       Labs:   Recent Labs   Lab Test 02/20/21  1838 12/15/20    137   POTASSIUM 4.1 3.7   CHLORIDE 102 101   CO2 27 24   ANIONGAP 6 12   GLC 80 65*   BUN 24 15   CR 1.08* 0.99   JOSÉ 9.2 9.0     CBC RESULTS:   Recent Labs   Lab Test 02/20/21  1838   WBC 9.7   RBC 3.41*   HGB 10.3*   HCT 32.4*   MCV 95   MCH 30.2   MCHC 31.8   RDW 13.4          ASSESSMENT / PLAN:  (M54.5) Acute low back pain without sciatica, unspecified back pain laterality  (primary encounter diagnosis)   (S32.000D) Compression fracture of lumbar vertebra with routine healing, unspecified lumbar vertebral level, subsequent encounter   (M51.36) DDD (degenerative disc disease), lumbar   (W19.XXXS) Fall, sequela   (R53.81) Physical  deconditioning  Comment:/armando: no acute issues currently, will cont with prn oxy, vitamin D and Tyelnol. PT OT, goal is to get back to apt with increased services    (R41.89) Cognitive impairment   (E46) Malnutrition, unspecified type (H)  Comment/Plan: needs encouragement to eat--had a lady come in to sit and eat lunch with her to ensure she eats, when in her apt    (I12.9,  N18.30) Benign hypertension with chronic kidney disease, stage III   (R60.9) Peripheral edema  Comment/Plan: 110-120's SBP, wt 109.6#, HR 60-70's,  checking labs on 3/4, reasonable BP control--if drops lower, may wish to adjust atenolol lower due to falls, monitor.    (M06.9) Rheumatoid arthritis, involving unspecified site, unspecified whether rheumatoid factor present (H)   (M19.91) Primary osteoarthritis, unspecified site  Comment/Plan: plaquenil, Tylenol, monitor.    **msg left on Tawnya Hastings's personal cell with update**    Electronically signed by:  STEVE Bradley CNP       ***

## 2021-03-08 NOTE — TELEPHONE ENCOUNTER
"ED/Discharge Protocol    \"Hi, my name is Myrna Retana RN, a registered nurse, and I am calling on behalf of Dr. Alfaro's office at [place].  I am calling to follow up and see how things are going for you after your recent visit.\"    \"I see that you were in the (IP-TCU) on 2/20/2021-  3/4/2021  How are you doing now that you are home?\" much better    Is patient experiencing symptoms that may require a hospital visit?  No    Discharge Instructions    \"Let's review your discharge instructions.  What is/are the follow-up recommendations?  Pt. Response: unknown    \"Were you instructed to make a follow-up appointment?\"  Pt. Response: Not that she remembers. appt made for 3/15/2021 with provider.       \"When you see the provider, I would recommend that you bring your discharge instructions with you.    Medications    \"How many new medications are you on since your hospitalization/ED visit?\"    0-1  \"How many of your current medicines changed (dose, timing, name, etc.) while you were in the hospital/ED visit?\"   0-1  \"Do you have questions about your medications?\"   No  \"Were you newly diagnosed with heart failure, COPD, diabetes or did you have a heart attack?\"   No  For patients on insulin: \"Did you start on insulin in the hospital or did you have your insulin dose changed?\"   No  Post Discharge Medication Reconciliation Status: unable to reconcile discharge medications due to talking with daughter.    Was MTM referral placed (*Make sure to put transitions as reason for referral)?   No    Call Summary    \"Do you have any questions or concerns about your condition or care plan at the moment?\"    No  Triage nurse advice given: call if questions    Patient was in ER 2 x  in the past year (assess appropriateness of ER visits.)      \"If you have questions or things don't continue to improve, we encourage you contact us through the main clinic number,  [phone].  Even if the clinic is not open, triage nurses are available " "24/7 to help you.     We would like you to know that our clinic has extended hours (provide information).  We also have urgent care (provide details on closest location and hours/contact info)\"      \"Thank you for your time and take care!\"      Myrna Retana RN        "

## 2021-03-10 PROBLEM — R19.7 DIARRHEA, UNSPECIFIED TYPE: Status: ACTIVE | Noted: 2021-01-01

## 2021-03-10 PROBLEM — K59.00 CONSTIPATION, UNSPECIFIED CONSTIPATION TYPE: Status: ACTIVE | Noted: 2021-01-01

## 2021-03-10 PROBLEM — F03.90 DEMENTIA WITHOUT BEHAVIORAL DISTURBANCE, UNSPECIFIED DEMENTIA TYPE: Status: ACTIVE | Noted: 2021-01-01

## 2021-03-10 NOTE — PROGRESS NOTES
"[place] [facility]  PRIMARY CARE PROVIDER AND CLINIC:  Caprice Caba, STEVE CNP, [address] [facility] [address] / [place] [address]  Chief Complaint   Patient presents with     [facility] Care     [place] Medical Record Number:  [id number]  Place of Service where encounter took place:  [facility]IV[facility] [place] ([facility]) [[id number]]    Celestina Tejada  is a 89 year old  (4/2/1931), admitted back to her apt from [facility] 3/9/21.. [facility] stay 2/20/21 to 2/22/21.  Admitted to this facility for  rehab, medical management and nursing care.       Acute low back pain without sciatica, unspecified back pain laterality  DDD (degenerative disc disease), lumbar  Chronic pain syndrome  Age-related osteoporosis without current pathological fracture  Rheumatoid arthritis involving both hands with positive rheumatoid factor (H)  Physical deconditioning  Chronic obstructive pulmonary disease, unspecified COPD type (H)  Benign hypertension with chronic kidney disease, stage III  Dementia without behavioral disturbance, unspecified dementia type (H)  Adjustment disorder with anxious mood  Malnutrition, unspecified type (H)  Constipation, unspecified constipation type  Diarrhea, unspecified type      HPI:    HPI information obtained from: facility chart records, facility staff, patient report and family/first contact Tawnya dahl in room for visit report.     Brief Summary of Hospital Course: see my H&P from 3/1/21 and Discharge Summary from 3/4/21    Updates on Status Since Skilled nursing Admission and ROS today: per pt and Darling, pt feels she needs more help and [facility]  She told daughter this earlier this week and then [facility] staff at [facility]. Today she was having some soft stools--not quite diarrhea but not able to make it to the bathroom and needed her CNA to assist her clean up.  She has no CP, SOB, Cough or dizziness, she eats poorly unless someone eats a meal " "with her. She feels she would eat better if she had company also \"likes sitting in the dining room and visiting with others\".    CODE STATUS/ADVANCE DIRECTIVES DISCUSSION:   DNR/DNI  Patient's living condition: lives alone  ALLERGIES: Hydrochlorothiazide  PAST MEDICAL HISTORY:  has a past medical history of CKD (chronic kidney disease) stage 3, GFR 30-59 ml/min, COPD (chronic obstructive pulmonary disease) (H) (3/1/2021), Hyperlipidemia LDL goal < 100, Hypertension goal BP (blood pressure) < 140/90, Osteoarthritis (2/9/2012), Osteopenia, and Rheumatoid arthritis(714.0). She also has no past medical history of Cancer (H), Cerebral infarction (H), Congestive heart failure (H), Depressive disorder, Diabetes (H), Heart disease, History of blood transfusion, Thyroid disease, or Uncomplicated asthma.  PAST SURGICAL HISTORY:   has a past surgical history that includes APPENDECTOMY (1950) and surgical history of - .  FAMILY HISTORY: family history includes Arthritis in her sister; C.A.D. in her sister; Family History Negative in her brother, daughter, and daughter; Heart Disease in her mother; Respiratory in her mother.  SOCIAL HISTORY:   reports that she quit smoking about 20 years ago. Her smoking use included cigarettes. She has a 10.00 pack-year smoking history. She has never used smokeless tobacco. She reports previous alcohol use. She reports that she does not use drugs.    Post Discharge Medication Reconciliation Status: discharge medications reconciled and changed, per note/orders     Current Outpatient Medications   Medication Sig Dispense Refill     loperamide (IMODIUM A-D) 2 MG tablet Take 1 tablet (2 mg) by mouth 2 times daily as needed for diarrhea 30 tablet 1     SENNA-docusate sodium (SENNA S) 8.6-50 MG tablet Take 1 tablet by mouth 2 times daily as needed (constipation) 30 tablet 3     acetaminophen (TYLENOL) 500 MG tablet Take 2 tablets (1,000 mg) by mouth 3 times daily 90 tablet 4     atenolol (TENORMIN) " 25 MG tablet Take 1 tablet (25 mg) by mouth daily       atorvastatin (LIPITOR) 10 MG tablet Take 1 tablet (10 mg) by mouth daily 30 tablet 3     escitalopram (LEXAPRO) 5 MG tablet Take 2 tablets (10 mg) by mouth daily 60 tablet 11     gabapentin (NEURONTIN) 300 MG capsule Take 300 mg by mouth 2 times daily       hydroxychloroquine (PLAQUENIL) 200 MG tablet Take 200 mg by mouth daily   3     miconazole (MICATIN) 2 % external powder Apply topically 2 times daily Apply to under breast and groin redness BID and BID PRN 90 g 11     multivitamin w/minerals (THERA-VIT-M) tablet Take 1 tablet by mouth daily 60 tablet 11     oxyCODONE (ROXICODONE) 5 MG tablet Take 0.5 tablets (2.5 mg) by mouth every 6 hours as needed for severe pain 30 tablet 0     vitamin D3 (CHOLECALCIFEROL) 50 mcg (2000 units) tablet Take 1 tablet (50 mcg) by mouth daily 30 tablet 11             Vitals:  /60   Pulse 62   Temp 97.9  F (36.6  C)   Resp 18   Ht 1.524 m (5')   Wt 49.7 kg (109 lb 9.6 oz)   SpO2 95%   BMI 21.40 kg/m    Exam:  GENERAL APPEARANCE:  Alert, in no distress, oriented, cooperative, forgetful  ENT:  Mouth with moist mucous membranes, normal hearing acuity  EYES:  Conjunctiva and lids normal  RESP:  respiratory effort  of chest normal, lungs CTA, no respiratory distress  CV:  Auscultation of heart done , RRR no murmur, rub, or gallop, trace  pedal edema, +2 pedal pulses  ABDOMEN:  normal bowel sounds, soft, nontender   M/S:   up with assist and walker but seen on couch  SKIN:  Inspection of skin at baseline,but limited as pt fully dressed.  NEURO:   Cranial nerves  are  grossly at patient's baseline  PSYCH:  oriented X 3, insight and judgement impaired, memory impaired       Lab/Diagnostic data:  Recent Labs   Lab Test 03/04/21 02/20/21  1838   * 135   POTASSIUM 4.0 4.1   CHLORIDE 102 102   CO2 25 27   ANIONGAP 8 6    80   BUN 13 24   CR 0.83 1.08*   JOSÉ 8.8 9.2     CBC RESULTS:   Recent Labs   Lab Test  03/04/21   WBC 9.2   RBC 3.35*   HGB 10.3*   HCT 31.9*   MCV 95   MCH 30.7   MCHC 32.3   RDW 13.8        ASSESSMENT / PLAN:  (M54.5) Acute low back pain without sciatica, unspecified back pain laterality  (primary encounter diagnosis)   (M51.36) DDD (degenerative disc disease), lumbar   (G89.4) Chronic pain syndrome   (M81.0) Age-related osteoporosis without current pathological fracture   (M05.741,  M05.742) Rheumatoid arthritis involving both hands with positive rheumatoid factor (H)  (R53.81) Physical deconditioning  Comment/Plan: taking occas oxy--had been on Norco prior to hospitalization, will make no changes today but consider if nec.  For now will cont tylenol, Plaquenil, Neurontin.    (J44.9) Chronic obstructive pulmonary disease, unspecified COPD type (H)  Comment/Plan: compensated, no acute issues.    (I12.9,  N18.30) Benign hypertension with chronic kidney disease, stage III  Comment/Plan: reasonable Bps--tenormin, statin, in 110-120's mostly, no changes, monitor. Check BMP regularly. Consider decrease or stopping statin.     (F03.90) Dementia without behavioral disturbance, unspecified dementia type (H)  (F43.22) Adjustment disorder with anxious mood   (E46) Malnutrition, unspecified type (H)  Comment/Plan: encourage po, Lexapro     (K59.00) Constipation, unspecified constipation type   (R19.7) Diarrhea, unspecified type  Comment/Plan:varies, currently loose BM, discontinue the schedule senna S and change to prn. Also add prn imodium.                Total time spent with patient visit at the skilled nursing facility was 55 including patient visit, review of past records and chat with pt and her hesham in apt. Greater than 50% of total time spent with counseling and coordinating care due to cares as noted above--they wish to talk to staff about going on waiting list for Care Center/LTC    Electronically signed by:  STEVE Bradley CNP

## 2021-03-10 NOTE — LETTER
"    3/10/2021        RE: Celestina Tejada  [facility]  [address]  [address]  [place] [address]        [place] [facility]  PRIMARY CARE PROVIDER AND CLINIC:  STEVE Bradley CNP, [address] [facility] [address] / [facility] [address]  Chief Complaint   Patient presents with     [facility]     [place] Medical Record Number:  [id number]  Place of Service where encounter took place:  [facility]IV[facility] [place] ([facility]) [[id number]]    Celestina Tejada  is a 89 year old  (4/2/1931), admitted back to her apt from [facility] TCU 3/9/21.. [facility] stay 2/20/21 to 2/22/21.  Admitted to this facility for  rehab, medical management and nursing care.       Acute low back pain without sciatica, unspecified back pain laterality  DDD (degenerative disc disease), lumbar  Chronic pain syndrome  Age-related osteoporosis without current pathological fracture  Rheumatoid arthritis involving both hands with positive rheumatoid factor (H)  Physical deconditioning  Chronic obstructive pulmonary disease, unspecified COPD type (H)  Benign hypertension with chronic kidney disease, stage III  Dementia without behavioral disturbance, unspecified dementia type (H)  Adjustment disorder with anxious mood  Malnutrition, unspecified type (H)  Constipation, unspecified constipation type  Diarrhea, unspecified type      HPI:    HPI information obtained from: facility chart records, facility staff, patient report and family/first contact Tawnya dahl in room for visit report.     Brief Summary of Hospital Course: see my H&P from 3/1/21 and Discharge Summary from 3/4/21    Updates on Status Since Skilled nursing Admission and ROS today: per pt and Darling, pt feels she needs more help and [facility]  She told daughter this earlier this week and then d[facility]w staff at [facility]. Today she was having some soft stools--not quite diarrhea but not able to make it to the bathroom and " "needed her CNA to assist her clean up.  She has no CP, SOB, Cough or dizziness, she eats poorly unless someone eats a meal with her. She feels she would eat better if she had company also \"likes sitting in the dining room and visiting with others\".    CODE STATUS/ADVANCE DIRECTIVES DISCUSSION:   DNR/DNI  Patient's living condition: lives alone  ALLERGIES: Hydrochlorothiazide  PAST MEDICAL HISTORY:  has a past medical history of CKD (chronic kidney disease) stage 3, GFR 30-59 ml/min, COPD (chronic obstructive pulmonary disease) (H) (3/1/2021), Hyperlipidemia LDL goal < 100, Hypertension goal BP (blood pressure) < 140/90, Osteoarthritis (2/9/2012), Osteopenia, and Rheumatoid arthritis(714.0). She also has no past medical history of Cancer (H), Cerebral infarction (H), Congestive heart failure (H), Depressive disorder, Diabetes (H), Heart disease, History of blood transfusion, Thyroid disease, or Uncomplicated asthma.  PAST SURGICAL HISTORY:   has a past surgical history that includes APPENDECTOMY (1950) and surgical history of - .  FAMILY HISTORY: family history includes Arthritis in her sister; C.A.D. in her sister; Family History Negative in her brother, daughter, and daughter; Heart Disease in her mother; Respiratory in her mother.  SOCIAL HISTORY:   reports that she quit smoking about 20 years ago. Her smoking use included cigarettes. She has a 10.00 pack-year smoking history. She has never used smokeless tobacco. She reports previous alcohol use. She reports that she does not use drugs.    Post Discharge Medication Reconciliation Status: discharge medications reconciled and changed, per note/orders     Current Outpatient Medications   Medication Sig Dispense Refill     loperamide (IMODIUM A-D) 2 MG tablet Take 1 tablet (2 mg) by mouth 2 times daily as needed for diarrhea 30 tablet 1     SENNA-docusate sodium (SENNA S) 8.6-50 MG tablet Take 1 tablet by mouth 2 times daily as needed (constipation) 30 tablet 3     " acetaminophen (TYLENOL) 500 MG tablet Take 2 tablets (1,000 mg) by mouth 3 times daily 90 tablet 4     atenolol (TENORMIN) 25 MG tablet Take 1 tablet (25 mg) by mouth daily       atorvastatin (LIPITOR) 10 MG tablet Take 1 tablet (10 mg) by mouth daily 30 tablet 3     escitalopram (LEXAPRO) 5 MG tablet Take 2 tablets (10 mg) by mouth daily 60 tablet 11     gabapentin (NEURONTIN) 300 MG capsule Take 300 mg by mouth 2 times daily       hydroxychloroquine (PLAQUENIL) 200 MG tablet Take 200 mg by mouth daily   3     miconazole (MICATIN) 2 % external powder Apply topically 2 times daily Apply to under breast and groin redness BID and BID PRN 90 g 11     multivitamin w/minerals (THERA-VIT-M) tablet Take 1 tablet by mouth daily 60 tablet 11     oxyCODONE (ROXICODONE) 5 MG tablet Take 0.5 tablets (2.5 mg) by mouth every 6 hours as needed for severe pain 30 tablet 0     vitamin D3 (CHOLECALCIFEROL) 50 mcg (2000 units) tablet Take 1 tablet (50 mcg) by mouth daily 30 tablet 11             Vitals:  /60   Pulse 62   Temp 97.9  F (36.6  C)   Resp 18   Ht 1.524 m (5')   Wt 49.7 kg (109 lb 9.6 oz)   SpO2 95%   BMI 21.40 kg/m    Exam:  GENERAL APPEARANCE:  Alert, in no distress, oriented, cooperative, forgetful  ENT:  Mouth with moist mucous membranes, normal hearing acuity  EYES:  Conjunctiva and lids normal  RESP:  respiratory effort  of chest normal, lungs CTA, no respiratory distress  CV:  Auscultation of heart done , RRR no murmur, rub, or gallop, trace  pedal edema, +2 pedal pulses  ABDOMEN:  normal bowel sounds, soft, nontender   M/S:   up with assist and walker but seen on couch  SKIN:  Inspection of skin at baseline,but limited as pt fully dressed.  NEURO:   Cranial nerves  are  grossly at patient's baseline  PSYCH:  oriented X 3, insight and judgement impaired, memory impaired       Lab/Diagnostic data:  Recent Labs   Lab Test 03/04/21 02/20/21  1838   * 135   POTASSIUM 4.0 4.1   CHLORIDE 102 102   CO2 25  27   ANIONGAP 8 6    80   BUN 13 24   CR 0.83 1.08*   JOSÉ 8.8 9.2     CBC RESULTS:   Recent Labs   Lab Test 03/04/21   WBC 9.2   RBC 3.35*   HGB 10.3*   HCT 31.9*   MCV 95   MCH 30.7   MCHC 32.3   RDW 13.8        ASSESSMENT / PLAN:  (M54.5) Acute low back pain without sciatica, unspecified back pain laterality  (primary encounter diagnosis)   (M51.36) DDD (degenerative disc disease), lumbar   (G89.4) Chronic pain syndrome   (M81.0) Age-related osteoporosis without current pathological fracture   (M05.741,  M05.742) Rheumatoid arthritis involving both hands with positive rheumatoid factor (H)  (R53.81) Physical deconditioning  Comment/Plan: taking occas oxy--had been on Norco prior to hospitalization, will make no changes today but consider if nec.  For now will cont tylenol, Plaquenil, Neurontin.    (J44.9) Chronic obstructive pulmonary disease, unspecified COPD type (H)  Comment/Plan: compensated, no acute issues.    (I12.9,  N18.30) Benign hypertension with chronic kidney disease, stage III  Comment/Plan: reasonable Bps--tenormin, statin, in 110-120's mostly, no changes, monitor. Check BMP regularly. Consider decrease or stopping statin.     (F03.90) Dementia without behavioral disturbance, unspecified dementia type (H)  (F43.22) Adjustment disorder with anxious mood   (E46) Malnutrition, unspecified type (H)  Comment/Plan: encourage po, Lexapro     (K59.00) Constipation, unspecified constipation type   (R19.7) Diarrhea, unspecified type  Comment/Plan:varies, currently loose BM, discontinue the schedule senna S and change to prn. Also add prn imodium.                Total time spent with patient visit at the skilled nursing facility was 55 including patient visit, review of past records and chat with pt and her hesham in apt. Greater than 50% of total time spent with counseling and coordinating care due to cares as noted above--they wish to talk to staff about going on waiting list for Care  Center/LTC    Electronically signed by:  STEVE Bradley CNP                           Sincerely,        STEVE Bradley CNP

## 2021-03-23 NOTE — TELEPHONE ENCOUNTER
[facility] Nurse Triage Telephone Note    Provider: Caprice Caba NP  Facility: [facility] [[email or web address]   Facility Type:  LTC    Caller: Ashley  Call Back Number: [phone]    Allergies   Allergen Reactions     Hydrochlorothiazide      hyponatremia       [facility] NONEMERGENT VOICEMAIL: Nurse called at 9:41pm to report unwitnessed pt fall at 8PM. Appeared to be attempting to self transfer self from chair to chair in room, found pt sitting upright. Pt is confused per baseline and neuros are intact. ROM intact bilat WNL. No evidence of a headstrike or injury noted. Pt is doing well today, remains with pain at baseline and no injury.   Vitals: BP:  120/97  P:: 66  R:: 16  SPO2: 97% R/A Temp.:  97.4       Nurse sent FYI to NP.

## 2021-03-24 NOTE — PROGRESS NOTES
[place]  PRIMARY CARE PROVIDER AND CLINIC:  Caprice Caba, STEVE CNP, [address] [facility] [address] / [place] [address]  Chief Complaint   Patient presents with     [facility]     [place] Medical Record Number:  [id number]  Place of Service where encounter took place:  [facility] () [[id number]]    Celestina Tejada  is a 89 year old  (4/2/1931), admitted to the above facility from [facility] TCU...  Admitted to this facility for  rehab, medical management and nursing care.    HPI:    HPI information obtained from: {FGS HPI:103151}.   Brief Summary of Hospital Course: ***  Updates on Status Since Skilled nursing Admission: ***    CODE STATUS/ADVANCE DIRECTIVES DISCUSSION:   CPR/Full code   Patient's living condition: lives alone  ALLERGIES: Hydrochlorothiazide  PAST MEDICAL HISTORY:  has a past medical history of CKD (chronic kidney disease) stage 3, GFR 30-59 ml/min, COPD (chronic obstructive pulmonary disease) (H) (3/1/2021), Hyperlipidemia LDL goal < 100, Hypertension goal BP (blood pressure) < 140/90, Osteoarthritis (2/9/2012), Osteopenia, and Rheumatoid arthritis(714.0). She also has no past medical history of Cancer (H), Cerebral infarction (H), Congestive heart failure (H), Depressive disorder, Diabetes (H), Heart disease, History of blood transfusion, Thyroid disease, or Uncomplicated asthma.  PAST SURGICAL HISTORY:   has a past surgical history that includes APPENDECTOMY (1950) and surgical history of - .  FAMILY HISTORY: family history includes Arthritis in her sister; C.A.D. in her sister; Family History Negative in her brother, daughter, and daughter; Heart Disease in her mother; Respiratory in her mother.  SOCIAL HISTORY:   reports that she quit smoking about 20 years ago. Her smoking use included cigarettes. She has a 10.00 pack-year smoking history. She has never used smokeless tobacco. She reports previous alcohol use. She reports that she does  not use drugs.    Post Discharge Medication Reconciliation Status: {O Med Rec (Provider):431159}  ***  Current Outpatient Medications   Medication Sig Dispense Refill     acetaminophen (TYLENOL) 500 MG tablet Take 2 tablets (1,000 mg) by mouth 3 times daily 90 tablet 4     atenolol (TENORMIN) 25 MG tablet Take 1 tablet (25 mg) by mouth daily       atorvastatin (LIPITOR) 10 MG tablet Take 1 tablet (10 mg) by mouth daily 30 tablet 3     escitalopram (LEXAPRO) 5 MG tablet Take 2 tablets (10 mg) by mouth daily 60 tablet 11     gabapentin (NEURONTIN) 300 MG capsule Take 300 mg by mouth 2 times daily       hydroxychloroquine (PLAQUENIL) 200 MG tablet Take 200 mg by mouth daily   3     loperamide (IMODIUM A-D) 2 MG tablet Take 1 tablet (2 mg) by mouth 2 times daily as needed for diarrhea 30 tablet 1     miconazole (MICATIN) 2 % external powder Apply topically 2 times daily Apply to under breast and groin redness BID and BID PRN 90 g 11     multivitamin w/minerals (THERA-VIT-M) tablet Take 1 tablet by mouth daily 60 tablet 11     oxyCODONE (ROXICODONE) 5 MG tablet Take 0.5 tablets (2.5 mg) by mouth every 6 hours as needed for severe pain 90 tablet 0     SENNA-docusate sodium (SENNA S) 8.6-50 MG tablet Take 1 tablet by mouth 2 times daily as needed (constipation) 30 tablet 3     vitamin D3 (CHOLECALCIFEROL) 50 mcg (2000 units) tablet Take 1 tablet (50 mcg) by mouth daily 30 tablet 11     {Providers Please double check the med list (in the plan section >> meds & orders tab) and Discontinue any of the meds flagged by the TC to be discontinued}  ***  ROS:  10 point ROS of systems including Constitutional, Eyes, Respiratory, Cardiovascular, Gastroenterology, Genitourinary, Integumentary, Musculoskeletal, Psychiatric were all negative except for pertinent positives noted in my HPI.    Vitals:  BP (!) 169/77   Pulse 67   Temp 97.2  F (36.2  C)   Resp 18   Ht 1.524 m (5')   Wt 50.4 kg (111 lb 3.2 oz)   SpO2 97%   BMI 21.72  kg/m    Exam:  GENERAL APPEARANCE:  {senior living GENERAL APPEARANCE:542375}  ENT:  {senior living ENT PE:250258}  EYES:  {senior living EYES PE :774382}  RESP:  {NURSING HOME RESP PE:605012}  CV:  {senior living CV PE:729026}  ABDOMEN:  {NURSING HOME GI PE:727264}  M/S:   {NURSING HOME M/S PE:244824}  SKIN:  {NURSING HOME SKIN PE:833237}  NEURO:   {senior living NEURO PE:712968}  PSYCH:  {senior living PSYCH PE:044834}    Lab/Diagnostic data:  {fgslab:298247}    ASSESSMENT/PLAN:  {FGS DX INITIAL:652763}    {fgsorders:052715}  ***    Total time spent with patient visit at the skilled nursing facility was {1/2/3/4/5:364865} including {1/2/3/4/5:719976}. Greater than 50% of total time spent with counseling and coordinating care due to ***.     Electronically signed by:  Tawnya Vaughn MA ***  {Providers Please double check the med list (in the plan section >> meds & orders tab) and Discontinue any of the meds flagged by the TC to be discontinued}

## 2021-03-25 ENCOUNTER — RECORDS - HEALTHEAST (OUTPATIENT)
Dept: LAB | Facility: CLINIC | Age: 86
End: 2021-03-25

## 2021-03-25 NOTE — PROGRESS NOTES
[place] [facility]  PRIMARY CARE PROVIDER AND CLINIC:  Caprice Caba, STEVE CNP, [address] / [place] [address]  Chief Complaint   Patient presents with     Establish Care     [facility] Medical Record Number:  [id number]  Place of Service where encounter took place:  [facility] () [[id number]]    Celestina Tejada  is a 89 year old  (4/2/1931), admitted to the above facility from [facility] LTC..  Admitted to this facility for  rehab, medical management and nursing care.     Chronic pain syndrome  DDD (degenerative disc disease), lumbar  Age-related osteoporosis without current pathological fracture  Rheumatoid arthritis involving both hands with positive rheumatoid factor (H)  Falls frequently  Physical deconditioning  Chronic obstructive pulmonary disease, unspecified COPD type (H)  Benign hypertension with chronic kidney disease, stage III  Dementia without behavioral disturbance, unspecified dementia type (H)  Adjustment disorder with anxious mood  Malnutrition, unspecified type (H)  Constipation, unspecified constipation type  Routine general medical examination at a health care facility    HPI:    HPI information obtained from: facility chart records, facility staff, [facility] chart review and family/first contact daughterTawnya report.     Brief Summary of Hospital Course: this case 89 year old female with demential, mac deg,and a fall was hospitalized from 2/20 to 2/22/21 after a fall and increased pain in back.  She was noted to have a chronic compression Fx of L4 and DDD thru L2-5. She had some lower BPs sp CCB dc'd and the  atenolol decreased. She was sent to TCU where she was from 2/22 until back to her AL apt on march 5th. During her TCU stay labs were fine, she has only occas chronic pain in low back .  Her SBP rant mostly 120-140's.  Of not, she was on Norco at home but changed to oxycodone in the hospital.  Furthermore, she was  "originally sent to U as no bed at [facility] and she was transferred to [facility] TCU on 2/25/21 when bed available.        TODAY DURING EXAM/ROS:  She feels fine, likes her room but \" still getting used to it\".  Said her daughter did a nice job setting the room up.   She asked me several times if this was now her home---\"forever??\" and \"That is best\".  No CP, SOB, Cough, dizziness, fevers, chills, HA, N/V, dysuria or Bowel Abnormalities. Appetite is fair.  No pain except low back but not right now.      CODE STATUS/ADVANCE DIRECTIVES DISCUSSION:   DNR/DNI  Patient's living condition: lived in an assisted living facility--[facility] prior to this transition to LTC    ALLERGIES: Hydrochlorothiazide  PAST MEDICAL HISTORY:  has a past medical history of CKD (chronic kidney disease) stage 3, GFR 30-59 ml/min, COPD (chronic obstructive pulmonary disease) (H) (3/1/2021), Hyperlipidemia LDL goal < 100, Hypertension goal BP (blood pressure) < 140/90, Osteoarthritis (2/9/2012), Osteopenia, and Rheumatoid arthritis(714.0). She also has no past medical history of Cancer (H), Cerebral infarction (H), Congestive heart failure (H), Depressive disorder, Diabetes (H), Heart disease, History of blood transfusion, Thyroid disease, or Uncomplicated asthma.  PAST SURGICAL HISTORY:   has a past surgical history that includes APPENDECTOMY (1950) and surgical history of - .  FAMILY HISTORY: family history includes Arthritis in her sister; C.A.D. in her sister; Family History Negative in her brother, daughter, and daughter; Heart Disease in her mother; Respiratory in her mother.  SOCIAL HISTORY:   reports that she quit smoking about 20 years ago. Her smoking use included cigarettes. She has a 10.00 pack-year smoking history. She has never used smokeless tobacco. She reports previous alcohol use. She reports that she does not use drugs.    Post Discharge Medication Reconciliation Status: discharge medications reconciled and " changed, per note/orders     Current Outpatient Medications   Medication Sig Dispense Refill     acetaminophen (TYLENOL) 500 MG tablet Take 2 tablets (1,000 mg) by mouth 3 times daily 90 tablet 4     atenolol (TENORMIN) 25 MG tablet Take 1 tablet (25 mg) by mouth daily       atorvastatin (LIPITOR) 10 MG tablet Take 1 tablet (10 mg) by mouth daily 30 tablet 3     escitalopram (LEXAPRO) 5 MG tablet Take 2 tablets (10 mg) by mouth daily 60 tablet 11     gabapentin (NEURONTIN) 300 MG capsule Take 300 mg by mouth 2 times daily       hydroxychloroquine (PLAQUENIL) 200 MG tablet Take 200 mg by mouth daily   3     loperamide (IMODIUM A-D) 2 MG tablet Take 1 tablet (2 mg) by mouth 2 times daily as needed for diarrhea 30 tablet 1     miconazole (MICATIN) 2 % external powder Apply topically 2 times daily Apply to under breast and groin redness BID and BID PRN 90 g 11     multivitamin w/minerals (THERA-VIT-M) tablet Take 1 tablet by mouth daily 60 tablet 11     oxyCODONE (ROXICODONE) 5 MG tablet Take 0.5 tablets (2.5 mg) by mouth every 6 hours as needed for severe pain 90 tablet 0     SENNA-docusate sodium (SENNA S) 8.6-50 MG tablet Take 1 tablet by mouth 2 times daily as needed (constipation) 30 tablet 3     vitamin D3 (CHOLECALCIFEROL) 50 mcg (2000 units) tablet Take 1 tablet (50 mcg) by mouth daily 30 tablet 11           ROS: see above under HPI    Vitals:  BP (!) 169/77   Pulse 67   Temp 97.2  F (36.2  C)   Resp 18   Ht 1.524 m (5')   Wt 50.4 kg (111 lb 3.2 oz)   SpO2 97%   BMI 21.72 kg/m    Exam:  GENERAL APPEARANCE:  Alert, in no distress, oriented, cooperative, forgetful  ENT:  Mouth with moist mucous membranes, is mildly [place]  EYES:  Conjunctiva and lids normal  RESP:  respiratory effort  of chest normal, lungs clear to ausculation, no respiratory distress  CV:  Auscultation of heart done , RRR no murmur, rub, or gallop, trace bilat pedal edema, +2 pedal pulses  ABDOMEN:  normal bowel sounds, soft, nontender  "  M/S:   up with assist and walker but seen in stuffed chair  SKIN:  Inspection of skin at baseline,but limited as pt fully dressed.  NEURO:   Cranial nerves  are  grossly at patient's baseline  PSYCH:  oriented X 3, insight and judgement impaired, memory impaired       Lab/Diagnostic data:  Recent Labs   Lab Test 03/04/21 02/20/21  1838   * 135   POTASSIUM 4.0 4.1   CHLORIDE 102 102   CO2 25 27   ANIONGAP 8 6    80   BUN 13 24   CR 0.83 1.08*   JOSÉ 8.8 9.2     CBC RESULTS:   Recent Labs   Lab Test 03/04/21   WBC 9.2   RBC 3.35*   HGB 10.3*   HCT 31.9*   MCV 95   MCH 30.7   MCHC 32.3   RDW 13.8        ASSESSMENT / PLAN:  (G89.4) Chronic pain syndrome  (primary encounter diagnosis)  (M51.36) DDD (degenerative disc disease), lumbar   (M81.0) Age-related osteoporosis without current pathological fracture   (M05.741,  M05.742) Rheumatoid arthritis involving both hands with positive rheumatoid factor (H)  Comment/Plan: cont current regimen, if needed change back to Norco, but if not using much--leave with Oxy, Neurontin,  Vitamin D and tylenol.  Also on Plaquenil, monitor, check Vit D on 3/30.     (R29.6) Falls frequently  (R53.81) Physical deconditioning  Comment/Plan: PT as needed prn    (J44.9) Chronic obstructive pulmonary disease, unspecified COPD type (H)  Comment/Plan: on no meds or O2--adjust or add meds as needed.    (I12.9,  N18.30) Benign hypertension with chronic kidney disease, stage III  (N18.9,  D63.1) Anemia of chronic renal failure, unspecified CKD stage   Comment/Plan: cont with atenolol, Lipitor, check BMP and TSH and Hgb  on 3/30    (F03.90) Dementia without behavioral disturbance, unspecified dementia type (H)   (F43.22) Adjustment disorder with anxious mood   (E46) Malnutrition, unspecified type (H)  Comment/Plan: cont Lexapro, also encourage po--hopefully will improved as now not \"isolated\" in her AL room. (Family had hired  to eat with her in her AL)    (K59.00) " Constipation, unspecified constipation type  Comment/Plan: senna S and monitor, adjust as needed.    (Z00.00) Routine general medical examination at a health care facility: 03/24/2021  Comment/Plan: had Covid vaccine, otter vaccines up to date.      Total time spent with patient visit at the skilled nursing facility was 50 mins including patient visit, review of past records and phone call to patient contact. Greater than 50% of total time spent with counseling and coordinating care and discussing plan of care with daughter, up coming labs, recent non injurious fall..       Electronically signed by:  STEVE Bradley CNP

## 2021-03-27 ENCOUNTER — RECORDS - HEALTHEAST (OUTPATIENT)
Dept: LAB | Facility: CLINIC | Age: 86
End: 2021-03-27

## 2021-03-28 PROBLEM — Z00.00 ROUTINE GENERAL MEDICAL EXAMINATION AT A HEALTH CARE FACILITY: Status: ACTIVE | Noted: 2021-01-01

## 2021-03-28 PROBLEM — N18.9 ANEMIA OF CHRONIC RENAL FAILURE, UNSPECIFIED CKD STAGE: Status: ACTIVE | Noted: 2021-01-01

## 2021-03-28 PROBLEM — D63.1 ANEMIA OF CHRONIC RENAL FAILURE, UNSPECIFIED CKD STAGE: Status: ACTIVE | Noted: 2021-01-01

## 2021-03-29 LAB
ANION GAP SERPL CALCULATED.3IONS-SCNC: 9 MMOL/L (ref 5–18)
BUN SERPL-MCNC: 24 MG/DL (ref 8–28)
CALCIUM SERPL-MCNC: 8.1 MG/DL (ref 8.5–10.5)
CHLORIDE BLD-SCNC: 102 MMOL/L (ref 98–107)
CO2 SERPL-SCNC: 23 MMOL/L (ref 22–31)
CREAT SERPL-MCNC: 0.88 MG/DL (ref 0.6–1.1)
GFR SERPL CREATININE-BSD FRML MDRD: >60 ML/MIN/1.73M2
GLUCOSE BLD-MCNC: 88 MG/DL (ref 70–125)
POTASSIUM BLD-SCNC: 3.9 MMOL/L (ref 3.5–5)
SODIUM SERPL-SCNC: 134 MMOL/L (ref 136–145)

## 2021-03-30 PROBLEM — E87.1 HYPONATREMIA: Status: ACTIVE | Noted: 2018-05-22

## 2021-03-30 PROBLEM — D50.9 IRON DEFICIENCY ANEMIA, UNSPECIFIED IRON DEFICIENCY ANEMIA TYPE: Status: RESOLVED | Noted: 2021-01-01 | Resolved: 2021-01-01

## 2021-03-30 LAB
25(OH)D3 SERPL-MCNC: 42.9 NG/ML (ref 30–80)
ANION GAP SERPL CALCULATED.3IONS-SCNC: 11 MMOL/L (ref 5–18)
BUN SERPL-MCNC: 24 MG/DL (ref 8–28)
CALCIUM SERPL-MCNC: 8.5 MG/DL (ref 8.5–10.5)
CHLORIDE BLD-SCNC: 102 MMOL/L (ref 98–107)
CO2 SERPL-SCNC: 23 MMOL/L (ref 22–31)
CREAT SERPL-MCNC: 0.85 MG/DL (ref 0.6–1.1)
GFR SERPL CREATININE-BSD FRML MDRD: >60 ML/MIN/1.73M2
GLUCOSE BLD-MCNC: 79 MG/DL (ref 70–125)
HGB BLD-MCNC: 9.9 G/DL (ref 12–16)
POTASSIUM BLD-SCNC: 3.8 MMOL/L (ref 3.5–5)
SODIUM SERPL-SCNC: 136 MMOL/L (ref 136–145)
T4 TOTAL - HISTORICAL: 4.9 UG/DL (ref 4.5–13)
TSH SERPL DL<=0.005 MIU/L-ACNC: 1.95 UIU/ML (ref 0.3–5)

## 2021-03-30 NOTE — PROGRESS NOTES
CC: Lab Recheck     HPI:    Celestina Tejada is a 89 year old  (4/2/1931), who is being seen today for an episodic care visit at [facility]. Today's concern: lab recheck of BMP, Vit D level, TSH, T4.        Benign hypertension with chronic kidney disease, stage III  Hyponatremia  Anemia of chronic renal failure, stage 3 (moderate), unspecified whether stage 3a or 3b CKD  Rheumatoid arthritis involving both hands with positive rheumatoid factor (H)  Age-related osteoporosis without current pathological fracture       OBJECTIVE: A & O x 3, NAD. Sitting in chair in room. No new focal neurological deficits.      /62   Pulse 66   Temp 97.3  F (36.3  C)   Resp 18   Ht 1.524 m (5')   Wt 50.5 kg (111 lb 4.8 oz)   SpO2 94%   BMI 21.74 kg/m      LABS: today   Recent Labs   Lab Test 03/29/21 03/04/21   * 135*   POTASSIUM 3.9 4.0   CHLORIDE 102 102   CO2 23 25   ANIONGAP 9 8   GLC 88 102   BUN 24 13   CR 0.88 0.83   JOSÉ 8.1* 8.8     Hemoglobin   Date Value Ref Range Status   3/30/2021 9.9     03/04/2021 10.3 (A) 12.0 - 16.0 g/dL Final   02/20/2021 10.3 (L) 11.7 - 15.7 g/dL Final     Lab Results   Component Value Date    TSH and T4=====4.9 1.95     TSH  1.55 11/24/2020   3/30/21: Vitamin D==42.9    ASSESSMENT / PLAN:  (I12.9,  N18.30) Benign hypertension with chronic kidney disease, stage III  (primary encounter diagnosis)   (E87.1) Hyponatremia  Comment/Plan: slightly low Na--no current concern, BMP otherwise fine, check in 3-6 mos or prn    (N18.30,  D63.1) Anemia of chronic renal failure, stage 3 (moderate), unspecified whether stage 3a or 3b CKD  Comment/Plan: slightly down--check in 3-6 mos or prn    (M05.741,  M05.742) Rheumatoid arthritis involving both hands with positive rheumatoid factor (H)   (M81.0) Age-related osteoporosis without current pathological fracture  Comment/Plan: Vitamin D level very good--no changes, check in a year or prn      Electronically Signed by:    Caprice Caba  RN, CNP

## 2021-04-08 NOTE — LETTER
4/8/2021        RE: Celestina Tejada  [facility] [place]  [address]  [address]  [facility] [address]        Celestina Tejada is a 89 year old female seen April 8, 2021 at [facility] LT unit where she has resided for 2 months (admit to TCU 2/2021, admit [facility] 9/2020)   She is seen in her room resting abed.   Nursing staff reports this is not unusual, pt usually stays in bed until after 11AM.   She needs assist with cares and ADLs, has had ongoing cognitive and physical decline.     Pt had [facility] hospitalization followed by TCU stay in February 2021.  Presented with back pain after a fall, and found to have L4 wedge fracture, and chronic severe lumbar degenerative endplate spurring L2-5    Still some pain, but better.     Pt has erosive seropositive rheumatoid arthritis, dx'd in 2008.   She has been on different medications, currently on Actemra and hydroxychloroquine, followed routinely by Rheumatology Dr Holt, last seen 4/7.  She also receives yearly Reclast infusions for osteoporosis.     Pt had seen Hem/Onc Dr Kaur in July for low platelets, and she declined further workup or tx.   Also seen by GI for elevated LFTs () found on screening for Actemra tx.  She was imaged and found to have cholecystitis and partially obstructing ductal stone.  She is asx, and has not wanted to undergo ERCP or surgery, currently monitored only.       Past Medical History:   Diagnosis Date     CKD (chronic kidney disease) stage 3, GFR 30-59 ml/min      Hyperlipidemia LDL goal < 100      Hypertension goal BP (blood pressure) < 140/90      Osteoarthritis 2/9/2012     Osteopenia      Rheumatoid arthritis(714.0)    Late onset dementia, Alzheimer's type     Cholecystitis, poss obstructing ductal stone, 2020  Thrombocytopenia    Past Surgical History:   Procedure Laterality Date     C APPENDECTOMY  1950     SURGICAL HISTORY OF -       2 normal vaginal births     SH:     She has 2  daughters: Tawnya lives locally, Florina lives in [place], [place]    Pt previously lived independently in her condo.     Past smoker.        ROS: SLUMS 15/30    Ambulates 60-90' with FWW and SBA, directional cuing.     Reports her usual weight is 130 lb  Wt Readings from Last 5 Encounters:   04/08/21 50.5 kg (111 lb 4.8 oz)   03/26/21 50.5 kg (111 lb 4.8 oz)   03/24/21 50.4 kg (111 lb 3.2 oz)   02/23/21 49.7 kg (109 lb 9.6 oz)   02/23/21 49.7 kg (109 lb 9.6 oz)      EXAM:  NAD, clothes are too big   /62   Pulse 66   Temp 97.6  F (36.4  C)   Resp 18   Ht 1.524 m (5')   Wt 50.5 kg (111 lb 4.8 oz)   SpO2 97%   BMI 21.74 kg/m     VS during visit: /74  HR 62  O2sat 93% on room air   Neck supple without adenopathy or thyromegaly  Lungs clear bilaterally with fair air movement  Heart RRR s1s2 without murmur  Abd soft, NT, no distention, +BS  Ext without edema, +ulnar deviation, hypertrophy of MCPs              Neuro: some confusion, wordfinding difficulty  Psych: affect flat    Last Comprehensive Metabolic Panel:  Sodium   Date Value Ref Range Status   03/30/2021 136 136 - 145 mmol/L Final     Potassium   Date Value Ref Range Status   03/30/2021 3.8 3.5 - 5.0 mmol/L Final     Chloride   Date Value Ref Range Status   03/30/2021 102 98 - 107 mmol/L Final     Carbon Dioxide   Date Value Ref Range Status   03/30/2021 23 22 - 31 mmol/L Final     Anion Gap   Date Value Ref Range Status   03/30/2021 11 5 - 18 mmol/L Final     Glucose   Date Value Ref Range Status   03/30/2021 79 70 - 125 mg/dL Final     Urea Nitrogen   Date Value Ref Range Status   03/30/2021 24 8 - 28 mg/dL Final     Creatinine   Date Value Ref Range Status   03/30/2021 0.85 0.60 - 1.10 mg/dL Final     GFR Estimate   Date Value Ref Range Status   03/30/2021 >60 >60 ml/min/1.73m2 Final     Calcium   Date Value Ref Range Status   03/30/2021 8.5 8.5 - 10.5 mg/dL Final     Lab Results   Component Value Date    ALBUMIN 2.9 11/24/2020     Lab Results    Component Value Date    WBC 9.2 03/04/2021      HGB 9.9 03/30/2021      MCV 95 03/04/2021       03/04/2021     MAGNETIC RESONANCE CHOLANGIOPANCREATOGRAPHY  7/11/2020 1:47 PM   HISTORY: Follow up from ultrasound, concern for obstructed cystic duct.   COMPARISON: HIDA scan from June 24, 2020  FINDINGS: Motion artifact limits detail. A contracted gallbladder is  presumably in the gallbladder fossa. Markedly irregular gallbladder  wall noted which may indicate cholecystitis rather than cystic duct  obstruction.   IMPRESSION: Findings on HIDA scan may indicate cholecystitis versus  cystic duct obstruction. No definite stone in the cystic duct is  demonstrated, however evaluation is limited by motion artifact.      IMP/PLAN:   (M51.36) DDD (degenerative disc disease), lumbar    (S32.040D) Compression fracture of L4 vertebra with routine healing, subsequent encounter  Comment: s/p fall  Plan: scheduled acetaminophen, prn oxycodone. Local measures.       (I10) Hypertension goal BP (blood pressure) < 140/90   Comment:  H/o LE edema, bps lower since she's lost weight   BP Readings from Last 3 Encounters:   04/08/21 121/62   03/24/21 121/62   03/24/21 (!) 169/77      Pulse Readings from Last 4 Encounters:   04/08/21 66   03/24/21 66   03/24/21 67   03/10/21 62      Plan:  Continue atenolol 25 mg/day   Follow bps and HRs       (M05.741,  M05.742) Rheumatoid arthritis involving both hands with positive rheumatoid factor (H)  Comment: followed by Rheumatology   Plan: hydroxychloroquine 200 mg/day, infusions of Actemra per Rheum Dr Holt    Monitor labs quarterly.      She is on gabapentin 300 mg tid and analgesia as above.    Encourage mobility, walking program       (F03.90) Dementia without behavioral disturbance, unspecified dementia type (H)  Comment: cognitive decline, disorientation, loss of coping skills and decreased functional status  Plan: LTC support for meds, meals, activity    (F43.22) Adjustment disorder  with anxious mood  Comment: as above   Plan: escitalopram 10 mg/day       (E55.9) Vitamin D deficiency  (M81.0) Age-related osteoporosis without current pathological fracture  Comment: remains ambulatory, and a fall risk   Plan: vit D3 2000 units/day, dietary calcium     Yearly Reclast infusion per Rheumatology     (K82.0) Obstruction of cystic duct  Comment: no current symptoms   Plan: monitor.  Per GI, use abx if she has GB symptoms     (E78.5) Hyperlipidemia, unspecified hyperlipidemia type  Comment:   Lab Results   Component Value Date    CHOL 99 11/24/2020     Lab Results   Component Value Date    HDL 45 11/24/2020     Lab Results   Component Value Date    LDL 40 11/24/2020     Lab Results   Component Value Date    TRIG 71 11/24/2020     Plan: on atorvastatin 10 mg/day.  Given elevated transaminases and no documented CV event, consider discontinuation       (D50.9) Iron deficiency anemia, unspecified iron deficiency anemia type  Comment: NCNC   Well tolerated   Plan: follow hgb, add PPI/ Fe if falls lower        Jess Oneill MD         Sincerely,        Jess Oneill MD

## 2021-04-13 NOTE — PROGRESS NOTES
Celestina Tejada is a 89 year old female seen April 8, 2021 at [facility] unit where she has resided for 2 months (admit to TCU 2/2021, admit to AL the [facility] 9/2020)   She is seen in her room resting abed.   Nursing staff reports this is not unusual, pt usually stays in bed until after 11AM.   She needs assist with cares and ADLs, has had ongoing cognitive and physical decline.     Pt had [facility] hospitalization followed by TCU stay in February 2021.  Presented with back pain after a fall, and found to have L4 wedge fracture, and chronic severe lumbar degenerative endplate spurring L2-5    Still some pain, but better.     Pt has erosive seropositive rheumatoid arthritis, dx'd in 2008.   She has been on different medications, currently on Actemra and hydroxychloroquine, followed routinely by Rheumatology Dr Holt, last seen 4/7.  She also receives yearly Reclast infusions for osteoporosis.     Pt had seen Hem/Onc Dr Kaur in July for low platelets, and she declined further workup or tx.   Also seen by GI for elevated LFTs () found on screening for Actemra tx.  She was imaged and found to have cholecystitis and partially obstructing ductal stone.  She is asx, and has not wanted to undergo ERCP or surgery, currently monitored only.       Past Medical History:   Diagnosis Date     CKD (chronic kidney disease) stage 3, GFR 30-59 ml/min      Hyperlipidemia LDL goal < 100      Hypertension goal BP (blood pressure) < 140/90      Osteoarthritis 2/9/2012     Osteopenia      Rheumatoid arthritis(714.0)    Late onset dementia, Alzheimer's type     Cholecystitis, poss obstructing ductal stone, 2020  Thrombocytopenia    Past Surgical History:   Procedure Laterality Date     C APPENDECTOMY  1950     SURGICAL HISTORY OF -       2 normal vaginal births     SH:     She has 2 daughters: Tawnya lives locally, Florina lives in [place], [place]    Pt previously lived independently in her condo.     Past  smoker.        ROS: SLUMS 15/30    Ambulates 60-90' with FWW and SBA, directional cuing.     Reports her usual weight is 130 lb  Wt Readings from Last 5 Encounters:   04/08/21 50.5 kg (111 lb 4.8 oz)   03/26/21 50.5 kg (111 lb 4.8 oz)   03/24/21 50.4 kg (111 lb 3.2 oz)   02/23/21 49.7 kg (109 lb 9.6 oz)   02/23/21 49.7 kg (109 lb 9.6 oz)      EXAM:  NAD, clothes are too big   /62   Pulse 66   Temp 97.6  F (36.4  C)   Resp 18   Ht 1.524 m (5')   Wt 50.5 kg (111 lb 4.8 oz)   SpO2 97%   BMI 21.74 kg/m     VS during visit: /74  HR 62  O2sat 93% on room air   Neck supple without adenopathy or thyromegaly  Lungs clear bilaterally with fair air movement  Heart RRR s1s2 without murmur  Abd soft, NT, no distention, +BS  Ext without edema, +ulnar deviation, hypertrophy of MCPs              Neuro: some confusion, wordfinding difficulty  Psych: affect flat    Last Comprehensive Metabolic Panel:  Sodium   Date Value Ref Range Status   03/30/2021 136 136 - 145 mmol/L Final     Potassium   Date Value Ref Range Status   03/30/2021 3.8 3.5 - 5.0 mmol/L Final     Chloride   Date Value Ref Range Status   03/30/2021 102 98 - 107 mmol/L Final     Carbon Dioxide   Date Value Ref Range Status   03/30/2021 23 22 - 31 mmol/L Final     Anion Gap   Date Value Ref Range Status   03/30/2021 11 5 - 18 mmol/L Final     Glucose   Date Value Ref Range Status   03/30/2021 79 70 - 125 mg/dL Final     Urea Nitrogen   Date Value Ref Range Status   03/30/2021 24 8 - 28 mg/dL Final     Creatinine   Date Value Ref Range Status   03/30/2021 0.85 0.60 - 1.10 mg/dL Final     GFR Estimate   Date Value Ref Range Status   03/30/2021 >60 >60 ml/min/1.73m2 Final     Calcium   Date Value Ref Range Status   03/30/2021 8.5 8.5 - 10.5 mg/dL Final     Lab Results   Component Value Date    ALBUMIN 2.9 11/24/2020     Lab Results   Component Value Date    WBC 9.2 03/04/2021      HGB 9.9 03/30/2021      MCV 95 03/04/2021       03/04/2021      MAGNETIC RESONANCE CHOLANGIOPANCREATOGRAPHY  7/11/2020 1:47 PM   HISTORY: Follow up from ultrasound, concern for obstructed cystic duct.   COMPARISON: HIDA scan from June 24, 2020  FINDINGS: Motion artifact limits detail. A contracted gallbladder is  presumably in the gallbladder fossa. Markedly irregular gallbladder  wall noted which may indicate cholecystitis rather than cystic duct  obstruction.   IMPRESSION: Findings on HIDA scan may indicate cholecystitis versus  cystic duct obstruction. No definite stone in the cystic duct is  demonstrated, however evaluation is limited by motion artifact.      IMP/PLAN:   (M51.36) DDD (degenerative disc disease), lumbar    (S32.040D) Compression fracture of L4 vertebra with routine healing, subsequent encounter  Comment: s/p fall  Plan: scheduled acetaminophen, prn oxycodone. Local measures.       (I10) Hypertension goal BP (blood pressure) < 140/90   Comment:  H/o LE edema, bps lower since she's lost weight   BP Readings from Last 3 Encounters:   04/08/21 121/62   03/24/21 121/62   03/24/21 (!) 169/77      Pulse Readings from Last 4 Encounters:   04/08/21 66   03/24/21 66   03/24/21 67   03/10/21 62      Plan:  Continue atenolol 25 mg/day   Follow bps and HRs       (M05.741,  M05.742) Rheumatoid arthritis involving both hands with positive rheumatoid factor (H)  Comment: followed by Rheumatology   Plan: hydroxychloroquine 200 mg/day, infusions of Actemra per Rheum Dr Holt    Monitor labs quarterly.      She is on gabapentin 300 mg tid and analgesia as above.    Encourage mobility, walking program       (F03.90) Dementia without behavioral disturbance, unspecified dementia type (H)  Comment: cognitive decline, disorientation, loss of coping skills and decreased functional status  Plan: LTC support for meds, meals, activity    (F43.22) Adjustment disorder with anxious mood  Comment: as above   Plan: escitalopram 10 mg/day       (E55.9) Vitamin D deficiency  (M81.0)  Age-related osteoporosis without current pathological fracture  Comment: remains ambulatory, and a fall risk   Plan: vit D3 2000 units/day, dietary calcium     Yearly Reclast infusion per Rheumatology     (K82.0) Obstruction of cystic duct  Comment: no current symptoms   Plan: monitor.  Per GI, use abx if she has GB symptoms     (E78.5) Hyperlipidemia, unspecified hyperlipidemia type  Comment:   Lab Results   Component Value Date    CHOL 99 11/24/2020     Lab Results   Component Value Date    HDL 45 11/24/2020     Lab Results   Component Value Date    LDL 40 11/24/2020     Lab Results   Component Value Date    TRIG 71 11/24/2020     Plan: on atorvastatin 10 mg/day.  Given elevated transaminases and no documented CV event, consider discontinuation       (D50.9) Iron deficiency anemia, unspecified iron deficiency anemia type  Comment: NCNC   Well tolerated   Plan: follow hgb, add PPI/ Fe if falls lower        Jess Oneill MD

## 2021-05-07 NOTE — PROGRESS NOTES
.[facility]  Chief Complaint   Patient presents with     prison Regulatory     [place] Medical Record Number:  [id number]  Place of Service where encounter took place:  [facility] () [[id number]]    HPI:    Celestina Tejada  is 89 year old (4/2/1931), who is being seen today for a federally mandated E/M visit.  HPI information obtained from: facility chart records, facility staff, patient report and [facility] chart review. Today's concerns are: recent (4/22/21) bump on right shin when out for appt with family.   Also she seems to have some difficulty with adjusting to the move from her [place] apt to LTC on 3/24/21--ACP has been consulted     Chronic pain syndrome  DDD (degenerative disc disease), lumbar  Age-related osteoporosis without current pathological fracture  Rheumatoid arthritis involving both hands with positive rheumatoid factor (H)  Falls frequently  Chronic obstructive pulmonary disease, unspecified COPD type (H)  Benign hypertension with chronic kidney disease, stage III  Dementia without behavioral disturbance, unspecified dementia type (H)  Adjustment disorder with anxious mood  Malnutrition, unspecified type (H)  Hematoma of skin      ALLERGIES:Hydrochlorothiazide  PAST MEDICAL HISTORY:   has a past medical history of COPD (chronic obstructive pulmonary disease) (H) (3/1/2021), Hyperlipidemia LDL goal < 100, Hypertension goal BP (blood pressure) < 140/90, Osteoarthritis (2/9/2012), Osteopenia, and Rheumatoid arthritis(714.0). She also has no past medical history of Cancer (H), Cerebral infarction (H), Congestive heart failure (H), Depressive disorder, Diabetes (H), Heart disease, History of blood transfusion, Thyroid disease, or Uncomplicated asthma.  PAST SURGICAL HISTORY:   has a past surgical history that includes APPENDECTOMY (1950) and surgical history of - .  FAMILY HISTORY: family history includes Arthritis in her sister; C.A.D. in her  sister; Family History Negative in her brother, daughter, and daughter; Heart Disease in her mother; Respiratory in her mother.  SOCIAL HISTORY:  reports that she quit smoking about 20 years ago. Her smoking use included cigarettes. She has a 10.00 pack-year smoking history. She has never used smokeless tobacco. She reports previous alcohol use. She reports that she does not use drugs.    MEDICATIONS:  Current Outpatient Medications   Medication Sig Dispense Refill     acetaminophen (TYLENOL) 500 MG tablet Take 2 tablets (1,000 mg) by mouth 3 times daily 90 tablet 4     atenolol (TENORMIN) 25 MG tablet Take 1 tablet (25 mg) by mouth daily       atorvastatin (LIPITOR) 10 MG tablet Take 1 tablet (10 mg) by mouth daily 30 tablet 3     escitalopram (LEXAPRO) 5 MG tablet Take 2 tablets (10 mg) by mouth daily 60 tablet 11     gabapentin (NEURONTIN) 300 MG capsule Take 300 mg by mouth 2 times daily       hydroxychloroquine (PLAQUENIL) 200 MG tablet Take 200 mg by mouth daily   3     loperamide (IMODIUM A-D) 2 MG tablet Take 1 tablet (2 mg) by mouth 2 times daily as needed for diarrhea 30 tablet 1     miconazole (MICATIN) 2 % external powder Apply topically 2 times daily Apply to under breast and groin redness BID and BID PRN 90 g 11     multivitamin w/minerals (THERA-VIT-M) tablet Take 1 tablet by mouth daily 60 tablet 11     oxyCODONE (ROXICODONE) 5 MG tablet Take 0.5 tablets (2.5 mg) by mouth every 6 hours as needed for severe pain 90 tablet 0     SENNA-docusate sodium (SENNA S) 8.6-50 MG tablet Take 1 tablet by mouth 2 times daily as needed (constipation) 30 tablet 3     vitamin D3 (CHOLECALCIFEROL) 50 mcg (2000 units) tablet Take 1 tablet (50 mcg) by mouth daily 30 tablet 11           Case Management:  I have reviewed the care plan and MDS and do agree with the plan. Patient's desire to return to the community is not assessible due to cognitive impairment. Information reviewed:  Medications, vital signs, orders, and  "nursing notes.    TODAY DURING EXAM/ROS: \"I want to go home to my apt\".   No CP, SOB, Cough, dizziness, fevers, chills, HA, N/V, dysuria or Bowel Abnormalities. Appetite is varies..  No pain except  \"Sore bump on my shin\"      Vitals:  /52   Pulse 65   Temp 97.1  F (36.2  C)   Resp 16   Ht 1.524 m (5')   Wt 50.5 kg (111 lb 4.8 oz)   SpO2 95%   BMI 21.74 kg/m    Body mass index is 21.74 kg/m .  Exam:  GENERAL APPEARANCE:  Alert, in no distress, oriented, cooperative, forgetful  ENT:  Mouth with moist mucous membranes, is mildly [place]  EYES:  Conjunctiva and lids normal  RESP:  respiratory effort  of chest normal, lungs clear to ausculation except a few scattered coarse BS, no respiratory distress  CV:  Auscultation of heart done , RRR no murmur, rub, or gallop, trace bilat pedal edema, +pedal pulses  ABDOMEN:  normal bowel sounds, soft, nontender   M/S:   up with assist and walker but seen in stuffed chair  SKIN:  Inspection of skin at baseline,but limited as pt fully dressed. Her right shin has a ~~2cm raised hematoma--not open, no sign infection or redness--*(improved per staff*)        NEURO:   Cranial nerves  are  grossly at patient's baseline  PSYCH:  oriented X 3, insight and judgement impaired, memory impaired       Lab/Diagnostic data:   Recent Labs   Lab Test 03/30/21 03/29/21    134*   POTASSIUM 3.8 3.9   CHLORIDE 102 102   CO2 23 23   ANIONGAP 11 9   GLC 79 88   BUN 24 24   CR 0.85 0.88   JOSÉ 8.5 8.1*     CBC RESULTS:   Recent Labs   Lab Test 03/30/21 03/04/21   WBC  --  9.2   RBC  --  3.35*   HGB 9.9* 10.3*   HCT  --  31.9*   MCV  --  95   MCH  --  30.7   MCHC  --  32.3   RDW  --  13.8   PLT  --  233     TSH   Date Value Ref Range Status   03/30/2021 1.95 0.30 - 5.00 ulu/ml Final     ASSESSMENT / PLAN:  (G89.4) Chronic pain syndrome  (primary encounter diagnosis)   (M51.36) DDD (degenerative disc disease), lumbar   (M81.0) Age-related osteoporosis without current pathological fracture   " (M05.741,  M05.742) Rheumatoid arthritis involving both hands with positive rheumatoid factor (H)   (R29.6) Falls frequently  Comment/Plan: no acute issues, unsteady of feet. On tyelnol, Neurontin,  Plaquenil, prn oxycodone (* has not used any oxy this month but [place] spells of [place] it [place]--will not stop at this time*). Monitor.    (J44.9) Chronic obstructive pulmonary disease, unspecified COPD type (H)  Comment/Plan: compensated, on no meds, add prn inhaler is nec, monitor.    (I12.9,  N18.30) Benign hypertension with chronic kidney disease, stage III  Comment/Plan: runs mostly 110-120's SBP, HR 60-70's, Tenormin. monitor.    (F03.90) Dementia without behavioral disturbance, unspecified dementia type (H)   (F43.22) Adjustment disorder with anxious mood   (E46) Malnutrition, unspecified type (H)  Comment/Plan: Lexapro, encourage po,  ACP consult also for adjustment,      Electronically signed by:  STEVE Bradley CNP

## 2021-05-09 PROBLEM — T14.8XXA HEMATOMA OF SKIN: Status: ACTIVE | Noted: 2021-01-01

## 2021-05-09 PROBLEM — R29.6 FALLS FREQUENTLY: Status: ACTIVE | Noted: 2021-01-01

## 2021-05-15 NOTE — TELEPHONE ENCOUNTER
"[facility] Nurse Triage Telephone Note    Provider: Heide Caba NP  Facility: [facility]              Facility Type:  LTC    Caller: Melindaallison  Call Back Number: [phone]    Allergies:    Allergies   Allergen Reactions     Hydrochlorothiazide      hyponatremia        Reason for call: Pt states she fell yesterday & now today is having pain on her R side by waist. No marks on skin, not painful when touch area. \"Hurts to take deep breath\" Rates pain 3/10. T:97.6, RA sat 95%. Takes Tylenol 1000mg TID.    Verbal Order/Direction given by Provider: Continue to monitor, tylenol, warm pack PRN. If continues call back.    Provider giving Order:  Pat Vicente NP    Verbal Order given to: Concha Yañez RN      "

## 2021-06-11 ENCOUNTER — RECORDS - HEALTHEAST (OUTPATIENT)
Dept: GERIATRICS | Facility: CLINIC | Age: 86
End: 2021-06-11

## 2021-06-11 NOTE — TELEPHONE ENCOUNTER
[facility] Nurse Triage Telephone Note    Provider: Guillermo Roberson NP  Facility: [facility]  Facility Type:  LTC    Caller: Breana  Call Back Number: [phone]    Allergies:    Allergies   Allergen Reactions     Hydrochlorothiazide      hyponatremia        Reason for call: Pt has a new rash on her cervical spine region wrapping down her left shoulder and posterior left arm down to her elbow. The area has erythema, vesicles, pain and no drainage at this time.   Nursing feels that this is shingles.   Vitals: BP:  134/61  P:: 96  R:: 20  SPO2: 96% R/A Temp.:  97.4 Wt 113.2        Verbal Order/Direction given by Provider: Valacyclovir 1000mg TID (0800, 1500, 2200) x7days for shingles. Update NP on 6/14.    Provider giving Order:  Guillermo Roberson NP    Verbal Order given to: Florina Kamara RN

## 2021-06-14 PROBLEM — B02.9 HERPES ZOSTER WITHOUT COMPLICATION: Status: ACTIVE | Noted: 2021-01-01

## 2021-06-14 PROBLEM — R53.1 LEFT-SIDED WEAKNESS: Status: ACTIVE | Noted: 2021-01-01

## 2021-06-14 NOTE — PROGRESS NOTES
Chief Complaint: rash and new left sided weakness    HPI:    Celestina Tejada is a 89 year old  (4/2/1931), who is being seen today for an episodic care visit at [facility]. Today's concern is:asked at 0950 to see pt as they had to transfer by Nicki/Parvez rather than her standing--weak on left leg. Not able to hold cup in either hand for BF very well     Dementia without behavioral disturbance, unspecified dementia type (H)  Herpes zoster without complication  Left-sided weakness      Current Outpatient Medications   Medication Sig Dispense Refill     valACYclovir (VALTREX) 1000 mg tablet Take 1,000 mg by mouth 3 times daily X 7 days 8a, 3p, 10p For shingles.       acetaminophen (TYLENOL) 500 MG tablet Take 2 tablets (1,000 mg) by mouth 3 times daily 90 tablet 4     atenolol (TENORMIN) 25 MG tablet Take 1 tablet (25 mg) by mouth daily       atorvastatin (LIPITOR) 10 MG tablet Take 1 tablet (10 mg) by mouth daily 30 tablet 3     escitalopram (LEXAPRO) 5 MG tablet Take 2 tablets (10 mg) by mouth daily 60 tablet 11     gabapentin (NEURONTIN) 300 MG capsule Take 300 mg by mouth 2 times daily       hydroxychloroquine (PLAQUENIL) 200 MG tablet Take 200 mg by mouth daily   3     loperamide (IMODIUM A-D) 2 MG tablet Take 1 tablet (2 mg) by mouth 2 times daily as needed for diarrhea 30 tablet 1     miconazole (MICATIN) 2 % external powder Apply topically 2 times daily Apply to under breast and groin redness BID and BID PRN 90 g 11     multivitamin w/minerals (THERA-VIT-M) tablet Take 1 tablet by mouth daily 60 tablet 11     oxyCODONE (ROXICODONE) 5 MG tablet Take 0.5 tablets (2.5 mg) by mouth every 6 hours as needed for severe pain 90 tablet 0     SENNA-docusate sodium (SENNA S) 8.6-50 MG tablet Take 1 tablet by mouth 2 times daily as needed (constipation) 30 tablet 3     vitamin D3 (CHOLECALCIFEROL) 50 mcg (2000 units) tablet Take 1 tablet (50 mcg) by mouth daily 30 tablet 11       REVIEW OF SYSTEMS:  Limited  "secondary to cognitive impairment   but today pt reports left arm and back itch--denies pain.      Temp 97.2  F (36.2  C)   Ht 1.524 m (5')   Wt 51.3 kg (113 lb 3.2 oz)   SpO2 96%   BMI 22.11 kg/m    GENERAL APPEARANCE:  Alert,oriented to self, anxious, Lungs CTA , S1/S2 with soft murmur.. trace edema, PVD changes.. Abdomen is soft, +BTS . No focal neurological deficits except slight left mouth droop, slightly weaker left  3/5 and left leg 2/5.  right side limbs appear at baseline strength wise. Rash as noted in epics:      ASSESSMENT / PLAN:  (F03.90) Dementia without behavioral disturbance, unspecified dementia type (H)  (primary encounter diagnosis)  Comment/Plan: in LTC--had been in [place] until springtime when could not manage any longer.    (B02.9) Herpes zoster without complication  Comment/Plan: left arm, upper back-- a little atypical but definitely shingles.    Had vague rash on Thursday, nurses said it was across both shoulders and blothy on left arm--no pain, just itchy-- at that time.  Friday, \"blossomed\" into above rash--no pain but itchy.  Is on Neurontin for neuropathic pain in general.. cont valtrex--stared 6/11,  Discontinue HC cream. monitor    (R.1) Left-sided weakness  Comment/Plan: ?CVA versus TIA--left UA weakness maybe in part to  Shingles in that arm.      I called and d/w family Darling lopez an update, the POC and care coordination.  This included discussion of pertinent diagnostic results,  risk and benefits of current and new treatments. Went over all meds pt on. All questions answered and there is agreement  with on the Care Plan. '15 minutes on phone.    Electronically Signed by:  STEVE Bradley CNP    "

## 2021-06-17 NOTE — PROGRESS NOTES
"Chief Complaint: f/u shingles    HPI:    Celestina Tejada is a 89 year old  (4/2/1931), who is being seen today for an episodic care visit at [facility] (). Today's concern is: pt very alert today, forgetful but asks \"I had shingles, how did I get that?\"     Herpes zoster without complication  Left-sided weakness  Dementia without behavioral disturbance, unspecified dementia type (H)      REVIEW OF SYSTEMS:  Limited secondary to cognitive impairment  But says no CP, SOB, Cough, pains. Says  Left arm a little itchy  Current Outpatient Medications   Medication Sig Dispense Refill     acetaminophen (TYLENOL) 500 MG tablet Take 2 tablets (1,000 mg) by mouth 3 times daily 90 tablet 4     atenolol (TENORMIN) 25 MG tablet Take 1 tablet (25 mg) by mouth daily       atorvastatin (LIPITOR) 10 MG tablet Take 1 tablet (10 mg) by mouth daily 30 tablet 3     escitalopram (LEXAPRO) 5 MG tablet Take 2 tablets (10 mg) by mouth daily 60 tablet 11     gabapentin (NEURONTIN) 300 MG capsule Take 300 mg by mouth 2 times daily       hydrocortisone (CORTAID) 1 % external cream Apply topically 2 times daily Can resume to shingles rash area that are not weeping TID X 5 DAYS THEN BID X 5 DAYS THEN BID PRN       hydroxychloroquine (PLAQUENIL) 200 MG tablet Take 200 mg by mouth daily   3     loperamide (IMODIUM A-D) 2 MG tablet Take 1 tablet (2 mg) by mouth 2 times daily as needed for diarrhea 30 tablet 1     miconazole (MICATIN) 2 % external powder Apply topically 2 times daily Apply to under breast and groin redness BID and BID PRN 90 g 11     multivitamin w/minerals (THERA-VIT-M) tablet Take 1 tablet by mouth daily 60 tablet 11     oxyCODONE (ROXICODONE) 5 MG tablet Take 0.5 tablets (2.5 mg) by mouth every 6 hours as needed for severe pain 90 tablet 0     SENNA-docusate sodium (SENNA S) 8.6-50 MG tablet Take 1 tablet by mouth 2 times daily as needed (constipation) 30 tablet 3     valACYclovir (VALTREX) " 1000 mg tablet Take 1,000 mg by mouth 3 times daily X 7 days 8a, 3p, 10p For shingles.       vitamin D3 (CHOLECALCIFEROL) 50 mcg (2000 units) tablet Take 1 tablet (50 mcg) by mouth daily 30 tablet 11       /58   Pulse 76   Temp 97.2  F (36.2  C)   Resp 20   Ht 1.524 m (5')   Wt 51.3 kg (113 lb 3.2 oz)   SpO2 96%   BMI 22.11 kg/m    GENERAL APPEARANCE:  Alert,oriented x person, we chatted with Darling, jessica vis FACE TIME.  She is in no distress. Lungs CTA but decreased,  No  edema. Abdomen is soft.  Rash on left upper neck/shoulder area--fading.  Left arm lesion, scabbed, dry and no open areas or blisters. (*See Pic below*) Left arm No focal neurological deficits--equally  both hands and legs strength same.   No left facial droop noted     ASSESSMENT / PLAN:  (B02.9) Herpes zoster without complication  (primary encounter diagnosis)  Comment.Plan: finishing treatment today--doing well     (R53.1) Left-sided weakness  Comment/Plan: not evident--?TIA--consider baby asa--d/w MD partner and family    (F03.90) Dementia without behavioral disturbance, unspecified dementia type (H)  Comment/Plan: at baseline, very alert today, Darling to come visit later today.  Monitor.      Electronically Signed by:  STEVE Bradley CNP

## 2021-06-21 NOTE — PROGRESS NOTES
Chief Complaint: f/u shingles    HPI:    Celestina Tejada is a 89 year old  (4/2/1931), who is being seen today for an episodic care visit at [facility] (). Today's concern is: pt very alert today, says no pain, but left arm still itches.     Herpes zoster without complication  Pruritus  Dementia without behavioral disturbance, unspecified dementia type (H)      REVIEW OF SYSTEMS:  Limited secondary to cognitive impairment:  No Pain.  Current Outpatient Medications   Medication Sig Dispense Refill     acetaminophen (TYLENOL) 500 MG tablet Take 2 tablets (1,000 mg) by mouth 3 times daily 90 tablet 4     atenolol (TENORMIN) 25 MG tablet Take 1 tablet (25 mg) by mouth daily       atorvastatin (LIPITOR) 10 MG tablet Take 1 tablet (10 mg) by mouth daily 30 tablet 3     escitalopram (LEXAPRO) 5 MG tablet Take 2 tablets (10 mg) by mouth daily 60 tablet 11     gabapentin (NEURONTIN) 300 MG capsule Take 300 mg by mouth 2 times daily       hydrocortisone (CORTAID) 1 % external cream Apply topically 2 times daily Can resume to shingles rash area that are not weeping TID X 5 DAYS THEN BID X 5 DAYS THEN BID PRN       hydroxychloroquine (PLAQUENIL) 200 MG tablet Take 200 mg by mouth daily   3     loperamide (IMODIUM A-D) 2 MG tablet Take 1 tablet (2 mg) by mouth 2 times daily as needed for diarrhea 30 tablet 1     miconazole (MICATIN) 2 % external powder Apply topically 2 times daily Apply to under breast and groin redness BID and BID PRN 90 g 11     multivitamin w/minerals (THERA-VIT-M) tablet Take 1 tablet by mouth daily 60 tablet 11     oxyCODONE (ROXICODONE) 5 MG tablet Take 0.5 tablets (2.5 mg) by mouth every 6 hours as needed for severe pain 90 tablet 0     SENNA-docusate sodium (SENNA S) 8.6-50 MG tablet Take 1 tablet by mouth 2 times daily as needed (constipation) 30 tablet 3     vitamin D3 (CHOLECALCIFEROL) 50 mcg (2000 units) tablet Take 1 tablet (50 mcg) by mouth daily 30 tablet 11        /58   Pulse 76   Temp 97.2  F (36.2  C)   Resp 20   SpO2 96%   GENERAL APPEARANCE:  Alert,oriented x person,  NAD,    Rash on left upper neck/shoulder area--fading.  Left arm lesion, scabbed, dry and no open areas or blisters. (*See Pic below taken today 6/21/21*) No focal neurological deficits noted today         ASSESSMENT / PLAN:  (B02.9) Herpes zoster without complication  (primary encounter diagnosis)    (L29.9) Pruritus  Comment.Plan: finishing treatment 6/18--HC cream for itching, monitor, it is improved, no open areas.     (F03.90) Dementia without behavioral disturbance, unspecified dementia type (H)  Comment/Plan: at baseline,  alert today, no changes,  Monitor.      Electronically Signed by:  STEVE Bradley CNP

## 2021-06-30 NOTE — PROGRESS NOTES
Chief Complaint: f/u shingles    HPI:    Celestina Tejada is a 89 year old  (4/2/1931), who is being seen today for an episodic care visit at [facility] [[email or web address] (). Today's concern is: pt alert today, says no pain but left arm still itchy but better.. no other issues per pt or staff     Herpes zoster without complication  Pruritus  Dementia without behavioral disturbance, unspecified dementia type (H)  Cognitive impairment  Malnutrition, unspecified type (H)  Benign hypertension with chronic kidney disease, stage III  Chronic obstructive pulmonary disease, unspecified COPD type (H)  Rheumatoid arthritis involving both hands with positive rheumatoid factor (H)  Age-related osteoporosis without current pathological fracture  Chronic pain syndrome  Compression fracture of lumbar vertebra with routine healing, unspecified lumbar vertebral level, subsequent encounter      REVIEW OF SYSTEMS:  Limited secondary to cognitive impairment:  No Pain.  Current Outpatient Medications   Medication Sig Dispense Refill     acetaminophen (TYLENOL) 500 MG tablet Take 2 tablets (1,000 mg) by mouth 3 times daily 90 tablet 4     atenolol (TENORMIN) 25 MG tablet Take 1 tablet (25 mg) by mouth daily       atorvastatin (LIPITOR) 10 MG tablet Take 1 tablet (10 mg) by mouth daily 30 tablet 3     escitalopram (LEXAPRO) 5 MG tablet Take 2 tablets (10 mg) by mouth daily 60 tablet 11     gabapentin (NEURONTIN) 300 MG capsule Take 300 mg by mouth 2 times daily       hydrocortisone (CORTAID) 1 % external cream Apply topically 2 times daily Can resume to shingles rash area that are not weeping TID X 5 DAYS THEN BID X 5 DAYS THEN BID PRN       hydroxychloroquine (PLAQUENIL) 200 MG tablet Take 200 mg by mouth daily   3     loperamide (IMODIUM A-D) 2 MG tablet Take 1 tablet (2 mg) by mouth 2 times daily as needed for diarrhea 30 tablet 1     miconazole (MICATIN) 2 % external powder Apply topically 2 times daily Apply to  under breast and groin redness BID and BID PRN 90 g 11     multivitamin w/minerals (THERA-VIT-M) tablet Take 1 tablet by mouth daily 60 tablet 11     oxyCODONE (ROXICODONE) 5 MG tablet Take 0.5 tablets (2.5 mg) by mouth every 6 hours as needed for severe pain 90 tablet 0     SENNA-docusate sodium (SENNA S) 8.6-50 MG tablet Take 1 tablet by mouth 2 times daily as needed (constipation) 30 tablet 3     vitamin D3 (CHOLECALCIFEROL) 50 mcg (2000 units) tablet Take 1 tablet (50 mcg) by mouth daily 30 tablet 11       /58   Pulse 76   Temp 97.2  F (36.2  C)   Resp 20   Ht 1.524 m (5')   Wt 51.3 kg (113 lb 3.2 oz)   SpO2 96%   BMI 22.11 kg/m     EXAM  GENERAL APPEARANCE:  Alert, in no distress, oriented, cooperative,+forgets  ENT:  Mouth with moist mucous membranes,  [place]  EYES:  Conjunctiva and lids normal--no redness  RESP:  respiratory effort  of chest normal, lungs CTA with a few scattered coarse BS, no respiratory distress  CV:  Auscultation of heart done , RRR. ?soft systolic murmur. no rub, gallop or edema, +pedal pulses  ABDOMEN:  normal bowel sounds, soft, nontender   M/S:   up with assist and seen in bed  SKIN:  Inspection of skin at baseline, with a few scabs left from Shingles left arma and upper back--nearly healed  NEURO:   Cranial nerves  are  Grossly intact and  at patient's baseline  PSYCH:  oriented X person  And globally place and situation,, insight and judgement impaired, memory impaired      Recent Labs   Lab Test 03/30/21 03/29/21    134*   POTASSIUM 3.8 3.9   CHLORIDE 102 102   CO2 23 23   ANIONGAP 11 9   GLC 79 88   BUN 24 24   CR 0.85 0.88   JOSÉ 8.5 8.1*     CBC RESULTS:   Recent Labs   Lab Test 03/30/21 03/04/21   WBC  --  9.2   RBC  --  3.35*   HGB 9.9* 10.3*   HCT  --  31.9*   MCV  --  95   MCH  --  30.7   MCHC  --  32.3   RDW  --  13.8   PLT  --  233       ASSESSMENT / PLAN:  (B02.9) Herpes zoster without complication  (primary encounter diagnosis)   (L29.9)  Pruritus  Comment/Plan: improving, has completed HC cream scheduled and now prn.     (F03.90) Dementia without behavioral disturbance, unspecified dementia type (H)   (R41.89) Cognitive impairment   (E46) Malnutrition, unspecified type (H)  Comment/Plan: needs 24 hr cares and help with ADLS, wt steady since coming to LTC(113.2# on 6.9.21 and was 111.2# on 3/22/21). Consider discontinue of statin next visit-- ?Benefits minimal at this stage in pt's illnesses and life situation.    (I12.9,  N18.30) Benign hypertension with chronic kidney disease, stage III  Comment/Plan: runs mostly 130's SBP occas 140's, cont tenormin, monitor. Check labs q6-12 mos along with Hgb    (J44.9) Chronic obstructive pulmonary disease, unspecified COPD type (H)  Comment/Plan: no meds, monitor, compensated.    (M05.741,  M05.742) Rheumatoid arthritis involving both hands with positive rheumatoid factor (H)  Comment/Plan: plaquenil, see below, monitor labs prn.     (M81.0) Age-related osteoporosis without current pathological fracture   (G89.4) Chronic pain syndrome  (S32.000D) Compression fracture of lumbar vertebra with routine healing, unspecified lumbar vertebral level, subsequent encounter  Comment/Plan: cont pain control, takes occas oxy but back has not flared terribly lately. Cont Tylenol and Vit D       Electronically Signed by:  STEVE Bradley CNP

## 2021-07-07 NOTE — LETTER
7/7/2021        RE: Celestina Tejada  [facility]  [address]  [address]  [facility] [id number]        Celestina Tejada is a 89 year old female seen July 7, 2021 at [facility] unit where she has resided for 5 months (admit to TCU 2/2021, admit to [facility] 9/2020)   She is seen in her room up to WCjaneth.   She awakens to answer questions but not able to give much reliable history.   Says no to pain.   She had an episode of LUE shingles (C6 or 7) last month, now clearing up        Pt has erosive seropositive rheumatoid arthritis, dx'd in 2008.   She has been on different medications, currently on Actemra and hydroxychloroquine, followed routinely by Rheumatology Dr Holt, last seen 4/7.  She also receives yearly Reclast infusions for osteoporosis.     Pt had seen Hem/Onc Dr Kaur in July 2020 for low platelets, and she declined further workup or tx.   Also seen by GI for elevated LFTs () found on screening for Actemra tx.  She was imaged and found to have cholecystitis and partially obstructing ductal stone.  She is asx, and has not wanted to undergo ERCP or surgery, currently monitored only.     Pt had [facility] hospitalization followed by TCU stay in February 2021.  Presented with back pain after a fall, and found to have L4 wedge fracture, and chronic severe lumbar degenerative endplate spurring L2-5       Past Medical History:   Diagnosis Date     CKD (chronic kidney disease) stage 3, GFR 30-59 ml/min      Hyperlipidemia LDL goal < 100      Hypertension goal BP (blood pressure) < 140/90      Osteoarthritis 2/9/2012     Osteopenia      Rheumatoid arthritis(714.0)    Late onset dementia, Alzheimer's type     Cholecystitis, poss obstructing ductal stone, 2020  Thrombocytopenia    Past Surgical History:   Procedure Laterality Date     C APPENDECTOMY  1950     SURGICAL HISTORY OF -       2 normal vaginal births     SH:     She has 2 daughters: Tawnya lives  locally, Florina lives in [place], [place]    Pt previously lived independently in her condo.     Past smoker.        ROS: SLUMS 15/30    Ambulates 60-90' with FWW and SBA, directional cuing.     Reports her usual weight is 130 lb >>> now 113 lbs    EXAM:  NAD  /58   Pulse 76   Temp 96.9  F (36.1  C)   Resp 20   Ht 1.524 m (5')   Wt 51.3 kg (113 lb 3.2 oz)   SpO2 95%   BMI 22.11 kg/m     Neck supple without adenopathy or thyromegaly  Lungs clear bilaterally with fair air movement  Heart RRR s1s2 without murmur  Abd soft, NT, no distention, +BS  Ext with trace LE edema, +ulnar deviation, hypertrophy of MCPs              Neuro: some confusion, wordfinding difficulty  Psych: affect flat    Labs reviewed      IMP/PLAN:   (M51.36) DDD (degenerative disc disease), lumbar    (S32.040D) Compression fracture of L4 vertebra with routine healing, subsequent encounter  Comment: s/p fall  Plan: scheduled acetaminophen, prn oxycodone. Local measures.       (I10) Hypertension goal BP (blood pressure) < 140/90   Comment:  H/o LE edema, bps 110-130s    Nifedipine discontinued and atenolol dose decreased over past several months  Plan:  Could discontinue atenolol altogether now given low dose and lower bps     (M05.741,  M05.742) Rheumatoid arthritis involving both hands with positive rheumatoid factor (H)  Comment: followed by Rheumatology   Plan: hydroxychloroquine 200 mg/day, infusions of Actemra per Rheum Dr Holt    Monitor labs quarterly.      She is on gabapentin 300 mg bid and analgesia as above.   Consider further GDR of gabapentin if able   Encourage mobility, walking program       (F03.90) Dementia without behavioral disturbance, unspecified dementia type (H)  Comment: cognitive decline, disorientation, loss of coping skills and decreased functional status  Plan: LTC support for meds, meals, activity    (F43.22) Adjustment disorder with anxious mood  Comment: some difficulty transitioning to LTC     Plan:  escitalopram 10 mg/day       (E55.9) Vitamin D deficiency  (M81.0) Age-related osteoporosis without current pathological fracture  Comment: remains ambulatory, and a fall risk   Plan: vit D3 2000 units/day, dietary calcium     Yearly Reclast infusion per Rheumatology     (K82.0) Obstruction of cystic duct  Comment: no current symptoms   Plan: monitor.  Per GI, use abx if she has GB symptoms     (E78.5) Hyperlipidemia, unspecified hyperlipidemia type  Comment: on atorvastatin 10 mg/day.  Plan: Given elevated transaminases and no documented CV event, consider discontinuation       (D50.9) Iron deficiency anemia, unspecified iron deficiency anemia type  Comment: NCNC, last hgb 9.9  Plan: follow hgb, add PPI/ Fe if falls lower        Jess Oneill MD         Sincerely,        Jess Oneill MD

## 2021-07-11 NOTE — PROGRESS NOTES
Celestina Tejada is a 89 year old female seen July 7, 2021 at [facility] unit where she has resided for 5 months (admit to TCU 2/2021, admit to AL the [facility] 9/2020)   She is seen in her room up to janeth CARMICHAEL.   She awakens to answer questions but not able to give much reliable history.   Says no to pain.   She had an episode of LUE shingles (C6 or 7) last month, now clearing up        Pt has erosive seropositive rheumatoid arthritis, dx'd in 2008.   She has been on different medications, currently on Actemra and hydroxychloroquine, followed routinely by Rheumatology Dr Holt, last seen 4/7.  She also receives yearly Reclast infusions for osteoporosis.     Pt had seen Hem/Onc Dr Kaur in July 2020 for low platelets, and she declined further workup or tx.   Also seen by GI for elevated LFTs () found on screening for Actemra tx.  She was imaged and found to have cholecystitis and partially obstructing ductal stone.  She is asx, and has not wanted to undergo ERCP or surgery, currently monitored only.     Pt had [facility] hospitalization followed by TCU stay in February 2021.  Presented with back pain after a fall, and found to have L4 wedge fracture, and chronic severe lumbar degenerative endplate spurring L2-5       Past Medical History:   Diagnosis Date     CKD (chronic kidney disease) stage 3, GFR 30-59 ml/min      Hyperlipidemia LDL goal < 100      Hypertension goal BP (blood pressure) < 140/90      Osteoarthritis 2/9/2012     Osteopenia      Rheumatoid arthritis(714.0)    Late onset dementia, Alzheimer's type     Cholecystitis, poss obstructing ductal stone, 2020  Thrombocytopenia    Past Surgical History:   Procedure Laterality Date     C APPENDECTOMY  1950     SURGICAL HISTORY OF -       2 normal vaginal births     SH:     She has 2 daughters: Tawnya lives locally, Florina lives in [place], [place]    Pt previously lived independently in her condo.     Past smoker.        ROS: SLUMS  15/30    Ambulates 60-90' with FWW and SBA, directional cuing.     Reports her usual weight is 130 lb >>> now 113 lbs    EXAM:  NAD  /58   Pulse 76   Temp 96.9  F (36.1  C)   Resp 20   Ht 1.524 m (5')   Wt 51.3 kg (113 lb 3.2 oz)   SpO2 95%   BMI 22.11 kg/m     Neck supple without adenopathy or thyromegaly  Lungs clear bilaterally with fair air movement  Heart RRR s1s2 without murmur  Abd soft, NT, no distention, +BS  Ext with trace LE edema, +ulnar deviation, hypertrophy of MCPs              Neuro: some confusion, wordfinding difficulty  Psych: affect flat    Labs reviewed      IMP/PLAN:   (M51.36) DDD (degenerative disc disease), lumbar    (S32.040D) Compression fracture of L4 vertebra with routine healing, subsequent encounter  Comment: s/p fall  Plan: scheduled acetaminophen, prn oxycodone. Local measures.       (I10) Hypertension goal BP (blood pressure) < 140/90   Comment:  H/o LE edema, bps 110-130s    Nifedipine discontinued and atenolol dose decreased over past several months  Plan:  Could discontinue atenolol altogether now given low dose and lower bps     (M05.741,  M05.742) Rheumatoid arthritis involving both hands with positive rheumatoid factor (H)  Comment: followed by Rheumatology   Plan: hydroxychloroquine 200 mg/day, infusions of Actemra per Rheum Dr Holt    Monitor labs quarterly.      She is on gabapentin 300 mg bid and analgesia as above.   Consider further GDR of gabapentin if able   Encourage mobility, walking program       (F03.90) Dementia without behavioral disturbance, unspecified dementia type (H)  Comment: cognitive decline, disorientation, loss of coping skills and decreased functional status  Plan: LTC support for meds, meals, activity    (F43.22) Adjustment disorder with anxious mood  Comment: some difficulty transitioning to LTC     Plan: escitalopram 10 mg/day       (E55.9) Vitamin D deficiency  (M81.0) Age-related osteoporosis without current pathological  fracture  Comment: remains ambulatory, and a fall risk   Plan: vit D3 2000 units/day, dietary calcium     Yearly Reclast infusion per Rheumatology     (K82.0) Obstruction of cystic duct  Comment: no current symptoms   Plan: monitor.  Per GI, use abx if she has GB symptoms     (E78.5) Hyperlipidemia, unspecified hyperlipidemia type  Comment: on atorvastatin 10 mg/day.  Plan: Given elevated transaminases and no documented CV event, consider discontinuation       (D50.9) Iron deficiency anemia, unspecified iron deficiency anemia type  Comment: NCNC, last hgb 9.9  Plan: follow hgb, add PPI/ Fe if falls lower        Jess Oneill MD

## 2021-08-24 NOTE — PROGRESS NOTES
"Chief Complaint: Fall     HPI:    Celestina Tejada is a 89 year old  (4/2/1931), who is being seen today for an episodic care visit at [facility]. Today's concern is:     Dementia without behavioral disturbance, unspecified dementia type (H)  Malnutrition, unspecified type (H)  History of shingles  PHN (postherpetic neuralgia)  Compression fracture of lumbar vertebra with routine healing, unspecified lumbar vertebral level, subsequent encounter  Rheumatoid arthritis involving both hands with positive rheumatoid factor (H)  Chronic pain syndrome  Chronic obstructive pulmonary disease, unspecified COPD type (H)  Recurrent falls      TODAY DURING EXAM/ROS: just got back from beauty shop.  Has  no CP, SOB,  HA, N/V, dysuria or Bowel Abnormalities. Appetite is down.  No pain except left arm and shoulder with pain since shingles--says it is \"nerve pain\"      /48   Pulse 66   Temp (!) 96.1  F (35.6  C)   Resp 18   SpO2 95%   GENERAL APPEARANCE:  Alert,oriented x 3-forgetful, in no distress. Lungs CTA , S1/S2 without M/G/R  . No  edema. Abdomen is soft, +BTS . No focal neurological deficits. Skin intact  Current Outpatient Medications   Medication Sig Dispense Refill     [START ON 8/26/2021] lidocaine (XYLOCAINE) 2 % external gel Apply 1 mL topically 3 times daily Left arm and shoulder areas of pain       acetaminophen (TYLENOL) 500 MG tablet Take 2 tablets (1,000 mg) by mouth 3 times daily 90 tablet 4     atenolol (TENORMIN) 25 MG tablet Take 1 tablet (25 mg) by mouth daily       escitalopram (LEXAPRO) 5 MG tablet Take 2 tablets (10 mg) by mouth daily 60 tablet 11     gabapentin (NEURONTIN) 300 MG capsule Take 300 mg by mouth 2 times daily       hydroxychloroquine (PLAQUENIL) 200 MG tablet Take 200 mg by mouth daily   3     loperamide (IMODIUM A-D) 2 MG tablet Take 1 tablet (2 mg) by mouth 2 times daily as needed for diarrhea 30 tablet 1     miconazole (MICATIN) 2 % external powder Apply topically 2 " times daily Apply to under breast and groin redness BID and BID PRN 90 g 11     multivitamin w/minerals (THERA-VIT-M) tablet Take 1 tablet by mouth daily 60 tablet 11     oxyCODONE (ROXICODONE) 5 MG tablet Take 0.5 tablets (2.5 mg) by mouth every 6 hours as needed for severe pain 90 tablet 0     SENNA-docusate sodium (SENNA S) 8.6-50 MG tablet Take 1 tablet by mouth 2 times daily as needed (constipation) 30 tablet 3     vitamin D3 (CHOLECALCIFEROL) 50 mcg (2000 units) tablet Take 1 tablet (50 mcg) by mouth daily 30 tablet 11         ASSESSMENT / PLAN:  (F03.90) Dementia without behavioral disturbance, unspecified dementia type (H)  (primary encounter diagnosis)  (R29.6) Recurrent falls  (E46) Malnutrition, unspecified type (H)  Comment/Plan: conts to decline, more falls, family wishes for Hospice consult.  I told pt we would have Hospice/Palliative care see her to help with pain control and comfort--pt agrees. Discontinue Lipitor    (Z86.19) History of shingles   (B02.29) PHN (postherpetic neuralgia)  Comment/Plan: consider increase Neurontin but will add Lidocaine gel 2% for now    (S32.000D) Compression fracture of lumbar vertebra with routine healing, unspecified lumbar vertebral level, subsequent encounter   (M05.741,  M05.742) Rheumatoid arthritis involving both hands with positive rheumatoid factor (H)   (G89.4) Chronic pain syndrome  Comment/Plan: cont current meds, Neurontin, plaquenil, oxy, monitor.    (J44.9) Chronic obstructive pulmonary disease, unspecified COPD type (H)  Comment/Plan: compensated, tires easily, cont same POC, on no meds       Electronically Signed by:  STEVE Bradley CNP

## 2021-08-25 PROBLEM — R29.6 FREQUENT FALLS: Status: ACTIVE | Noted: 2021-01-01

## 2021-08-25 PROBLEM — B02.29 PHN (POSTHERPETIC NEURALGIA): Status: ACTIVE | Noted: 2021-01-01

## 2021-09-01 NOTE — PROGRESS NOTES
[facility]  Chief Complaint   Patient presents with     skilled nursing Regulatory     [place] Medical Record Number:  [id number]  Place of Service where encounter took place:  [facility] () [[id number]]    HPI:    Celestina Tejada  is 89 year old (4/2/1931), who is being seen today for a federally mandated E/M visit.  HPI information obtained from: facility chart records, facility staff and [facility] Epic chart review. Today's concerns are: pt has been declining since admit to Hospice.--is not taking pills or eating today.     Chronic pain syndrome  Rheumatoid arthritis involving both hands with positive rheumatoid factor (H)  Chronic obstructive pulmonary disease, unspecified COPD type (H)  Benign hypertension with chronic kidney disease, stage III  Dementia without behavioral disturbance, unspecified dementia type (H)  Malnutrition, unspecified type (H)  Hospice care patient      ALLERGIES:Hydrochlorothiazide  PAST MEDICAL HISTORY:   has a past medical history of COPD (chronic obstructive pulmonary disease) (H) (3/1/2021), Hyperlipidemia LDL goal < 100, Hypertension goal BP (blood pressure) < 140/90, Osteoarthritis (2/9/2012), Osteopenia, and Rheumatoid arthritis(714.0). She also has no past medical history of Cancer (H), Cerebral infarction (H), Congestive heart failure (H), Depressive disorder, Diabetes (H), Heart disease, History of blood transfusion, Thyroid disease, or Uncomplicated asthma.  PAST SURGICAL HISTORY:   has a past surgical history that includes APPENDECTOMY (1950) and surgical history of - .  FAMILY HISTORY: family history includes Arthritis in her sister; C.A.D. in her sister; Family History Negative in her brother, daughter, and daughter; Heart Disease in her mother; Respiratory in her mother.  SOCIAL HISTORY:  reports that she quit smoking about 20 years ago. Her smoking use included cigarettes. She has a 10.00 pack-year smoking history. She has  never used smokeless tobacco. She reports previous alcohol use. She reports that she does not use drugs.    MEDICATIONS:  Current Outpatient Medications   Medication Sig Dispense Refill     acetaminophen (TYLENOL) 500 MG tablet Take 2 tablets (1,000 mg) by mouth 3 times daily 90 tablet 4     atenolol (TENORMIN) 25 MG tablet Take 1 tablet (25 mg) by mouth daily       escitalopram (LEXAPRO) 5 MG tablet Take 2 tablets (10 mg) by mouth daily 60 tablet 11     gabapentin (NEURONTIN) 300 MG capsule Take 300 mg by mouth 2 times daily       hydroxychloroquine (PLAQUENIL) 200 MG tablet Take 200 mg by mouth daily   3     lidocaine (XYLOCAINE) 2 % external gel Apply 1 mL topically 3 times daily Left arm and shoulder areas of pain       loperamide (IMODIUM A-D) 2 MG tablet Take 1 tablet (2 mg) by mouth 2 times daily as needed for diarrhea 30 tablet 1     miconazole (MICATIN) 2 % external powder Apply topically 2 times daily Apply to under breast and groin redness BID and BID PRN 90 g 11     multivitamin w/minerals (THERA-VIT-M) tablet Take 1 tablet by mouth daily 60 tablet 11     oxyCODONE (ROXICODONE) 5 MG tablet Take 0.5 tablets (2.5 mg) by mouth every 6 hours as needed for severe pain 90 tablet 0     SENNA-docusate sodium (SENNA S) 8.6-50 MG tablet Take 1 tablet by mouth 2 times daily as needed (constipation) 30 tablet 3     vitamin D3 (CHOLECALCIFEROL) 50 mcg (2000 units) tablet Take 1 tablet (50 mcg) by mouth daily 30 tablet 11           Case Management:  I have reviewed the care plan and MDS and do agree with the plan. Patient's desire to return to the community is not responsive. Information reviewed:  Medications, vital signs, orders, and nursing notes.    ROS:  Unobtainable secondary  Declining, not responding to questions, FUENTES to touch    Vitals:  /61   Pulse 62   Temp 97.3  F (36.3  C)   Resp 18   Ht 1.524 m (5')   Wt 46.8 kg (103 lb 3.2 oz)   SpO2 94%   BMI 20.15 kg/m    Body mass index is 20.15  kg/m .  Exam:  GENERAL APPEARANCE:  somnolent  ENT:  Mouth open and with dry mucous membranes  RESP:  respiratory effort  of chest shallow, no respiratory distress, diminished breath sounds with some coarse BS  CV:  Auscultation of heart done , regular rate and rhythm, muffled, no murmur, rub, or gallop, no edema  ABDOMEN: +l bowel sounds, soft  M/S:   FUENTES randomly and not to command  SKIN:  scattered bruises, warm skin, limited exam   NEURO:   somnolent--no purposeful mvmts    Lab/Diagnostic data:   Recent Labs   Lab Test 03/30/21  0000 03/29/21  0000    134*   POTASSIUM 3.8 3.9   CHLORIDE 102 102   CO2 23 23   ANIONGAP 11 9   GLC 79 88   BUN 24 24   CR 0.85 0.88   JOSÉ 8.5 8.1*     ASSESSMENT / PLAN:  (G89.4) Chronic pain syndrome  (primary encounter diagnosis)   (M05.741,  M05.742) 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    (J44.9) Chronic obstructive pulmonary disease, unspecified COPD type (H)   (I12.9,  N18.30) Benign hypertension with chronic kidney disease, stage III  Comment/Plan: calm, restful, non distressed, goal is comfort    (F03.90) Dementia without behavioral disturbance, unspecified dementia type (H)   (E46) Malnutrition, unspecified type (H)   (Z51.5) Hospice care patient  Comment: pt is actively dying  Plan: goal is comfort, discontinue all po meds      Electronically signed by:  STEVE Bradley CNP

## 2021-09-02 PROBLEM — Z51.5 HOSPICE CARE PATIENT: Status: ACTIVE | Noted: 2021-01-01

## 2021-09-03 NOTE — TELEPHONE ENCOUNTER
[facility] Nurse Triage Telephone Note    Provider: STEVE Melvin CNP    Facility: [facility]  Facility Type:  LTC    Caller: [facility]       Allergies:    Allergies   Allergen Reactions     Hydrochlorothiazide      hyponatremia        Reason for call: [facility] nurse is calling to inform the care team that the pt passed away this am @ 809.         Gita Kamara RN
